# Patient Record
Sex: FEMALE | Race: WHITE | NOT HISPANIC OR LATINO | Employment: OTHER | URBAN - METROPOLITAN AREA
[De-identification: names, ages, dates, MRNs, and addresses within clinical notes are randomized per-mention and may not be internally consistent; named-entity substitution may affect disease eponyms.]

---

## 2017-01-20 ENCOUNTER — INFUSION (OUTPATIENT)
Dept: ONCOLOGY | Facility: HOSPITAL | Age: 67
End: 2017-01-20

## 2017-01-20 VITALS
WEIGHT: 144.1 LBS | DIASTOLIC BLOOD PRESSURE: 78 MMHG | BODY MASS INDEX: 22.57 KG/M2 | SYSTOLIC BLOOD PRESSURE: 143 MMHG | HEART RATE: 66 BPM | TEMPERATURE: 98.3 F | RESPIRATION RATE: 18 BRPM

## 2017-01-20 DIAGNOSIS — G35 MULTIPLE SCLEROSIS (HCC): Primary | ICD-10-CM

## 2017-01-20 PROCEDURE — 96365 THER/PROPH/DIAG IV INF INIT: CPT

## 2017-01-20 PROCEDURE — 96413 CHEMO IV INFUSION 1 HR: CPT

## 2017-01-20 PROCEDURE — 25010000002 NATALIZUMAB PER 1 MG: Performed by: PSYCHIATRY & NEUROLOGY

## 2017-01-20 PROCEDURE — 63710000001 DIPHENHYDRAMINE PER 50 MG: Performed by: PSYCHIATRY & NEUROLOGY

## 2017-01-20 RX ORDER — DIPHENHYDRAMINE HCL 25 MG
25 CAPSULE ORAL ONCE
Status: COMPLETED | OUTPATIENT
Start: 2017-01-20 | End: 2017-01-20

## 2017-01-20 RX ORDER — DIPHENHYDRAMINE HCL 25 MG
25 CAPSULE ORAL ONCE
Status: CANCELLED | OUTPATIENT
Start: 2017-02-01

## 2017-01-20 RX ORDER — ACETAMINOPHEN 325 MG/1
650 TABLET ORAL ONCE
Status: CANCELLED | OUTPATIENT
Start: 2017-02-01

## 2017-01-20 RX ORDER — ACETAMINOPHEN 325 MG/1
650 TABLET ORAL ONCE
Status: COMPLETED | OUTPATIENT
Start: 2017-01-20 | End: 2017-01-20

## 2017-01-20 RX ORDER — SODIUM CHLORIDE 9 MG/ML
250 INJECTION, SOLUTION INTRAVENOUS ONCE
Status: CANCELLED | OUTPATIENT
Start: 2017-02-01

## 2017-01-20 RX ADMIN — ACETAMINOPHEN 650 MG: 325 TABLET ORAL at 10:03

## 2017-01-20 RX ADMIN — NATALIZUMAB 300 MG: 300 INJECTION INTRAVENOUS at 10:05

## 2017-01-20 RX ADMIN — DIPHENHYDRAMINE HYDROCHLORIDE 25 MG: 25 CAPSULE ORAL at 10:04

## 2017-02-20 ENCOUNTER — INFUSION (OUTPATIENT)
Dept: ONCOLOGY | Facility: HOSPITAL | Age: 67
End: 2017-02-20

## 2017-02-20 ENCOUNTER — APPOINTMENT (OUTPATIENT)
Dept: LAB | Facility: HOSPITAL | Age: 67
End: 2017-02-20

## 2017-02-20 ENCOUNTER — OFFICE VISIT (OUTPATIENT)
Dept: NEUROLOGY | Facility: CLINIC | Age: 67
End: 2017-02-20

## 2017-02-20 VITALS
SYSTOLIC BLOOD PRESSURE: 175 MMHG | HEART RATE: 66 BPM | TEMPERATURE: 97.5 F | RESPIRATION RATE: 18 BRPM | DIASTOLIC BLOOD PRESSURE: 94 MMHG

## 2017-02-20 VITALS
OXYGEN SATURATION: 92 % | HEART RATE: 58 BPM | SYSTOLIC BLOOD PRESSURE: 154 MMHG | DIASTOLIC BLOOD PRESSURE: 84 MMHG | WEIGHT: 148.6 LBS | HEIGHT: 67 IN | BODY MASS INDEX: 23.32 KG/M2

## 2017-02-20 DIAGNOSIS — G35 MULTIPLE SCLEROSIS (HCC): Primary | ICD-10-CM

## 2017-02-20 LAB
ALBUMIN SERPL-MCNC: 3.8 G/DL (ref 3.2–4.8)
ALBUMIN/GLOB SERPL: 1.2 G/DL (ref 1.5–2.5)
ALP SERPL-CCNC: 50 U/L (ref 25–100)
ALT SERPL W P-5'-P-CCNC: 18 U/L (ref 7–40)
ANION GAP SERPL CALCULATED.3IONS-SCNC: 3 MMOL/L (ref 3–11)
AST SERPL-CCNC: 19 U/L (ref 0–33)
BASOPHILS # BLD AUTO: 0.03 10*3/MM3 (ref 0–0.2)
BASOPHILS NFR BLD AUTO: 0.2 % (ref 0–1)
BILIRUB SERPL-MCNC: 0.3 MG/DL (ref 0.3–1.2)
BUN BLD-MCNC: 18 MG/DL (ref 9–23)
BUN/CREAT SERPL: 20 (ref 7–25)
CALCIUM SPEC-SCNC: 9.4 MG/DL (ref 8.7–10.4)
CHLORIDE SERPL-SCNC: 107 MMOL/L (ref 99–109)
CO2 SERPL-SCNC: 30 MMOL/L (ref 20–31)
CREAT BLD-MCNC: 0.9 MG/DL (ref 0.6–1.3)
DEPRECATED RDW RBC AUTO: 52.1 FL (ref 37–54)
EOSINOPHIL # BLD AUTO: 0.15 10*3/MM3 (ref 0.1–0.3)
EOSINOPHIL NFR BLD AUTO: 0.9 % (ref 0–3)
ERYTHROCYTE [DISTWIDTH] IN BLOOD BY AUTOMATED COUNT: 15.1 % (ref 11.3–14.5)
GFR SERPL CREATININE-BSD FRML MDRD: 63 ML/MIN/1.73
GLOBULIN UR ELPH-MCNC: 3.1 GM/DL
GLUCOSE BLD-MCNC: 107 MG/DL (ref 70–100)
HCT VFR BLD AUTO: 35.4 % (ref 34.5–44)
HGB BLD-MCNC: 11.5 G/DL (ref 11.5–15.5)
IMM GRANULOCYTES # BLD: 0.16 10*3/MM3 (ref 0–0.03)
IMM GRANULOCYTES NFR BLD: 0.9 % (ref 0–0.6)
LYMPHOCYTES # BLD AUTO: 8.38 10*3/MM3 (ref 0.6–4.8)
LYMPHOCYTES NFR BLD AUTO: 48.3 % (ref 24–44)
MCH RBC QN AUTO: 30.7 PG (ref 27–31)
MCHC RBC AUTO-ENTMCNC: 32.5 G/DL (ref 32–36)
MCV RBC AUTO: 94.7 FL (ref 80–99)
MONOCYTES # BLD AUTO: 1.05 10*3/MM3 (ref 0–1)
MONOCYTES NFR BLD AUTO: 6 % (ref 0–12)
NEUTROPHILS # BLD AUTO: 7.59 10*3/MM3 (ref 1.5–8.3)
NEUTROPHILS NFR BLD AUTO: 43.7 % (ref 41–71)
NRBC BLD MANUAL-RTO: 0.5 /100 WBC (ref 0–0)
PLATELET # BLD AUTO: 391 10*3/MM3 (ref 150–450)
PMV BLD AUTO: 9.9 FL (ref 6–12)
POTASSIUM BLD-SCNC: 3.9 MMOL/L (ref 3.5–5.5)
PROT SERPL-MCNC: 6.9 G/DL (ref 5.7–8.2)
RBC # BLD AUTO: 3.74 10*6/MM3 (ref 3.89–5.14)
SODIUM BLD-SCNC: 140 MMOL/L (ref 132–146)
WBC NRBC COR # BLD: 17.36 10*3/MM3 (ref 3.5–10.8)

## 2017-02-20 PROCEDURE — 63710000001 DIPHENHYDRAMINE PER 50 MG: Performed by: PSYCHIATRY & NEUROLOGY

## 2017-02-20 PROCEDURE — 80053 COMPREHEN METABOLIC PANEL: CPT | Performed by: PSYCHIATRY & NEUROLOGY

## 2017-02-20 PROCEDURE — 96365 THER/PROPH/DIAG IV INF INIT: CPT

## 2017-02-20 PROCEDURE — 96413 CHEMO IV INFUSION 1 HR: CPT

## 2017-02-20 PROCEDURE — 99212 OFFICE O/P EST SF 10 MIN: CPT | Performed by: PSYCHIATRY & NEUROLOGY

## 2017-02-20 PROCEDURE — 36415 COLL VENOUS BLD VENIPUNCTURE: CPT | Performed by: PSYCHIATRY & NEUROLOGY

## 2017-02-20 PROCEDURE — 25010000002 NATALIZUMAB PER 1 MG: Performed by: PSYCHIATRY & NEUROLOGY

## 2017-02-20 PROCEDURE — 85025 COMPLETE CBC W/AUTO DIFF WBC: CPT | Performed by: PSYCHIATRY & NEUROLOGY

## 2017-02-20 RX ORDER — ACETAMINOPHEN 325 MG/1
650 TABLET ORAL ONCE
Status: COMPLETED | OUTPATIENT
Start: 2017-02-20 | End: 2017-02-20

## 2017-02-20 RX ORDER — ACETAMINOPHEN 325 MG/1
650 TABLET ORAL ONCE
Status: CANCELLED | OUTPATIENT
Start: 2017-03-01

## 2017-02-20 RX ORDER — DEXTROMETHORPHAN HYDROBROMIDE AND PROMETHAZINE HYDROCHLORIDE 15; 6.25 MG/5ML; MG/5ML
SYRUP ORAL
COMMUNITY
Start: 2017-01-23 | End: 2017-07-05

## 2017-02-20 RX ORDER — SODIUM CHLORIDE 9 MG/ML
250 INJECTION, SOLUTION INTRAVENOUS ONCE
Status: CANCELLED | OUTPATIENT
Start: 2017-03-01

## 2017-02-20 RX ORDER — DIPHENHYDRAMINE HCL 25 MG
25 CAPSULE ORAL ONCE
Status: CANCELLED | OUTPATIENT
Start: 2017-03-01

## 2017-02-20 RX ORDER — DIPHENHYDRAMINE HCL 25 MG
25 CAPSULE ORAL ONCE
Status: COMPLETED | OUTPATIENT
Start: 2017-02-20 | End: 2017-02-20

## 2017-02-20 RX ORDER — PREDNISONE 10 MG/1
TABLET ORAL
COMMUNITY
Start: 2017-02-10 | End: 2017-02-20

## 2017-02-20 RX ADMIN — DIPHENHYDRAMINE HYDROCHLORIDE 25 MG: 25 CAPSULE ORAL at 09:49

## 2017-02-20 RX ADMIN — ACETAMINOPHEN 650 MG: 325 TABLET ORAL at 09:49

## 2017-02-20 RX ADMIN — NATALIZUMAB 300 MG: 300 INJECTION INTRAVENOUS at 09:50

## 2017-02-20 NOTE — PROGRESS NOTES
Subjective:     Patient ID: Leatha Wall is a 66 y.o. female.    History of Present Illness     65 yo woman returns in follow for RRMS and migraines.  Last visit on 12/8/16 started Tysabri and ordered JCV.    JCV 2.21    Tb negative  AST 46  ALT 44  CBC - NCS       First infusion of Tysabri on 1/20/17.  Noticed increased stamina and improved strength in left leg.  Balance has improved.  Right eye vision stabilized.  Tongue numbness markedly decreased.  Fatigue is mild.        Migraines have resolved.       PMH:    Fatigue and heat are bothersome.  Echo -   EF 60%  C U/S -  0-49% on right, no stenosis on left    MRI cervical, my review, unremarkable  Labs below:    NMO negative  Hypercoaguable panel WNL  Vit D 17.2    Fatigue continues to wax and wane.  Last few days has had unsteady gait. Weakness on left arm/leg.  Numbness in left arm and right foot.  Left side of tongue numb for the last 7 to 14 weeks.   Continue to smoke Tob 1 ppd.     Patient called and noted overwhelming fatigue.  After steroids fatigue improved but then recurred on 8/5/16.  Frequent HA's every other day frontal and occipital location of squeezing sensation of moderate severity.  Prednisone taper rx on 7/22/16 improved energy.      6/22/16 awoke at 3:30 am and noted left sided weakness in arm and leg.  Gait was unsteady and worsened over the next 7 days and presented to [facility] ED.  Fatigue was overwhelming.  MRI Brain in ED, my review, with multiple T2 white matter lesions and one large black hole in left PVWM.  Given steroids in ED with improvement in sx by 50%.    5/2016 had episode of left arm clumsiness for a week.      Reports increased fatigue since the first of the year.      Vision is fuzzy and feel swollen.      Bladder frequency.     Tob 1 ppd.    The following portions of the patient's history were reviewed and updated as appropriate: allergies, current medications, past family history, past medical history, past social history, past  "surgical history and problem list.    Review of Systems   Constitutional: Positive for fatigue. Negative for activity change and unexpected weight change.   HENT: Negative for tinnitus and trouble swallowing.    Eyes: Negative for photophobia and visual disturbance.   Respiratory: Negative for apnea and choking.    Cardiovascular: Negative for leg swelling.   Endocrine: Negative for cold intolerance and heat intolerance.   Genitourinary: Positive for frequency and urgency. Negative for difficulty urinating and menstrual problem.   Musculoskeletal: Positive for gait problem. Negative for back pain.   Skin: Negative for color change.   Allergic/Immunologic: Negative for immunocompromised state.   Neurological: Positive for dizziness and tremors. Negative for seizures, syncope, facial asymmetry, speech difficulty and light-headedness.   Hematological: Negative for adenopathy. Does not bruise/bleed easily.   Psychiatric/Behavioral: Positive for decreased concentration. Negative for behavioral problems, confusion, hallucinations and sleep disturbance.        Objective:  Vitals:    02/20/17 1143   BP: 154/84   Pulse: 58   SpO2: 92%   Weight: 148 lb 9.6 oz (67.4 kg)   Height: 67\" (170.2 cm)       Neurologic Exam     Mental Status   Registration: recalls 3 of 3 objects. Recall at 5 minutes: recalls 3 of 3 objects. Follows 3 step commands.   Attention: normal. Concentration: normal.   Level of consciousness: alert  Knowledge: good and consistent with education.   Able to name object. Able to read. Able to repeat. Able to write. Normal comprehension.     Cranial Nerves     CN II   Visual fields full to confrontation.   Visual acuity: normal  Right visual field deficit: none  Left visual field deficit: none     CN III, IV, VI   Right pupil: Shape: regular. Reactivity: brisk. Consensual response: intact.   Left pupil: Shape: regular. Reactivity: brisk. Consensual response: intact.   Nystagmus: none   Diplopia: " none  Ophthalmoparesis: none  Upgaze: normal  Downgaze: normal  Conjugate gaze: present  Vestibulo-ocular reflex: present    CN V   Right facial sensation deficit: forehead  Left facial sensation deficit: forehead  Right corneal reflex: normal  Left corneal reflex: normal    CN VII   Right facial weakness: none  Left facial weakness: none    CN VIII   Hearing: intact    CN IX, X   Palate: symmetric  Right gag reflex: normal  Left gag reflex: normal    CN XI   Right sternocleidomastoid strength: normal  Left sternocleidomastoid strength: normal    CN XII   Tongue: not atrophic  Fasciculations: absent  Tongue deviation: none    Motor Exam   Muscle bulk: normal  Overall muscle tone: normal  Right arm tone: normal  Left arm tone: normal  Right leg tone: normal  Left leg tone: normal    Strength   Right neck flexion: 5/5  Left neck flexion: 5/5  Right neck extension: 5/5  Left neck extension: 5/5  Right deltoid: 5/5  Left deltoid: 5/5  Right biceps: 5/5  Left biceps: 5/5  Right triceps: 5/5  Left triceps: 5/5  Right wrist flexion: 5/5  Left wrist flexion: 5/5  Right wrist extension: 5/5  Left wrist extension: 5/5  Right interossei: 5/5  Left interossei: 5/5  Right abdominals: 5/5  Left abdominals: 5/5  Right iliopsoas: 5/5  Left iliopsoas: 5/5  Right quadriceps: 5/5  Left quadriceps: 5/5  Right hamstrin/5  Left hamstrin/5  Right glutei: 5/5  Left glutei: 5/5  Right anterior tibial: 5/5  Left anterior tibial: 5/5  Right posterior tibial: 5/5  Left posterior tibial: 5/5  Right peroneal: 5/5  Left peroneal: 5/5  Right gastroc: 5/5  Left gastroc: 5/5    Sensory Exam   Right arm light touch: normal  Right leg light touch: normal  Right arm vibration: normal  Right leg vibration: normal  Right arm proprioception: normal  Right leg proprioception: normal  Right arm pinprick: normal  Right leg pinprick: normal  Sensory deficit distribution on left: C6    Gait, Coordination, and Reflexes     Gait  Gait: circumduction  (left leg)    Tremor   Resting tremor: absent  Intention tremor: absent  Action tremor: absent    Reflexes   Right ankle clonus: absent  Left ankle clonus: absent      Physical Exam   Constitutional: She appears well-developed and well-nourished.   Nursing note and vitals reviewed.    Office Visit on 10/06/2016   Component Date Value Ref Range Status   • Glucose 10/06/2016 98  70 - 100 mg/dL Final   • BUN 10/06/2016 10  9 - 23 mg/dL Final   • Creatinine 10/06/2016 0.80  0.60 - 1.30 mg/dL Final   • Sodium 10/06/2016 136  132 - 146 mmol/L Final   • Potassium 10/06/2016 5.3  3.5 - 5.5 mmol/L Final   • Chloride 10/06/2016 104  99 - 109 mmol/L Final   • CO2 10/06/2016 30.0  20.0 - 31.0 mmol/L Final   • Calcium 10/06/2016 9.9  8.7 - 10.4 mg/dL Final   • Total Protein 10/06/2016 8.4* 5.7 - 8.2 g/dL Final   • Albumin 10/06/2016 4.20  3.20 - 4.80 g/dL Final   • ALT (SGPT) 10/06/2016 44* 7 - 40 U/L Final   • AST (SGOT) 10/06/2016 46* 0 - 33 U/L Final   • Alkaline Phosphatase 10/06/2016 69  25 - 100 U/L Final   • Total Bilirubin 10/06/2016 0.5  0.3 - 1.2 mg/dL Final   • eGFR Non African Amer 10/06/2016 72  >60 mL/min/1.73 Final   • Globulin 10/06/2016 4.2  gm/dL Final   • A/G Ratio 10/06/2016 1.0  g/dL Final   • BUN/Creatinine Ratio 10/06/2016 12.5  7.0 - 25.0 Final   • Anion Gap 10/06/2016 2.0* 3.0 - 11.0 mmol/L Final   • QuantiFERON-TB Gold In Tube 10/06/2016 Negative  Negative Final    The specimen received for QuantiFERON testing was incubated by the  ordering institution.  Specific procedures outlined in our Directory  of Services and in the package insert for the QuantiFERON Gold  (In Tube) test must be followed to enable for proper stimulation of  cells for the production of interferon gamma.   • QuantiFERON Criteria 10/06/2016 Comment   Final    To be considered positive a specimen should have a TB Ag minus Nil  value greater than or equal to 0.35 IU/mL and in addition the TB Ag  minus Nil value must be greater than or  equal to 25% of the Nil  value. There may be insufficient information in these values to  differentiate between some negative and some indeterminate test  values.   • QuantiFERON TB Ag Value 10/06/2016 0.04  IU/mL Final   • QuantiFERON Nil Value 10/06/2016 0.05  IU/mL Final   • QuantiFERON Mitogen Value 10/06/2016 >10.00  IU/mL Final   • QFT TB Ag minus Nil Value 10/06/2016 <0.00  IU/mL Final   • Interpretation 10/06/2016 Comment   Final    The QuantiFERON TB Gold (in Tube) assay is intended for use as an aid  in the diagnosis of TB infection. Negative results suggest that there  is no TB infection. In patients with high suspicion of exposure, a  negative test should be repeated. A positive test indicates infection  with Mycobacterium tuberculosis. Among individuals without  tuberculosis infection, a positive test may be due to exposure to  M. kansasii, M. szulgai or M. marinum. On the Internet, go to  cdc.gov/tb for further details.   • WBC 10/06/2016 10.83* 3.50 - 10.80 10*3/mm3 Final   • RBC 10/06/2016 4.57  3.89 - 5.14 10*6/mm3 Final   • Hemoglobin 10/06/2016 14.0  11.5 - 15.5 g/dL Final   • Hematocrit 10/06/2016 42.1  34.5 - 44.0 % Final   • MCV 10/06/2016 92.1  80.0 - 99.0 fL Final   • MCH 10/06/2016 30.6  27.0 - 31.0 pg Final   • MCHC 10/06/2016 33.3  32.0 - 36.0 g/dL Final   • RDW 10/06/2016 13.4  11.3 - 14.5 % Final   • RDW-SD 10/06/2016 44.9  37.0 - 54.0 fl Final   • MPV 10/06/2016 10.0  6.0 - 12.0 fL Final   • Platelets 10/06/2016 368  150 - 450 10*3/mm3 Final   • Neutrophil % 10/06/2016 65.7  41.0 - 71.0 % Final   • Lymphocyte % 10/06/2016 26.7  24.0 - 44.0 % Final   • Monocyte % 10/06/2016 6.3  0.0 - 12.0 % Final   • Eosinophil % 10/06/2016 0.8  0.0 - 3.0 % Final   • Basophil % 10/06/2016 0.3  0.0 - 1.0 % Final   • Immature Grans % 10/06/2016 0.2  0.0 - 0.6 % Final   • Neutrophils, Absolute 10/06/2016 7.12  1.50 - 8.30 10*3/mm3 Final   • Lymphocytes, Absolute 10/06/2016 2.89  0.60 - 4.80 10*3/mm3 Final    • Monocytes, Absolute 10/06/2016 0.68  0.00 - 1.00 10*3/mm3 Final   • Eosinophils, Absolute 10/06/2016 0.09* 0.10 - 0.30 10*3/mm3 Final   • Basophils, Absolute 10/06/2016 0.03  0.00 - 0.20 10*3/mm3 Final   • Immature Grans, Absolute 10/06/2016 0.02  0.00 - 0.03 10*3/mm3 Final     Assessment/Plan:       Problems Addressed this Visit        Nervous and Auditory    Multiple sclerosis - Primary     Marked improvement after one infusion of Tsyabri    CBC, CMP           Relevant Orders    CBC & Differential    Comprehensive Metabolic Panel

## 2017-02-24 ENCOUNTER — TELEPHONE (OUTPATIENT)
Dept: NEUROLOGY | Facility: CLINIC | Age: 67
End: 2017-02-24

## 2017-02-24 NOTE — TELEPHONE ENCOUNTER
----- Message from Alesha Nguyen sent at 2/24/2017 11:46 AM EST -----  Regarding: sweating/stomach upset  Contact: [phone]  Patient states she is experiencing cold sweats and upset stomach. She states she is having bloody watery bowel movements for two days.

## 2017-03-20 ENCOUNTER — INFUSION (OUTPATIENT)
Dept: ONCOLOGY | Facility: HOSPITAL | Age: 67
End: 2017-03-20

## 2017-03-20 VITALS
SYSTOLIC BLOOD PRESSURE: 151 MMHG | BODY MASS INDEX: 22.66 KG/M2 | WEIGHT: 144.7 LBS | DIASTOLIC BLOOD PRESSURE: 77 MMHG | HEART RATE: 61 BPM | RESPIRATION RATE: 16 BRPM

## 2017-03-20 DIAGNOSIS — G35 MULTIPLE SCLEROSIS (HCC): Primary | ICD-10-CM

## 2017-03-20 PROCEDURE — 25010000002 NATALIZUMAB PER 1 MG: Performed by: PSYCHIATRY & NEUROLOGY

## 2017-03-20 PROCEDURE — 96413 CHEMO IV INFUSION 1 HR: CPT

## 2017-03-20 PROCEDURE — 96365 THER/PROPH/DIAG IV INF INIT: CPT

## 2017-03-20 PROCEDURE — 63710000001 DIPHENHYDRAMINE PER 50 MG: Performed by: PSYCHIATRY & NEUROLOGY

## 2017-03-20 RX ORDER — ACETAMINOPHEN 325 MG/1
650 TABLET ORAL ONCE
Status: CANCELLED | OUTPATIENT
Start: 2017-04-01

## 2017-03-20 RX ORDER — SODIUM CHLORIDE 9 MG/ML
250 INJECTION, SOLUTION INTRAVENOUS ONCE
Status: CANCELLED | OUTPATIENT
Start: 2017-04-01

## 2017-03-20 RX ORDER — DIPHENHYDRAMINE HCL 25 MG
25 CAPSULE ORAL ONCE
Status: COMPLETED | OUTPATIENT
Start: 2017-03-20 | End: 2017-03-20

## 2017-03-20 RX ORDER — DIPHENHYDRAMINE HCL 25 MG
25 CAPSULE ORAL ONCE
Status: CANCELLED | OUTPATIENT
Start: 2017-04-01

## 2017-03-20 RX ORDER — ACETAMINOPHEN 325 MG/1
650 TABLET ORAL ONCE
Status: COMPLETED | OUTPATIENT
Start: 2017-03-20 | End: 2017-03-20

## 2017-03-20 RX ADMIN — DIPHENHYDRAMINE HYDROCHLORIDE 25 MG: 25 CAPSULE ORAL at 09:58

## 2017-03-20 RX ADMIN — NATALIZUMAB 300 MG: 300 INJECTION INTRAVENOUS at 10:03

## 2017-03-20 RX ADMIN — ACETAMINOPHEN 650 MG: 325 TABLET, FILM COATED ORAL at 09:58

## 2017-04-17 ENCOUNTER — INFUSION (OUTPATIENT)
Dept: ONCOLOGY | Facility: HOSPITAL | Age: 67
End: 2017-04-17

## 2017-04-17 VITALS
SYSTOLIC BLOOD PRESSURE: 165 MMHG | RESPIRATION RATE: 20 BRPM | DIASTOLIC BLOOD PRESSURE: 82 MMHG | HEART RATE: 65 BPM | TEMPERATURE: 97.5 F

## 2017-04-17 DIAGNOSIS — G35 MULTIPLE SCLEROSIS (HCC): Primary | ICD-10-CM

## 2017-04-17 PROCEDURE — 96413 CHEMO IV INFUSION 1 HR: CPT

## 2017-04-17 PROCEDURE — 96365 THER/PROPH/DIAG IV INF INIT: CPT

## 2017-04-17 PROCEDURE — 25010000002 NATALIZUMAB PER 1 MG: Performed by: PSYCHIATRY & NEUROLOGY

## 2017-04-17 PROCEDURE — 63710000001 DIPHENHYDRAMINE PER 50 MG: Performed by: PSYCHIATRY & NEUROLOGY

## 2017-04-17 RX ORDER — ACETAMINOPHEN 325 MG/1
650 TABLET ORAL ONCE
Status: COMPLETED | OUTPATIENT
Start: 2017-04-17 | End: 2017-04-17

## 2017-04-17 RX ORDER — DIPHENHYDRAMINE HCL 25 MG
25 CAPSULE ORAL ONCE
Status: CANCELLED | OUTPATIENT
Start: 2017-05-01

## 2017-04-17 RX ORDER — DIPHENHYDRAMINE HCL 25 MG
25 CAPSULE ORAL ONCE
Status: COMPLETED | OUTPATIENT
Start: 2017-04-17 | End: 2017-04-17

## 2017-04-17 RX ORDER — SODIUM CHLORIDE 9 MG/ML
250 INJECTION, SOLUTION INTRAVENOUS ONCE
Status: CANCELLED | OUTPATIENT
Start: 2017-05-01

## 2017-04-17 RX ORDER — SODIUM CHLORIDE 9 MG/ML
250 INJECTION, SOLUTION INTRAVENOUS ONCE
Status: DISCONTINUED | OUTPATIENT
Start: 2017-04-17 | End: 2017-04-17 | Stop reason: HOSPADM

## 2017-04-17 RX ORDER — ACETAMINOPHEN 325 MG/1
650 TABLET ORAL ONCE
Status: CANCELLED | OUTPATIENT
Start: 2017-05-01

## 2017-04-17 RX ADMIN — NATALIZUMAB 300 MG: 300 INJECTION INTRAVENOUS at 10:03

## 2017-04-17 RX ADMIN — ACETAMINOPHEN 650 MG: 325 TABLET ORAL at 09:32

## 2017-04-17 RX ADMIN — DIPHENHYDRAMINE HYDROCHLORIDE 25 MG: 25 CAPSULE ORAL at 09:31

## 2017-05-17 ENCOUNTER — INFUSION (OUTPATIENT)
Dept: ONCOLOGY | Facility: HOSPITAL | Age: 67
End: 2017-05-17

## 2017-05-17 VITALS
DIASTOLIC BLOOD PRESSURE: 76 MMHG | TEMPERATURE: 97.6 F | RESPIRATION RATE: 18 BRPM | HEART RATE: 65 BPM | SYSTOLIC BLOOD PRESSURE: 152 MMHG

## 2017-05-17 DIAGNOSIS — G35 MULTIPLE SCLEROSIS (HCC): Primary | ICD-10-CM

## 2017-05-17 RX ORDER — SODIUM CHLORIDE 9 MG/ML
250 INJECTION, SOLUTION INTRAVENOUS ONCE
Status: CANCELLED | OUTPATIENT
Start: 2017-05-17

## 2017-05-17 RX ORDER — ACETAMINOPHEN 325 MG/1
650 TABLET ORAL ONCE
Status: CANCELLED | OUTPATIENT
Start: 2017-05-17

## 2017-05-17 RX ORDER — ACETAMINOPHEN 325 MG/1
650 TABLET ORAL ONCE
Status: COMPLETED | OUTPATIENT
Start: 2017-05-17 | End: 2017-05-17

## 2017-05-17 RX ORDER — DIPHENHYDRAMINE HCL 25 MG
25 CAPSULE ORAL ONCE
Status: CANCELLED | OUTPATIENT
Start: 2017-05-17

## 2017-05-17 RX ORDER — DIPHENHYDRAMINE HCL 25 MG
25 CAPSULE ORAL ONCE
Status: COMPLETED | OUTPATIENT
Start: 2017-05-17 | End: 2017-05-17

## 2017-05-17 RX ADMIN — NATALIZUMAB 300 MG: 300 INJECTION INTRAVENOUS at 09:43

## 2017-05-17 RX ADMIN — DIPHENHYDRAMINE HYDROCHLORIDE 25 MG: 25 CAPSULE ORAL at 09:38

## 2017-05-17 RX ADMIN — ACETAMINOPHEN 650 MG: 325 TABLET ORAL at 09:39

## 2017-06-09 ENCOUNTER — OFFICE VISIT (OUTPATIENT)
Dept: NEUROLOGY | Facility: CLINIC | Age: 67
End: 2017-06-09

## 2017-06-09 VITALS
DIASTOLIC BLOOD PRESSURE: 76 MMHG | WEIGHT: 143 LBS | HEART RATE: 62 BPM | BODY MASS INDEX: 22.4 KG/M2 | OXYGEN SATURATION: 98 % | SYSTOLIC BLOOD PRESSURE: 122 MMHG

## 2017-06-09 DIAGNOSIS — G35 MULTIPLE SCLEROSIS (HCC): Primary | ICD-10-CM

## 2017-06-09 DIAGNOSIS — G43.C0 PERIODIC HEADACHE SYNDROME, NOT INTRACTABLE: ICD-10-CM

## 2017-06-09 PROCEDURE — 99214 OFFICE O/P EST MOD 30 MIN: CPT | Performed by: PSYCHIATRY & NEUROLOGY

## 2017-06-09 NOTE — ASSESSMENT & PLAN NOTE
MSFC acceptable on Tysabri    MRI Brain   CBC, CMP, JCV ab  Refer to PT in [facility]

## 2017-06-14 ENCOUNTER — INFUSION (OUTPATIENT)
Dept: ONCOLOGY | Facility: HOSPITAL | Age: 67
End: 2017-06-14

## 2017-06-14 VITALS
RESPIRATION RATE: 18 BRPM | HEART RATE: 63 BPM | DIASTOLIC BLOOD PRESSURE: 83 MMHG | TEMPERATURE: 97.6 F | SYSTOLIC BLOOD PRESSURE: 151 MMHG

## 2017-06-14 DIAGNOSIS — G35 MULTIPLE SCLEROSIS (HCC): Primary | ICD-10-CM

## 2017-06-14 PROCEDURE — 96365 THER/PROPH/DIAG IV INF INIT: CPT

## 2017-06-14 PROCEDURE — 63710000001 DIPHENHYDRAMINE PER 50 MG: Performed by: PSYCHIATRY & NEUROLOGY

## 2017-06-14 PROCEDURE — 96413 CHEMO IV INFUSION 1 HR: CPT

## 2017-06-14 PROCEDURE — 25010000002 NATALIZUMAB PER 1 MG: Performed by: PSYCHIATRY & NEUROLOGY

## 2017-06-14 RX ORDER — DIPHENHYDRAMINE HCL 25 MG
25 CAPSULE ORAL ONCE
Status: CANCELLED | OUTPATIENT
Start: 2017-06-14

## 2017-06-14 RX ORDER — ACETAMINOPHEN 325 MG/1
650 TABLET ORAL ONCE
Status: COMPLETED | OUTPATIENT
Start: 2017-06-14 | End: 2017-06-14

## 2017-06-14 RX ORDER — SODIUM CHLORIDE 9 MG/ML
250 INJECTION, SOLUTION INTRAVENOUS ONCE
Status: CANCELLED | OUTPATIENT
Start: 2017-06-14

## 2017-06-14 RX ORDER — ACETAMINOPHEN 325 MG/1
650 TABLET ORAL ONCE
Status: CANCELLED | OUTPATIENT
Start: 2017-06-14

## 2017-06-14 RX ORDER — DIPHENHYDRAMINE HCL 25 MG
25 CAPSULE ORAL ONCE
Status: COMPLETED | OUTPATIENT
Start: 2017-06-14 | End: 2017-06-14

## 2017-06-14 RX ADMIN — NATALIZUMAB 300 MG: 300 INJECTION INTRAVENOUS at 10:00

## 2017-06-14 RX ADMIN — ACETAMINOPHEN 650 MG: 325 TABLET ORAL at 09:32

## 2017-06-14 RX ADMIN — DIPHENHYDRAMINE HYDROCHLORIDE 25 MG: 25 CAPSULE ORAL at 09:32

## 2017-06-21 ENCOUNTER — HOSPITAL ENCOUNTER (OUTPATIENT)
Dept: MRI IMAGING | Facility: HOSPITAL | Age: 67
Discharge: HOME | End: 2017-06-21
Attending: PSYCHIATRY & NEUROLOGY | Admitting: PSYCHIATRY & NEUROLOGY

## 2017-06-21 PROCEDURE — A9577 INJ MULTIHANCE: HCPCS | Performed by: PSYCHIATRY & NEUROLOGY

## 2017-06-21 PROCEDURE — 0 GADOBENATE DIMEGLUMINE 529 MG/ML SOLUTION: Performed by: PSYCHIATRY & NEUROLOGY

## 2017-06-21 PROCEDURE — 70553 MRI BRAIN STEM W/O & W/DYE: CPT

## 2017-06-21 RX ADMIN — GADOBENATE DIMEGLUMINE 13 ML: 529 INJECTION, SOLUTION INTRAVENOUS at 11:56

## 2017-07-05 ENCOUNTER — OFFICE VISIT (OUTPATIENT)
Dept: NEUROLOGY | Facility: CLINIC | Age: 67
End: 2017-07-05

## 2017-07-05 VITALS
OXYGEN SATURATION: 99 % | WEIGHT: 141 LBS | HEIGHT: 67 IN | SYSTOLIC BLOOD PRESSURE: 126 MMHG | HEART RATE: 66 BPM | BODY MASS INDEX: 22.13 KG/M2 | DIASTOLIC BLOOD PRESSURE: 74 MMHG

## 2017-07-05 DIAGNOSIS — G43.C0 PERIODIC HEADACHE SYNDROME, NOT INTRACTABLE: ICD-10-CM

## 2017-07-05 DIAGNOSIS — G35 MULTIPLE SCLEROSIS (HCC): Primary | ICD-10-CM

## 2017-07-05 PROCEDURE — 99214 OFFICE O/P EST MOD 30 MIN: CPT | Performed by: PSYCHIATRY & NEUROLOGY

## 2017-07-05 NOTE — PROGRESS NOTES
Subjective:     Patient ID: Leatha Wall is a 67 y.o. female.    History of Present Illness     67 yo woman returns in follow for RRMS and migraines.  Last visit on 6/9/17 continue Tysabri and ordered MRI Brain and  labs.      12/13/16:  JCV 2.21  6/27/17    JCV 1.87   6/27/17:  Cr 1.19, WBC 13.8  - NCS   MRI Brain my review of films moderate T2 lesion load with decrease number and size of lesions, no enhancing lesions as compared to 6/29/16    RRMS    Fvie total Tysabri infusions.  Left arm continues with feeling of heaviness.  Starting PT this Friday in [facility].  No heat intolerance.     Problem history:    MSFC remarkable for 26/110 o/w within normal results.  Oral numbness resolved.  Bladder urgency and frequency slight increase.    Mild N/T in bottom of feet     Migraines         Migraine frequency is 1 to 2 times a week  IBU aborts in 45 minutes.  Located in occiput and behind eyes.  Quality of tightness and throbbing.  Mild severity.       PMH:    Fatigue and heat are bothersome.  Echo -   EF 60%  C U/S -  0-49% on right, no stenosis on left    MRI cervical, my review, unremarkable  Labs below:    NMO negative  Hypercoaguable panel WNL  Vit D 17.2    Fatigue continues to wax and wane.  Last few days has had unsteady gait. Weakness on left arm/leg.  Numbness in left arm and right foot.  Left side of tongue numb for the last 7 to 14 weeks.   Continue to smoke Tob 1 ppd.     Patient called and noted overwhelming fatigue.  After steroids fatigue improved but then recurred on 8/5/16.  Frequent HA's every other day frontal and occipital location of squeezing sensation of moderate severity.  Prednisone taper rx on 7/22/16 improved energy.      6/22/16 awoke at 3:30 am and noted left sided weakness in arm and leg.  Gait was unsteady and worsened over the next 7 days and presented to [facility] ED.  Fatigue was overwhelming.  MRI Brain in ED, my review, with multiple T2 white matter lesions and one large black hole in  "left PVWM.  Given steroids in ED with improvement in sx by 50%.    5/2016 had episode of left arm clumsiness for a week.      Reports increased fatigue since the first of the year.      Vision is fuzzy and feel swollen.      Bladder frequency.     Tob 1 ppd.    The following portions of the patient's history were reviewed and updated as appropriate: allergies, current medications, past family history, past medical history, past social history, past surgical history and problem list.    Review of Systems   Constitutional: Positive for fatigue. Negative for activity change and unexpected weight change.   HENT: Negative for tinnitus and trouble swallowing.    Eyes: Negative for photophobia and visual disturbance.   Respiratory: Negative for apnea and choking.    Cardiovascular: Negative for leg swelling.   Endocrine: Negative for cold intolerance and heat intolerance.   Genitourinary: Positive for frequency and urgency. Negative for difficulty urinating and menstrual problem.   Musculoskeletal: Positive for gait problem. Negative for back pain.   Skin: Negative for color change.   Allergic/Immunologic: Negative for immunocompromised state.   Neurological: Positive for dizziness and tremors. Negative for seizures, syncope, facial asymmetry, speech difficulty and light-headedness.   Hematological: Negative for adenopathy. Does not bruise/bleed easily.   Psychiatric/Behavioral: Positive for decreased concentration. Negative for behavioral problems, confusion, hallucinations and sleep disturbance.        Objective:  Vitals:    07/05/17 1127   BP: 126/74   Pulse: 66   SpO2: 99%   Weight: 141 lb (64 kg)   Height: 67\" (170.2 cm)       Neurologic Exam     Mental Status   Registration: recalls 3 of 3 objects. Recall at 5 minutes: recalls 3 of 3 objects. Follows 3 step commands.   Attention: normal. Concentration: normal.   Level of consciousness: alert  Knowledge: good and consistent with education.   Able to name object. Able " to read. Able to repeat. Able to write. Normal comprehension.     Cranial Nerves     CN II   Visual fields full to confrontation.   Visual acuity: normal  Right visual field deficit: none  Left visual field deficit: none     CN III, IV, VI   Right pupil: Shape: regular. Reactivity: brisk. Consensual response: intact.   Left pupil: Shape: regular. Reactivity: brisk. Consensual response: intact.   Nystagmus: none   Diplopia: none  Ophthalmoparesis: none  Upgaze: normal  Downgaze: normal  Conjugate gaze: present  Vestibulo-ocular reflex: present    CN V   Right facial sensation deficit: forehead  Left facial sensation deficit: forehead  Right corneal reflex: normal  Left corneal reflex: normal    CN VII   Right facial weakness: none  Left facial weakness: none    CN VIII   Hearing: intact    CN IX, X   Palate: symmetric  Right gag reflex: normal  Left gag reflex: normal    CN XI   Right sternocleidomastoid strength: normal  Left sternocleidomastoid strength: normal    CN XII   Tongue: not atrophic  Fasciculations: absent  Tongue deviation: none    Motor Exam   Muscle bulk: normal  Overall muscle tone: normal  Right arm tone: normal  Left arm tone: normal  Right leg tone: normal  Left leg tone: normal    Strength   Right neck flexion: 5/5  Left neck flexion: 5/5  Right neck extension: 5/5  Left neck extension: 5/5  Right deltoid: 5/5  Left deltoid: 5/5  Right biceps: 5/5  Left biceps: 5/5  Right triceps: 5/5  Left triceps: 5/5  Right wrist flexion: 5/5  Left wrist flexion: 5/5  Right wrist extension: 5/5  Left wrist extension: 5/5  Right interossei: 5/5  Left interossei: 5/5  Right abdominals: 5/5  Left abdominals: 5/5  Right iliopsoas: 5/5  Left iliopsoas: 5/5  Right quadriceps: 5/5  Left quadriceps: 5/5  Right hamstrin/5  Left hamstrin/5  Right glutei: 5/5  Left glutei: 5/5  Right anterior tibial: 5/5  Left anterior tibial: 5/5  Right posterior tibial: 5/5  Left posterior tibial: 5/5  Right peroneal: 5/5  Left  peroneal: 5/5  Right gastroc: 5/5  Left gastroc: 5/5    Sensory Exam   Right arm light touch: normal  Right leg light touch: normal  Right arm vibration: normal  Right leg vibration: normal  Right arm proprioception: normal  Right leg proprioception: normal  Right arm pinprick: normal  Right leg pinprick: normal  Sensory deficit distribution on left: C6    Gait, Coordination, and Reflexes     Gait  Gait: circumduction (left leg)    Tremor   Resting tremor: absent  Intention tremor: absent  Action tremor: absent    Reflexes   Right ankle clonus: absent  Left ankle clonus: absent      Physical Exam   Constitutional: She appears well-developed and well-nourished.   Nursing note and vitals reviewed.    Office Visit on 02/20/2017   Component Date Value Ref Range Status   • Glucose 02/20/2017 107* 70 - 100 mg/dL Final   • BUN 02/20/2017 18  9 - 23 mg/dL Final   • Creatinine 02/20/2017 0.90  0.60 - 1.30 mg/dL Final   • Sodium 02/20/2017 140  132 - 146 mmol/L Final   • Potassium 02/20/2017 3.9  3.5 - 5.5 mmol/L Final   • Chloride 02/20/2017 107  99 - 109 mmol/L Final   • CO2 02/20/2017 30.0  20.0 - 31.0 mmol/L Final   • Calcium 02/20/2017 9.4  8.7 - 10.4 mg/dL Final   • Total Protein 02/20/2017 6.9  5.7 - 8.2 g/dL Final   • Albumin 02/20/2017 3.80  3.20 - 4.80 g/dL Final   • ALT (SGPT) 02/20/2017 18  7 - 40 U/L Final   • AST (SGOT) 02/20/2017 19  0 - 33 U/L Final   • Alkaline Phosphatase 02/20/2017 50  25 - 100 U/L Final   • Total Bilirubin 02/20/2017 0.3  0.3 - 1.2 mg/dL Final   • eGFR Non  Amer 02/20/2017 63  >60 mL/min/1.73 Final   • Globulin 02/20/2017 3.1  gm/dL Final   • A/G Ratio 02/20/2017 1.2* 1.5 - 2.5 g/dL Final   • BUN/Creatinine Ratio 02/20/2017 20.0  7.0 - 25.0 Final   • Anion Gap 02/20/2017 3.0  3.0 - 11.0 mmol/L Final   • WBC 02/20/2017 17.36* 3.50 - 10.80 10*3/mm3 Final   • RBC 02/20/2017 3.74* 3.89 - 5.14 10*6/mm3 Final   • Hemoglobin 02/20/2017 11.5  11.5 - 15.5 g/dL Final   • Hematocrit 02/20/2017  35.4  34.5 - 44.0 % Final   • MCV 02/20/2017 94.7  80.0 - 99.0 fL Final   • MCH 02/20/2017 30.7  27.0 - 31.0 pg Final   • MCHC 02/20/2017 32.5  32.0 - 36.0 g/dL Final   • RDW 02/20/2017 15.1* 11.3 - 14.5 % Final   • RDW-SD 02/20/2017 52.1  37.0 - 54.0 fl Final   • MPV 02/20/2017 9.9  6.0 - 12.0 fL Final   • Platelets 02/20/2017 391  150 - 450 10*3/mm3 Final   • Neutrophil % 02/20/2017 43.7  41.0 - 71.0 % Final   • Lymphocyte % 02/20/2017 48.3* 24.0 - 44.0 % Final   • Monocyte % 02/20/2017 6.0  0.0 - 12.0 % Final   • Eosinophil % 02/20/2017 0.9  0.0 - 3.0 % Final   • Basophil % 02/20/2017 0.2  0.0 - 1.0 % Final   • Immature Grans % 02/20/2017 0.9* 0.0 - 0.6 % Final   • Neutrophils, Absolute 02/20/2017 7.59  1.50 - 8.30 10*3/mm3 Final   • Lymphocytes, Absolute 02/20/2017 8.38* 0.60 - 4.80 10*3/mm3 Final   • Monocytes, Absolute 02/20/2017 1.05* 0.00 - 1.00 10*3/mm3 Final   • Eosinophils, Absolute 02/20/2017 0.15  0.10 - 0.30 10*3/mm3 Final   • Basophils, Absolute 02/20/2017 0.03  0.00 - 0.20 10*3/mm3 Final   • Immature Grans, Absolute 02/20/2017 0.16* 0.00 - 0.03 10*3/mm3 Final   • nRBC 02/20/2017 0.5* 0.0 - 0.0 /100 WBC Final     Assessment/Plan:       Problems Addressed this Visit        Cardiovascular and Mediastinum    Periodic headache syndrome, not intractable     Headaches are improving with treatment.  Continue current treatment regimen.                Nervous and Auditory    Multiple sclerosis - Primary     MRI Brain improved on Tysabri    JCV 1.87    5 total infusions    RTC 6 months

## 2017-07-12 ENCOUNTER — INFUSION (OUTPATIENT)
Dept: ONCOLOGY | Facility: HOSPITAL | Age: 67
End: 2017-07-12

## 2017-07-12 VITALS
SYSTOLIC BLOOD PRESSURE: 158 MMHG | DIASTOLIC BLOOD PRESSURE: 80 MMHG | HEART RATE: 63 BPM | RESPIRATION RATE: 20 BRPM | TEMPERATURE: 98.3 F

## 2017-07-12 DIAGNOSIS — G35 MULTIPLE SCLEROSIS (HCC): Primary | ICD-10-CM

## 2017-07-12 PROCEDURE — 96413 CHEMO IV INFUSION 1 HR: CPT

## 2017-07-12 PROCEDURE — 25010000002 NATALIZUMAB PER 1 MG: Performed by: PSYCHIATRY & NEUROLOGY

## 2017-07-12 PROCEDURE — 63710000001 DIPHENHYDRAMINE PER 50 MG: Performed by: PSYCHIATRY & NEUROLOGY

## 2017-07-12 RX ORDER — DIPHENHYDRAMINE HCL 25 MG
25 CAPSULE ORAL ONCE
Status: COMPLETED | OUTPATIENT
Start: 2017-07-12 | End: 2017-07-12

## 2017-07-12 RX ORDER — ACETAMINOPHEN 325 MG/1
650 TABLET ORAL ONCE
Status: COMPLETED | OUTPATIENT
Start: 2017-07-12 | End: 2017-07-12

## 2017-07-12 RX ORDER — DIPHENHYDRAMINE HCL 25 MG
25 CAPSULE ORAL ONCE
Status: CANCELLED | OUTPATIENT
Start: 2017-07-12

## 2017-07-12 RX ORDER — ACETAMINOPHEN 325 MG/1
650 TABLET ORAL ONCE
Status: CANCELLED | OUTPATIENT
Start: 2017-07-12

## 2017-07-12 RX ORDER — SODIUM CHLORIDE 9 MG/ML
250 INJECTION, SOLUTION INTRAVENOUS ONCE
Status: CANCELLED | OUTPATIENT
Start: 2017-07-12

## 2017-07-12 RX ADMIN — NATALIZUMAB 300 MG: 300 INJECTION INTRAVENOUS at 09:35

## 2017-07-12 RX ADMIN — ACETAMINOPHEN 650 MG: 325 TABLET ORAL at 09:33

## 2017-07-12 RX ADMIN — DIPHENHYDRAMINE HYDROCHLORIDE 25 MG: 25 CAPSULE ORAL at 09:33

## 2017-08-09 ENCOUNTER — INFUSION (OUTPATIENT)
Dept: ONCOLOGY | Facility: HOSPITAL | Age: 67
End: 2017-08-09

## 2017-08-09 VITALS
TEMPERATURE: 97.7 F | HEART RATE: 64 BPM | SYSTOLIC BLOOD PRESSURE: 144 MMHG | DIASTOLIC BLOOD PRESSURE: 70 MMHG | RESPIRATION RATE: 18 BRPM

## 2017-08-09 DIAGNOSIS — G35 MULTIPLE SCLEROSIS (HCC): Primary | ICD-10-CM

## 2017-08-09 PROCEDURE — 63710000001 DIPHENHYDRAMINE PER 50 MG: Performed by: PSYCHIATRY & NEUROLOGY

## 2017-08-09 PROCEDURE — 96365 THER/PROPH/DIAG IV INF INIT: CPT

## 2017-08-09 PROCEDURE — 25010000002 NATALIZUMAB PER 1 MG: Performed by: PSYCHIATRY & NEUROLOGY

## 2017-08-09 PROCEDURE — 96413 CHEMO IV INFUSION 1 HR: CPT

## 2017-08-09 RX ORDER — DIPHENHYDRAMINE HCL 25 MG
25 CAPSULE ORAL ONCE
Status: COMPLETED | OUTPATIENT
Start: 2017-08-09 | End: 2017-08-09

## 2017-08-09 RX ORDER — ACETAMINOPHEN 325 MG/1
650 TABLET ORAL ONCE
Status: CANCELLED | OUTPATIENT
Start: 2017-08-09

## 2017-08-09 RX ORDER — SODIUM CHLORIDE 9 MG/ML
250 INJECTION, SOLUTION INTRAVENOUS ONCE
Status: CANCELLED | OUTPATIENT
Start: 2017-08-09

## 2017-08-09 RX ORDER — DIPHENHYDRAMINE HCL 25 MG
25 CAPSULE ORAL ONCE
Status: CANCELLED | OUTPATIENT
Start: 2017-08-09

## 2017-08-09 RX ORDER — ACETAMINOPHEN 325 MG/1
650 TABLET ORAL ONCE
Status: COMPLETED | OUTPATIENT
Start: 2017-08-09 | End: 2017-08-09

## 2017-08-09 RX ADMIN — DIPHENHYDRAMINE HYDROCHLORIDE 25 MG: 25 CAPSULE ORAL at 09:42

## 2017-08-09 RX ADMIN — ACETAMINOPHEN 650 MG: 325 TABLET ORAL at 09:42

## 2017-08-09 RX ADMIN — NATALIZUMAB 300 MG: 300 INJECTION INTRAVENOUS at 09:42

## 2017-09-06 ENCOUNTER — INFUSION (OUTPATIENT)
Dept: ONCOLOGY | Facility: HOSPITAL | Age: 67
End: 2017-09-06

## 2017-09-06 VITALS
DIASTOLIC BLOOD PRESSURE: 67 MMHG | SYSTOLIC BLOOD PRESSURE: 140 MMHG | RESPIRATION RATE: 20 BRPM | HEART RATE: 70 BPM | TEMPERATURE: 97.5 F

## 2017-09-06 DIAGNOSIS — G35 MULTIPLE SCLEROSIS (HCC): Primary | ICD-10-CM

## 2017-09-06 PROCEDURE — 96413 CHEMO IV INFUSION 1 HR: CPT

## 2017-09-06 PROCEDURE — 96365 THER/PROPH/DIAG IV INF INIT: CPT

## 2017-09-06 PROCEDURE — 63710000001 DIPHENHYDRAMINE PER 50 MG: Performed by: PSYCHIATRY & NEUROLOGY

## 2017-09-06 PROCEDURE — 25010000002 NATALIZUMAB PER 1 MG: Performed by: PSYCHIATRY & NEUROLOGY

## 2017-09-06 RX ORDER — ACETAMINOPHEN 325 MG/1
650 TABLET ORAL ONCE
Status: CANCELLED | OUTPATIENT
Start: 2017-09-06

## 2017-09-06 RX ORDER — DIPHENHYDRAMINE HCL 25 MG
25 CAPSULE ORAL ONCE
Status: COMPLETED | OUTPATIENT
Start: 2017-09-06 | End: 2017-09-06

## 2017-09-06 RX ORDER — DIPHENHYDRAMINE HCL 25 MG
25 CAPSULE ORAL ONCE
Status: CANCELLED | OUTPATIENT
Start: 2017-09-06

## 2017-09-06 RX ORDER — ACETAMINOPHEN 325 MG/1
650 TABLET ORAL ONCE
Status: COMPLETED | OUTPATIENT
Start: 2017-09-06 | End: 2017-09-06

## 2017-09-06 RX ORDER — SODIUM CHLORIDE 9 MG/ML
250 INJECTION, SOLUTION INTRAVENOUS ONCE
Status: CANCELLED | OUTPATIENT
Start: 2017-09-06

## 2017-09-06 RX ADMIN — NATALIZUMAB 300 MG: 300 INJECTION INTRAVENOUS at 09:34

## 2017-09-06 RX ADMIN — ACETAMINOPHEN 650 MG: 325 TABLET ORAL at 09:32

## 2017-09-06 RX ADMIN — DIPHENHYDRAMINE HYDROCHLORIDE 25 MG: 25 CAPSULE ORAL at 09:32

## 2017-10-04 ENCOUNTER — INFUSION (OUTPATIENT)
Dept: ONCOLOGY | Facility: HOSPITAL | Age: 67
End: 2017-10-04

## 2017-10-04 VITALS
SYSTOLIC BLOOD PRESSURE: 142 MMHG | DIASTOLIC BLOOD PRESSURE: 79 MMHG | BODY MASS INDEX: 21.46 KG/M2 | HEART RATE: 71 BPM | WEIGHT: 137 LBS | RESPIRATION RATE: 18 BRPM | TEMPERATURE: 97.6 F

## 2017-10-04 DIAGNOSIS — G35 MULTIPLE SCLEROSIS (HCC): Primary | ICD-10-CM

## 2017-10-04 PROCEDURE — 25010000002 NATALIZUMAB PER 1 MG: Performed by: PSYCHIATRY & NEUROLOGY

## 2017-10-04 PROCEDURE — 63710000001 DIPHENHYDRAMINE PER 50 MG: Performed by: PSYCHIATRY & NEUROLOGY

## 2017-10-04 PROCEDURE — 96365 THER/PROPH/DIAG IV INF INIT: CPT

## 2017-10-04 RX ORDER — ACETAMINOPHEN 325 MG/1
650 TABLET ORAL ONCE
Status: CANCELLED | OUTPATIENT
Start: 2017-10-04

## 2017-10-04 RX ORDER — ACETAMINOPHEN 325 MG/1
650 TABLET ORAL ONCE
Status: COMPLETED | OUTPATIENT
Start: 2017-10-04 | End: 2017-10-04

## 2017-10-04 RX ORDER — DIPHENHYDRAMINE HCL 25 MG
25 CAPSULE ORAL ONCE
Status: COMPLETED | OUTPATIENT
Start: 2017-10-04 | End: 2017-10-04

## 2017-10-04 RX ORDER — DIPHENHYDRAMINE HCL 25 MG
25 CAPSULE ORAL ONCE
Status: CANCELLED | OUTPATIENT
Start: 2017-10-04

## 2017-10-04 RX ORDER — SODIUM CHLORIDE 9 MG/ML
250 INJECTION, SOLUTION INTRAVENOUS ONCE
Status: CANCELLED | OUTPATIENT
Start: 2017-10-04

## 2017-10-04 RX ADMIN — ACETAMINOPHEN 650 MG: 325 TABLET ORAL at 09:40

## 2017-10-04 RX ADMIN — DIPHENHYDRAMINE HYDROCHLORIDE 25 MG: 25 CAPSULE ORAL at 09:40

## 2017-10-04 RX ADMIN — NATALIZUMAB 300 MG: 300 INJECTION INTRAVENOUS at 09:41

## 2017-11-01 ENCOUNTER — OFFICE VISIT (OUTPATIENT)
Dept: NEUROLOGY | Facility: CLINIC | Age: 67
End: 2017-11-01

## 2017-11-01 ENCOUNTER — INFUSION (OUTPATIENT)
Dept: ONCOLOGY | Facility: HOSPITAL | Age: 67
End: 2017-11-01

## 2017-11-01 VITALS
HEART RATE: 68 BPM | HEIGHT: 67 IN | RESPIRATION RATE: 16 BRPM | BODY MASS INDEX: 21.97 KG/M2 | WEIGHT: 140 LBS | SYSTOLIC BLOOD PRESSURE: 140 MMHG | DIASTOLIC BLOOD PRESSURE: 72 MMHG

## 2017-11-01 VITALS — TEMPERATURE: 97.8 F

## 2017-11-01 DIAGNOSIS — G35 MULTIPLE SCLEROSIS (HCC): Primary | ICD-10-CM

## 2017-11-01 LAB
ALBUMIN SERPL-MCNC: 4.1 G/DL (ref 3.2–4.8)
ALBUMIN/GLOB SERPL: 1.1 G/DL (ref 1.5–2.5)
ALP SERPL-CCNC: 74 U/L (ref 25–100)
ALT SERPL W P-5'-P-CCNC: 25 U/L (ref 7–40)
ANION GAP SERPL CALCULATED.3IONS-SCNC: 2 MMOL/L (ref 3–11)
AST SERPL-CCNC: 30 U/L (ref 0–33)
BASOPHILS # BLD AUTO: 0.04 10*3/MM3 (ref 0–0.2)
BASOPHILS NFR BLD AUTO: 0.3 % (ref 0–1)
BILIRUB SERPL-MCNC: 0.4 MG/DL (ref 0.3–1.2)
BUN BLD-MCNC: 11 MG/DL (ref 9–23)
BUN/CREAT SERPL: 12.2 (ref 7–25)
CALCIUM SPEC-SCNC: 9.2 MG/DL (ref 8.7–10.4)
CHLORIDE SERPL-SCNC: 106 MMOL/L (ref 99–109)
CO2 SERPL-SCNC: 29 MMOL/L (ref 20–31)
CREAT BLD-MCNC: 0.9 MG/DL (ref 0.6–1.3)
DEPRECATED RDW RBC AUTO: 49.4 FL (ref 37–54)
EOSINOPHIL # BLD AUTO: 0.17 10*3/MM3 (ref 0–0.3)
EOSINOPHIL NFR BLD AUTO: 1.4 % (ref 0–3)
ERYTHROCYTE [DISTWIDTH] IN BLOOD BY AUTOMATED COUNT: 14.7 % (ref 11.3–14.5)
GFR SERPL CREATININE-BSD FRML MDRD: 63 ML/MIN/1.73
GLOBULIN UR ELPH-MCNC: 3.9 GM/DL
GLUCOSE BLD-MCNC: 87 MG/DL (ref 70–100)
HCT VFR BLD AUTO: 37.9 % (ref 34.5–44)
HGB BLD-MCNC: 12.9 G/DL (ref 11.5–15.5)
IMM GRANULOCYTES # BLD: 0.05 10*3/MM3 (ref 0–0.03)
IMM GRANULOCYTES NFR BLD: 0.4 % (ref 0–0.6)
LYMPHOCYTES # BLD AUTO: 4.44 10*3/MM3 (ref 0.6–4.8)
LYMPHOCYTES NFR BLD AUTO: 37 % (ref 24–44)
MCH RBC QN AUTO: 30.9 PG (ref 27–31)
MCHC RBC AUTO-ENTMCNC: 34 G/DL (ref 32–36)
MCV RBC AUTO: 90.9 FL (ref 80–99)
MONOCYTES # BLD AUTO: 0.95 10*3/MM3 (ref 0–1)
MONOCYTES NFR BLD AUTO: 7.9 % (ref 0–12)
NEUTROPHILS # BLD AUTO: 6.34 10*3/MM3 (ref 1.5–8.3)
NEUTROPHILS NFR BLD AUTO: 53 % (ref 41–71)
PLATELET # BLD AUTO: 294 10*3/MM3 (ref 150–450)
PMV BLD AUTO: 9.9 FL (ref 6–12)
POTASSIUM BLD-SCNC: 4.9 MMOL/L (ref 3.5–5.5)
PROT SERPL-MCNC: 8 G/DL (ref 5.7–8.2)
RBC # BLD AUTO: 4.17 10*6/MM3 (ref 3.89–5.14)
SODIUM BLD-SCNC: 137 MMOL/L (ref 132–146)
WBC NRBC COR # BLD: 11.99 10*3/MM3 (ref 3.5–10.8)

## 2017-11-01 PROCEDURE — 96374 THER/PROPH/DIAG INJ IV PUSH: CPT

## 2017-11-01 PROCEDURE — 99213 OFFICE O/P EST LOW 20 MIN: CPT | Performed by: PSYCHIATRY & NEUROLOGY

## 2017-11-01 PROCEDURE — 63710000001 DIPHENHYDRAMINE PER 50 MG: Performed by: PSYCHIATRY & NEUROLOGY

## 2017-11-01 PROCEDURE — 96365 THER/PROPH/DIAG IV INF INIT: CPT

## 2017-11-01 PROCEDURE — 80053 COMPREHEN METABOLIC PANEL: CPT

## 2017-11-01 PROCEDURE — 25010000002 NATALIZUMAB PER 1 MG: Performed by: PSYCHIATRY & NEUROLOGY

## 2017-11-01 PROCEDURE — 85025 COMPLETE CBC W/AUTO DIFF WBC: CPT

## 2017-11-01 RX ORDER — ACETAMINOPHEN 325 MG/1
650 TABLET ORAL ONCE
Status: CANCELLED | OUTPATIENT
Start: 2017-11-01

## 2017-11-01 RX ORDER — DIPHENHYDRAMINE HCL 25 MG
25 CAPSULE ORAL ONCE
Status: CANCELLED | OUTPATIENT
Start: 2017-11-01

## 2017-11-01 RX ORDER — SODIUM CHLORIDE 9 MG/ML
250 INJECTION, SOLUTION INTRAVENOUS ONCE
Status: CANCELLED | OUTPATIENT
Start: 2017-11-01

## 2017-11-01 RX ORDER — ACETAMINOPHEN 325 MG/1
650 TABLET ORAL ONCE
Status: COMPLETED | OUTPATIENT
Start: 2017-11-01 | End: 2017-11-01

## 2017-11-01 RX ORDER — DIPHENHYDRAMINE HCL 25 MG
25 CAPSULE ORAL ONCE
Status: COMPLETED | OUTPATIENT
Start: 2017-11-01 | End: 2017-11-01

## 2017-11-01 RX ADMIN — ACETAMINOPHEN 650 MG: 325 TABLET ORAL at 11:05

## 2017-11-01 RX ADMIN — NATALIZUMAB 300 MG: 300 INJECTION INTRAVENOUS at 11:06

## 2017-11-01 RX ADMIN — DIPHENHYDRAMINE HYDROCHLORIDE 25 MG: 25 CAPSULE ORAL at 11:05

## 2017-11-01 NOTE — PROGRESS NOTES
Subjective:   Chief Complaint   Patient presents with   • Multiple Sclerosis       Patient ID: Leatha Wall is a 67 y.o. female.    History of Present Illness     67 y.o. woman returns in follow for RRMS and migraines.  Last visit on 7/5/17 continue Tysabri.    12/13/16:  JCV 2.21  6/27/17    JCV 1.87   6/27/17:  Cr 1.19, WBC 13.8  - NCS   MRI Brain 6/21/17 moderate T2 lesion load with decrease number and size of lesions, no enhancing lesions as compared to 6/29/16    RRMS    Continues on Tysabri without side effects 10 infusions.  No new or worsening sx.  Left side feels heavy with colder weather.  Fatigue is moderate to severe.  Exercising on own.  Walking for 40 minutes every other day.  Decreasing Tobacco.     Problem history:    MSFC remarkable for 26/110 o/w within normal results.  Oral numbness resolved.  Bladder urgency and frequency slight increase.    Mild N/T in bottom of feet     Migraines         Migraine frequency is 1 to 2 times a week  IBU aborts in 45 minutes.  Located in occiput and behind eyes.  Quality of tightness and throbbing.  Mild severity.       PMH:    Fatigue and heat are bothersome.  Echo -   EF 60%  C U/S -  0-49% on right, no stenosis on left    MRI cervical, my review, unremarkable  Labs below:    NMO negative  Hypercoaguable panel WNL  Vit D 17.2    Fatigue continues to wax and wane.  Last few days has had unsteady gait. Weakness on left arm/leg.  Numbness in left arm and right foot.  Left side of tongue numb for the last 7 to 14 weeks.   Continue to smoke Tob 1 ppd.     Patient called and noted overwhelming fatigue.  After steroids fatigue improved but then recurred on 8/5/16.  Frequent HA's every other day frontal and occipital location of squeezing sensation of moderate severity.  Prednisone taper rx on 7/22/16 improved energy.      6/22/16 awoke at 3:30 am and noted left sided weakness in arm and leg.  Gait was unsteady and worsened over the next 7 days and presented to [facility] ED.  " Fatigue was overwhelming.  MRI Brain in ED, my review, with multiple T2 white matter lesions and one large black hole in left PVWM.  Given steroids in ED with improvement in sx by 50%.    5/2016 had episode of left arm clumsiness for a week.      Reports increased fatigue since the first of the year.      Vision is fuzzy and feel swollen.      Bladder frequency.     Tob 1 ppd.    The following portions of the patient's history were reviewed and updated as appropriate: allergies, current medications, past family history, past medical history, past social history, past surgical history and problem list.    Review of Systems   Constitutional: Positive for fatigue. Negative for activity change and unexpected weight change.   HENT: Negative for tinnitus and trouble swallowing.    Eyes: Negative for photophobia and visual disturbance.   Respiratory: Negative for apnea and choking.    Cardiovascular: Negative for leg swelling.   Endocrine: Negative for cold intolerance and heat intolerance.   Genitourinary: Positive for frequency and urgency. Negative for difficulty urinating and menstrual problem.   Musculoskeletal: Positive for gait problem. Negative for back pain.   Skin: Negative for color change.   Allergic/Immunologic: Negative for immunocompromised state.   Neurological: Positive for dizziness and tremors. Negative for seizures, syncope, facial asymmetry, speech difficulty and light-headedness.   Hematological: Negative for adenopathy. Does not bruise/bleed easily.   Psychiatric/Behavioral: Positive for decreased concentration. Negative for behavioral problems, confusion, hallucinations and sleep disturbance.        Objective:  Vitals:    11/01/17 1020   BP: 140/72   Pulse: 68   Resp: 16   Weight: 140 lb (63.5 kg)   Height: 67\" (170.2 cm)       Neurologic Exam     Mental Status   Registration: recalls 3 of 3 objects. Recall at 5 minutes: recalls 3 of 3 objects. Follows 3 step commands.   Attention: normal. " Concentration: normal.   Level of consciousness: alert  Knowledge: good and consistent with education.   Able to name object. Able to read. Able to repeat. Able to write. Normal comprehension.     Cranial Nerves     CN II   Visual fields full to confrontation.   Visual acuity: normal  Right visual field deficit: none  Left visual field deficit: none     CN III, IV, VI   Right pupil: Shape: regular. Reactivity: brisk. Consensual response: intact.   Left pupil: Shape: regular. Reactivity: brisk. Consensual response: intact.   Nystagmus: none   Diplopia: none  Ophthalmoparesis: none  Upgaze: normal  Downgaze: normal  Conjugate gaze: present  Vestibulo-ocular reflex: present    CN V   Right facial sensation deficit: forehead  Left facial sensation deficit: forehead  Right corneal reflex: normal  Left corneal reflex: normal    CN VII   Right facial weakness: none  Left facial weakness: none    CN VIII   Hearing: intact    CN IX, X   Palate: symmetric  Right gag reflex: normal  Left gag reflex: normal    CN XI   Right sternocleidomastoid strength: normal  Left sternocleidomastoid strength: normal    CN XII   Tongue: not atrophic  Fasciculations: absent  Tongue deviation: none    Motor Exam   Muscle bulk: normal  Overall muscle tone: normal  Right arm tone: normal  Left arm tone: normal  Right leg tone: normal  Left leg tone: normal    Strength   Right neck flexion: 5/5  Left neck flexion: 5/5  Right neck extension: 5/5  Left neck extension: 5/5  Right deltoid: 5/5  Left deltoid: 5/5  Right biceps: 5/5  Left biceps: 5/5  Right triceps: 5/5  Left triceps: 5/5  Right wrist flexion: 5/5  Left wrist flexion: 5/5  Right wrist extension: 5/5  Left wrist extension: 5/5  Right interossei: 5/5  Left interossei: 5/5  Right abdominals: 5/5  Left abdominals: 5/5  Right iliopsoas: 5/5  Left iliopsoas: 5/5  Right quadriceps: 5/5  Left quadriceps: 5/5  Right hamstrin/5  Left hamstrin/5  Right glutei: 5/5  Left glutei: 5/5  Right  anterior tibial: 5/5  Left anterior tibial: 5/5  Right posterior tibial: 5/5  Left posterior tibial: 5/5  Right peroneal: 5/5  Left peroneal: 5/5  Right gastroc: 5/5  Left gastroc: 5/5    Sensory Exam   Right arm light touch: normal  Right leg light touch: normal  Right arm vibration: normal  Right leg vibration: normal  Right arm proprioception: normal  Right leg proprioception: normal  Right arm pinprick: normal  Right leg pinprick: normal  Sensory deficit distribution on left: C6    Gait, Coordination, and Reflexes     Gait  Gait: normal ( )    Tremor   Resting tremor: absent  Intention tremor: absent  Action tremor: absent    Reflexes   Reflexes 2+ except as noted.   Right ankle clonus: absent  Left ankle clonus: absent      Physical Exam   Constitutional: She appears well-developed and well-nourished.   Neurological: Gait normal.   Nursing note and vitals reviewed.    Office Visit on 02/20/2017   Component Date Value Ref Range Status   • Glucose 02/20/2017 107* 70 - 100 mg/dL Final   • BUN 02/20/2017 18  9 - 23 mg/dL Final   • Creatinine 02/20/2017 0.90  0.60 - 1.30 mg/dL Final   • Sodium 02/20/2017 140  132 - 146 mmol/L Final   • Potassium 02/20/2017 3.9  3.5 - 5.5 mmol/L Final   • Chloride 02/20/2017 107  99 - 109 mmol/L Final   • CO2 02/20/2017 30.0  20.0 - 31.0 mmol/L Final   • Calcium 02/20/2017 9.4  8.7 - 10.4 mg/dL Final   • Total Protein 02/20/2017 6.9  5.7 - 8.2 g/dL Final   • Albumin 02/20/2017 3.80  3.20 - 4.80 g/dL Final   • ALT (SGPT) 02/20/2017 18  7 - 40 U/L Final   • AST (SGOT) 02/20/2017 19  0 - 33 U/L Final   • Alkaline Phosphatase 02/20/2017 50  25 - 100 U/L Final   • Total Bilirubin 02/20/2017 0.3  0.3 - 1.2 mg/dL Final   • eGFR Non  Amer 02/20/2017 63  >60 mL/min/1.73 Final   • Globulin 02/20/2017 3.1  gm/dL Final   • A/G Ratio 02/20/2017 1.2* 1.5 - 2.5 g/dL Final   • BUN/Creatinine Ratio 02/20/2017 20.0  7.0 - 25.0 Final   • Anion Gap 02/20/2017 3.0  3.0 - 11.0 mmol/L Final   • WBC  02/20/2017 17.36* 3.50 - 10.80 10*3/mm3 Final   • RBC 02/20/2017 3.74* 3.89 - 5.14 10*6/mm3 Final   • Hemoglobin 02/20/2017 11.5  11.5 - 15.5 g/dL Final   • Hematocrit 02/20/2017 35.4  34.5 - 44.0 % Final   • MCV 02/20/2017 94.7  80.0 - 99.0 fL Final   • MCH 02/20/2017 30.7  27.0 - 31.0 pg Final   • MCHC 02/20/2017 32.5  32.0 - 36.0 g/dL Final   • RDW 02/20/2017 15.1* 11.3 - 14.5 % Final   • RDW-SD 02/20/2017 52.1  37.0 - 54.0 fl Final   • MPV 02/20/2017 9.9  6.0 - 12.0 fL Final   • Platelets 02/20/2017 391  150 - 450 10*3/mm3 Final   • Neutrophil % 02/20/2017 43.7  41.0 - 71.0 % Final   • Lymphocyte % 02/20/2017 48.3* 24.0 - 44.0 % Final   • Monocyte % 02/20/2017 6.0  0.0 - 12.0 % Final   • Eosinophil % 02/20/2017 0.9  0.0 - 3.0 % Final   • Basophil % 02/20/2017 0.2  0.0 - 1.0 % Final   • Immature Grans % 02/20/2017 0.9* 0.0 - 0.6 % Final   • Neutrophils, Absolute 02/20/2017 7.59  1.50 - 8.30 10*3/mm3 Final   • Lymphocytes, Absolute 02/20/2017 8.38* 0.60 - 4.80 10*3/mm3 Final   • Monocytes, Absolute 02/20/2017 1.05* 0.00 - 1.00 10*3/mm3 Final   • Eosinophils, Absolute 02/20/2017 0.15  0.10 - 0.30 10*3/mm3 Final   • Basophils, Absolute 02/20/2017 0.03  0.00 - 0.20 10*3/mm3 Final   • Immature Grans, Absolute 02/20/2017 0.16* 0.00 - 0.03 10*3/mm3 Final   • nRBC 02/20/2017 0.5* 0.0 - 0.0 /100 WBC Final     Assessment/Plan:       Problems Addressed this Visit        Nervous and Auditory    Multiple sclerosis - Primary     Stable without new or worsening on Tysabri     6/27/17    JCV 1.87     Draw labs     Continue Tysabri          Relevant Orders    CBC & Differential    Comprehensive Metabolic Panel    Stratify JCV, Antibody CHRISTIAN With / Reflex To Inhibition Assay

## 2017-11-06 ENCOUNTER — RESULTS ENCOUNTER (OUTPATIENT)
Dept: NEUROLOGY | Facility: CLINIC | Age: 67
End: 2017-11-06

## 2017-11-06 DIAGNOSIS — G35 MULTIPLE SCLEROSIS (HCC): ICD-10-CM

## 2017-11-29 ENCOUNTER — INFUSION (OUTPATIENT)
Dept: ONCOLOGY | Facility: HOSPITAL | Age: 67
End: 2017-11-29

## 2017-11-29 VITALS
RESPIRATION RATE: 18 BRPM | SYSTOLIC BLOOD PRESSURE: 139 MMHG | TEMPERATURE: 97.6 F | DIASTOLIC BLOOD PRESSURE: 73 MMHG | HEART RATE: 66 BPM

## 2017-11-29 DIAGNOSIS — G35 MULTIPLE SCLEROSIS (HCC): Primary | ICD-10-CM

## 2017-11-29 PROCEDURE — 63710000001 DIPHENHYDRAMINE PER 50 MG: Performed by: PSYCHIATRY & NEUROLOGY

## 2017-11-29 PROCEDURE — 25010000002 NATALIZUMAB PER 1 MG: Performed by: PSYCHIATRY & NEUROLOGY

## 2017-11-29 PROCEDURE — 96365 THER/PROPH/DIAG IV INF INIT: CPT

## 2017-11-29 RX ORDER — DIPHENHYDRAMINE HCL 25 MG
25 CAPSULE ORAL ONCE
Status: COMPLETED | OUTPATIENT
Start: 2017-11-29 | End: 2017-11-29

## 2017-11-29 RX ORDER — DIPHENHYDRAMINE HCL 25 MG
25 CAPSULE ORAL ONCE
Status: CANCELLED | OUTPATIENT
Start: 2017-11-29

## 2017-11-29 RX ORDER — SODIUM CHLORIDE 9 MG/ML
250 INJECTION, SOLUTION INTRAVENOUS ONCE
Status: CANCELLED | OUTPATIENT
Start: 2017-11-29

## 2017-11-29 RX ORDER — ACETAMINOPHEN 325 MG/1
650 TABLET ORAL ONCE
Status: CANCELLED | OUTPATIENT
Start: 2017-11-29

## 2017-11-29 RX ORDER — SODIUM CHLORIDE 9 MG/ML
250 INJECTION, SOLUTION INTRAVENOUS ONCE
Status: DISCONTINUED | OUTPATIENT
Start: 2017-11-29 | End: 2017-11-29 | Stop reason: HOSPADM

## 2017-11-29 RX ORDER — ACETAMINOPHEN 325 MG/1
650 TABLET ORAL ONCE
Status: COMPLETED | OUTPATIENT
Start: 2017-11-29 | End: 2017-11-29

## 2017-11-29 RX ADMIN — DIPHENHYDRAMINE HYDROCHLORIDE 25 MG: 25 CAPSULE ORAL at 10:06

## 2017-11-29 RX ADMIN — ACETAMINOPHEN 650 MG: 325 TABLET ORAL at 10:06

## 2017-11-29 RX ADMIN — NATALIZUMAB 300 MG: 300 INJECTION INTRAVENOUS at 10:07

## 2017-12-27 ENCOUNTER — INFUSION (OUTPATIENT)
Dept: ONCOLOGY | Facility: HOSPITAL | Age: 67
End: 2017-12-27

## 2017-12-27 VITALS
TEMPERATURE: 97.6 F | DIASTOLIC BLOOD PRESSURE: 90 MMHG | HEART RATE: 68 BPM | SYSTOLIC BLOOD PRESSURE: 155 MMHG | RESPIRATION RATE: 16 BRPM

## 2017-12-27 DIAGNOSIS — G35 MULTIPLE SCLEROSIS (HCC): Primary | ICD-10-CM

## 2017-12-27 PROCEDURE — 96365 THER/PROPH/DIAG IV INF INIT: CPT

## 2017-12-27 PROCEDURE — 63710000001 DIPHENHYDRAMINE PER 50 MG: Performed by: PSYCHIATRY & NEUROLOGY

## 2017-12-27 PROCEDURE — 96413 CHEMO IV INFUSION 1 HR: CPT

## 2017-12-27 PROCEDURE — 25010000002 NATALIZUMAB PER 1 MG: Performed by: PSYCHIATRY & NEUROLOGY

## 2017-12-27 RX ORDER — SODIUM CHLORIDE 9 MG/ML
250 INJECTION, SOLUTION INTRAVENOUS ONCE
Status: CANCELLED | OUTPATIENT
Start: 2017-12-27

## 2017-12-27 RX ORDER — DIPHENHYDRAMINE HCL 25 MG
25 CAPSULE ORAL ONCE
Status: CANCELLED | OUTPATIENT
Start: 2017-12-27

## 2017-12-27 RX ORDER — DIPHENHYDRAMINE HCL 25 MG
25 CAPSULE ORAL ONCE
Status: COMPLETED | OUTPATIENT
Start: 2017-12-27 | End: 2017-12-27

## 2017-12-27 RX ORDER — ACETAMINOPHEN 325 MG/1
650 TABLET ORAL ONCE
Status: CANCELLED | OUTPATIENT
Start: 2017-12-27

## 2017-12-27 RX ORDER — ACETAMINOPHEN 325 MG/1
650 TABLET ORAL ONCE
Status: COMPLETED | OUTPATIENT
Start: 2017-12-27 | End: 2017-12-27

## 2017-12-27 RX ADMIN — DIPHENHYDRAMINE HYDROCHLORIDE 25 MG: 25 CAPSULE ORAL at 10:00

## 2017-12-27 RX ADMIN — NATALIZUMAB 300 MG: 300 INJECTION INTRAVENOUS at 10:01

## 2017-12-27 RX ADMIN — ACETAMINOPHEN 650 MG: 325 TABLET ORAL at 10:00

## 2018-01-24 ENCOUNTER — INFUSION (OUTPATIENT)
Dept: ONCOLOGY | Facility: HOSPITAL | Age: 68
End: 2018-01-24

## 2018-01-24 VITALS
SYSTOLIC BLOOD PRESSURE: 162 MMHG | TEMPERATURE: 97.3 F | RESPIRATION RATE: 18 BRPM | HEART RATE: 66 BPM | DIASTOLIC BLOOD PRESSURE: 82 MMHG

## 2018-01-24 DIAGNOSIS — G35 MULTIPLE SCLEROSIS (HCC): Primary | ICD-10-CM

## 2018-01-24 PROCEDURE — 25010000002 NATALIZUMAB PER 1 MG: Performed by: PSYCHIATRY & NEUROLOGY

## 2018-01-24 PROCEDURE — 96365 THER/PROPH/DIAG IV INF INIT: CPT

## 2018-01-24 PROCEDURE — 63710000001 DIPHENHYDRAMINE PER 50 MG: Performed by: PSYCHIATRY & NEUROLOGY

## 2018-01-24 RX ORDER — ACETAMINOPHEN 325 MG/1
650 TABLET ORAL ONCE
Status: CANCELLED | OUTPATIENT
Start: 2018-01-24

## 2018-01-24 RX ORDER — ACETAMINOPHEN 325 MG/1
650 TABLET ORAL ONCE
Status: COMPLETED | OUTPATIENT
Start: 2018-01-24 | End: 2018-01-24

## 2018-01-24 RX ORDER — SODIUM CHLORIDE 9 MG/ML
250 INJECTION, SOLUTION INTRAVENOUS ONCE
Status: CANCELLED | OUTPATIENT
Start: 2018-01-24

## 2018-01-24 RX ORDER — DIPHENHYDRAMINE HCL 25 MG
25 CAPSULE ORAL ONCE
Status: COMPLETED | OUTPATIENT
Start: 2018-01-24 | End: 2018-01-24

## 2018-01-24 RX ORDER — DIPHENHYDRAMINE HCL 25 MG
25 CAPSULE ORAL ONCE
Status: CANCELLED | OUTPATIENT
Start: 2018-01-24

## 2018-01-24 RX ADMIN — ACETAMINOPHEN 650 MG: 325 TABLET ORAL at 09:33

## 2018-01-24 RX ADMIN — NATALIZUMAB 300 MG: 300 INJECTION INTRAVENOUS at 09:35

## 2018-01-24 RX ADMIN — DIPHENHYDRAMINE HYDROCHLORIDE 25 MG: 25 CAPSULE ORAL at 09:33

## 2018-02-21 ENCOUNTER — INFUSION (OUTPATIENT)
Dept: ONCOLOGY | Facility: HOSPITAL | Age: 68
End: 2018-02-21

## 2018-02-21 VITALS
DIASTOLIC BLOOD PRESSURE: 72 MMHG | RESPIRATION RATE: 16 BRPM | SYSTOLIC BLOOD PRESSURE: 146 MMHG | TEMPERATURE: 97.5 F | HEART RATE: 62 BPM

## 2018-02-21 DIAGNOSIS — G35 MULTIPLE SCLEROSIS (HCC): Primary | ICD-10-CM

## 2018-02-21 PROCEDURE — 25010000002 NATALIZUMAB PER 1 MG: Performed by: PSYCHIATRY & NEUROLOGY

## 2018-02-21 PROCEDURE — 63710000001 DIPHENHYDRAMINE PER 50 MG: Performed by: PSYCHIATRY & NEUROLOGY

## 2018-02-21 PROCEDURE — 96365 THER/PROPH/DIAG IV INF INIT: CPT

## 2018-02-21 RX ORDER — ACETAMINOPHEN 325 MG/1
650 TABLET ORAL ONCE
Status: CANCELLED | OUTPATIENT
Start: 2018-02-21

## 2018-02-21 RX ORDER — DIPHENHYDRAMINE HCL 25 MG
25 CAPSULE ORAL ONCE
Status: CANCELLED | OUTPATIENT
Start: 2018-02-21

## 2018-02-21 RX ORDER — ACETAMINOPHEN 325 MG/1
650 TABLET ORAL ONCE
Status: COMPLETED | OUTPATIENT
Start: 2018-02-21 | End: 2018-02-21

## 2018-02-21 RX ORDER — SODIUM CHLORIDE 9 MG/ML
250 INJECTION, SOLUTION INTRAVENOUS ONCE
Status: CANCELLED | OUTPATIENT
Start: 2018-02-21

## 2018-02-21 RX ORDER — DIPHENHYDRAMINE HCL 25 MG
25 CAPSULE ORAL ONCE
Status: COMPLETED | OUTPATIENT
Start: 2018-02-21 | End: 2018-02-21

## 2018-02-21 RX ADMIN — ACETAMINOPHEN 650 MG: 325 TABLET ORAL at 09:38

## 2018-02-21 RX ADMIN — DIPHENHYDRAMINE HYDROCHLORIDE 25 MG: 25 CAPSULE ORAL at 09:38

## 2018-02-21 RX ADMIN — NATALIZUMAB 300 MG: 300 INJECTION INTRAVENOUS at 09:39

## 2018-03-21 ENCOUNTER — APPOINTMENT (OUTPATIENT)
Dept: ONCOLOGY | Facility: HOSPITAL | Age: 68
End: 2018-03-21

## 2018-03-22 ENCOUNTER — INFUSION (OUTPATIENT)
Dept: ONCOLOGY | Facility: HOSPITAL | Age: 68
End: 2018-03-22

## 2018-03-22 ENCOUNTER — OFFICE VISIT (OUTPATIENT)
Dept: NEUROLOGY | Facility: CLINIC | Age: 68
End: 2018-03-22

## 2018-03-22 VITALS
WEIGHT: 147 LBS | RESPIRATION RATE: 18 BRPM | BODY MASS INDEX: 23.07 KG/M2 | SYSTOLIC BLOOD PRESSURE: 124 MMHG | HEART RATE: 62 BPM | HEIGHT: 67 IN | OXYGEN SATURATION: 99 % | DIASTOLIC BLOOD PRESSURE: 68 MMHG

## 2018-03-22 DIAGNOSIS — G35 MULTIPLE SCLEROSIS (HCC): Primary | ICD-10-CM

## 2018-03-22 DIAGNOSIS — D89.89 OTHER SPECIFIED DISORDERS INVOLVING THE IMMUNE MECHANISM, NOT ELSEWHERE CLASSIFIED (HCC): ICD-10-CM

## 2018-03-22 DIAGNOSIS — G60.8 OTHER HEREDITARY AND IDIOPATHIC NEUROPATHIES: ICD-10-CM

## 2018-03-22 DIAGNOSIS — G43.C0 PERIODIC HEADACHE SYNDROME, NOT INTRACTABLE: ICD-10-CM

## 2018-03-22 DIAGNOSIS — G93.31 POSTVIRAL FATIGUE SYNDROME: ICD-10-CM

## 2018-03-22 PROCEDURE — 96365 THER/PROPH/DIAG IV INF INIT: CPT

## 2018-03-22 PROCEDURE — 25010000002 NATALIZUMAB PER 1 MG: Performed by: PSYCHIATRY & NEUROLOGY

## 2018-03-22 PROCEDURE — 99214 OFFICE O/P EST MOD 30 MIN: CPT | Performed by: PSYCHIATRY & NEUROLOGY

## 2018-03-22 PROCEDURE — 63710000001 DIPHENHYDRAMINE PER 50 MG: Performed by: PSYCHIATRY & NEUROLOGY

## 2018-03-22 RX ORDER — DIPHENHYDRAMINE HCL 25 MG
25 CAPSULE ORAL ONCE
Status: CANCELLED | OUTPATIENT
Start: 2018-03-22

## 2018-03-22 RX ORDER — ACETAMINOPHEN 325 MG/1
650 TABLET ORAL ONCE
Status: COMPLETED | OUTPATIENT
Start: 2018-03-22 | End: 2018-03-22

## 2018-03-22 RX ORDER — ACETAMINOPHEN 325 MG/1
650 TABLET ORAL ONCE
Status: CANCELLED | OUTPATIENT
Start: 2018-03-22

## 2018-03-22 RX ORDER — DIPHENHYDRAMINE HCL 25 MG
25 CAPSULE ORAL ONCE
Status: COMPLETED | OUTPATIENT
Start: 2018-03-22 | End: 2018-03-22

## 2018-03-22 RX ORDER — SODIUM CHLORIDE 9 MG/ML
250 INJECTION, SOLUTION INTRAVENOUS ONCE
Status: CANCELLED | OUTPATIENT
Start: 2018-03-22

## 2018-03-22 RX ADMIN — ACETAMINOPHEN 650 MG: 325 TABLET ORAL at 12:59

## 2018-03-22 RX ADMIN — NATALIZUMAB 300 MG: 300 INJECTION INTRAVENOUS at 13:00

## 2018-03-22 RX ADMIN — DIPHENHYDRAMINE HYDROCHLORIDE 25 MG: 25 CAPSULE ORAL at 12:59

## 2018-03-22 NOTE — PROGRESS NOTES
Subjective:   Chief Complaint   Patient presents with   • Multiple Sclerosis       Patient ID: Leatha Wall is a 67 y.o. female.    History of Present Illness     67 y.o. woman returns in follow for RRMS and migraines.  Last visit on 11/1/17 continue Tysabri.    11/1/17:  CBC, CMP - NCS    12/13/16:  JCV 2.21  6/27/17    JCV 1.87   6/27/17:  Cr 1.19, WBC 13.8  - NCS   MRI Brain 6/21/17 moderate T2 lesion load with decrease number and size of lesions, no enhancing lesions as compared to 6/29/16    RRMS    Continues on Tysabri without side effects 15 infusions.  Fatigue is most common symptom.  Fatigue is moderate to severe.  Exercising on own.  Walking for 40 minutes every other day.   Tobacco 3/4 ppd.     Problem history:    MSFC remarkable for 26/110 o/w within normal results.  Oral numbness resolved.  Bladder urgency and frequency slight increase.    Mild N/T in bottom of feet     Migraines         Migraine frequency is less than once a month.    Located in occiput and behind eyes.  Quality of tightness and throbbing.  Mild severity.       PMH:    Fatigue and heat are bothersome.  Echo -   EF 60%  C U/S -  0-49% on right, no stenosis on left    MRI cervical, my review, unremarkable  Labs below:    NMO negative  Hypercoaguable panel WNL  Vit D 17.2    Fatigue continues to wax and wane.  Last few days has had unsteady gait. Weakness on left arm/leg.  Numbness in left arm and right foot.  Left side of tongue numb for the last 7 to 14 weeks.   Continue to smoke Tob 1 ppd.     Patient called and noted overwhelming fatigue.  After steroids fatigue improved but then recurred on 8/5/16.  Frequent HA's every other day frontal and occipital location of squeezing sensation of moderate severity.  Prednisone taper rx on 7/22/16 improved energy.      6/22/16 awoke at 3:30 am and noted left sided weakness in arm and leg.  Gait was unsteady and worsened over the next 7 days and presented to [facility] ED.  Fatigue was overwhelming.   "MRI Brain in ED, my review, with multiple T2 white matter lesions and one large black hole in left PVWM.  Given steroids in ED with improvement in sx by 50%.    5/2016 had episode of left arm clumsiness for a week.      Reports increased fatigue since the first of the year.      Vision is fuzzy and feel swollen.      Bladder frequency.     Tob 1 ppd.    The following portions of the patient's history were reviewed and updated as appropriate: allergies, current medications, past family history, past medical history, past social history, past surgical history and problem list.    Review of Systems   Constitutional: Negative for activity change and unexpected weight change.   HENT: Negative for tinnitus and trouble swallowing.    Eyes: Negative for photophobia and visual disturbance.   Respiratory: Negative for apnea and choking.    Cardiovascular: Negative for leg swelling.   Endocrine: Negative for cold intolerance and heat intolerance.   Genitourinary: Positive for frequency and urgency. Negative for difficulty urinating and menstrual problem.   Musculoskeletal: Positive for gait problem. Negative for back pain.   Skin: Negative for color change.   Allergic/Immunologic: Negative for immunocompromised state.   Neurological: Positive for dizziness and tremors. Negative for seizures, syncope, facial asymmetry, speech difficulty and light-headedness.   Hematological: Negative for adenopathy. Does not bruise/bleed easily.   Psychiatric/Behavioral: Positive for decreased concentration. Negative for behavioral problems, confusion, hallucinations and sleep disturbance.        Objective:  Vitals:    03/22/18 1124   BP: 124/68   BP Location: Left arm   Patient Position: Sitting   Cuff Size: Adult   Pulse: 62   Resp: 18   SpO2: 99%   Weight: 66.7 kg (147 lb)   Height: 170.2 cm (67\")       Neurologic Exam     Mental Status   Registration: recalls 3 of 3 objects. Recall at 5 minutes: recalls 3 of 3 objects. Follows 3 step " commands.   Attention: normal. Concentration: normal.   Level of consciousness: alert  Knowledge: good and consistent with education.   Able to name object. Able to read. Able to repeat. Able to write. Normal comprehension.     Cranial Nerves     CN II   Visual fields full to confrontation.   Visual acuity: normal  Right visual field deficit: none  Left visual field deficit: none     CN III, IV, VI   Right pupil: Shape: regular. Reactivity: brisk. Consensual response: intact.   Left pupil: Shape: regular. Reactivity: brisk. Consensual response: intact.   Nystagmus: none   Diplopia: none  Ophthalmoparesis: none  Upgaze: normal  Downgaze: normal  Conjugate gaze: present  Vestibulo-ocular reflex: present    CN V   Right facial sensation deficit: forehead  Left facial sensation deficit: forehead  Right corneal reflex: normal  Left corneal reflex: normal    CN VII   Right facial weakness: none  Left facial weakness: none    CN VIII   Hearing: intact    CN IX, X   Palate: symmetric  Right gag reflex: normal  Left gag reflex: normal    CN XI   Right sternocleidomastoid strength: normal  Left sternocleidomastoid strength: normal    CN XII   Tongue: not atrophic  Fasciculations: absent  Tongue deviation: none    Motor Exam   Muscle bulk: normal  Overall muscle tone: normal  Right arm tone: normal  Left arm tone: normal  Right leg tone: normal  Left leg tone: normal    Strength   Right neck flexion: 5/5  Left neck flexion: 5/5  Right neck extension: 5/5  Left neck extension: 5/5  Right deltoid: 5/5  Left deltoid: 5/5  Right biceps: 5/5  Left biceps: 5/5  Right triceps: 5/5  Left triceps: 5/5  Right wrist flexion: 5/5  Left wrist flexion: 5/5  Right wrist extension: 5/5  Left wrist extension: 5/5  Right interossei: 5/5  Left interossei: 5/5  Right abdominals: 5/5  Left abdominals: 5/5  Right iliopsoas: 5/5  Left iliopsoas: 5/5  Right quadriceps: 5/5  Left quadriceps: 5/5  Right hamstrin/5  Left hamstrin/5  Right  glutei: 5/5  Left glutei: 5/5  Right anterior tibial: 5/5  Left anterior tibial: 5/5  Right posterior tibial: 5/5  Left posterior tibial: 5/5  Right peroneal: 5/5  Left peroneal: 5/5  Right gastroc: 5/5  Left gastroc: 5/5    Sensory Exam   Right arm light touch: normal  Right leg light touch: normal  Right arm vibration: normal  Right leg vibration: normal  Right arm proprioception: normal  Right leg proprioception: normal  Right arm pinprick: normal  Right leg pinprick: normal  Sensory deficit distribution on left: C6    Gait, Coordination, and Reflexes     Gait  Gait: normal ( )    Tremor   Resting tremor: absent  Intention tremor: absent  Action tremor: absent    Reflexes   Reflexes 2+ except as noted.   Right ankle clonus: absent  Left ankle clonus: absent      Physical Exam   Constitutional: She appears well-developed and well-nourished.   Neurological: Gait normal.   Nursing note and vitals reviewed.    Infusion on 11/01/2017   Component Date Value Ref Range Status   • Glucose 11/01/2017 87  70 - 100 mg/dL Final   • BUN 11/01/2017 11  9 - 23 mg/dL Final   • Creatinine 11/01/2017 0.90  0.60 - 1.30 mg/dL Final   • Sodium 11/01/2017 137  132 - 146 mmol/L Final   • Potassium 11/01/2017 4.9  3.5 - 5.5 mmol/L Final   • Chloride 11/01/2017 106  99 - 109 mmol/L Final   • CO2 11/01/2017 29.0  20.0 - 31.0 mmol/L Final   • Calcium 11/01/2017 9.2  8.7 - 10.4 mg/dL Final   • Total Protein 11/01/2017 8.0  5.7 - 8.2 g/dL Final   • Albumin 11/01/2017 4.10  3.20 - 4.80 g/dL Final   • ALT (SGPT) 11/01/2017 25  7 - 40 U/L Final   • AST (SGOT) 11/01/2017 30  0 - 33 U/L Final   • Alkaline Phosphatase 11/01/2017 74  25 - 100 U/L Final   • Total Bilirubin 11/01/2017 0.4  0.3 - 1.2 mg/dL Final   • eGFR Non African Amer 11/01/2017 63  >60 mL/min/1.73 Final   • Globulin 11/01/2017 3.9  gm/dL Final   • A/G Ratio 11/01/2017 1.1* 1.5 - 2.5 g/dL Final   • BUN/Creatinine Ratio 11/01/2017 12.2  7.0 - 25.0 Final   • Anion Gap 11/01/2017 2.0*  3.0 - 11.0 mmol/L Final   • WBC 11/01/2017 11.99* 3.50 - 10.80 10*3/mm3 Final   • RBC 11/01/2017 4.17  3.89 - 5.14 10*6/mm3 Final   • Hemoglobin 11/01/2017 12.9  11.5 - 15.5 g/dL Final   • Hematocrit 11/01/2017 37.9  34.5 - 44.0 % Final   • MCV 11/01/2017 90.9  80.0 - 99.0 fL Final   • MCH 11/01/2017 30.9  27.0 - 31.0 pg Final   • MCHC 11/01/2017 34.0  32.0 - 36.0 g/dL Final   • RDW 11/01/2017 14.7* 11.3 - 14.5 % Final   • RDW-SD 11/01/2017 49.4  37.0 - 54.0 fl Final   • MPV 11/01/2017 9.9  6.0 - 12.0 fL Final   • Platelets 11/01/2017 294  150 - 450 10*3/mm3 Final   • Neutrophil % 11/01/2017 53.0  41.0 - 71.0 % Final   • Lymphocyte % 11/01/2017 37.0  24.0 - 44.0 % Final   • Monocyte % 11/01/2017 7.9  0.0 - 12.0 % Final   • Eosinophil % 11/01/2017 1.4  0.0 - 3.0 % Final   • Basophil % 11/01/2017 0.3  0.0 - 1.0 % Final   • Immature Grans % 11/01/2017 0.4  0.0 - 0.6 % Final   • Neutrophils, Absolute 11/01/2017 6.34  1.50 - 8.30 10*3/mm3 Final   • Lymphocytes, Absolute 11/01/2017 4.44  0.60 - 4.80 10*3/mm3 Final   • Monocytes, Absolute 11/01/2017 0.95  0.00 - 1.00 10*3/mm3 Final   • Eosinophils, Absolute 11/01/2017 0.17  0.00 - 0.30 10*3/mm3 Final   • Basophils, Absolute 11/01/2017 0.04  0.00 - 0.20 10*3/mm3 Final   • Immature Grans, Absolute 11/01/2017 0.05* 0.00 - 0.03 10*3/mm3 Final     Assessment/Plan:       Problems Addressed this Visit        Cardiovascular and Mediastinum    Periodic headache syndrome, not intractable     Headaches are improving with treatment.  Continue current treatment regimen.                Nervous and Auditory    Multiple sclerosis - Primary     15 infusions of Tysabri and JCV positive    Discusses Lemtrada    Draw pre labs and MRI Brain          Relevant Orders    MRI Brain With & Without Contrast    Hepatitis Panel, Acute    CBC & Differential    Comprehensive Metabolic Panel    TSH    Urinalysis With Microscopic - Urine, Clean Catch    Varicella Zoster Antibody, IgG    QuantiFERON TB  Client Incubated    HIV 1 / 2 (HIV-1 / 2) Antibody Differentiation      Other Visit Diagnoses     Postviral fatigue syndrome         Relevant Orders    Hepatitis Panel, Acute    Other specified disorders involving the immune mechanism, not elsewhere classified         Relevant Orders    TSH    Other hereditary and idiopathic neuropathies         Relevant Orders    HIV 1 / 2 (HIV-1 / 2) Antibody Differentiation

## 2018-04-04 ENCOUNTER — HOSPITAL ENCOUNTER (OUTPATIENT)
Dept: MRI IMAGING | Facility: HOSPITAL | Age: 68
Discharge: HOME | End: 2018-04-04
Attending: PSYCHIATRY & NEUROLOGY | Admitting: PSYCHIATRY & NEUROLOGY

## 2018-04-04 DIAGNOSIS — G35 MULTIPLE SCLEROSIS (HCC): ICD-10-CM

## 2018-04-04 LAB — CREAT BLDA-MCNC: 1.1 MG/DL (ref 0.6–1.3)

## 2018-04-04 PROCEDURE — 0 GADOBENATE DIMEGLUMINE 529 MG/ML SOLUTION: Performed by: PSYCHIATRY & NEUROLOGY

## 2018-04-04 PROCEDURE — 82565 ASSAY OF CREATININE: CPT

## 2018-04-04 PROCEDURE — A9577 INJ MULTIHANCE: HCPCS | Performed by: PSYCHIATRY & NEUROLOGY

## 2018-04-04 PROCEDURE — 70553 MRI BRAIN STEM W/O & W/DYE: CPT

## 2018-04-04 RX ADMIN — GADOBENATE DIMEGLUMINE 13 ML: 529 INJECTION, SOLUTION INTRAVENOUS at 12:15

## 2018-04-18 ENCOUNTER — OFFICE VISIT (OUTPATIENT)
Dept: NEUROLOGY | Facility: CLINIC | Age: 68
End: 2018-04-18

## 2018-04-18 ENCOUNTER — INFUSION (OUTPATIENT)
Dept: ONCOLOGY | Facility: HOSPITAL | Age: 68
End: 2018-04-18

## 2018-04-18 VITALS
WEIGHT: 147 LBS | OXYGEN SATURATION: 97 % | RESPIRATION RATE: 18 BRPM | SYSTOLIC BLOOD PRESSURE: 140 MMHG | BODY MASS INDEX: 23.07 KG/M2 | HEIGHT: 67 IN | DIASTOLIC BLOOD PRESSURE: 84 MMHG | HEART RATE: 56 BPM

## 2018-04-18 VITALS
SYSTOLIC BLOOD PRESSURE: 151 MMHG | TEMPERATURE: 97.5 F | DIASTOLIC BLOOD PRESSURE: 76 MMHG | HEART RATE: 62 BPM | RESPIRATION RATE: 18 BRPM

## 2018-04-18 DIAGNOSIS — G60.8 OTHER HEREDITARY AND IDIOPATHIC NEUROPATHIES: ICD-10-CM

## 2018-04-18 DIAGNOSIS — G43.C0 PERIODIC HEADACHE SYNDROME, NOT INTRACTABLE: ICD-10-CM

## 2018-04-18 DIAGNOSIS — G35 MULTIPLE SCLEROSIS (HCC): Primary | ICD-10-CM

## 2018-04-18 DIAGNOSIS — D89.89 OTHER SPECIFIED DISORDERS INVOLVING THE IMMUNE MECHANISM, NOT ELSEWHERE CLASSIFIED (HCC): ICD-10-CM

## 2018-04-18 DIAGNOSIS — G93.31 POSTVIRAL FATIGUE SYNDROME: ICD-10-CM

## 2018-04-18 PROCEDURE — 25010000002 NATALIZUMAB PER 1 MG: Performed by: PSYCHIATRY & NEUROLOGY

## 2018-04-18 PROCEDURE — 99213 OFFICE O/P EST LOW 20 MIN: CPT | Performed by: PSYCHIATRY & NEUROLOGY

## 2018-04-18 PROCEDURE — 63710000001 DIPHENHYDRAMINE PER 50 MG: Performed by: PSYCHIATRY & NEUROLOGY

## 2018-04-18 PROCEDURE — 96365 THER/PROPH/DIAG IV INF INIT: CPT

## 2018-04-18 RX ORDER — DIPHENHYDRAMINE HCL 25 MG
25 CAPSULE ORAL ONCE
Status: COMPLETED | OUTPATIENT
Start: 2018-04-18 | End: 2018-04-18

## 2018-04-18 RX ORDER — SODIUM CHLORIDE 9 MG/ML
250 INJECTION, SOLUTION INTRAVENOUS ONCE
Status: CANCELLED | OUTPATIENT
Start: 2018-04-18

## 2018-04-18 RX ORDER — DIPHENHYDRAMINE HCL 25 MG
25 CAPSULE ORAL ONCE
Status: CANCELLED | OUTPATIENT
Start: 2018-04-18

## 2018-04-18 RX ORDER — ACETAMINOPHEN 325 MG/1
650 TABLET ORAL ONCE
Status: COMPLETED | OUTPATIENT
Start: 2018-04-18 | End: 2018-04-18

## 2018-04-18 RX ORDER — ACETAMINOPHEN 325 MG/1
650 TABLET ORAL ONCE
Status: CANCELLED | OUTPATIENT
Start: 2018-04-18

## 2018-04-18 RX ADMIN — DIPHENHYDRAMINE HYDROCHLORIDE 25 MG: 25 CAPSULE ORAL at 09:19

## 2018-04-18 RX ADMIN — ACETAMINOPHEN 650 MG: 325 TABLET ORAL at 09:19

## 2018-04-18 RX ADMIN — NATALIZUMAB 300 MG: 300 INJECTION INTRAVENOUS at 09:20

## 2018-04-18 NOTE — ASSESSMENT & PLAN NOTE
Pt is willing to proceed with Lemtrada    Will do one more Tysabri infusion then do Lemtrada 4 weeks after next infusion

## 2018-04-18 NOTE — PROGRESS NOTES
Subjective:   Chief Complaint   Patient presents with   • Multiple Sclerosis       Patient ID: Leatha Wall is a 67 y.o. female.    History of Present Illness     67 y.o. woman returns in follow for RRMS and migraines.  Last visit on 3/22/18 continue Tysabri, ordered MRI Brain and pre labs for Lemtrada.  .    Pre labs not drawn.     MRI Brain, my review of films, 4/4/18 as compared to 6/21/17 moderate to severe T2 lesion load, without new/enlarging or enhancing lesions    11/1/17:  CBC, CMP - NCS    12/13/16:  JCV 2.21  6/27/17    JCV 1.87   6/27/17:  Cr 1.19, WBC 13.8  - NCS        RRMS    No new or worsening sx.  Intermittent numbness left arm and leg.   Continues on Tysabri without side effects 16 infusions.  Fatigue is moderate to severe.    Walking for 40 minutes every other day.   Tobacco 3/4 ppd.     Problem history:    MSFC remarkable for 26/110 o/w within normal results.  Oral numbness resolved.  Bladder urgency and frequency slight increase.    Mild N/T in bottom of feet     Migraines         Migraine frequency is  once a every couple months.    Located in occiput and behind eyes.  Quality of tightness and throbbing.  Mild severity.       PMH:    Fatigue and heat are bothersome.  Echo -   EF 60%  C U/S -  0-49% on right, no stenosis on left    MRI cervical, my review, unremarkable  Labs below:    NMO negative  Hypercoaguable panel WNL  Vit D 17.2    Fatigue continues to wax and wane.  Last few days has had unsteady gait. Weakness on left arm/leg.  Numbness in left arm and right foot.  Left side of tongue numb for the last 7 to 14 weeks.   Continue to smoke Tob 1 ppd.     Patient called and noted overwhelming fatigue.  After steroids fatigue improved but then recurred on 8/5/16.  Frequent HA's every other day frontal and occipital location of squeezing sensation of moderate severity.  Prednisone taper rx on 7/22/16 improved energy.      6/22/16 awoke at 3:30 am and noted left sided weakness in arm and  "leg.  Gait was unsteady and worsened over the next 7 days and presented to [facility] ED.  Fatigue was overwhelming.  MRI Brain in ED, my review, with multiple T2 white matter lesions and one large black hole in left PVWM.  Given steroids in ED with improvement in sx by 50%.    5/2016 had episode of left arm clumsiness for a week.      Reports increased fatigue since the first of the year.      Vision is fuzzy and feel swollen.      Bladder frequency.     Tob 1 ppd.    The following portions of the patient's history were reviewed and updated as appropriate: allergies, current medications, past family history, past medical history, past social history, past surgical history and problem list.    Review of Systems   Constitutional: Positive for fatigue. Negative for activity change and unexpected weight change.   HENT: Negative for tinnitus and trouble swallowing.    Eyes: Negative for photophobia and visual disturbance.   Respiratory: Negative for apnea and choking.    Cardiovascular: Negative for leg swelling.   Endocrine: Negative for cold intolerance and heat intolerance.   Genitourinary: Positive for frequency and urgency. Negative for difficulty urinating and menstrual problem.   Musculoskeletal: Positive for gait problem. Negative for back pain.   Skin: Negative for color change.   Allergic/Immunologic: Negative for immunocompromised state.   Neurological: Positive for dizziness and tremors. Negative for seizures, syncope, facial asymmetry, speech difficulty and light-headedness.   Hematological: Negative for adenopathy. Does not bruise/bleed easily.   Psychiatric/Behavioral: Positive for decreased concentration. Negative for behavioral problems, confusion, hallucinations and sleep disturbance.        Objective:  Vitals:    04/18/18 1034   BP: 140/84   BP Location: Left arm   Patient Position: Sitting   Cuff Size: Adult   Pulse: 56   Resp: 18   SpO2: 97%   Weight: 66.7 kg (147 lb)   Height: 170.2 cm (67\") "       Neurologic Exam     Mental Status   Registration: recalls 3 of 3 objects. Recall at 5 minutes: recalls 3 of 3 objects. Follows 3 step commands.   Attention: normal. Concentration: normal.   Level of consciousness: alert  Knowledge: good and consistent with education.   Able to name object. Able to read. Able to repeat. Able to write. Normal comprehension.     Cranial Nerves     CN II   Visual fields full to confrontation.   Visual acuity: normal  Right visual field deficit: none  Left visual field deficit: none     CN III, IV, VI   Right pupil: Shape: regular. Reactivity: brisk. Consensual response: intact.   Left pupil: Shape: regular. Reactivity: brisk. Consensual response: intact.   Nystagmus: none   Diplopia: none  Ophthalmoparesis: none  Upgaze: normal  Downgaze: normal  Conjugate gaze: present  Vestibulo-ocular reflex: present    CN V   Right facial sensation deficit: forehead  Left facial sensation deficit: forehead  Right corneal reflex: normal  Left corneal reflex: normal    CN VII   Right facial weakness: none  Left facial weakness: none    CN VIII   Hearing: intact    CN IX, X   Palate: symmetric  Right gag reflex: normal  Left gag reflex: normal    CN XI   Right sternocleidomastoid strength: normal  Left sternocleidomastoid strength: normal    CN XII   Tongue: not atrophic  Fasciculations: absent  Tongue deviation: none    Motor Exam   Muscle bulk: normal  Overall muscle tone: normal  Right arm tone: normal  Left arm tone: normal  Right leg tone: normal  Left leg tone: normal    Strength   Right neck flexion: 5/5  Left neck flexion: 5/5  Right neck extension: 5/5  Left neck extension: 5/5  Right deltoid: 5/5  Left deltoid: 5/5  Right biceps: 5/5  Left biceps: 5/5  Right triceps: 5/5  Left triceps: 5/5  Right wrist flexion: 5/5  Left wrist flexion: 5/5  Right wrist extension: 5/5  Left wrist extension: 5/5  Right interossei: 5/5  Left interossei: 5/5  Right abdominals: 5/5  Left abdominals:  5/5  Right iliopsoas: 5/5  Left iliopsoas: 5/5  Right quadriceps: 5/5  Left quadriceps: 5/5  Right hamstrin/5  Left hamstrin/5  Right glutei: 5/5  Left glutei: 5/5  Right anterior tibial: 5/5  Left anterior tibial: 5/5  Right posterior tibial: 5/5  Left posterior tibial: 5/5  Right peroneal: 5/5  Left peroneal: 5/5  Right gastroc: 5/5  Left gastroc: 5/5    Sensory Exam   Right arm light touch: normal  Right leg light touch: normal  Right arm vibration: normal  Right leg vibration: normal  Right arm proprioception: normal  Right leg proprioception: normal  Right arm pinprick: normal  Right leg pinprick: normal  Sensory deficit distribution on left: C6    Gait, Coordination, and Reflexes     Gait  Gait: normal ( )    Tremor   Resting tremor: absent  Intention tremor: absent  Action tremor: absent    Reflexes   Reflexes 2+ except as noted.   Right ankle clonus: absent  Left ankle clonus: absent      Physical Exam   Constitutional: She appears well-developed and well-nourished.   Neurological: Gait normal.   Nursing note and vitals reviewed.    Hospital Outpatient Visit on 2018   Component Date Value Ref Range Status   • Creatinine 2018 1.10  0.60 - 1.30 mg/dL Final     Assessment/Plan:       Problems Addressed this Visit        Cardiovascular and Mediastinum    Periodic headache syndrome, not intractable     Headaches are improving with treatment.  Continue current treatment regimen.                Nervous and Auditory    Multiple sclerosis - Primary     Pt is willing to proceed with Lemtrada    Will do one more Tysabri infusion then do Lemtrada 4 weeks after next infusion           Other Visit Diagnoses    None.

## 2018-05-16 ENCOUNTER — INFUSION (OUTPATIENT)
Dept: ONCOLOGY | Facility: HOSPITAL | Age: 68
End: 2018-05-16

## 2018-05-16 VITALS
RESPIRATION RATE: 18 BRPM | TEMPERATURE: 98 F | HEIGHT: 67 IN | HEART RATE: 63 BPM | SYSTOLIC BLOOD PRESSURE: 143 MMHG | DIASTOLIC BLOOD PRESSURE: 72 MMHG

## 2018-05-16 DIAGNOSIS — G35 MULTIPLE SCLEROSIS (HCC): Primary | ICD-10-CM

## 2018-05-16 PROCEDURE — 25010000002 NATALIZUMAB PER 1 MG: Performed by: PSYCHIATRY & NEUROLOGY

## 2018-05-16 PROCEDURE — 63710000001 DIPHENHYDRAMINE PER 50 MG: Performed by: PSYCHIATRY & NEUROLOGY

## 2018-05-16 PROCEDURE — 96365 THER/PROPH/DIAG IV INF INIT: CPT

## 2018-05-16 RX ORDER — ACETAMINOPHEN 325 MG/1
650 TABLET ORAL ONCE
Status: COMPLETED | OUTPATIENT
Start: 2018-05-16 | End: 2018-05-16

## 2018-05-16 RX ORDER — ACETAMINOPHEN 325 MG/1
650 TABLET ORAL ONCE
Status: CANCELLED | OUTPATIENT
Start: 2018-05-16

## 2018-05-16 RX ORDER — SODIUM CHLORIDE 9 MG/ML
250 INJECTION, SOLUTION INTRAVENOUS ONCE
Status: CANCELLED | OUTPATIENT
Start: 2018-05-16

## 2018-05-16 RX ORDER — DIPHENHYDRAMINE HCL 25 MG
25 CAPSULE ORAL ONCE
Status: COMPLETED | OUTPATIENT
Start: 2018-05-16 | End: 2018-05-16

## 2018-05-16 RX ORDER — DIPHENHYDRAMINE HCL 25 MG
25 CAPSULE ORAL ONCE
Status: CANCELLED | OUTPATIENT
Start: 2018-05-16

## 2018-05-16 RX ADMIN — ACETAMINOPHEN 650 MG: 325 TABLET ORAL at 10:00

## 2018-05-16 RX ADMIN — NATALIZUMAB 300 MG: 300 INJECTION INTRAVENOUS at 10:06

## 2018-05-16 RX ADMIN — DIPHENHYDRAMINE HYDROCHLORIDE 25 MG: 25 CAPSULE ORAL at 10:01

## 2018-05-17 RX ORDER — DIPHENHYDRAMINE HYDROCHLORIDE 50 MG/ML
25 INJECTION INTRAMUSCULAR; INTRAVENOUS ONCE
Status: CANCELLED | OUTPATIENT
Start: 2018-05-17

## 2018-05-17 RX ORDER — ONDANSETRON 2 MG/ML
8 INJECTION INTRAMUSCULAR; INTRAVENOUS EVERY 8 HOURS PRN
Status: CANCELLED | OUTPATIENT
Start: 2018-05-17

## 2018-05-17 RX ORDER — FAMOTIDINE 10 MG/ML
20 INJECTION, SOLUTION INTRAVENOUS EVERY 6 HOURS PRN
Status: CANCELLED | OUTPATIENT
Start: 2018-05-17

## 2018-05-17 RX ORDER — ACETAMINOPHEN 325 MG/1
650 TABLET ORAL ONCE
Status: CANCELLED | OUTPATIENT
Start: 2018-05-17

## 2018-05-17 RX ORDER — SODIUM CHLORIDE 9 MG/ML
250 INJECTION, SOLUTION INTRAVENOUS ONCE
Status: CANCELLED | OUTPATIENT
Start: 2018-05-17

## 2018-05-29 ENCOUNTER — TELEPHONE (OUTPATIENT)
Dept: NEUROLOGY | Facility: CLINIC | Age: 68
End: 2018-05-29

## 2018-05-29 NOTE — TELEPHONE ENCOUNTER
CALLED PATIENT, SPOKE WITH THE PATIENT, PATIENT IS AWARE THAT THE PROVIDER WANTED TO GO OVER LAB RESULTS. PATIENT VERBALIZED UNDERSTANDING AND MADE AN APPOINTMENT.

## 2018-05-29 NOTE — TELEPHONE ENCOUNTER
----- Message from Umang Perez MD sent at 5/29/2018  8:15 AM EDT -----  Please have patient schedule follow up to discuss labs

## 2018-06-07 ENCOUNTER — OFFICE VISIT (OUTPATIENT)
Dept: NEUROLOGY | Facility: CLINIC | Age: 68
End: 2018-06-07

## 2018-06-07 VITALS
SYSTOLIC BLOOD PRESSURE: 128 MMHG | HEIGHT: 67 IN | DIASTOLIC BLOOD PRESSURE: 68 MMHG | RESPIRATION RATE: 18 BRPM | BODY MASS INDEX: 22.76 KG/M2 | OXYGEN SATURATION: 99 % | WEIGHT: 145 LBS | HEART RATE: 60 BPM

## 2018-06-07 DIAGNOSIS — R76.8 HEPATITIS C ANTIBODY TEST POSITIVE: Primary | ICD-10-CM

## 2018-06-07 DIAGNOSIS — G43.C0 PERIODIC HEADACHE SYNDROME, NOT INTRACTABLE: ICD-10-CM

## 2018-06-07 DIAGNOSIS — G35 MULTIPLE SCLEROSIS (HCC): ICD-10-CM

## 2018-06-07 PROCEDURE — 99214 OFFICE O/P EST MOD 30 MIN: CPT | Performed by: PSYCHIATRY & NEUROLOGY

## 2018-06-07 RX ORDER — DIPHENHYDRAMINE HCL 25 MG
25 CAPSULE ORAL ONCE
Status: CANCELLED | OUTPATIENT
Start: 2018-06-07

## 2018-06-07 RX ORDER — SODIUM CHLORIDE 9 MG/ML
250 INJECTION, SOLUTION INTRAVENOUS ONCE
Status: CANCELLED | OUTPATIENT
Start: 2018-06-07

## 2018-06-07 RX ORDER — ACETAMINOPHEN 325 MG/1
650 TABLET ORAL ONCE
Status: CANCELLED | OUTPATIENT
Start: 2018-06-07

## 2018-06-07 NOTE — PROGRESS NOTES
Subjective:   Chief Complaint   Patient presents with   • Multiple Sclerosis       Patient ID: Leatha Wall is a 68 y.o. female.    History of Present Illness     68 y.o. woman returns in follow for RRMS, Hep C and migraines.  Last visit on 4/18/18 continue Tysabri, and planned to transition to Lemtrada, ordered pre labs.     TB - neg   Cr 1.12; WBC 12.6; VZV 2318; HIV - neg; Hep C reactive 29.00, HCV quantitative PCR 6.45     MRI Brain, 4/4/18 as compared to 6/21/17 moderate to severe T2 lesion load, without new/enlarging or enhancing lesions    11/1/17:  CBC, CMP - NCS    5/23/18     JCV 1.44  12/13/16:  JCV 2.21  6/27/17    JCV 1.87   6/27/17:  Cr 1.19, WBC 13.8  - NCS        RRMS    No new or worsening sx.  Intermittent numbness left arm and leg.   Continues on Tysabri without side effects 17 infusions.  Fatigue is moderate to severe.    Walking for 40 minutes every other day.   Tobacco 3/4 ppd.     Problem history:    MSFC remarkable for 26/110 o/w within normal results.  Oral numbness resolved.  Bladder urgency and frequency slight increase.    Mild N/T in bottom of feet     Migraines         Migraine frequency is rare.    Located in occiput and behind eyes.  Quality of tightness and throbbing.  Mild severity.       PMH:    Fatigue and heat are bothersome.  Echo -   EF 60%  C U/S -  0-49% on right, no stenosis on left    MRI cervical, my review, unremarkable  Labs below:    NMO negative  Hypercoaguable panel WNL  Vit D 17.2    Fatigue continues to wax and wane.  Last few days has had unsteady gait. Weakness on left arm/leg.  Numbness in left arm and right foot.  Left side of tongue numb for the last 7 to 14 weeks.   Continue to smoke Tob 1 ppd.     Patient called and noted overwhelming fatigue.  After steroids fatigue improved but then recurred on 8/5/16.  Frequent HA's every other day frontal and occipital location of squeezing sensation of moderate severity.  Prednisone taper rx on 7/22/16 improved  energy.      6/22/16 awoke at 3:30 am and noted left sided weakness in arm and leg.  Gait was unsteady and worsened over the next 7 days and presented to [facility] ED.  Fatigue was overwhelming.  MRI Brain in ED, my review, with multiple T2 white matter lesions and one large black hole in left PVWM.  Given steroids in ED with improvement in sx by 50%.    5/2016 had episode of left arm clumsiness for a week.      Reports increased fatigue since the first of the year.      Vision is fuzzy and feel swollen.      Bladder frequency.     Tob 1 ppd.    The following portions of the patient's history were reviewed and updated as appropriate: allergies, current medications, past family history, past medical history, past social history, past surgical history and problem list.    Review of Systems   Constitutional: Positive for fatigue. Negative for activity change and unexpected weight change.   HENT: Negative for tinnitus and trouble swallowing.    Eyes: Negative for photophobia and visual disturbance.   Respiratory: Negative for apnea and choking.    Cardiovascular: Negative for leg swelling.   Endocrine: Negative for cold intolerance and heat intolerance.   Genitourinary: Positive for frequency and urgency. Negative for difficulty urinating and menstrual problem.   Musculoskeletal: Positive for gait problem. Negative for back pain.   Skin: Negative for color change.   Allergic/Immunologic: Negative for immunocompromised state.   Neurological: Positive for dizziness and tremors. Negative for seizures, syncope, facial asymmetry, speech difficulty and light-headedness.   Hematological: Negative for adenopathy. Does not bruise/bleed easily.   Psychiatric/Behavioral: Positive for decreased concentration. Negative for behavioral problems, confusion, hallucinations and sleep disturbance.        Objective:  Vitals:    06/07/18 1057   BP: 128/68   BP Location: Left arm   Patient Position: Sitting   Cuff Size: Adult   Pulse: 60   Resp:  "18   SpO2: 99%   Weight: 65.8 kg (145 lb)   Height: 170.2 cm (67\")       Neurologic Exam     Mental Status   Registration: recalls 3 of 3 objects. Recall at 5 minutes: recalls 3 of 3 objects. Follows 3 step commands.   Attention: normal. Concentration: normal.   Level of consciousness: alert  Knowledge: good and consistent with education.   Able to name object. Able to read. Able to repeat. Able to write. Normal comprehension.     Cranial Nerves     CN II   Visual fields full to confrontation.   Visual acuity: normal  Right visual field deficit: none  Left visual field deficit: none     CN III, IV, VI   Right pupil: Shape: regular. Reactivity: brisk. Consensual response: intact.   Left pupil: Shape: regular. Reactivity: brisk. Consensual response: intact.   Nystagmus: none   Diplopia: none  Ophthalmoparesis: none  Upgaze: normal  Downgaze: normal  Conjugate gaze: present  Vestibulo-ocular reflex: present    CN V   Right facial sensation deficit: forehead  Left facial sensation deficit: forehead  Right corneal reflex: normal  Left corneal reflex: normal    CN VII   Right facial weakness: none  Left facial weakness: none    CN VIII   Hearing: intact    CN IX, X   Palate: symmetric  Right gag reflex: normal  Left gag reflex: normal    CN XI   Right sternocleidomastoid strength: normal  Left sternocleidomastoid strength: normal    CN XII   Tongue: not atrophic  Fasciculations: absent  Tongue deviation: none    Motor Exam   Muscle bulk: normal  Overall muscle tone: normal  Right arm tone: normal  Left arm tone: normal  Right leg tone: normal  Left leg tone: normal    Strength   Right neck flexion: 5/5  Left neck flexion: 5/5  Right neck extension: 5/5  Left neck extension: 5/5  Right deltoid: 5/5  Left deltoid: 5/5  Right biceps: 5/5  Left biceps: 5/5  Right triceps: 5/5  Left triceps: 5/5  Right wrist flexion: 5/5  Left wrist flexion: 5/5  Right wrist extension: 5/5  Left wrist extension: 5/5  Right interossei: " 5/5  Left interossei: 5/5  Right abdominals: 5/5  Left abdominals: 5/5  Right iliopsoas: 5/5  Left iliopsoas: 5/5  Right quadriceps: 5/5  Left quadriceps: 5/5  Right hamstrin/5  Left hamstrin/5  Right glutei: 5/5  Left glutei: 5/5  Right anterior tibial: 5/5  Left anterior tibial: 5/5  Right posterior tibial: 5/5  Left posterior tibial: 5/5  Right peroneal: 5/5  Left peroneal: 5/5  Right gastroc: 5/5  Left gastroc: 5/5    Sensory Exam   Right arm light touch: normal  Right leg light touch: normal  Right arm vibration: normal  Right leg vibration: normal  Right arm proprioception: normal  Right leg proprioception: normal  Right arm pinprick: normal  Right leg pinprick: normal  Sensory deficit distribution on left: C6    Gait, Coordination, and Reflexes     Gait  Gait: normal ( )    Tremor   Resting tremor: absent  Intention tremor: absent  Action tremor: absent    Reflexes   Reflexes 2+ except as noted.   Right ankle clonus: absent  Left ankle clonus: absent      Physical Exam   Constitutional: She appears well-developed and well-nourished.   Neurological: Gait normal.   Nursing note and vitals reviewed.    Hospital Outpatient Visit on 2018   Component Date Value Ref Range Status   • Creatinine 2018 1.10  0.60 - 1.30 mg/dL Final    Serial Number: [id number]:  [id number]     Assessment/Plan:       Problems Addressed this Visit        Cardiovascular and Mediastinum    Periodic headache syndrome, not intractable     Headaches are improving with treatment.  Continue current treatment regimen.                Nervous and Auditory    Multiple sclerosis     Hold on Lemtrada due to Hep C ab positive    Continue Tysabri 300 mg IV every 6 weeks.     Next Tysabri 18             Other    Hepatitis C antibody test positive - Primary    Relevant Orders    Ambulatory Referral to Gastroenterology

## 2018-06-07 NOTE — ASSESSMENT & PLAN NOTE
Hold on Lemtrada due to Hep C ab positive    Continue Tysabri 300 mg IV every 6 weeks.     Next Tysabri 6/27/18

## 2018-06-25 ENCOUNTER — APPOINTMENT (OUTPATIENT)
Dept: ONCOLOGY | Facility: HOSPITAL | Age: 68
End: 2018-06-25

## 2018-06-27 ENCOUNTER — TELEPHONE (OUTPATIENT)
Dept: NEUROLOGY | Facility: CLINIC | Age: 68
End: 2018-06-27

## 2018-06-27 NOTE — TELEPHONE ENCOUNTER
----- Message from Alesharosy Nguyen sent at 6/27/2018  1:58 PM EDT -----  Regarding: MEDICATION  Contact: [phone]  Patient states she was unclear if she was to remain on tysabri after hep c dx. She states her Gastro appt is not until Aug 19/18 [facility].

## 2018-06-27 NOTE — TELEPHONE ENCOUNTER
CALLED PATIENT, SPOKE WITH THE PATIENT, PATIENT IS AWARE THAT THE PROVIDER WANTS HER TO STAY ON THE TYSABRI AT THIS TIME. TRANSFERRED HER TO INFUSION TO SCHEDULE HER INFUSION.

## 2018-07-05 ENCOUNTER — APPOINTMENT (OUTPATIENT)
Dept: ONCOLOGY | Facility: HOSPITAL | Age: 68
End: 2018-07-05

## 2018-07-23 ENCOUNTER — INFUSION (OUTPATIENT)
Dept: ONCOLOGY | Facility: HOSPITAL | Age: 68
End: 2018-07-23

## 2018-07-23 VITALS
RESPIRATION RATE: 16 BRPM | TEMPERATURE: 96.6 F | DIASTOLIC BLOOD PRESSURE: 75 MMHG | SYSTOLIC BLOOD PRESSURE: 172 MMHG | HEART RATE: 62 BPM

## 2018-07-23 DIAGNOSIS — G35 MULTIPLE SCLEROSIS (HCC): Primary | ICD-10-CM

## 2018-07-23 PROCEDURE — 63710000001 DIPHENHYDRAMINE PER 50 MG: Performed by: PSYCHIATRY & NEUROLOGY

## 2018-07-23 PROCEDURE — 25010000002 NATALIZUMAB PER 1 MG: Performed by: PSYCHIATRY & NEUROLOGY

## 2018-07-23 PROCEDURE — 96365 THER/PROPH/DIAG IV INF INIT: CPT

## 2018-07-23 RX ORDER — ACETAMINOPHEN 325 MG/1
650 TABLET ORAL ONCE
Status: COMPLETED | OUTPATIENT
Start: 2018-07-23 | End: 2018-07-23

## 2018-07-23 RX ORDER — DIPHENHYDRAMINE HCL 25 MG
25 CAPSULE ORAL ONCE
Status: CANCELLED | OUTPATIENT
Start: 2018-07-23

## 2018-07-23 RX ORDER — SODIUM CHLORIDE 9 MG/ML
250 INJECTION, SOLUTION INTRAVENOUS ONCE
Status: CANCELLED | OUTPATIENT
Start: 2018-07-23

## 2018-07-23 RX ORDER — SODIUM CHLORIDE 9 MG/ML
250 INJECTION, SOLUTION INTRAVENOUS ONCE
Status: DISCONTINUED | OUTPATIENT
Start: 2018-07-23 | End: 2018-07-23 | Stop reason: HOSPADM

## 2018-07-23 RX ORDER — DIPHENHYDRAMINE HCL 25 MG
25 CAPSULE ORAL ONCE
Status: COMPLETED | OUTPATIENT
Start: 2018-07-23 | End: 2018-07-23

## 2018-07-23 RX ORDER — ACETAMINOPHEN 325 MG/1
650 TABLET ORAL ONCE
Status: CANCELLED | OUTPATIENT
Start: 2018-07-23

## 2018-07-23 RX ADMIN — NATALIZUMAB 300 MG: 300 INJECTION INTRAVENOUS at 09:49

## 2018-07-23 RX ADMIN — DIPHENHYDRAMINE HYDROCHLORIDE 25 MG: 25 CAPSULE ORAL at 09:48

## 2018-07-23 RX ADMIN — ACETAMINOPHEN 650 MG: 325 TABLET ORAL at 09:48

## 2018-09-05 ENCOUNTER — INFUSION (OUTPATIENT)
Dept: ONCOLOGY | Facility: HOSPITAL | Age: 68
End: 2018-09-05

## 2018-09-05 VITALS
WEIGHT: 145 LBS | SYSTOLIC BLOOD PRESSURE: 140 MMHG | TEMPERATURE: 97.8 F | BODY MASS INDEX: 22.76 KG/M2 | RESPIRATION RATE: 16 BRPM | DIASTOLIC BLOOD PRESSURE: 76 MMHG | HEART RATE: 64 BPM | HEIGHT: 67 IN

## 2018-09-05 DIAGNOSIS — G35 MULTIPLE SCLEROSIS (HCC): Primary | ICD-10-CM

## 2018-09-05 PROCEDURE — 63710000001 DIPHENHYDRAMINE PER 50 MG: Performed by: PSYCHIATRY & NEUROLOGY

## 2018-09-05 PROCEDURE — 25010000002 NATALIZUMAB PER 1 MG: Performed by: PSYCHIATRY & NEUROLOGY

## 2018-09-05 PROCEDURE — 96413 CHEMO IV INFUSION 1 HR: CPT

## 2018-09-05 PROCEDURE — 96365 THER/PROPH/DIAG IV INF INIT: CPT

## 2018-09-05 RX ORDER — ACETAMINOPHEN 325 MG/1
650 TABLET ORAL ONCE
Status: CANCELLED | OUTPATIENT
Start: 2018-09-05

## 2018-09-05 RX ORDER — DIPHENHYDRAMINE HCL 25 MG
25 CAPSULE ORAL ONCE
Status: COMPLETED | OUTPATIENT
Start: 2018-09-05 | End: 2018-09-05

## 2018-09-05 RX ORDER — ACETAMINOPHEN 325 MG/1
650 TABLET ORAL ONCE
Status: COMPLETED | OUTPATIENT
Start: 2018-09-05 | End: 2018-09-05

## 2018-09-05 RX ORDER — DIPHENHYDRAMINE HCL 25 MG
25 CAPSULE ORAL ONCE
Status: CANCELLED | OUTPATIENT
Start: 2018-09-05

## 2018-09-05 RX ORDER — SODIUM CHLORIDE 9 MG/ML
250 INJECTION, SOLUTION INTRAVENOUS ONCE
Status: CANCELLED | OUTPATIENT
Start: 2018-09-05

## 2018-09-05 RX ADMIN — ACETAMINOPHEN 650 MG: 325 TABLET ORAL at 09:32

## 2018-09-05 RX ADMIN — DIPHENHYDRAMINE HYDROCHLORIDE 25 MG: 25 CAPSULE ORAL at 09:32

## 2018-09-05 RX ADMIN — NATALIZUMAB 300 MG: 300 INJECTION INTRAVENOUS at 09:34

## 2018-10-08 ENCOUNTER — OFFICE VISIT (OUTPATIENT)
Dept: NEUROLOGY | Facility: CLINIC | Age: 68
End: 2018-10-08

## 2018-10-08 VITALS
HEIGHT: 64 IN | RESPIRATION RATE: 16 BRPM | WEIGHT: 145 LBS | DIASTOLIC BLOOD PRESSURE: 72 MMHG | HEART RATE: 58 BPM | SYSTOLIC BLOOD PRESSURE: 138 MMHG | BODY MASS INDEX: 24.75 KG/M2 | OXYGEN SATURATION: 99 %

## 2018-10-08 DIAGNOSIS — G43.C0 PERIODIC HEADACHE SYNDROME, NOT INTRACTABLE: ICD-10-CM

## 2018-10-08 DIAGNOSIS — R76.8 HEPATITIS C ANTIBODY TEST POSITIVE: ICD-10-CM

## 2018-10-08 DIAGNOSIS — G35 MULTIPLE SCLEROSIS (HCC): Primary | ICD-10-CM

## 2018-10-08 PROCEDURE — 99214 OFFICE O/P EST MOD 30 MIN: CPT | Performed by: PSYCHIATRY & NEUROLOGY

## 2018-10-08 NOTE — PROGRESS NOTES
Subjective:   Chief Complaint   Patient presents with   • Multiple Sclerosis       Patient ID: Leatha Wall is a 68 y.o. female.    History of Present Illness     68 y.o. woman returns in follow for RRMS, Hep C and migraines.  Last visit on 6/7/18 continue Tysabri every 6 weeks, referred to GI    TB - neg   Cr 1.12; WBC 12.6; VZV 2318; HIV - neg;     Hep C reactive 29.00, HCV quantitative PCR 6.45     MRI Brain, 4/4/18 as compared to 6/21/17 moderate to severe T2 lesion load, without new/enlarging or enhancing lesions    11/1/17:  CBC, CMP - NCS    5/23/18     JCV 1.44  12/13/16:  JCV 2.21  6/27/17    JCV 1.87   6/27/17:  Cr 1.19, WBC 13.8  - NCS        RRMS    Starting treatment for Hep C tomorrow.      Increasing Tysabri to every 6 weeks has felt well.   Last two months has felt noticeably better.     Mild numbness left arm and leg.     Fatigue is mild.    Walking for 40 minutes every other day.   Tobacco < 1 ppd.     Problem history:    MSFC remarkable for 26/110 o/w within normal results.  Oral numbness resolved.  Bladder urgency and frequency slight increase.    Mild N/T in bottom of feet     Migraines         Migraine frequency controlled. .    Located in occiput and behind eyes.  Quality of tightness and throbbing.  Mild severity.       PMH:    Fatigue and heat are bothersome.  Echo -   EF 60%  C U/S -  0-49% on right, no stenosis on left    MRI cervical, my review, unremarkable  Labs below:    NMO negative  Hypercoaguable panel WNL  Vit D 17.2    Fatigue continues to wax and wane.  Last few days has had unsteady gait. Weakness on left arm/leg.  Numbness in left arm and right foot.  Left side of tongue numb for the last 7 to 14 weeks.   Continue to smoke Tob 1 ppd.     Patient called and noted overwhelming fatigue.  After steroids fatigue improved but then recurred on 8/5/16.  Frequent HA's every other day frontal and occipital location of squeezing sensation of moderate severity.  Prednisone taper rx on  7/22/16 improved energy.      6/22/16 awoke at 3:30 am and noted left sided weakness in arm and leg.  Gait was unsteady and worsened over the next 7 days and presented to [facility] ED.  Fatigue was overwhelming.  MRI Brain in ED, my review, with multiple T2 white matter lesions and one large black hole in left PVWM.  Given steroids in ED with improvement in sx by 50%.    5/2016 had episode of left arm clumsiness for a week.      Reports increased fatigue since the first of the year.      Vision is fuzzy and feel swollen.      Bladder frequency.     Tob 1 ppd.    The following portions of the patient's history were reviewed and updated as appropriate: allergies, current medications, past family history, past medical history, past social history, past surgical history and problem list.    Review of Systems   Constitutional: Positive for fatigue. Negative for activity change and unexpected weight change.   HENT: Negative for tinnitus and trouble swallowing.    Eyes: Negative for photophobia and visual disturbance.   Respiratory: Negative for apnea and choking.    Cardiovascular: Negative for leg swelling.   Endocrine: Negative for cold intolerance and heat intolerance.   Genitourinary: Positive for frequency and urgency. Negative for difficulty urinating and menstrual problem.   Musculoskeletal: Positive for gait problem. Negative for back pain.   Skin: Negative for color change.   Allergic/Immunologic: Negative for immunocompromised state.   Neurological: Positive for dizziness and tremors. Negative for seizures, syncope, facial asymmetry, speech difficulty and light-headedness.   Hematological: Negative for adenopathy. Does not bruise/bleed easily.   Psychiatric/Behavioral: Positive for decreased concentration. Negative for behavioral problems, confusion, hallucinations and sleep disturbance.        Objective:  Vitals:    10/08/18 0946   BP: 138/72   BP Location: Left arm   Patient Position: Sitting   Cuff Size: Adult  "  Pulse: 58   Resp: 16   SpO2: 99%   Weight: 65.8 kg (145 lb)   Height: 162.6 cm (64\")       Neurologic Exam     Mental Status   Registration: recalls 3 of 3 objects. Recall at 5 minutes: recalls 3 of 3 objects. Follows 3 step commands.   Attention: normal. Concentration: normal.   Level of consciousness: alert  Knowledge: good and consistent with education.   Able to name object. Able to read. Able to repeat. Able to write. Normal comprehension.     Cranial Nerves     CN II   Visual fields full to confrontation.   Visual acuity: normal  Right visual field deficit: none  Left visual field deficit: none     CN III, IV, VI   Right pupil: Shape: regular. Reactivity: brisk. Consensual response: intact.   Left pupil: Shape: regular. Reactivity: brisk. Consensual response: intact.   Nystagmus: none   Diplopia: none  Ophthalmoparesis: none  Upgaze: normal  Downgaze: normal  Conjugate gaze: present  Vestibulo-ocular reflex: present    CN V   Right facial sensation deficit: forehead  Left facial sensation deficit: forehead  Right corneal reflex: normal  Left corneal reflex: normal    CN VII   Right facial weakness: none  Left facial weakness: none    CN VIII   Hearing: intact    CN IX, X   Palate: symmetric  Right gag reflex: normal  Left gag reflex: normal    CN XI   Right sternocleidomastoid strength: normal  Left sternocleidomastoid strength: normal    CN XII   Tongue: not atrophic  Fasciculations: absent  Tongue deviation: none    Motor Exam   Muscle bulk: normal  Overall muscle tone: normal  Right arm tone: normal  Left arm tone: normal  Right leg tone: normal  Left leg tone: normal    Strength   Right neck flexion: 5/5  Left neck flexion: 5/5  Right neck extension: 5/5  Left neck extension: 5/5  Right deltoid: 5/5  Left deltoid: 5/5  Right biceps: 5/5  Left biceps: 5/5  Right triceps: 5/5  Left triceps: 5/5  Right wrist flexion: 5/5  Left wrist flexion: 5/5  Right wrist extension: 5/5  Left wrist extension: 5/5  Right " interossei: 5/5  Left interossei: 5/5  Right abdominals: 5/5  Left abdominals: 5/5  Right iliopsoas: 5/5  Left iliopsoas: 5/5  Right quadriceps: 5/5  Left quadriceps: 5/5  Right hamstrin/5  Left hamstrin/5  Right glutei: 5/5  Left glutei: 5/5  Right anterior tibial: 5/5  Left anterior tibial: 5/5  Right posterior tibial: 5/5  Left posterior tibial: 5/5  Right peroneal: 5/5  Left peroneal: 5/5  Right gastroc: 5/5  Left gastroc: 5/5    Sensory Exam   Right arm light touch: normal  Right leg light touch: normal  Right arm vibration: normal  Right leg vibration: normal  Right arm proprioception: normal  Right leg proprioception: normal  Right arm pinprick: normal  Right leg pinprick: normal  Sensory deficit distribution on left: C6    Gait, Coordination, and Reflexes     Gait  Gait: normal ( )    Tremor   Resting tremor: absent  Intention tremor: absent  Action tremor: absent    Reflexes   Reflexes 2+ except as noted.   Right ankle clonus: absent  Left ankle clonus: absent      Physical Exam   Constitutional: She appears well-developed and well-nourished.   Neurological: Gait normal.   Nursing note and vitals reviewed.    Hospital Outpatient Visit on 2018   Component Date Value Ref Range Status   • Creatinine 2018 1.10  0.60 - 1.30 mg/dL Final    Serial Number: [id number]:  [id number]     Assessment/Plan:       Problems Addressed this Visit        Cardiovascular and Mediastinum    Periodic headache syndrome, not intractable     Headaches are improving with treatment.  Continue current treatment regimen.                Nervous and Auditory    Multiple sclerosis (CMS/HCC) - Primary     Minimal sx on Tysabri every 6 weeks,    JCV ab    Starting treatment for Hep C                Other    Hepatitis C antibody test positive     Starting treatment for Hep C with Dr Ling

## 2018-10-17 ENCOUNTER — INFUSION (OUTPATIENT)
Dept: ONCOLOGY | Facility: HOSPITAL | Age: 68
End: 2018-10-17

## 2018-10-17 VITALS
SYSTOLIC BLOOD PRESSURE: 145 MMHG | HEART RATE: 64 BPM | RESPIRATION RATE: 16 BRPM | TEMPERATURE: 97.2 F | DIASTOLIC BLOOD PRESSURE: 64 MMHG

## 2018-10-17 DIAGNOSIS — G35 MULTIPLE SCLEROSIS (HCC): Primary | ICD-10-CM

## 2018-10-17 PROCEDURE — 63710000001 DIPHENHYDRAMINE PER 50 MG: Performed by: PSYCHIATRY & NEUROLOGY

## 2018-10-17 PROCEDURE — 25010000002 NATALIZUMAB PER 1 MG: Performed by: PSYCHIATRY & NEUROLOGY

## 2018-10-17 PROCEDURE — 96365 THER/PROPH/DIAG IV INF INIT: CPT

## 2018-10-17 RX ORDER — ACETAMINOPHEN 325 MG/1
650 TABLET ORAL ONCE
Status: COMPLETED | OUTPATIENT
Start: 2018-10-17 | End: 2018-10-17

## 2018-10-17 RX ORDER — ACETAMINOPHEN 325 MG/1
650 TABLET ORAL ONCE
Status: CANCELLED | OUTPATIENT
Start: 2018-10-17

## 2018-10-17 RX ORDER — DIPHENHYDRAMINE HCL 25 MG
25 CAPSULE ORAL ONCE
Status: CANCELLED | OUTPATIENT
Start: 2018-10-17

## 2018-10-17 RX ORDER — SODIUM CHLORIDE 9 MG/ML
250 INJECTION, SOLUTION INTRAVENOUS ONCE
Status: CANCELLED | OUTPATIENT
Start: 2018-10-17

## 2018-10-17 RX ORDER — DIPHENHYDRAMINE HCL 25 MG
25 CAPSULE ORAL ONCE
Status: COMPLETED | OUTPATIENT
Start: 2018-10-17 | End: 2018-10-17

## 2018-10-17 RX ADMIN — DIPHENHYDRAMINE HYDROCHLORIDE 25 MG: 25 CAPSULE ORAL at 09:41

## 2018-10-17 RX ADMIN — ACETAMINOPHEN 650 MG: 325 TABLET ORAL at 09:41

## 2018-10-17 RX ADMIN — NATALIZUMAB 300 MG: 300 INJECTION INTRAVENOUS at 09:42

## 2018-11-28 ENCOUNTER — INFUSION (OUTPATIENT)
Dept: ONCOLOGY | Facility: HOSPITAL | Age: 68
End: 2018-11-28

## 2018-11-28 VITALS
SYSTOLIC BLOOD PRESSURE: 136 MMHG | TEMPERATURE: 97.7 F | HEART RATE: 64 BPM | DIASTOLIC BLOOD PRESSURE: 77 MMHG | RESPIRATION RATE: 16 BRPM

## 2018-11-28 DIAGNOSIS — G35 MULTIPLE SCLEROSIS (HCC): Primary | ICD-10-CM

## 2018-11-28 PROCEDURE — 96365 THER/PROPH/DIAG IV INF INIT: CPT

## 2018-11-28 PROCEDURE — 25010000002 NATALIZUMAB PER 1 MG: Performed by: PSYCHIATRY & NEUROLOGY

## 2018-11-28 PROCEDURE — 63710000001 DIPHENHYDRAMINE PER 50 MG: Performed by: PSYCHIATRY & NEUROLOGY

## 2018-11-28 RX ORDER — DIPHENHYDRAMINE HCL 25 MG
25 CAPSULE ORAL ONCE
Status: COMPLETED | OUTPATIENT
Start: 2018-11-28 | End: 2018-11-28

## 2018-11-28 RX ORDER — VELPATASVIR AND SOFOSBUVIR 100; 400 MG/1; MG/1
TABLET, FILM COATED ORAL
COMMUNITY
Start: 2018-11-07 | End: 2019-02-20

## 2018-11-28 RX ORDER — DIPHENHYDRAMINE HCL 25 MG
25 CAPSULE ORAL ONCE
Status: CANCELLED | OUTPATIENT
Start: 2018-11-28

## 2018-11-28 RX ORDER — SODIUM CHLORIDE 9 MG/ML
250 INJECTION, SOLUTION INTRAVENOUS ONCE
Status: CANCELLED | OUTPATIENT
Start: 2018-11-28

## 2018-11-28 RX ORDER — ACETAMINOPHEN 325 MG/1
650 TABLET ORAL ONCE
Status: COMPLETED | OUTPATIENT
Start: 2018-11-28 | End: 2018-11-28

## 2018-11-28 RX ORDER — ACETAMINOPHEN 325 MG/1
650 TABLET ORAL ONCE
Status: CANCELLED | OUTPATIENT
Start: 2018-11-28

## 2018-11-28 RX ADMIN — DIPHENHYDRAMINE HYDROCHLORIDE 25 MG: 25 CAPSULE ORAL at 10:11

## 2018-11-28 RX ADMIN — NATALIZUMAB 300 MG: 300 INJECTION INTRAVENOUS at 10:16

## 2018-11-28 RX ADMIN — ACETAMINOPHEN 650 MG: 325 TABLET ORAL at 10:11

## 2019-01-09 ENCOUNTER — INFUSION (OUTPATIENT)
Dept: ONCOLOGY | Facility: HOSPITAL | Age: 69
End: 2019-01-09

## 2019-01-09 VITALS
RESPIRATION RATE: 16 BRPM | HEART RATE: 68 BPM | BODY MASS INDEX: 24.88 KG/M2 | DIASTOLIC BLOOD PRESSURE: 82 MMHG | TEMPERATURE: 96.5 F | SYSTOLIC BLOOD PRESSURE: 147 MMHG | HEIGHT: 64 IN

## 2019-01-09 DIAGNOSIS — G35 MULTIPLE SCLEROSIS (HCC): Primary | ICD-10-CM

## 2019-01-09 PROCEDURE — 25010000002 NATALIZUMAB PER 1 MG: Performed by: PSYCHIATRY & NEUROLOGY

## 2019-01-09 PROCEDURE — 63710000001 DIPHENHYDRAMINE PER 50 MG: Performed by: PSYCHIATRY & NEUROLOGY

## 2019-01-09 PROCEDURE — 96365 THER/PROPH/DIAG IV INF INIT: CPT

## 2019-01-09 RX ORDER — ACETAMINOPHEN 325 MG/1
650 TABLET ORAL ONCE
Status: CANCELLED | OUTPATIENT
Start: 2019-01-09

## 2019-01-09 RX ORDER — DIPHENHYDRAMINE HCL 25 MG
25 CAPSULE ORAL ONCE
Status: CANCELLED | OUTPATIENT
Start: 2019-01-09

## 2019-01-09 RX ORDER — ACETAMINOPHEN 325 MG/1
650 TABLET ORAL ONCE
Status: COMPLETED | OUTPATIENT
Start: 2019-01-09 | End: 2019-01-09

## 2019-01-09 RX ORDER — SODIUM CHLORIDE 9 MG/ML
250 INJECTION, SOLUTION INTRAVENOUS ONCE
Status: CANCELLED | OUTPATIENT
Start: 2019-01-09

## 2019-01-09 RX ORDER — DIPHENHYDRAMINE HCL 25 MG
25 CAPSULE ORAL ONCE
Status: COMPLETED | OUTPATIENT
Start: 2019-01-09 | End: 2019-01-09

## 2019-01-09 RX ADMIN — DIPHENHYDRAMINE HYDROCHLORIDE 25 MG: 25 CAPSULE ORAL at 10:01

## 2019-01-09 RX ADMIN — NATALIZUMAB 300 MG: 300 INJECTION INTRAVENOUS at 10:04

## 2019-01-09 RX ADMIN — ACETAMINOPHEN 650 MG: 325 TABLET ORAL at 10:01

## 2019-02-20 ENCOUNTER — INFUSION (OUTPATIENT)
Dept: ONCOLOGY | Facility: HOSPITAL | Age: 69
End: 2019-02-20

## 2019-02-20 ENCOUNTER — OFFICE VISIT (OUTPATIENT)
Dept: NEUROLOGY | Facility: CLINIC | Age: 69
End: 2019-02-20

## 2019-02-20 VITALS
TEMPERATURE: 97.5 F | DIASTOLIC BLOOD PRESSURE: 82 MMHG | SYSTOLIC BLOOD PRESSURE: 147 MMHG | RESPIRATION RATE: 20 BRPM | HEART RATE: 69 BPM

## 2019-02-20 VITALS
WEIGHT: 149 LBS | RESPIRATION RATE: 16 BRPM | HEIGHT: 64 IN | OXYGEN SATURATION: 98 % | BODY MASS INDEX: 25.44 KG/M2 | SYSTOLIC BLOOD PRESSURE: 152 MMHG | HEART RATE: 58 BPM | DIASTOLIC BLOOD PRESSURE: 80 MMHG

## 2019-02-20 DIAGNOSIS — G35 MULTIPLE SCLEROSIS (HCC): Primary | ICD-10-CM

## 2019-02-20 DIAGNOSIS — G43.C0 PERIODIC HEADACHE SYNDROME, NOT INTRACTABLE: ICD-10-CM

## 2019-02-20 PROCEDURE — 99214 OFFICE O/P EST MOD 30 MIN: CPT | Performed by: PSYCHIATRY & NEUROLOGY

## 2019-02-20 PROCEDURE — 63710000001 DIPHENHYDRAMINE PER 50 MG: Performed by: PSYCHIATRY & NEUROLOGY

## 2019-02-20 PROCEDURE — 96365 THER/PROPH/DIAG IV INF INIT: CPT

## 2019-02-20 PROCEDURE — 25010000002 NATALIZUMAB PER 1 MG: Performed by: PSYCHIATRY & NEUROLOGY

## 2019-02-20 RX ORDER — SODIUM CHLORIDE 9 MG/ML
250 INJECTION, SOLUTION INTRAVENOUS ONCE
Status: CANCELLED | OUTPATIENT
Start: 2019-02-20

## 2019-02-20 RX ORDER — DIPHENHYDRAMINE HCL 25 MG
25 CAPSULE ORAL ONCE
Status: COMPLETED | OUTPATIENT
Start: 2019-02-20 | End: 2019-02-20

## 2019-02-20 RX ORDER — ACETAMINOPHEN 325 MG/1
650 TABLET ORAL ONCE
Status: COMPLETED | OUTPATIENT
Start: 2019-02-20 | End: 2019-02-20

## 2019-02-20 RX ORDER — ACETAMINOPHEN 325 MG/1
650 TABLET ORAL ONCE
Status: CANCELLED | OUTPATIENT
Start: 2019-02-20

## 2019-02-20 RX ORDER — DIPHENHYDRAMINE HCL 25 MG
25 CAPSULE ORAL ONCE
Status: CANCELLED | OUTPATIENT
Start: 2019-02-20

## 2019-02-20 RX ADMIN — NATALIZUMAB 300 MG: 300 INJECTION INTRAVENOUS at 09:57

## 2019-02-20 RX ADMIN — ACETAMINOPHEN 650 MG: 325 TABLET ORAL at 09:55

## 2019-02-20 RX ADMIN — DIPHENHYDRAMINE HYDROCHLORIDE 25 MG: 25 CAPSULE ORAL at 09:55

## 2019-02-20 NOTE — PROGRESS NOTES
Subjective:   Chief Complaint   Patient presents with   • Multiple Sclerosis       Patient ID: eLatha Wall is a 68 y.o. female.    History of Present Illness     68 y.o. woman returns in follow for RRMS, Hep C and migraines.  Last visit on 10/8/18 continue Tysabri every 6 weeks, referred to GI for Hep C.    TB - neg   Cr 1.12; WBC 12.6; VZV 2318; HIV - neg;     Hep C reactive 29.00, HCV quantitative PCR 6.45     MRI Brain, 4/4/18 as compared to 6/21/17 moderate to severe T2 lesion load, without new/enlarging or enhancing lesions    11/1/17:   CBC, CMP - NCS  10/12/18  JCV 1.39  5/23/18    JCV 1.44  12/13/16: JCV 2.21  6/27/17    JCV 1.87   6/27/17:   Cr 1.19, WBC 13.8  - NCS      RRMS    Finished Hep C treatment.      Started Tysabri 2/20/17 23 infusions    No new or worsening sx on Tysabri every 6 weeks.     Mild heaviness left arm and leg.     Fatigue is minimal    Walking for 40 minutes every other day.   Tobacco < 1 ppd.     Problem history:    MSFC remarkable for 26/110 o/w within normal results.  Oral numbness resolved.  Bladder urgency and frequency slight increase.    Mild N/T in bottom of feet     Migraines         None since last visit.  Located in occiput and behind eyes.  Quality of tightness and throbbing.  Mild severity.       PMH:    Fatigue and heat are bothersome.  Echo -   EF 60%  C U/S -  0-49% on right, no stenosis on left    MRI cervical, my review, unremarkable  Labs below:    NMO negative  Hypercoaguable panel WNL  Vit D 17.2    Fatigue continues to wax and wane.  Last few days has had unsteady gait. Weakness on left arm/leg.  Numbness in left arm and right foot.  Left side of tongue numb for the last 7 to 14 weeks.   Continue to smoke Tob 1 ppd.     Patient called and noted overwhelming fatigue.  After steroids fatigue improved but then recurred on 8/5/16.  Frequent HA's every other day frontal and occipital location of squeezing sensation of moderate severity.  Prednisone taper rx on  7/22/16 improved energy.      6/22/16 awoke at 3:30 am and noted left sided weakness in arm and leg.  Gait was unsteady and worsened over the next 7 days and presented to [facility] ED.  Fatigue was overwhelming.  MRI Brain in ED, my review, with multiple T2 white matter lesions and one large black hole in left PVWM.  Given steroids in ED with improvement in sx by 50%.    5/2016 had episode of left arm clumsiness for a week.      Reports increased fatigue since the first of the year.      Vision is fuzzy and feel swollen.      Bladder frequency.     Tob 1 ppd.    The following portions of the patient's history were reviewed and updated as appropriate: allergies, current medications, past family history, past medical history, past social history, past surgical history and problem list.    Review of Systems   Constitutional: Positive for fatigue. Negative for activity change and unexpected weight change.   HENT: Negative for tinnitus and trouble swallowing.    Eyes: Negative for photophobia and visual disturbance.   Respiratory: Negative for apnea and choking.    Cardiovascular: Negative for leg swelling.   Endocrine: Negative for cold intolerance and heat intolerance.   Genitourinary: Positive for frequency and urgency. Negative for difficulty urinating and menstrual problem.   Musculoskeletal: Positive for gait problem. Negative for back pain.   Skin: Negative for color change.   Allergic/Immunologic: Negative for immunocompromised state.   Neurological: Positive for dizziness and tremors. Negative for seizures, syncope, facial asymmetry, speech difficulty and light-headedness.   Hematological: Negative for adenopathy. Does not bruise/bleed easily.   Psychiatric/Behavioral: Positive for decreased concentration. Negative for behavioral problems, confusion, hallucinations and sleep disturbance.        Objective:  Vitals:    02/20/19 1107   BP: 152/80   BP Location: Left arm   Patient Position: Sitting   Cuff Size: Adult  "  Pulse: 58   Resp: 16   SpO2: 98%   Weight: 67.6 kg (149 lb)   Height: 162.6 cm (64\")       Neurologic Exam     Mental Status   Registration: recalls 3 of 3 objects. Recall at 5 minutes: recalls 3 of 3 objects. Follows 3 step commands.   Attention: normal. Concentration: normal.   Level of consciousness: alert  Knowledge: good and consistent with education.   Able to name object. Able to read. Able to repeat. Able to write. Normal comprehension.     Cranial Nerves     CN II   Visual fields full to confrontation.   Visual acuity: normal  Right visual field deficit: none  Left visual field deficit: none     CN III, IV, VI   Right pupil: Shape: regular. Reactivity: brisk. Consensual response: intact.   Left pupil: Shape: regular. Reactivity: brisk. Consensual response: intact.   Nystagmus: none   Diplopia: none  Ophthalmoparesis: none  Upgaze: normal  Downgaze: normal  Conjugate gaze: present  Vestibulo-ocular reflex: present    CN V   Right facial sensation deficit: forehead  Left facial sensation deficit: forehead  Right corneal reflex: normal  Left corneal reflex: normal    CN VII   Right facial weakness: none  Left facial weakness: none    CN VIII   Hearing: intact    CN IX, X   Palate: symmetric  Right gag reflex: normal  Left gag reflex: normal    CN XI   Right sternocleidomastoid strength: normal  Left sternocleidomastoid strength: normal    CN XII   Tongue: not atrophic  Fasciculations: absent  Tongue deviation: none    Motor Exam   Muscle bulk: normal  Overall muscle tone: normal  Right arm tone: normal  Left arm tone: normal  Right leg tone: normal  Left leg tone: normal    Strength   Right neck flexion: 5/5  Left neck flexion: 5/5  Right neck extension: 5/5  Left neck extension: 5/5  Right deltoid: 5/5  Left deltoid: 5/5  Right biceps: 5/5  Left biceps: 5/5  Right triceps: 5/5  Left triceps: 5/5  Right wrist flexion: 5/5  Left wrist flexion: 5/5  Right wrist extension: 5/5  Left wrist extension: 5/5  Right " interossei: 5/5  Left interossei: 5/5  Right abdominals: 5/5  Left abdominals: 5/5  Right iliopsoas: 5/5  Left iliopsoas: 5/5  Right quadriceps: 5/5  Left quadriceps: 5/5  Right hamstrin/5  Left hamstrin/5  Right glutei: 5/5  Left glutei: 5/5  Right anterior tibial: 5/5  Left anterior tibial: 5/5  Right posterior tibial: 5/5  Left posterior tibial: 5/5  Right peroneal: 5/5  Left peroneal: 5/5  Right gastroc: 5/5  Left gastroc: 5/5    Sensory Exam   Right arm light touch: normal  Right leg light touch: normal  Right arm vibration: normal  Right leg vibration: normal  Right arm proprioception: normal  Right leg proprioception: normal  Right arm pinprick: normal  Right leg pinprick: normal  Sensory deficit distribution on left: C6    Gait, Coordination, and Reflexes     Gait  Gait: normal ( )    Tremor   Resting tremor: absent  Intention tremor: absent  Action tremor: absent    Reflexes   Reflexes 2+ except as noted.   Right ankle clonus: absent  Left ankle clonus: absent      Physical Exam   Constitutional: She appears well-developed and well-nourished.   Neurological: Gait normal.   Nursing note and vitals reviewed.    Hospital Outpatient Visit on 2018   Component Date Value Ref Range Status   • Creatinine 2018 1.10  0.60 - 1.30 mg/dL Final    Serial Number: [id number]:  [id number]     Assessment/Plan:       Problems Addressed this Visit        Cardiovascular and Mediastinum    Periodic headache syndrome, not intractable     Headaches are improving with treatment.  Continue current treatment regimen.                Nervous and Auditory    Multiple sclerosis (CMS/HCC) - Primary     Clinically stable on Tysabri every 6 weeks.     CBC,CMP, JCV            Relevant Orders    CBC & Differential    Comprehensive Metabolic Panel    Stratify JCV, Antibody CHRISTIAN With / Reflex To Inhibition Assay    MRI Brain With & Without Contrast

## 2019-02-25 ENCOUNTER — RESULTS ENCOUNTER (OUTPATIENT)
Dept: NEUROLOGY | Facility: CLINIC | Age: 69
End: 2019-02-25

## 2019-02-25 DIAGNOSIS — G35 MULTIPLE SCLEROSIS (HCC): ICD-10-CM

## 2019-03-29 ENCOUNTER — TELEPHONE (OUTPATIENT)
Dept: NEUROLOGY | Facility: CLINIC | Age: 69
End: 2019-03-29

## 2019-03-29 NOTE — TELEPHONE ENCOUNTER
----- Message from Alesha Nguyen sent at 3/29/2019 12:03 PM EDT -----  Regarding: Bronchitis  Contact: [phone]  Patient states she is taking prednisone and levaquin for bronchitis. Patient on medications for 7 days She is scheduled for an infusion 04/03/19. Please advise

## 2019-03-29 NOTE — TELEPHONE ENCOUNTER
Called the patient, spoke with the patient, advised her of the provider instructions. Patient verbalized understanding.

## 2019-04-08 ENCOUNTER — OFFICE VISIT (OUTPATIENT)
Dept: NEUROLOGY | Facility: CLINIC | Age: 69
End: 2019-04-08

## 2019-04-08 ENCOUNTER — HOSPITAL ENCOUNTER (OUTPATIENT)
Dept: MRI IMAGING | Facility: HOSPITAL | Age: 69
Discharge: HOME | End: 2019-04-08
Admitting: PSYCHIATRY & NEUROLOGY

## 2019-04-08 VITALS
HEIGHT: 64 IN | WEIGHT: 148 LBS | RESPIRATION RATE: 18 BRPM | BODY MASS INDEX: 25.27 KG/M2 | DIASTOLIC BLOOD PRESSURE: 74 MMHG | HEART RATE: 68 BPM | OXYGEN SATURATION: 98 % | SYSTOLIC BLOOD PRESSURE: 118 MMHG

## 2019-04-08 DIAGNOSIS — G35 MULTIPLE SCLEROSIS (HCC): Primary | ICD-10-CM

## 2019-04-08 DIAGNOSIS — G43.C0 PERIODIC HEADACHE SYNDROME, NOT INTRACTABLE: ICD-10-CM

## 2019-04-08 DIAGNOSIS — G35 MULTIPLE SCLEROSIS (HCC): ICD-10-CM

## 2019-04-08 LAB — CREAT BLDA-MCNC: 1.1 MG/DL (ref 0.6–1.3)

## 2019-04-08 PROCEDURE — 70553 MRI BRAIN STEM W/O & W/DYE: CPT

## 2019-04-08 PROCEDURE — 99214 OFFICE O/P EST MOD 30 MIN: CPT | Performed by: PSYCHIATRY & NEUROLOGY

## 2019-04-08 PROCEDURE — 82565 ASSAY OF CREATININE: CPT

## 2019-04-08 PROCEDURE — 0 GADOBENATE DIMEGLUMINE 529 MG/ML SOLUTION: Performed by: PSYCHIATRY & NEUROLOGY

## 2019-04-08 PROCEDURE — A9577 INJ MULTIHANCE: HCPCS | Performed by: PSYCHIATRY & NEUROLOGY

## 2019-04-08 RX ADMIN — GADOBENATE DIMEGLUMINE 14 ML: 529 INJECTION, SOLUTION INTRAVENOUS at 10:37

## 2019-04-10 ENCOUNTER — INFUSION (OUTPATIENT)
Dept: ONCOLOGY | Facility: HOSPITAL | Age: 69
End: 2019-04-10

## 2019-04-10 VITALS
TEMPERATURE: 97.6 F | HEART RATE: 60 BPM | RESPIRATION RATE: 18 BRPM | DIASTOLIC BLOOD PRESSURE: 51 MMHG | SYSTOLIC BLOOD PRESSURE: 116 MMHG

## 2019-04-10 DIAGNOSIS — G35 MULTIPLE SCLEROSIS (HCC): Primary | ICD-10-CM

## 2019-04-10 PROCEDURE — 96365 THER/PROPH/DIAG IV INF INIT: CPT

## 2019-04-10 PROCEDURE — 63710000001 DIPHENHYDRAMINE PER 50 MG: Performed by: PSYCHIATRY & NEUROLOGY

## 2019-04-10 PROCEDURE — 25010000002 NATALIZUMAB PER 1 MG: Performed by: PSYCHIATRY & NEUROLOGY

## 2019-04-10 RX ORDER — SODIUM CHLORIDE 9 MG/ML
250 INJECTION, SOLUTION INTRAVENOUS ONCE
Status: CANCELLED | OUTPATIENT
Start: 2019-04-10

## 2019-04-10 RX ORDER — DIPHENHYDRAMINE HCL 25 MG
25 CAPSULE ORAL ONCE
Status: COMPLETED | OUTPATIENT
Start: 2019-04-10 | End: 2019-04-10

## 2019-04-10 RX ORDER — ACETAMINOPHEN 325 MG/1
650 TABLET ORAL ONCE
Status: COMPLETED | OUTPATIENT
Start: 2019-04-10 | End: 2019-04-10

## 2019-04-10 RX ORDER — ACETAMINOPHEN 325 MG/1
650 TABLET ORAL ONCE
Status: CANCELLED | OUTPATIENT
Start: 2019-04-10

## 2019-04-10 RX ORDER — DIPHENHYDRAMINE HCL 25 MG
25 CAPSULE ORAL ONCE
Status: CANCELLED | OUTPATIENT
Start: 2019-04-10

## 2019-04-10 RX ADMIN — NATALIZUMAB 300 MG: 300 INJECTION INTRAVENOUS at 09:59

## 2019-04-10 RX ADMIN — DIPHENHYDRAMINE HYDROCHLORIDE 25 MG: 25 CAPSULE ORAL at 09:56

## 2019-04-10 RX ADMIN — ACETAMINOPHEN 650 MG: 325 TABLET ORAL at 09:56

## 2019-05-22 ENCOUNTER — INFUSION (OUTPATIENT)
Dept: ONCOLOGY | Facility: HOSPITAL | Age: 69
End: 2019-05-22

## 2019-05-22 VITALS
SYSTOLIC BLOOD PRESSURE: 148 MMHG | DIASTOLIC BLOOD PRESSURE: 76 MMHG | TEMPERATURE: 96.9 F | RESPIRATION RATE: 18 BRPM | HEART RATE: 63 BPM

## 2019-05-22 DIAGNOSIS — G35 MULTIPLE SCLEROSIS (HCC): Primary | ICD-10-CM

## 2019-05-22 PROCEDURE — 63710000001 DIPHENHYDRAMINE PER 50 MG: Performed by: PSYCHIATRY & NEUROLOGY

## 2019-05-22 PROCEDURE — 96365 THER/PROPH/DIAG IV INF INIT: CPT

## 2019-05-22 PROCEDURE — 25010000002 NATALIZUMAB PER 1 MG: Performed by: PSYCHIATRY & NEUROLOGY

## 2019-05-22 RX ORDER — BENZONATATE 100 MG/1
100 CAPSULE ORAL 3 TIMES DAILY PRN
COMMUNITY
End: 2019-11-06

## 2019-05-22 RX ORDER — DIPHENHYDRAMINE HCL 25 MG
25 CAPSULE ORAL ONCE
Status: CANCELLED | OUTPATIENT
Start: 2019-05-22

## 2019-05-22 RX ORDER — SODIUM CHLORIDE 9 MG/ML
250 INJECTION, SOLUTION INTRAVENOUS ONCE
Status: CANCELLED | OUTPATIENT
Start: 2019-05-22

## 2019-05-22 RX ORDER — ACETAMINOPHEN 325 MG/1
650 TABLET ORAL ONCE
Status: CANCELLED | OUTPATIENT
Start: 2019-05-22

## 2019-05-22 RX ORDER — ACETAMINOPHEN 325 MG/1
650 TABLET ORAL ONCE
Status: COMPLETED | OUTPATIENT
Start: 2019-05-22 | End: 2019-05-22

## 2019-05-22 RX ORDER — DIPHENHYDRAMINE HCL 25 MG
25 CAPSULE ORAL ONCE
Status: COMPLETED | OUTPATIENT
Start: 2019-05-22 | End: 2019-05-22

## 2019-05-22 RX ADMIN — ACETAMINOPHEN 650 MG: 325 TABLET ORAL at 09:37

## 2019-05-22 RX ADMIN — NATALIZUMAB 300 MG: 300 INJECTION INTRAVENOUS at 09:39

## 2019-05-22 RX ADMIN — DIPHENHYDRAMINE HYDROCHLORIDE 25 MG: 25 CAPSULE ORAL at 09:37

## 2019-07-03 ENCOUNTER — INFUSION (OUTPATIENT)
Dept: ONCOLOGY | Facility: HOSPITAL | Age: 69
End: 2019-07-03

## 2019-07-03 VITALS
HEART RATE: 62 BPM | SYSTOLIC BLOOD PRESSURE: 154 MMHG | WEIGHT: 144 LBS | RESPIRATION RATE: 20 BRPM | TEMPERATURE: 97.2 F | BODY MASS INDEX: 24.72 KG/M2 | DIASTOLIC BLOOD PRESSURE: 74 MMHG

## 2019-07-03 DIAGNOSIS — G35 MULTIPLE SCLEROSIS (HCC): Primary | ICD-10-CM

## 2019-07-03 PROCEDURE — 25010000002 NATALIZUMAB PER 1 MG: Performed by: PSYCHIATRY & NEUROLOGY

## 2019-07-03 PROCEDURE — 63710000001 DIPHENHYDRAMINE PER 50 MG: Performed by: PSYCHIATRY & NEUROLOGY

## 2019-07-03 PROCEDURE — 96365 THER/PROPH/DIAG IV INF INIT: CPT

## 2019-07-03 RX ORDER — DIPHENHYDRAMINE HCL 25 MG
25 CAPSULE ORAL ONCE
Status: CANCELLED | OUTPATIENT
Start: 2019-07-03

## 2019-07-03 RX ORDER — DIPHENHYDRAMINE HCL 25 MG
25 CAPSULE ORAL ONCE
Status: COMPLETED | OUTPATIENT
Start: 2019-07-03 | End: 2019-07-03

## 2019-07-03 RX ORDER — SODIUM CHLORIDE 9 MG/ML
250 INJECTION, SOLUTION INTRAVENOUS ONCE
Status: CANCELLED | OUTPATIENT
Start: 2019-07-03

## 2019-07-03 RX ORDER — ACETAMINOPHEN 325 MG/1
650 TABLET ORAL ONCE
Status: CANCELLED | OUTPATIENT
Start: 2019-07-03

## 2019-07-03 RX ORDER — ACETAMINOPHEN 325 MG/1
650 TABLET ORAL ONCE
Status: COMPLETED | OUTPATIENT
Start: 2019-07-03 | End: 2019-07-03

## 2019-07-03 RX ADMIN — ACETAMINOPHEN 650 MG: 325 TABLET ORAL at 09:35

## 2019-07-03 RX ADMIN — DIPHENHYDRAMINE HYDROCHLORIDE 25 MG: 25 CAPSULE ORAL at 09:35

## 2019-07-03 RX ADMIN — NATALIZUMAB 300 MG: 300 INJECTION INTRAVENOUS at 09:36

## 2019-08-14 ENCOUNTER — INFUSION (OUTPATIENT)
Dept: ONCOLOGY | Facility: HOSPITAL | Age: 69
End: 2019-08-14

## 2019-08-14 VITALS
TEMPERATURE: 98 F | RESPIRATION RATE: 18 BRPM | SYSTOLIC BLOOD PRESSURE: 131 MMHG | HEART RATE: 61 BPM | DIASTOLIC BLOOD PRESSURE: 67 MMHG

## 2019-08-14 DIAGNOSIS — G35 MULTIPLE SCLEROSIS (HCC): Primary | ICD-10-CM

## 2019-08-14 PROCEDURE — 25010000002 NATALIZUMAB PER 1 MG: Performed by: PSYCHIATRY & NEUROLOGY

## 2019-08-14 PROCEDURE — 96365 THER/PROPH/DIAG IV INF INIT: CPT

## 2019-08-14 PROCEDURE — 63710000001 DIPHENHYDRAMINE PER 50 MG: Performed by: PSYCHIATRY & NEUROLOGY

## 2019-08-14 RX ORDER — SODIUM CHLORIDE 9 MG/ML
250 INJECTION, SOLUTION INTRAVENOUS ONCE
Status: CANCELLED | OUTPATIENT
Start: 2019-08-14

## 2019-08-14 RX ORDER — ACETAMINOPHEN 325 MG/1
650 TABLET ORAL ONCE
Status: COMPLETED | OUTPATIENT
Start: 2019-08-14 | End: 2019-08-14

## 2019-08-14 RX ORDER — DIPHENHYDRAMINE HCL 25 MG
25 CAPSULE ORAL ONCE
Status: COMPLETED | OUTPATIENT
Start: 2019-08-14 | End: 2019-08-14

## 2019-08-14 RX ORDER — DIPHENHYDRAMINE HCL 25 MG
25 CAPSULE ORAL ONCE
Status: CANCELLED | OUTPATIENT
Start: 2019-08-14

## 2019-08-14 RX ORDER — ACETAMINOPHEN 325 MG/1
650 TABLET ORAL ONCE
Status: CANCELLED | OUTPATIENT
Start: 2019-08-14

## 2019-08-14 RX ADMIN — DIPHENHYDRAMINE HYDROCHLORIDE 25 MG: 25 CAPSULE ORAL at 10:10

## 2019-08-14 RX ADMIN — ACETAMINOPHEN 650 MG: 325 TABLET ORAL at 10:10

## 2019-08-14 RX ADMIN — NATALIZUMAB 300 MG: 300 INJECTION INTRAVENOUS at 10:14

## 2019-09-25 ENCOUNTER — INFUSION (OUTPATIENT)
Dept: ONCOLOGY | Facility: HOSPITAL | Age: 69
End: 2019-09-25

## 2019-09-25 VITALS
HEART RATE: 61 BPM | RESPIRATION RATE: 22 BRPM | DIASTOLIC BLOOD PRESSURE: 73 MMHG | TEMPERATURE: 97.1 F | SYSTOLIC BLOOD PRESSURE: 155 MMHG

## 2019-09-25 DIAGNOSIS — G35 MULTIPLE SCLEROSIS (HCC): Primary | ICD-10-CM

## 2019-09-25 PROCEDURE — 96365 THER/PROPH/DIAG IV INF INIT: CPT

## 2019-09-25 PROCEDURE — 63710000001 DIPHENHYDRAMINE PER 50 MG: Performed by: PSYCHIATRY & NEUROLOGY

## 2019-09-25 PROCEDURE — 25010000002 NATALIZUMAB PER 1 MG: Performed by: PSYCHIATRY & NEUROLOGY

## 2019-09-25 RX ORDER — ACETAMINOPHEN 325 MG/1
650 TABLET ORAL ONCE
Status: COMPLETED | OUTPATIENT
Start: 2019-09-25 | End: 2019-09-25

## 2019-09-25 RX ORDER — SODIUM CHLORIDE 9 MG/ML
250 INJECTION, SOLUTION INTRAVENOUS ONCE
Status: CANCELLED | OUTPATIENT
Start: 2019-09-25

## 2019-09-25 RX ORDER — DIPHENHYDRAMINE HCL 25 MG
25 CAPSULE ORAL ONCE
Status: CANCELLED | OUTPATIENT
Start: 2019-09-25

## 2019-09-25 RX ORDER — DIPHENHYDRAMINE HCL 25 MG
25 CAPSULE ORAL ONCE
Status: COMPLETED | OUTPATIENT
Start: 2019-09-25 | End: 2019-09-25

## 2019-09-25 RX ORDER — ACETAMINOPHEN 325 MG/1
650 TABLET ORAL ONCE
Status: CANCELLED | OUTPATIENT
Start: 2019-09-25

## 2019-09-25 RX ADMIN — DIPHENHYDRAMINE HYDROCHLORIDE 25 MG: 25 CAPSULE ORAL at 09:58

## 2019-09-25 RX ADMIN — ACETAMINOPHEN 650 MG: 325 TABLET ORAL at 09:58

## 2019-09-25 RX ADMIN — NATALIZUMAB 300 MG: 300 INJECTION INTRAVENOUS at 09:59

## 2019-11-06 ENCOUNTER — HOSPITAL ENCOUNTER (OUTPATIENT)
Facility: HOSPITAL | Age: 69
Setting detail: OBSERVATION
Discharge: HOME | End: 2019-11-08
Attending: EMERGENCY MEDICINE | Admitting: EMERGENCY MEDICINE

## 2019-11-06 ENCOUNTER — APPOINTMENT (OUTPATIENT)
Dept: CT IMAGING | Facility: HOSPITAL | Age: 69
End: 2019-11-06

## 2019-11-06 ENCOUNTER — INFUSION (OUTPATIENT)
Dept: ONCOLOGY | Facility: HOSPITAL | Age: 69
End: 2019-11-06

## 2019-11-06 ENCOUNTER — OFFICE VISIT (OUTPATIENT)
Dept: NEUROLOGY | Facility: CLINIC | Age: 69
End: 2019-11-06

## 2019-11-06 VITALS
SYSTOLIC BLOOD PRESSURE: 143 MMHG | TEMPERATURE: 97.5 F | HEART RATE: 61 BPM | DIASTOLIC BLOOD PRESSURE: 68 MMHG | RESPIRATION RATE: 20 BRPM

## 2019-11-06 VITALS
OXYGEN SATURATION: 96 % | RESPIRATION RATE: 16 BRPM | HEART RATE: 61 BPM | DIASTOLIC BLOOD PRESSURE: 70 MMHG | BODY MASS INDEX: 24.96 KG/M2 | SYSTOLIC BLOOD PRESSURE: 132 MMHG | WEIGHT: 146.2 LBS | HEIGHT: 64 IN

## 2019-11-06 DIAGNOSIS — G43.C0 PERIODIC HEADACHE SYNDROME, NOT INTRACTABLE: ICD-10-CM

## 2019-11-06 DIAGNOSIS — G35 MULTIPLE SCLEROSIS (HCC): Primary | ICD-10-CM

## 2019-11-06 DIAGNOSIS — T50.905A ADVERSE EFFECT OF DRUG, INITIAL ENCOUNTER: ICD-10-CM

## 2019-11-06 DIAGNOSIS — R19.7 NAUSEA VOMITING AND DIARRHEA: ICD-10-CM

## 2019-11-06 DIAGNOSIS — R76.8 HEPATITIS C ANTIBODY TEST POSITIVE: ICD-10-CM

## 2019-11-06 DIAGNOSIS — R11.2 NAUSEA VOMITING AND DIARRHEA: ICD-10-CM

## 2019-11-06 DIAGNOSIS — E87.20 LACTIC ACIDOSIS: Primary | ICD-10-CM

## 2019-11-06 PROBLEM — D72.829 LEUKOCYTOSIS: Status: ACTIVE | Noted: 2019-11-06

## 2019-11-06 PROBLEM — K52.9 COLITIS: Status: ACTIVE | Noted: 2019-11-06

## 2019-11-06 PROBLEM — N28.1 RENAL CYST: Status: ACTIVE | Noted: 2019-11-06

## 2019-11-06 PROBLEM — Z72.0 TOBACCO ABUSE: Status: ACTIVE | Noted: 2019-11-06

## 2019-11-06 LAB
ALBUMIN SERPL-MCNC: 4 G/DL (ref 3.5–5.2)
ALBUMIN/GLOB SERPL: 0.8 G/DL
ALP SERPL-CCNC: 83 U/L (ref 39–117)
ALT SERPL W P-5'-P-CCNC: 6 U/L (ref 1–33)
ANION GAP SERPL CALCULATED.3IONS-SCNC: 14 MMOL/L (ref 5–15)
AST SERPL-CCNC: 15 U/L (ref 1–32)
BACTERIA UR QL AUTO: ABNORMAL /HPF
BASOPHILS # BLD AUTO: 0.06 10*3/MM3 (ref 0–0.2)
BASOPHILS NFR BLD AUTO: 0.3 % (ref 0–1.5)
BILIRUB SERPL-MCNC: 0.3 MG/DL (ref 0.2–1.2)
BILIRUB UR QL STRIP: NEGATIVE
BUN BLD-MCNC: 15 MG/DL (ref 8–23)
BUN/CREAT SERPL: 13.3 (ref 7–25)
C DIFF TOX GENS STL QL NAA+PROBE: NOT DETECTED
CALCIUM SPEC-SCNC: 8.7 MG/DL (ref 8.6–10.5)
CHLORIDE SERPL-SCNC: 103 MMOL/L (ref 98–107)
CLARITY UR: CLEAR
CO2 SERPL-SCNC: 21 MMOL/L (ref 22–29)
COLOR UR: YELLOW
CREAT BLD-MCNC: 1.13 MG/DL (ref 0.57–1)
D-LACTATE SERPL-SCNC: 2.4 MMOL/L (ref 0.5–2)
DEPRECATED RDW RBC AUTO: 49.1 FL (ref 37–54)
EOSINOPHIL # BLD AUTO: 0.12 10*3/MM3 (ref 0–0.4)
EOSINOPHIL NFR BLD AUTO: 0.6 % (ref 0.3–6.2)
ERYTHROCYTE [DISTWIDTH] IN BLOOD BY AUTOMATED COUNT: 14.3 % (ref 12.3–15.4)
GFR SERPL CREATININE-BSD FRML MDRD: 48 ML/MIN/1.73
GLOBULIN UR ELPH-MCNC: 5 GM/DL
GLUCOSE BLD-MCNC: 137 MG/DL (ref 65–99)
GLUCOSE UR STRIP-MCNC: NEGATIVE MG/DL
HCT VFR BLD AUTO: 44.6 % (ref 34–46.6)
HGB BLD-MCNC: 14.5 G/DL (ref 12–15.9)
HGB UR QL STRIP.AUTO: NEGATIVE
HOLD SPECIMEN: NORMAL
HYALINE CASTS UR QL AUTO: ABNORMAL /LPF
IMM GRANULOCYTES # BLD AUTO: 0.1 10*3/MM3 (ref 0–0.05)
IMM GRANULOCYTES NFR BLD AUTO: 0.5 % (ref 0–0.5)
KETONES UR QL STRIP: NEGATIVE
LEUKOCYTE ESTERASE UR QL STRIP.AUTO: ABNORMAL
LIPASE SERPL-CCNC: 20 U/L (ref 13–60)
LYMPHOCYTES # BLD AUTO: 4.09 10*3/MM3 (ref 0.7–3.1)
LYMPHOCYTES NFR BLD AUTO: 20.9 % (ref 19.6–45.3)
MCH RBC QN AUTO: 30.5 PG (ref 26.6–33)
MCHC RBC AUTO-ENTMCNC: 32.5 G/DL (ref 31.5–35.7)
MCV RBC AUTO: 93.9 FL (ref 79–97)
MONOCYTES # BLD AUTO: 1.14 10*3/MM3 (ref 0.1–0.9)
MONOCYTES NFR BLD AUTO: 5.8 % (ref 5–12)
NEUTROPHILS # BLD AUTO: 14.09 10*3/MM3 (ref 1.7–7)
NEUTROPHILS NFR BLD AUTO: 71.9 % (ref 42.7–76)
NITRITE UR QL STRIP: NEGATIVE
NRBC BLD AUTO-RTO: 0.2 /100 WBC (ref 0–0.2)
PH UR STRIP.AUTO: <=5 [PH] (ref 5–8)
PLATELET # BLD AUTO: 376 10*3/MM3 (ref 140–450)
PMV BLD AUTO: 9.2 FL (ref 6–12)
POTASSIUM BLD-SCNC: 3.9 MMOL/L (ref 3.5–5.2)
PROT SERPL-MCNC: 9 G/DL (ref 6–8.5)
PROT UR QL STRIP: NEGATIVE
RBC # BLD AUTO: 4.75 10*6/MM3 (ref 3.77–5.28)
RBC # UR: ABNORMAL /HPF
REF LAB TEST METHOD: ABNORMAL
SODIUM BLD-SCNC: 138 MMOL/L (ref 136–145)
SP GR UR STRIP: 1.01 (ref 1–1.03)
SQUAMOUS #/AREA URNS HPF: ABNORMAL /HPF
UROBILINOGEN UR QL STRIP: ABNORMAL
WBC NRBC COR # BLD: 19.6 10*3/MM3 (ref 3.4–10.8)
WBC UR QL AUTO: ABNORMAL /HPF
WHOLE BLOOD HOLD SPECIMEN: NORMAL
WHOLE BLOOD HOLD SPECIMEN: NORMAL

## 2019-11-06 PROCEDURE — 83690 ASSAY OF LIPASE: CPT | Performed by: EMERGENCY MEDICINE

## 2019-11-06 PROCEDURE — 83605 ASSAY OF LACTIC ACID: CPT | Performed by: EMERGENCY MEDICINE

## 2019-11-06 PROCEDURE — 87045 FECES CULTURE AEROBIC BACT: CPT | Performed by: EMERGENCY MEDICINE

## 2019-11-06 PROCEDURE — 96372 THER/PROPH/DIAG INJ SC/IM: CPT

## 2019-11-06 PROCEDURE — G0378 HOSPITAL OBSERVATION PER HR: HCPCS

## 2019-11-06 PROCEDURE — 81001 URINALYSIS AUTO W/SCOPE: CPT | Performed by: EMERGENCY MEDICINE

## 2019-11-06 PROCEDURE — 99213 OFFICE O/P EST LOW 20 MIN: CPT | Performed by: PSYCHIATRY & NEUROLOGY

## 2019-11-06 PROCEDURE — 99284 EMERGENCY DEPT VISIT MOD MDM: CPT

## 2019-11-06 PROCEDURE — 96361 HYDRATE IV INFUSION ADD-ON: CPT

## 2019-11-06 PROCEDURE — 25010000002 HEPARIN (PORCINE) PER 1000 UNITS: Performed by: INTERNAL MEDICINE

## 2019-11-06 PROCEDURE — 87427 SHIGA-LIKE TOXIN AG IA: CPT | Performed by: EMERGENCY MEDICINE

## 2019-11-06 PROCEDURE — 80053 COMPREHEN METABOLIC PANEL: CPT | Performed by: EMERGENCY MEDICINE

## 2019-11-06 PROCEDURE — 63710000001 DIPHENHYDRAMINE PER 50 MG: Performed by: PSYCHIATRY & NEUROLOGY

## 2019-11-06 PROCEDURE — 25010000002 NATALIZUMAB PER 1 MG: Performed by: PSYCHIATRY & NEUROLOGY

## 2019-11-06 PROCEDURE — 87493 C DIFF AMPLIFIED PROBE: CPT | Performed by: EMERGENCY MEDICINE

## 2019-11-06 PROCEDURE — 74176 CT ABD & PELVIS W/O CONTRAST: CPT

## 2019-11-06 PROCEDURE — 85025 COMPLETE CBC W/AUTO DIFF WBC: CPT | Performed by: EMERGENCY MEDICINE

## 2019-11-06 PROCEDURE — 96365 THER/PROPH/DIAG IV INF INIT: CPT

## 2019-11-06 PROCEDURE — 99220 PR INITIAL OBSERVATION CARE/DAY 70 MINUTES: CPT | Performed by: INTERNAL MEDICINE

## 2019-11-06 PROCEDURE — 87046 STOOL CULTR AEROBIC BACT EA: CPT | Performed by: EMERGENCY MEDICINE

## 2019-11-06 RX ORDER — OXYBUTYNIN CHLORIDE 5 MG/1
5 TABLET ORAL 2 TIMES DAILY
COMMUNITY
End: 2022-04-18

## 2019-11-06 RX ORDER — SODIUM CHLORIDE 0.9 % (FLUSH) 0.9 %
10 SYRINGE (ML) INJECTION AS NEEDED
Status: DISCONTINUED | OUTPATIENT
Start: 2019-11-06 | End: 2019-11-06 | Stop reason: SDUPTHER

## 2019-11-06 RX ORDER — ONDANSETRON 2 MG/ML
4 INJECTION INTRAMUSCULAR; INTRAVENOUS EVERY 6 HOURS PRN
Status: DISCONTINUED | OUTPATIENT
Start: 2019-11-06 | End: 2019-11-08 | Stop reason: HOSPADM

## 2019-11-06 RX ORDER — SODIUM CHLORIDE 0.9 % (FLUSH) 0.9 %
10 SYRINGE (ML) INJECTION AS NEEDED
Status: DISCONTINUED | OUTPATIENT
Start: 2019-11-06 | End: 2019-11-08 | Stop reason: HOSPADM

## 2019-11-06 RX ORDER — ACETAMINOPHEN 325 MG/1
650 TABLET ORAL ONCE
Status: COMPLETED | OUTPATIENT
Start: 2019-11-06 | End: 2019-11-06

## 2019-11-06 RX ORDER — LISINOPRIL 10 MG/1
10 TABLET ORAL
Status: DISCONTINUED | OUTPATIENT
Start: 2019-11-07 | End: 2019-11-06

## 2019-11-06 RX ORDER — SODIUM CHLORIDE 9 MG/ML
75 INJECTION, SOLUTION INTRAVENOUS CONTINUOUS
Status: DISCONTINUED | OUTPATIENT
Start: 2019-11-06 | End: 2019-11-08 | Stop reason: HOSPADM

## 2019-11-06 RX ORDER — LISINOPRIL 10 MG/1
10 TABLET ORAL NIGHTLY
Status: DISCONTINUED | OUTPATIENT
Start: 2019-11-06 | End: 2019-11-08 | Stop reason: HOSPADM

## 2019-11-06 RX ORDER — OXYBUTYNIN CHLORIDE 5 MG/1
5 TABLET ORAL 2 TIMES DAILY
Status: DISCONTINUED | OUTPATIENT
Start: 2019-11-06 | End: 2019-11-08 | Stop reason: HOSPADM

## 2019-11-06 RX ORDER — ZOLPIDEM TARTRATE 5 MG/1
10 TABLET ORAL NIGHTLY PRN
Status: DISCONTINUED | OUTPATIENT
Start: 2019-11-06 | End: 2019-11-08 | Stop reason: HOSPADM

## 2019-11-06 RX ORDER — GABAPENTIN 300 MG/1
300 CAPSULE ORAL 2 TIMES DAILY
Status: DISCONTINUED | OUTPATIENT
Start: 2019-11-06 | End: 2019-11-08 | Stop reason: HOSPADM

## 2019-11-06 RX ORDER — SODIUM CHLORIDE 9 MG/ML
250 INJECTION, SOLUTION INTRAVENOUS ONCE
Status: CANCELLED | OUTPATIENT
Start: 2019-11-06

## 2019-11-06 RX ORDER — AMLODIPINE BESYLATE 10 MG/1
5 TABLET ORAL DAILY
Status: DISCONTINUED | OUTPATIENT
Start: 2019-11-07 | End: 2019-11-06

## 2019-11-06 RX ORDER — GABAPENTIN 300 MG/1
300 CAPSULE ORAL 2 TIMES DAILY
Qty: 60 CAPSULE | Refills: 5 | Status: SHIPPED | OUTPATIENT
Start: 2019-11-06 | End: 2020-04-28 | Stop reason: SDUPTHER

## 2019-11-06 RX ORDER — HEPARIN SODIUM 5000 [USP'U]/ML
5000 INJECTION, SOLUTION INTRAVENOUS; SUBCUTANEOUS EVERY 12 HOURS SCHEDULED
Status: DISCONTINUED | OUTPATIENT
Start: 2019-11-06 | End: 2019-11-08 | Stop reason: HOSPADM

## 2019-11-06 RX ORDER — ONDANSETRON 2 MG/ML
4 INJECTION INTRAMUSCULAR; INTRAVENOUS ONCE
Status: DISCONTINUED | OUTPATIENT
Start: 2019-11-06 | End: 2019-11-08 | Stop reason: HOSPADM

## 2019-11-06 RX ORDER — NICOTINE 21 MG/24HR
1 PATCH, TRANSDERMAL 24 HOURS TRANSDERMAL
Status: DISCONTINUED | OUTPATIENT
Start: 2019-11-07 | End: 2019-11-08 | Stop reason: HOSPADM

## 2019-11-06 RX ORDER — HYDROCODONE BITARTRATE AND ACETAMINOPHEN 10; 325 MG/1; MG/1
1 TABLET ORAL 2 TIMES DAILY PRN
Status: DISCONTINUED | OUTPATIENT
Start: 2019-11-06 | End: 2019-11-08 | Stop reason: HOSPADM

## 2019-11-06 RX ORDER — DIPHENHYDRAMINE HCL 25 MG
25 CAPSULE ORAL ONCE
Status: COMPLETED | OUTPATIENT
Start: 2019-11-06 | End: 2019-11-06

## 2019-11-06 RX ORDER — ACETAMINOPHEN 325 MG/1
650 TABLET ORAL ONCE
Status: CANCELLED | OUTPATIENT
Start: 2019-11-06

## 2019-11-06 RX ORDER — DIPHENHYDRAMINE HCL 25 MG
25 CAPSULE ORAL ONCE
Status: CANCELLED | OUTPATIENT
Start: 2019-11-06

## 2019-11-06 RX ORDER — SODIUM CHLORIDE 0.9 % (FLUSH) 0.9 %
10 SYRINGE (ML) INJECTION EVERY 12 HOURS SCHEDULED
Status: DISCONTINUED | OUTPATIENT
Start: 2019-11-06 | End: 2019-11-08 | Stop reason: HOSPADM

## 2019-11-06 RX ORDER — OXYBUTYNIN CHLORIDE 5 MG/1
10 TABLET, EXTENDED RELEASE ORAL DAILY
Status: DISCONTINUED | OUTPATIENT
Start: 2019-11-07 | End: 2019-11-06

## 2019-11-06 RX ORDER — AMLODIPINE BESYLATE 10 MG/1
5 TABLET ORAL NIGHTLY
Status: DISCONTINUED | OUTPATIENT
Start: 2019-11-06 | End: 2019-11-08 | Stop reason: HOSPADM

## 2019-11-06 RX ORDER — ALBUTEROL SULFATE 90 UG/1
AEROSOL, METERED RESPIRATORY (INHALATION)
COMMUNITY
Start: 2019-10-10

## 2019-11-06 RX ADMIN — SODIUM CHLORIDE 1000 ML: 9 INJECTION, SOLUTION INTRAVENOUS at 20:31

## 2019-11-06 RX ADMIN — DIPHENHYDRAMINE HYDROCHLORIDE 25 MG: 25 CAPSULE ORAL at 09:55

## 2019-11-06 RX ADMIN — ZOLPIDEM TARTRATE 10 MG: 5 TABLET ORAL at 23:22

## 2019-11-06 RX ADMIN — NATALIZUMAB 300 MG: 300 INJECTION INTRAVENOUS at 10:00

## 2019-11-06 RX ADMIN — HEPARIN SODIUM 5000 UNITS: 5000 INJECTION, SOLUTION INTRAVENOUS; SUBCUTANEOUS at 23:03

## 2019-11-06 RX ADMIN — OXYBUTYNIN CHLORIDE 5 MG: 5 TABLET ORAL at 23:03

## 2019-11-06 RX ADMIN — LISINOPRIL 10 MG: 10 TABLET ORAL at 23:03

## 2019-11-06 RX ADMIN — SODIUM CHLORIDE, PRESERVATIVE FREE 10 ML: 5 INJECTION INTRAVENOUS at 22:42

## 2019-11-06 RX ADMIN — SODIUM CHLORIDE 125 ML/HR: 9 INJECTION, SOLUTION INTRAVENOUS at 23:03

## 2019-11-06 RX ADMIN — AMLODIPINE BESYLATE 5 MG: 10 TABLET ORAL at 23:03

## 2019-11-06 RX ADMIN — SODIUM CHLORIDE 1000 ML: 9 INJECTION, SOLUTION INTRAVENOUS at 16:47

## 2019-11-06 RX ADMIN — ACETAMINOPHEN 650 MG: 325 TABLET ORAL at 09:55

## 2019-11-06 RX ADMIN — GABAPENTIN 300 MG: 300 CAPSULE ORAL at 23:03

## 2019-11-06 NOTE — ED PROVIDER NOTES
"Subjective   Leatha Wall is a 69 y.o. female who presents to the ED via EMS with complaints of worsening nausea and vomiting for the past 2 hours s/p an MS infusion with Dr. Perez, neurology. She also complains of associated diaphoresis, hot flashes, lightheadedness, diarrhea, and abdominal cramping secondary to the vomiting and diarrhea. She reports that her symptoms started after she got home from her infusion today. She advises that she has been getting MS infusions every 6 weeks for the past 2 years and she has never had any issues before. EMS reports that they administered Zofran and Benadryl en route and patient stated she felt slightly better. She has a history of MS, HTN, COPD, and arthritis. Her past surgical history includes a cholecystectomy, hysterectomy, and small intestine surgery. Her neurologist is Dr. Perez. Her PCP is Dr. Edmonds. There are no other acute complaints at this time.        History provided by:  Patient  Nausea   The primary symptoms include abdominal pain (cramping), nausea, vomiting and diarrhea. The illness began today. The onset was sudden. The problem has been gradually worsening.       Review of Systems   Constitutional: Positive for diaphoresis.        + hot flashes   Gastrointestinal: Positive for abdominal pain (cramping), diarrhea, nausea and vomiting.   Neurological: Positive for light-headedness.   All other systems reviewed and are negative.      Past Medical History:   Diagnosis Date   • Arthritis    • Back disorder     \"PROTRUDING DISC\"   • COPD (chronic obstructive pulmonary disease) (CMS/HCC)    • Hypertension    • MS (multiple sclerosis) (CMS/[facility])        Allergies   Allergen Reactions   • Penicillins    • Sulfa Antibiotics        Past Surgical History:   Procedure Laterality Date   • CHOLECYSTECTOMY     • HYSTERECTOMY     • SMALL INTESTINE SURGERY     • TONSILLECTOMY         Family History   Problem Relation Age of Onset   • Cancer Father    • Diabetes Brother  "   • Hypertension Brother        Social History     Socioeconomic History   • Marital status:      Spouse name: Not on file   • Number of children: Not on file   • Years of education: Not on file   • Highest education level: Not on file   Tobacco Use   • Smoking status: Current Every Day Smoker     Packs/day: 1.00     Years: 40.00     Pack years: 40.00   • Smokeless tobacco: Never Used   Substance and Sexual Activity   • Alcohol use: No   • Drug use: No   • Sexual activity: Defer         Objective   Physical Exam   Constitutional: She is oriented to person, place, and time. She appears well-developed and well-nourished.   Patient appears uncomfortable with her eyes closed.   HENT:   Head: Normocephalic and atraumatic.   Nose: Nose normal.   Eyes: Conjunctivae are normal. No scleral icterus.   Neck: Normal range of motion. Neck supple.   Cardiovascular: Normal rate, regular rhythm and normal heart sounds.   No murmur heard.  Pulmonary/Chest: Effort normal and breath sounds normal. No respiratory distress.   Abdominal: Soft. She exhibits no distension. There is generalized tenderness.   Soft. Mild diffused abdominal tenderness. No focal pain.    Musculoskeletal: Normal range of motion.   Neurological: She is alert and oriented to person, place, and time.   Mild generalized weakness.   Skin: Skin is warm and dry.   Psychiatric: She has a normal mood and affect. Her behavior is normal.   Nursing note and vitals reviewed.      Procedures         ED Course  ED Course as of Nov 06 2249 Wed Nov 06, 2019 1931 Paged Dr. Perez, neurology -CP  [NP]   1934 Discussed the case with Dr. Perez, neurology, who states this could be an allergic reaction, however, it is not something he has seen often. Given the leokocytosis and lactic acid he agrees it would be safer to admit the patient overnight for observation and trend her labs. -CP  [CP]   1954 At bedside re-evaluating the patient and updating her on her findings,  as well as, Dr. Perez's recommendation for admission. -CP  [NP]      ED Course User Index  [CP] Marshall Zelaya DO  [NP] Regina Tinoco     Recent Results (from the past 24 hour(s))   Comprehensive Metabolic Panel    Collection Time: 11/06/19  4:42 PM   Result Value Ref Range    Glucose 137 (H) 65 - 99 mg/dL    BUN 15 8 - 23 mg/dL    Creatinine 1.13 (H) 0.57 - 1.00 mg/dL    Sodium 138 136 - 145 mmol/L    Potassium 3.9 3.5 - 5.2 mmol/L    Chloride 103 98 - 107 mmol/L    CO2 21.0 (L) 22.0 - 29.0 mmol/L    Calcium 8.7 8.6 - 10.5 mg/dL    Total Protein 9.0 (H) 6.0 - 8.5 g/dL    Albumin 4.00 3.50 - 5.20 g/dL    ALT (SGPT) 6 1 - 33 U/L    AST (SGOT) 15 1 - 32 U/L    Alkaline Phosphatase 83 39 - 117 U/L    Total Bilirubin 0.3 0.2 - 1.2 mg/dL    eGFR Non African Amer 48 (L) >60 mL/min/1.73    Globulin 5.0 gm/dL    A/G Ratio 0.8 g/dL    BUN/Creatinine Ratio 13.3 7.0 - 25.0    Anion Gap 14.0 5.0 - 15.0 mmol/L   Lipase    Collection Time: 11/06/19  4:42 PM   Result Value Ref Range    Lipase 20 13 - 60 U/L   Light Blue Top    Collection Time: 11/06/19  4:42 PM   Result Value Ref Range    Extra Tube hold for add-on    Green Top (Gel)    Collection Time: 11/06/19  4:42 PM   Result Value Ref Range    Extra Tube Hold for add-ons.    Lavender Top    Collection Time: 11/06/19  4:42 PM   Result Value Ref Range    Extra Tube hold for add-on    Gold Top - SST    Collection Time: 11/06/19  4:42 PM   Result Value Ref Range    Extra Tube Hold for add-ons.    CBC Auto Differential    Collection Time: 11/06/19  4:42 PM   Result Value Ref Range    WBC 19.60 (H) 3.40 - 10.80 10*3/mm3    RBC 4.75 3.77 - 5.28 10*6/mm3    Hemoglobin 14.5 12.0 - 15.9 g/dL    Hematocrit 44.6 34.0 - 46.6 %    MCV 93.9 79.0 - 97.0 fL    MCH 30.5 26.6 - 33.0 pg    MCHC 32.5 31.5 - 35.7 g/dL    RDW 14.3 12.3 - 15.4 %    RDW-SD 49.1 37.0 - 54.0 fl    MPV 9.2 6.0 - 12.0 fL    Platelets 376 140 - 450 10*3/mm3    Neutrophil % 71.9 42.7 - 76.0 %    Lymphocyte % 20.9 19.6 -  45.3 %    Monocyte % 5.8 5.0 - 12.0 %    Eosinophil % 0.6 0.3 - 6.2 %    Basophil % 0.3 0.0 - 1.5 %    Immature Grans % 0.5 0.0 - 0.5 %    Neutrophils, Absolute 14.09 (H) 1.70 - 7.00 10*3/mm3    Lymphocytes, Absolute 4.09 (H) 0.70 - 3.10 10*3/mm3    Monocytes, Absolute 1.14 (H) 0.10 - 0.90 10*3/mm3    Eosinophils, Absolute 0.12 0.00 - 0.40 10*3/mm3    Basophils, Absolute 0.06 0.00 - 0.20 10*3/mm3    Immature Grans, Absolute 0.10 (H) 0.00 - 0.05 10*3/mm3    nRBC 0.2 0.0 - 0.2 /100 WBC   Clostridium Difficile Toxin, PCR - Stool, Per Rectum    Collection Time: 11/06/19  4:42 PM   Result Value Ref Range    C. Difficile Toxins by PCR Not Detected Not Detected   Lactic Acid, Plasma    Collection Time: 11/06/19  4:43 PM   Result Value Ref Range    Lactate 2.4 (C) 0.5 - 2.0 mmol/L   Lactic Acid, Reflex Timer (This will reflex a repeat order 3-3:15 hours after ordered.)    Collection Time: 11/06/19  4:43 PM   Result Value Ref Range    Extra Tube Hold for add-ons.    Urinalysis With Microscopic If Indicated (No Culture) - Urine, Clean Catch    Collection Time: 11/06/19  9:14 PM   Result Value Ref Range    Color, UA Yellow Yellow, Straw    Appearance, UA Clear Clear    pH, UA <=5.0 5.0 - 8.0    Specific Gravity, UA 1.009 1.001 - 1.030    Glucose, UA Negative Negative    Ketones, UA Negative Negative    Bilirubin, UA Negative Negative    Blood, UA Negative Negative    Protein, UA Negative Negative    Leuk Esterase, UA Moderate (2+) (A) Negative    Nitrite, UA Negative Negative    Urobilinogen, UA 0.2 E.U./dL 0.2 - 1.0 E.U./dL   Urinalysis, Microscopic Only - Urine, Clean Catch    Collection Time: 11/06/19  9:14 PM   Result Value Ref Range    RBC, UA 0-2 None Seen, 0-2 /HPF    WBC, UA 6-12 (A) None Seen, 0-2 /HPF    Bacteria, UA None Seen None Seen, Trace /HPF    Squamous Epithelial Cells, UA 0-2 None Seen, 0-2 /HPF    Hyaline Casts, UA 0-6 0 - 6 /LPF    Methodology Automated Microscopy      Note: In addition to lab results from  this visit, the labs listed above may include labs taken at another facility or during a different encounter within the last 24 hours. Please correlate lab times with ED admission and discharge times for further clarification of the services performed during this visit.    CT Abdomen Pelvis Without Contrast   Final Result   Colonic wall thickening predominantly involving the descending colon with inflammatory changes within the pericolonic soft tissues. Findings most compatible with focal colitis either infectious or inflammatory in nature. No perforation or abscess. No   obstruction.      Small amount of free fluid within the pelvis.      1.7 cm exophytic hyperdense right renal lesion probably represents a hemorrhagic or proteinaceous cyst. Correlation with outside studies recommended if available. If no prior studies follow-up imaging may be warranted.               Signer Name: EM Edmonds MD    Signed: 11/6/2019 9:30 PM    Workstation Name: [id number]     [facility]        Vitals:    11/06/19 1831 11/06/19 1900 11/06/19 1930 11/06/19 2134   BP:  121/75 146/84 143/73   BP Location:    Right arm   Patient Position:    Lying   Pulse: 70 71 68 72   Resp:       Temp:       TempSrc:       SpO2: 97% 97% 97% 97%   Weight:       Height:         Medications   ondansetron (ZOFRAN) injection 4 mg (0 mg Intravenous Hold 11/6/19 1656)   amLODIPine (NORVASC) tablet 5 mg (not administered)   lisinopril (PRINIVIL,ZESTRIL) tablet 10 mg (not administered)   gabapentin (NEURONTIN) capsule 300 mg (not administered)   HYDROcodone-acetaminophen (NORCO)  MG per tablet 1 tablet (not administered)   oxybutynin XL (DITROPAN-XL) 24 hr tablet 10 mg (not administered)   sodium chloride 0.9 % flush 10 mL (10 mL Intravenous Given 11/6/19 2242)   sodium chloride 0.9 % flush 10 mL (not administered)   heparin (porcine) 5000 UNIT/ML injection 5,000 Units (not administered)   sodium chloride 0.9 % infusion  (not administered)   ondansetron (ZOFRAN) injection 4 mg (not administered)   nicotine (NICODERM CQ) 14 MG/24HR patch 1 patch (not administered)   sodium chloride 0.9 % bolus 1,000 mL (0 mL Intravenous Stopped 11/6/19 1851)   sodium chloride 0.9 % bolus 1,000 mL (1,000 mL Intravenous Currently Infusing 11/6/19 2136)     ECG/EMG Results (last 24 hours)     ** No results found for the last 24 hours. **        No orders to display                       MDM    Final diagnoses:   Lactic acidosis   Nausea vomiting and diarrhea   Adverse effect of drug, initial encounter       Documentation assistance provided by analisa Tinoco.  Information recorded by the scribe was done at my direction and has been verified and validated by me.                 Regina Tinoco  11/06/19 1937       Marshall Zelaya DO  11/06/19 7034

## 2019-11-06 NOTE — PROGRESS NOTES
Subjective:   Chief Complaint   Patient presents with   • Multiple Sclerosis       Patient ID: Leatha Wall is a 69 y.o. female.    History of Present Illness     69 y.o. woman returns in follow for RRMS, Hep C and migraines.  Last visit on 4/8/19 continue Tysabri every 6 weeks, ordered MRI Brain, labs.     TB - neg   Cr 1.12; WBC 12.6; VZV 2318; HIV - neg;     Hep C treated    MRI Brain, 4/8/19 as compared to 4/4/18 moderate to severe T2 lesion load, without new/enlarging or enhancing lesions    2/22/19:   CBC, CMP - NCS  2/22/19 JCV 1.38  10/12/18  JCV 1.39  5/23/18    JCV 1.44  12/13/16: JCV 2.21  6/27/17    JCV 1.87   6/27/17:   Cr 1.19, WBC 13.8  - NCS      RRMS      Started Tysabri 2/20/17 switched to CHENG.    No new or worsening sx on Tysabri every 6 weeks.     Mild heaviness left arm and leg.  Left left bothers her at night feels like it is crawling.     Fatigue is minimal    Walking for 40 minutes every other day.   Tobacco < 1 ppd.    Working part time.     Problem history:    MSFC remarkable for 26/110 o/w within normal results.  Oral numbness resolved.  Bladder urgency and frequency slight increase.    Mild N/T in bottom of feet     Migraines         Frequency was daily for 3 weeks.  Located in occiput and behind eyes.  Quality of tightness and throbbing.  Moderate severity.       PMH:    Fatigue and heat are bothersome.  Echo -   EF 60%  C U/S -  0-49% on right, no stenosis on left    MRI cervical, my review, unremarkable  Labs below:    NMO negative  Hypercoaguable panel WNL  Vit D 17.2    Fatigue continues to wax and wane.  Last few days has had unsteady gait. Weakness on left arm/leg.  Numbness in left arm and right foot.  Left side of tongue numb for the last 7 to 14 weeks.   Continue to smoke Tob 1 ppd.     Patient called and noted overwhelming fatigue.  After steroids fatigue improved but then recurred on 8/5/16.  Frequent HA's every other day frontal and occipital location of squeezing  sensation of moderate severity.  Prednisone taper rx on 7/22/16 improved energy.      6/22/16 awoke at 3:30 am and noted left sided weakness in arm and leg.  Gait was unsteady and worsened over the next 7 days and presented to [facility] ED.  Fatigue was overwhelming.  MRI Brain in ED, my review, with multiple T2 white matter lesions and one large black hole in left PVWM.  Given steroids in ED with improvement in sx by 50%.    5/2016 had episode of left arm clumsiness for a week.      Reports increased fatigue since the first of the year.      Vision is fuzzy and feel swollen.      Bladder frequency.     Tob 1 ppd.    The following portions of the patient's history were reviewed and updated as appropriate: allergies, current medications, past family history, past medical history, past social history, past surgical history and problem list.    Review of Systems   Constitutional: Negative for activity change and unexpected weight change.   HENT: Negative for tinnitus and trouble swallowing.    Eyes: Negative for photophobia and visual disturbance.   Respiratory: Negative for apnea and choking.    Cardiovascular: Negative for leg swelling.   Endocrine: Negative for cold intolerance and heat intolerance.   Genitourinary: Positive for frequency and urgency. Negative for difficulty urinating and menstrual problem.   Musculoskeletal: Positive for gait problem. Negative for back pain.   Skin: Negative for color change.   Allergic/Immunologic: Negative for immunocompromised state.   Neurological: Positive for dizziness and tremors. Negative for seizures, syncope, facial asymmetry, speech difficulty and light-headedness.   Hematological: Negative for adenopathy. Does not bruise/bleed easily.   Psychiatric/Behavioral: Positive for decreased concentration. Negative for behavioral problems, confusion, hallucinations and sleep disturbance.        Objective:  Vitals:    11/06/19 1113   BP: 132/70   Pulse: 61   Resp: 16   SpO2: 96%  "  Weight: 66.3 kg (146 lb 3.2 oz)   Height: 162.6 cm (64\")       Neurologic Exam     Mental Status   Registration: recalls 3 of 3 objects. Recall at 5 minutes: recalls 3 of 3 objects. Follows 3 step commands.   Attention: normal. Concentration: normal.   Level of consciousness: alert  Knowledge: good and consistent with education.   Able to name object. Able to read. Able to repeat. Able to write. Normal comprehension.     Cranial Nerves     CN II   Visual fields full to confrontation.   Visual acuity: normal  Right visual field deficit: none  Left visual field deficit: none     CN III, IV, VI   Right pupil: Shape: regular. Reactivity: brisk. Consensual response: intact.   Left pupil: Shape: regular. Reactivity: brisk. Consensual response: intact.   Nystagmus: none   Diplopia: none  Ophthalmoparesis: none  Upgaze: normal  Downgaze: normal  Conjugate gaze: present  Vestibulo-ocular reflex: present    CN V   Right facial sensation deficit: forehead  Left facial sensation deficit: forehead  Right corneal reflex: normal  Left corneal reflex: normal    CN VII   Right facial weakness: none  Left facial weakness: none    CN VIII   Hearing: intact    CN IX, X   Palate: symmetric  Right gag reflex: normal  Left gag reflex: normal    CN XI   Right sternocleidomastoid strength: normal  Left sternocleidomastoid strength: normal    CN XII   Tongue: not atrophic  Fasciculations: absent  Tongue deviation: none    Motor Exam   Muscle bulk: normal  Overall muscle tone: normal  Right arm tone: normal  Left arm tone: normal  Right leg tone: normal  Left leg tone: normal    Strength   Right neck flexion: 5/5  Left neck flexion: 5/5  Right neck extension: 5/5  Left neck extension: 5/5  Right deltoid: 5/5  Left deltoid: 5/5  Right biceps: 5/5  Left biceps: 5/5  Right triceps: 5/5  Left triceps: 5/5  Right wrist flexion: 5/5  Left wrist flexion: 5/5  Right wrist extension: 5/5  Left wrist extension: 5/5  Right interossei: 5/5  Left " interossei: 5/5  Right abdominals: 5/5  Left abdominals: 5/5  Right iliopsoas: 5/5  Left iliopsoas: 5/5  Right quadriceps: 5/5  Left quadriceps: 5/5  Right hamstrin/5  Left hamstrin/5  Right glutei: 5/5  Left glutei: 5/5  Right anterior tibial: 5/5  Left anterior tibial: 5/5  Right posterior tibial: 5/5  Left posterior tibial: 5/5  Right peroneal: 5/5  Left peroneal: 5/5  Right gastroc: 5/5  Left gastroc: 5/5    Sensory Exam   Right arm light touch: normal  Right leg light touch: normal  Right arm vibration: normal  Right leg vibration: normal  Right arm proprioception: normal  Right leg proprioception: normal  Right arm pinprick: normal  Right leg pinprick: normal  Sensory deficit distribution on left: C6    Gait, Coordination, and Reflexes     Gait  Gait: normal ( )    Tremor   Resting tremor: absent  Intention tremor: absent  Action tremor: absent    Reflexes   Reflexes 2+ except as noted.   Right ankle clonus: absent  Left ankle clonus: absent      Physical Exam   Constitutional: She appears well-developed and well-nourished.   Neurological: Gait normal.   Nursing note and vitals reviewed.    Hospital Outpatient Visit on 2019   Component Date Value Ref Range Status   • Creatinine 2019 1.10  0.60 - 1.30 mg/dL Final    Serial Number: [id number]:  [id number]     Assessment/Plan:       Problems Addressed this Visit        Cardiovascular and Mediastinum    Periodic headache syndrome, not intractable     Headaches are worsening.  Medication changes per orders.     Start  mg BID              Relevant Medications    gabapentin (NEURONTIN) 300 MG capsule       Nervous and Auditory    Multiple sclerosis (CMS/HCC) - Primary     Sx stable on Tysabri    CBC,CMP, JCV            Relevant Orders    CBC & Differential    Comprehensive Metabolic Panel    Stratify JCV, Antibody CHRISTIAN With / Reflex To Inhibition Assay       Other    Hepatitis C antibody test positive

## 2019-11-07 LAB
ALBUMIN SERPL-MCNC: 3 G/DL (ref 3.5–5.2)
ALBUMIN/GLOB SERPL: 0.7 G/DL
ALP SERPL-CCNC: 64 U/L (ref 39–117)
ALT SERPL W P-5'-P-CCNC: <5 U/L (ref 1–33)
ANION GAP SERPL CALCULATED.3IONS-SCNC: 9 MMOL/L (ref 5–15)
AST SERPL-CCNC: 14 U/L (ref 1–32)
BASOPHILS # BLD AUTO: 0.03 10*3/MM3 (ref 0–0.2)
BASOPHILS NFR BLD AUTO: 0.2 % (ref 0–1.5)
BILIRUB SERPL-MCNC: 0.2 MG/DL (ref 0.2–1.2)
BUN BLD-MCNC: 12 MG/DL (ref 8–23)
BUN/CREAT SERPL: 11.2 (ref 7–25)
CALCIUM SPEC-SCNC: 8.4 MG/DL (ref 8.6–10.5)
CHLORIDE SERPL-SCNC: 109 MMOL/L (ref 98–107)
CO2 SERPL-SCNC: 20 MMOL/L (ref 22–29)
CREAT BLD-MCNC: 1.07 MG/DL (ref 0.57–1)
D-LACTATE SERPL-SCNC: 1 MMOL/L (ref 0.5–2)
D-LACTATE SERPL-SCNC: 1 MMOL/L (ref 0.5–2)
DEPRECATED RDW RBC AUTO: 48.6 FL (ref 37–54)
EOSINOPHIL # BLD AUTO: 0.12 10*3/MM3 (ref 0–0.4)
EOSINOPHIL NFR BLD AUTO: 0.8 % (ref 0.3–6.2)
ERYTHROCYTE [DISTWIDTH] IN BLOOD BY AUTOMATED COUNT: 14.3 % (ref 12.3–15.4)
GFR SERPL CREATININE-BSD FRML MDRD: 51 ML/MIN/1.73
GLOBULIN UR ELPH-MCNC: 4.1 GM/DL
GLUCOSE BLD-MCNC: 93 MG/DL (ref 65–99)
HCT VFR BLD AUTO: 35.5 % (ref 34–46.6)
HGB BLD-MCNC: 11.4 G/DL (ref 12–15.9)
IMM GRANULOCYTES # BLD AUTO: 0.06 10*3/MM3 (ref 0–0.05)
IMM GRANULOCYTES NFR BLD AUTO: 0.4 % (ref 0–0.5)
LYMPHOCYTES # BLD AUTO: 4.5 10*3/MM3 (ref 0.7–3.1)
LYMPHOCYTES NFR BLD AUTO: 30 % (ref 19.6–45.3)
MAGNESIUM SERPL-MCNC: 2 MG/DL (ref 1.6–2.4)
MCH RBC QN AUTO: 29.8 PG (ref 26.6–33)
MCHC RBC AUTO-ENTMCNC: 32.1 G/DL (ref 31.5–35.7)
MCV RBC AUTO: 92.9 FL (ref 79–97)
MONOCYTES # BLD AUTO: 1.02 10*3/MM3 (ref 0.1–0.9)
MONOCYTES NFR BLD AUTO: 6.8 % (ref 5–12)
NEUTROPHILS # BLD AUTO: 9.26 10*3/MM3 (ref 1.7–7)
NEUTROPHILS NFR BLD AUTO: 61.8 % (ref 42.7–76)
NRBC BLD AUTO-RTO: 0 /100 WBC (ref 0–0.2)
PLATELET # BLD AUTO: 289 10*3/MM3 (ref 140–450)
PMV BLD AUTO: 9.3 FL (ref 6–12)
POTASSIUM BLD-SCNC: 3.9 MMOL/L (ref 3.5–5.2)
PROT SERPL-MCNC: 7.1 G/DL (ref 6–8.5)
RBC # BLD AUTO: 3.82 10*6/MM3 (ref 3.77–5.28)
SODIUM BLD-SCNC: 138 MMOL/L (ref 136–145)
WBC NRBC COR # BLD: 14.99 10*3/MM3 (ref 3.4–10.8)

## 2019-11-07 PROCEDURE — G0378 HOSPITAL OBSERVATION PER HR: HCPCS

## 2019-11-07 PROCEDURE — 80053 COMPREHEN METABOLIC PANEL: CPT | Performed by: INTERNAL MEDICINE

## 2019-11-07 PROCEDURE — 25010000002 ERTAPENEM PER 500 MG: Performed by: INTERNAL MEDICINE

## 2019-11-07 PROCEDURE — 85025 COMPLETE CBC W/AUTO DIFF WBC: CPT | Performed by: INTERNAL MEDICINE

## 2019-11-07 PROCEDURE — 96365 THER/PROPH/DIAG IV INF INIT: CPT

## 2019-11-07 PROCEDURE — 96366 THER/PROPH/DIAG IV INF ADDON: CPT

## 2019-11-07 PROCEDURE — 99221 1ST HOSP IP/OBS SF/LOW 40: CPT | Performed by: PSYCHIATRY & NEUROLOGY

## 2019-11-07 PROCEDURE — 93010 ELECTROCARDIOGRAM REPORT: CPT | Performed by: INTERNAL MEDICINE

## 2019-11-07 PROCEDURE — 93005 ELECTROCARDIOGRAM TRACING: CPT | Performed by: INTERNAL MEDICINE

## 2019-11-07 PROCEDURE — 25010000002 LEVOFLOXACIN PER 250 MG: Performed by: INTERNAL MEDICINE

## 2019-11-07 PROCEDURE — 83735 ASSAY OF MAGNESIUM: CPT | Performed by: INTERNAL MEDICINE

## 2019-11-07 PROCEDURE — 99226 PR SBSQ OBSERVATION CARE/DAY 35 MINUTES: CPT | Performed by: INTERNAL MEDICINE

## 2019-11-07 PROCEDURE — 96361 HYDRATE IV INFUSION ADD-ON: CPT

## 2019-11-07 PROCEDURE — 96372 THER/PROPH/DIAG INJ SC/IM: CPT

## 2019-11-07 PROCEDURE — 83605 ASSAY OF LACTIC ACID: CPT | Performed by: INTERNAL MEDICINE

## 2019-11-07 PROCEDURE — 25010000002 HEPARIN (PORCINE) PER 1000 UNITS: Performed by: INTERNAL MEDICINE

## 2019-11-07 PROCEDURE — 96367 TX/PROPH/DG ADDL SEQ IV INF: CPT

## 2019-11-07 RX ORDER — MAGNESIUM SULFATE 1 G/100ML
1 INJECTION INTRAVENOUS AS NEEDED
Status: DISCONTINUED | OUTPATIENT
Start: 2019-11-07 | End: 2019-11-08 | Stop reason: HOSPADM

## 2019-11-07 RX ORDER — MAGNESIUM SULFATE HEPTAHYDRATE 40 MG/ML
2 INJECTION, SOLUTION INTRAVENOUS AS NEEDED
Status: DISCONTINUED | OUTPATIENT
Start: 2019-11-07 | End: 2019-11-08 | Stop reason: HOSPADM

## 2019-11-07 RX ORDER — POTASSIUM CHLORIDE 7.45 MG/ML
10 INJECTION INTRAVENOUS
Status: DISCONTINUED | OUTPATIENT
Start: 2019-11-07 | End: 2019-11-08 | Stop reason: HOSPADM

## 2019-11-07 RX ORDER — POTASSIUM CHLORIDE 750 MG/1
40 CAPSULE, EXTENDED RELEASE ORAL AS NEEDED
Status: DISCONTINUED | OUTPATIENT
Start: 2019-11-07 | End: 2019-11-08 | Stop reason: HOSPADM

## 2019-11-07 RX ORDER — LEVOFLOXACIN 5 MG/ML
750 INJECTION, SOLUTION INTRAVENOUS EVERY 24 HOURS
Status: DISCONTINUED | OUTPATIENT
Start: 2019-11-07 | End: 2019-11-08 | Stop reason: HOSPADM

## 2019-11-07 RX ORDER — POTASSIUM CHLORIDE 1.5 G/1.77G
40 POWDER, FOR SOLUTION ORAL AS NEEDED
Status: DISCONTINUED | OUTPATIENT
Start: 2019-11-07 | End: 2019-11-08 | Stop reason: HOSPADM

## 2019-11-07 RX ORDER — METRONIDAZOLE 500 MG/1
500 TABLET ORAL EVERY 8 HOURS
Status: DISCONTINUED | OUTPATIENT
Start: 2019-11-07 | End: 2019-11-08 | Stop reason: HOSPADM

## 2019-11-07 RX ORDER — MAGNESIUM SULFATE HEPTAHYDRATE 40 MG/ML
4 INJECTION, SOLUTION INTRAVENOUS AS NEEDED
Status: DISCONTINUED | OUTPATIENT
Start: 2019-11-07 | End: 2019-11-08 | Stop reason: HOSPADM

## 2019-11-07 RX ADMIN — HEPARIN SODIUM 5000 UNITS: 5000 INJECTION, SOLUTION INTRAVENOUS; SUBCUTANEOUS at 20:08

## 2019-11-07 RX ADMIN — LISINOPRIL 10 MG: 10 TABLET ORAL at 20:07

## 2019-11-07 RX ADMIN — OXYBUTYNIN CHLORIDE 5 MG: 5 TABLET ORAL at 20:07

## 2019-11-07 RX ADMIN — NICOTINE 1 PATCH: 14 PATCH, EXTENDED RELEASE TRANSDERMAL at 09:50

## 2019-11-07 RX ADMIN — SODIUM CHLORIDE 125 ML/HR: 9 INJECTION, SOLUTION INTRAVENOUS at 07:29

## 2019-11-07 RX ADMIN — METRONIDAZOLE 500 MG: 500 TABLET ORAL at 17:46

## 2019-11-07 RX ADMIN — OXYBUTYNIN CHLORIDE 5 MG: 5 TABLET ORAL at 09:50

## 2019-11-07 RX ADMIN — ERTAPENEM SODIUM 1 G: 1 INJECTION, POWDER, LYOPHILIZED, FOR SOLUTION INTRAMUSCULAR; INTRAVENOUS at 11:24

## 2019-11-07 RX ADMIN — GABAPENTIN 300 MG: 300 CAPSULE ORAL at 09:50

## 2019-11-07 RX ADMIN — LEVOFLOXACIN 750 MG: 5 INJECTION, SOLUTION INTRAVENOUS at 17:46

## 2019-11-07 RX ADMIN — SODIUM CHLORIDE 75 ML/HR: 9 INJECTION, SOLUTION INTRAVENOUS at 17:47

## 2019-11-07 RX ADMIN — ZOLPIDEM TARTRATE 10 MG: 5 TABLET ORAL at 22:21

## 2019-11-07 RX ADMIN — AMLODIPINE BESYLATE 5 MG: 10 TABLET ORAL at 20:07

## 2019-11-07 RX ADMIN — HEPARIN SODIUM 5000 UNITS: 5000 INJECTION, SOLUTION INTRAVENOUS; SUBCUTANEOUS at 09:50

## 2019-11-07 RX ADMIN — HYDROCODONE BITARTRATE AND ACETAMINOPHEN 1 TABLET: 10; 325 TABLET ORAL at 22:21

## 2019-11-07 RX ADMIN — GABAPENTIN 300 MG: 300 CAPSULE ORAL at 20:07

## 2019-11-07 NOTE — PLAN OF CARE
Problem: Patient Care Overview  Goal: Plan of Care Review  Outcome: Ongoing (interventions implemented as appropriate)   11/07/19 0454   Coping/Psychosocial   Plan of Care Reviewed With patient   Plan of Care Review   Progress no change   OTHER   Outcome Summary Patient arrived on the unit with signs and symptoms related to colitis. VSS, and patient has been alert and oriented times four. No complaints of pain since arriving on the unit. Nursing staff will continue to monitor and assess the patient.     Goal: Individualization and Mutuality  Outcome: Ongoing (interventions implemented as appropriate)   11/07/19 0454   Individualization   Patient Specific Interventions Monitor and assess for pain q2hrs and prn       Problem: Pain, Chronic (Adult)  Goal: Identify Related Risk Factors and Signs and Symptoms  Outcome: Ongoing (interventions implemented as appropriate)   11/07/19 0454   Pain, Chronic (Adult)   Related Risk Factors (Chronic Pain) disease process   Signs and Symptoms (Chronic Pain) verbalization of pain/discomfort for a prolonged time period     Goal: Acceptable Pain/Comfort Level and Functional Ability  Outcome: Ongoing (interventions implemented as appropriate)   11/07/19 0454   Pain, Chronic (Adult)   Acceptable Pain/Comfort Level and Functional Ability making progress toward outcome       Problem: Bowel Disease, Inflammatory (Adult)  Goal: Signs and Symptoms of Listed Potential Problems Will be Absent, Minimized or Managed (Bowel Disease, Inflammatory)  Outcome: Ongoing (interventions implemented as appropriate)   11/07/19 0454   Goal/Outcome Evaluation   Problems Assessed (Inflammatory Bowel Disease) all   Problems Present (Inflammatory Bowel) diarrhea;infection       Problem: Fall Risk (Adult)  Goal: Identify Related Risk Factors and Signs and Symptoms  Outcome: Ongoing (interventions implemented as appropriate)   11/07/19 0454   Fall Risk (Adult)   Related Risk Factors (Fall Risk) environment  unfamiliar     Goal: Absence of Fall  Outcome: Ongoing (interventions implemented as appropriate)   11/07/19 0454   Fall Risk (Adult)   Absence of Fall making progress toward outcome

## 2019-11-07 NOTE — CONSULTS
Neurology    Referring provider:   No referring provider defined for this encounter.    Reason for Consultation: MS evaluation    Chief complaint: Diarrhea    History of present illness: 69-year-old woman seen for Dr. Franklin for evaluation of MS.    Patient is followed by Dr. Perez for the last 2 years with the recent diagnosis of MS late in life.    She has been on Tysabri therapy for 2 years.    She has had no troubles in the MS is been well controlled.    Her primary complaint is fatigue.    She does have a mild left-sided weakness which is been worse in the past.    She is had episodes of what sound like optic neuritis of the right eye and some tingling and numbness in her right arm.    She said no worsening of her symptoms associated with the current illness which started abruptly yesterday.    She does have hepatitis C but has been treated with the new curative drug.        Review of Systems: Ongoing fatigue.    No nausea or vomiting.    All other systems reviewed and are negative.        Home meds:   Medications Prior to Admission   Medication Sig Dispense Refill Last Dose   • albuterol sulfate  (90 Base) MCG/ACT inhaler    Past Week at Unknown time   • amLODIPine (NORVASC) 5 MG tablet Take 5 mg by mouth Every Night.   11/6/2019 at Unknown time   • benazepril (LOTENSIN) 40 MG tablet Take 1 tablet by mouth Every Night.   11/5/2019 at Unknown time   • gabapentin (NEURONTIN) 300 MG capsule Take 1 capsule by mouth 2 (Two) Times a Day. 60 capsule 5 11/6/2019 at Unknown time   • HYDROcodone-acetaminophen (NORCO)  MG per tablet Take 1 tablet by mouth 2 (two) times a day.   11/6/2019 at Unknown time   • oxybutynin (DITROPAN) 5 MG tablet Take 5 mg by mouth 2 (Two) Times a Day.   11/6/2019 at Unknown time   • zolpidem (AMBIEN) 10 MG tablet Take 10 mg by mouth at night as needed for sleep.   11/5/2019 at Unknown time       History  Past Medical History:   Diagnosis Date   • Arthritis    • Back disorder      "\"PROTRUDING DISC\"   • COPD (chronic obstructive pulmonary disease) (CMS/HCC)    • Hypertension    • MS (multiple sclerosis) (CMS/HCC)    ,   Past Surgical History:   Procedure Laterality Date   • CHOLECYSTECTOMY     • HYSTERECTOMY     • SMALL INTESTINE SURGERY     • TONSILLECTOMY     ,   Family History   Problem Relation Age of Onset   • Cancer Father    • Diabetes Brother    • Hypertension Brother    ,   Social History     Tobacco Use   • Smoking status: Current Every Day Smoker     Packs/day: 1.00     Years: 40.00     Pack years: 40.00   • Smokeless tobacco: Never Used   Substance Use Topics   • Alcohol use: No   • Drug use: No    and Allergies:  Penicillins and Sulfa antibiotics,    Vital Signs   Blood pressure 136/64, pulse 64, temperature 98.6 °F (37 °C), temperature source Oral, resp. rate 16, height 162.6 cm (64\"), weight 67.7 kg (149 lb 4.8 oz), SpO2 90 %.  Body mass index is 25.63 kg/m².    Physical Exam:   General: Pleasant white female in no distress              Head: No signs of trauma              Neck: No bruit              Resp: Normal breath sound              Cor: Regular rhythm              Extremities: No edema              Skin: Warm and dry              Neuro: Mentally alert and oriented with normal memory attention and concentration.    Speech is articulate with no word finding problems.    Cranial nerves show benign fundi equal pupils and full eye movements.  Facial movement and sensation are normal.    Palate elevates normally tongue protrudes normally.    Coordination is normal and fine finger movements on the right.  It is mildly dysmetric on the left with slow fine finger movements on the left by comparison to the right.    Motor testing shows no weakness in the left arm or leg.    Reflexes are 2+ left 1+ right with a cross adductor to the left.    Sensory testing is normal.        Results Review: CT of the abdomen shows colonic wall thickening predominantly involving the descending colon " with inflammatory changes.  Most compatible with a focal colitis.        Labs:  Lab Results (last 72 hours)     Procedure Component Value Units Date/Time    Magnesium [[id number]]  (Normal) Collected:  11/07/19 0551    Specimen:  Blood Updated:  11/07/19 1141     Magnesium 2.0 mg/dL     CBC Auto Differential [[id number]]  (Abnormal) Collected:  11/07/19 0551    Specimen:  Blood Updated:  11/07/19 0802     WBC 14.99 10*3/mm3      RBC 3.82 10*6/mm3      Hemoglobin 11.4 g/dL      Hematocrit 35.5 %      MCV 92.9 fL      MCH 29.8 pg      MCHC 32.1 g/dL      RDW 14.3 %      RDW-SD 48.6 fl      MPV 9.3 fL      Platelets 289 10*3/mm3      Neutrophil % 61.8 %      Lymphocyte % 30.0 %      Monocyte % 6.8 %      Eosinophil % 0.8 %      Basophil % 0.2 %      Immature Grans % 0.4 %      Neutrophils, Absolute 9.26 10*3/mm3      Lymphocytes, Absolute 4.50 10*3/mm3      Monocytes, Absolute 1.02 10*3/mm3      Eosinophils, Absolute 0.12 10*3/mm3      Basophils, Absolute 0.03 10*3/mm3      Immature Grans, Absolute 0.06 10*3/mm3      nRBC 0.0 /100 WBC     Comprehensive Metabolic Panel [[id number]]  (Abnormal) Collected:  11/07/19 0551    Specimen:  Blood Updated:  11/07/19 0737     Glucose 93 mg/dL      BUN 12 mg/dL      Creatinine 1.07 mg/dL      Sodium 138 mmol/L      Potassium 3.9 mmol/L      Chloride 109 mmol/L      CO2 20.0 mmol/L      Calcium 8.4 mg/dL      Total Protein 7.1 g/dL      Albumin 3.00 g/dL      ALT (SGPT) <5 U/L      AST (SGOT) 14 U/L      Alkaline Phosphatase 64 U/L      Total Bilirubin 0.2 mg/dL      eGFR Non African Amer 51 mL/min/1.73      Globulin 4.1 gm/dL      A/G Ratio 0.7 g/dL      BUN/Creatinine Ratio 11.2     Anion Gap 9.0 mmol/L     Narrative:       GFR Normal >60  Chronic Kidney Disease <60  Kidney Failure <15    Lactic Acid, Plasma [[id number]]  (Normal) Collected:  11/07/19 0551    Specimen:  Blood Updated:  11/07/19 0653     Lactate 1.0 mmol/L      Comment: Falsely depressed results may occur on samples  drawn from patients receiving N-Acetylcysteine (NAC) or Metamizole.       Lactic Acid, Reflex [[id number]]  (Normal) Collected:  11/06/19 2305    Specimen:  Blood Updated:  11/07/19 0033     Lactate 1.0 mmol/L      Comment: Falsely depressed results may occur on samples drawn from patients receiving N-Acetylcysteine (NAC) or Metamizole.       Urinalysis With Microscopic If Indicated (No Culture) - Urine, Clean Catch [[id number]]  (Abnormal) Collected:  11/06/19 2114    Specimen:  Urine, Clean Catch Updated:  11/06/19 2124     Color, UA Yellow     Appearance, UA Clear     pH, UA <=5.0     Specific Gravity, UA 1.009     Glucose, UA Negative     Ketones, UA Negative     Bilirubin, UA Negative     Blood, UA Negative     Protein, UA Negative     Leuk Esterase, UA Moderate (2+)     Nitrite, UA Negative     Urobilinogen, UA 0.2 E.U./dL    Urinalysis, Microscopic Only - Urine, Clean Catch [[id number]]  (Abnormal) Collected:  11/06/19 2114    Specimen:  Urine, Clean Catch Updated:  11/06/19 2124     RBC, UA 0-2 /HPF      WBC, UA 6-12 /HPF      Bacteria, UA None Seen /HPF      Squamous Epithelial Cells, UA 0-2 /HPF      Hyaline Casts, UA 0-6 /LPF      Methodology Automated Microscopy    Lactic Acid, Reflex Timer (This will reflex a repeat order 3-3:15 hours after ordered.) [[id number]] Collected:  11/06/19 1643    Specimen:  Blood Updated:  11/06/19 2107     Extra Tube Hold for add-ons.     Comment: Auto resulted.       [place] Draw [[id number]] Collected:  11/06/19 1642    Specimen:  Blood Updated:  11/06/19 2011    Narrative:       The following orders were created for panel order [place] Draw.  Procedure                               Abnormality         Status                     ---------                               -----------         ------                     Light Blue Top[[id number]]                                   Final result               Green Top (Gel)[[id number]]                                  Final result                Lavender Top[[id number]]                                     Final result               Gold Top - SST[[id number]]                                   Final result               Green Top (No Gel)[[id number]]                                                            Please view results for these tests on the individual orders.    Clostridium Difficile Toxin - Stool, Per Rectum [[id number]] Collected:  11/06/19 1642    Specimen:  Stool from Per Rectum Updated:  11/06/19 1828    Narrative:       The following orders were created for panel order Clostridium Difficile Toxin - Stool, Per Rectum.  Procedure                               Abnormality         Status                     ---------                               -----------         ------                     Clostridium Difficile To...[[id number]]  Normal              Final result                 Please view results for these tests on the individual orders.    Clostridium Difficile Toxin, PCR - Stool, Per Rectum [[id number]]  (Normal) Collected:  11/06/19 1642    Specimen:  Stool from Per Rectum Updated:  11/06/19 1828     C. Difficile Toxins by PCR Not Detected    Narrative:       Performance characteristics of test not established for patients <2 years of age.  Negative for Toxigenic C. Difficile    Green Top (Gel) [[id number]] Collected:  11/06/19 1642    Specimen:  Blood Updated:  11/06/19 1745     Extra Tube Hold for add-ons.     Comment: Auto resulted.       Lavender Top [[id number]] Collected:  11/06/19 1642    Specimen:  Blood Updated:  11/06/19 1745     Extra Tube hold for add-on     Comment: Auto resulted       Gold Top - SST [[id number]] Collected:  11/06/19 1642    Specimen:  Blood Updated:  11/06/19 1745     Extra Tube Hold for add-ons.     Comment: Auto resulted.       Light Blue Top [[id number]] Collected:  11/06/19 1642    Specimen:  Blood Updated:  11/06/19 1745     Extra Tube hold for add-on     Comment: Auto resulted       Lactic Acid, Plasma  [[id number]]  (Abnormal) Collected:  11/06/19 1643    Specimen:  Blood Updated:  11/06/19 1738     Lactate 2.4 mmol/L      Comment: Falsely depressed results may occur on samples drawn from patients receiving N-Acetylcysteine (NAC) or Metamizole.       Stool Culture (Reference Lab) - Stool, Per Rectum [[id number]] Collected:  11/06/19 1642    Specimen:  Stool from Per Rectum Updated:  11/06/19 1736    Comprehensive Metabolic Panel [[id number]]  (Abnormal) Collected:  11/06/19 1642    Specimen:  Blood Updated:  11/06/19 1731     Glucose 137 mg/dL      BUN 15 mg/dL      Creatinine 1.13 mg/dL      Sodium 138 mmol/L      Potassium 3.9 mmol/L      Chloride 103 mmol/L      CO2 21.0 mmol/L      Calcium 8.7 mg/dL      Total Protein 9.0 g/dL      Albumin 4.00 g/dL      ALT (SGPT) 6 U/L      AST (SGOT) 15 U/L      Alkaline Phosphatase 83 U/L      Total Bilirubin 0.3 mg/dL      eGFR Non African Amer 48 mL/min/1.73      Globulin 5.0 gm/dL      A/G Ratio 0.8 g/dL      BUN/Creatinine Ratio 13.3     Anion Gap 14.0 mmol/L     Narrative:       GFR Normal >60  Chronic Kidney Disease <60  Kidney Failure <15    Lipase [[id number]]  (Normal) Collected:  11/06/19 1642    Specimen:  Blood Updated:  11/06/19 1731     Lipase 20 U/L     CBC & Differential [[id number]] Collected:  11/06/19 1642    Specimen:  Blood Updated:  11/06/19 1704    Narrative:       The following orders were created for panel order CBC & Differential.  Procedure                               Abnormality         Status                     ---------                               -----------         ------                     CBC Auto Differential[[id number]]        Abnormal            Final result                 Please view results for these tests on the individual orders.    CBC Auto Differential [[id number]]  (Abnormal) Collected:  11/06/19 1642    Specimen:  Blood Updated:  11/06/19 1704     WBC 19.60 10*3/mm3      RBC 4.75 10*6/mm3      Hemoglobin 14.5 g/dL       Hematocrit 44.6 %      MCV 93.9 fL      MCH 30.5 pg      MCHC 32.5 g/dL      RDW 14.3 %      RDW-SD 49.1 fl      MPV 9.2 fL      Platelets 376 10*3/mm3      Neutrophil % 71.9 %      Lymphocyte % 20.9 %      Monocyte % 5.8 %      Eosinophil % 0.6 %      Basophil % 0.3 %      Immature Grans % 0.5 %      Neutrophils, Absolute 14.09 10*3/mm3      Lymphocytes, Absolute 4.09 10*3/mm3      Monocytes, Absolute 1.14 10*3/mm3      Eosinophils, Absolute 0.12 10*3/mm3      Basophils, Absolute 0.06 10*3/mm3      Immature Grans, Absolute 0.10 10*3/mm3      nRBC 0.2 /100 WBC           Rads:  Imaging Results (Last 72 Hours)     Procedure Component Value Units Date/Time    CT Abdomen Pelvis Without Contrast [[id number]] Collected:  11/06/19 2130     Updated:  11/06/19 2132    Narrative:       CT Abdomen Pelvis WO    INDICATION:   Generalized abdominal pain with nausea vomiting and diarrhea. Leukocytosis after infusion today.    TECHNIQUE:   CT of the abdomen and pelvis without IV contrast. Coronal and sagittal reconstructions were obtained.  Radiation dose reduction techniques included automated exposure control or exposure modulation based on body size. Count of known CT and cardiac nuc  med studies performed in previous 12 months: 0.     COMPARISON:   None available.    FINDINGS:  Abdomen: Emphysematous changes within the lung bases. Liver and spleen unremarkable. Gallbladder surgically absent. Pancreas and adrenal glands appear normal. 1.7 cm hyperdense exophytic right renal lesion most likely represents a hemorrhagic or  proteinaceous cyst but further evaluation and follow-up may be warranted. Correlation with any outside studies may be of benefit. Mild aneurysmal dilatation of the infrarenal aorta measuring up to 3 cm. Colonic wall thickening predominantly involving the  descending colon and to a lesser extent sigmoid colon may represent focal colitis either infectious or inflammatory nature. No perforation or abscess. No  obstruction.    Pelvis: Bladder moderately distended. Small amount of free fluid within the pelvis. Grade 1 spondylolisthesis L4 on L5 with lower lumbar spine facet arthropathy.      Impression:       Colonic wall thickening predominantly involving the descending colon with inflammatory changes within the pericolonic soft tissues. Findings most compatible with focal colitis either infectious or inflammatory in nature. No perforation or abscess. No  obstruction.    Small amount of free fluid within the pelvis.    1.7 cm exophytic hyperdense right renal lesion probably represents a hemorrhagic or proteinaceous cyst. Correlation with outside studies recommended if available. If no prior studies follow-up imaging may be warranted.          Signer Name: EM Edmonds MD   Signed: 11/6/2019 9:30 PM   Workstation Name: [facility]    [facility]            Assessment: Acute colitis, questionable cause    Relapsing remitting MS stable       Plan:    Continue therapy and evaluation.    The patient seems to have responded to antibiotics.        Comment:   Seem highly improbable that focal colitis would be side effect of Tysabri.  That said this is an immune suppressant drug and careful search for infectious causes as appropriate.    I discussed the patients findings and my recommendations with patient and family      Francisco Javier Meraz MD  11/07/19  3:56 PM

## 2019-11-07 NOTE — PROGRESS NOTES
[facility] Medicine Services  PROGRESS NOTE    Patient Name: Leatha Wall  : 1950  MRN: [id number]    Date of Admission: 2019  Primary Care Physician: Clare Edmonds APRN    Subjective   Subjective     CC:  N/v/d, colitis    HPI:  Feeling much better. No fever. No chills, abd pain improved. no dyspnea. tolrated clears    Review of Systems  No headache, no chest pain      Objective   Objective     Vital Signs:   Temp:  [97.5 °F (36.4 °C)-98.5 °F (36.9 °C)] 98.5 °F (36.9 °C)  Heart Rate:  [65-80] 68  Resp:  [16-18] 18  BP: (121-181)/(61-97) 125/63        Physical Exam:  Constitutional:Alert, oriented x 3, nontoxic appearing  Psych:Normal/appropriate affect  HEENT:Ncat, oroph clear  Neck: neck supple, full range of motion  Neuro: Face symmetric, speech clear, equal , moves all extremities  Cardiac: Rrr; No pretibial pitting edema  Resp: Ctab, normal effort  GI: abd soft, LLQ tenderness, no rebound, no guarding, +bs  Skin: No extremity rash  Musculoskeletal/extremities: no cyanosis extremities; no significant ankle edema            Results Reviewed:    Results from last 7 days   Lab Units 19  0551 19  1642   WBC 10*3/mm3 14.99* 19.60*   HEMOGLOBIN g/dL 11.4* 14.5   HEMATOCRIT % 35.5 44.6   PLATELETS 10*3/mm3 289 376     Results from last 7 days   Lab Units 19  0551 19  1642   SODIUM mmol/L 138 138   POTASSIUM mmol/L 3.9 3.9   CHLORIDE mmol/L 109* 103   CO2 mmol/L 20.0* 21.0*   BUN mg/dL 12 15   CREATININE mg/dL 1.07* 1.13*   GLUCOSE mg/dL 93 137*   CALCIUM mg/dL 8.4* 8.7   ALT (SGPT) U/L <5 6   AST (SGOT) U/L 14 15     Estimated Creatinine Clearance: 48.2 mL/min (A) (by C-G formula based on SCr of 1.07 mg/dL (H)).    Microbiology Results Abnormal     Procedure Component Value - Date/Time    Clostridium Difficile Toxin - Stool, Per Rectum [[id number]] Collected:  19 1642    Lab Status:  Final result Specimen:  Stool from Per Rectum Updated:   11/06/19 1828    Narrative:       The following orders were created for panel order Clostridium Difficile Toxin - Stool, Per Rectum.  Procedure                               Abnormality         Status                     ---------                               -----------         ------                     Clostridium Difficile To...[[id number]]  Normal              Final result                 Please view results for these tests on the individual orders.    Clostridium Difficile Toxin, PCR - Stool, Per Rectum [[id number]]  (Normal) Collected:  11/06/19 1642    Lab Status:  Final result Specimen:  Stool from Per Rectum Updated:  11/06/19 1828     C. Difficile Toxins by PCR Not Detected    Narrative:       Performance characteristics of test not established for patients <2 years of age.  Negative for Toxigenic C. Difficile          Imaging Results (Last 24 Hours)     Procedure Component Value Units Date/Time    CT Abdomen Pelvis Without Contrast [[id number]] Collected:  11/06/19 2130     Updated:  11/06/19 2132    Narrative:       CT Abdomen Pelvis WO    INDICATION:   Generalized abdominal pain with nausea vomiting and diarrhea. Leukocytosis after infusion today.    TECHNIQUE:   CT of the abdomen and pelvis without IV contrast. Coronal and sagittal reconstructions were obtained.  Radiation dose reduction techniques included automated exposure control or exposure modulation based on body size. Count of known CT and cardiac nuc  med studies performed in previous 12 months: 0.     COMPARISON:   None available.    FINDINGS:  Abdomen: Emphysematous changes within the lung bases. Liver and spleen unremarkable. Gallbladder surgically absent. Pancreas and adrenal glands appear normal. 1.7 cm hyperdense exophytic right renal lesion most likely represents a hemorrhagic or  proteinaceous cyst but further evaluation and follow-up may be warranted. Correlation with any outside studies may be of benefit. Mild aneurysmal dilatation of  the infrarenal aorta measuring up to 3 cm. Colonic wall thickening predominantly involving the  descending colon and to a lesser extent sigmoid colon may represent focal colitis either infectious or inflammatory nature. No perforation or abscess. No obstruction.    Pelvis: Bladder moderately distended. Small amount of free fluid within the pelvis. Grade 1 spondylolisthesis L4 on L5 with lower lumbar spine facet arthropathy.      Impression:       Colonic wall thickening predominantly involving the descending colon with inflammatory changes within the pericolonic soft tissues. Findings most compatible with focal colitis either infectious or inflammatory in nature. No perforation or abscess. No  obstruction.    Small amount of free fluid within the pelvis.    1.7 cm exophytic hyperdense right renal lesion probably represents a hemorrhagic or proteinaceous cyst. Correlation with outside studies recommended if available. If no prior studies follow-up imaging may be warranted.          Signer Name: EM Edmonds MD   Signed: 11/6/2019 9:30 PM   Workstation Name: [email or web address]\""    [facility]          Results for orders placed during the hospital encounter of 09/07/16   Adult transthoracic echo complete    Narrative · Left ventricular function is normal. Estimated EF = 60%.  · Mild mitral valve regurgitation is present  · Mild tricuspid valve regurgitation is present.          I have reviewed the medications:  Scheduled Meds:  amLODIPine 5 mg Oral Nightly   ertapenem 1 g Intravenous Q24H   gabapentin 300 mg Oral BID   heparin (porcine) 5,000 Units Subcutaneous Q12H   lisinopril 10 mg Oral Nightly   nicotine 1 patch Transdermal Q24H   ondansetron 4 mg Intravenous Once   oxybutynin 5 mg Oral BID   sodium chloride 10 mL Intravenous Q12H     Continuous Infusions:  sodium chloride 75 mL/hr Last Rate: 125 mL/hr (11/07/19 1000)     PRN Meds:.HYDROcodone-acetaminophen  •  magnesium sulfate **OR**  magnesium sulfate in D5W 1g/100mL (PREMIX) **OR** magnesium sulfate  •  ondansetron  •  potassium chloride **OR** potassium chloride **OR** potassium chloride  •  sodium chloride  •  zolpidem      Assessment/Plan   Assessment / Plan     Active Hospital Problems    Diagnosis  POA   • Lactic acidosis [E87.2]  Yes   • Nausea vomiting and diarrhea [R11.2, R19.7]  Yes   • Leukocytosis [D72.829]  Yes   • Colitis [K52.9]  Yes   • Renal cyst [N28.1]  Not Applicable   • Tobacco abuse [Z72.0]  Yes   • Multiple sclerosis (CMS/HCC) [G35]  Yes      Resolved Hospital Problems   No resolved problems to display.        Brief Hospital Course to date:  Leatha Wall is a 69 y.o. female     *N/V/D w/ Colitis (distal descending colon and sigmoid) on CT scan   -infectious gastroenteritis vs food borne illness vs other   -c.diff pcr negative  *Lactic acidosis (resolved)  *Leukocytosis  *Lactic acidosis  *incidental right renal cystic lesion (outpatient follow up)  *incidental 3cm infrarenal AAA (outpatient f/u)  *Multiple sclerosis    -gets q6w infusion tysabri  *htn  *pcn allergy (unclear severity) & sulfa allergy    Plan:  -no n/v/d for >12 hours. No fever. Wbc & lactic improved w/ iv fluids  -initiated invanz; get ekg and if qtc ok likely change to levaquin & flagyl x 1 week (patient understands risk of heart dysrrhythmia & tendinopathy and wishes to proceed with therapy)  -discussed w/ Dr. Perez, not likely infusion reaction from tysabri  -continue iv fluids, advance to gi soft diet    Am labs: cbc,bmp,mag; follow stool studies    *if afebrile, labs continue to improve, left abdominal tenderness improved and tolerating diet without further n/v/d then possible d/c home tomorrow on abx x 1 week w/ close pcp follow up    Addendum:  -ekg qtc ok. Initiate levaquin & flagyl tonight  -ekg in a.m.  -monitor/replace electrolytes prn  -see how tolerates diet, repeat labs in a.m., etc    DVT Prophylaxis:  ambulate    Disposition: I expect  the patient to be discharged 1 day    CODE STATUS:   Code Status and Medical Interventions:   Ordered at: 11/06/19 2207     Code Status:    CPR     Medical Interventions (Level of Support Prior to Arrest):    Full         Electronically signed by Marcel Franklin MD, 11/07/19, 11:25 AM.

## 2019-11-07 NOTE — PLAN OF CARE
Problem: Patient Care Overview  Goal: Plan of Care Review  Outcome: Ongoing (interventions implemented as appropriate)   11/07/19 1658   Coping/Psychosocial   Plan of Care Reviewed With patient   Plan of Care Review   Progress improving   OTHER   Outcome Summary patient denied pain today, tolerated advancing her diet to a regular GI soft diet, fall precautions in place, will continue to monitor        Problem: Pain, Chronic (Adult)  Goal: Acceptable Pain/Comfort Level and Functional Ability  Outcome: Ongoing (interventions implemented as appropriate)   11/07/19 1658   Pain, Chronic (Adult)   Acceptable Pain/Comfort Level and Functional Ability making progress toward outcome       Problem: Bowel Disease, Inflammatory (Adult)  Goal: Signs and Symptoms of Listed Potential Problems Will be Absent, Minimized or Managed (Bowel Disease, Inflammatory)  Outcome: Ongoing (interventions implemented as appropriate)   11/07/19 1658   Goal/Outcome Evaluation   Problems Assessed (Inflammatory Bowel Disease) all   Problems Present (Inflammatory Bowel) situational response       Problem: Fall Risk (Adult)  Goal: Absence of Fall  Outcome: Ongoing (interventions implemented as appropriate)   11/07/19 1658   Fall Risk (Adult)   Absence of Fall making progress toward outcome

## 2019-11-07 NOTE — PROGRESS NOTES
Discharge Planning Assessment  [facility]     Patient Name: Leatha Wall  MRN: [id number]  Today's Date: 11/7/2019    Admit Date: 11/6/2019    Discharge Needs Assessment     Row Name 11/07/19 1109       Living Environment    Lives With  alone    Current Living Arrangements  home/apartment/condo    Primary Care Provided by  self    Provides Primary Care For  no one    Family Caregiver if Needed  grandchild(brandi), adult    Family Caregiver Names  Granddaughter, Niharika Wall    Quality of Family Relationships  supportive;helpful    Able to Return to Prior Arrangements  yes    Living Arrangement Comments  Lives in mobile home with 4 steps at entrance       Resource/Environmental Concerns    Resource/Environmental Concerns  none    Transportation Concerns  car, none       Transition Planning    Patient/Family Anticipates Transition to  home    Patient/Family Anticipated Services at Transition  none    Transportation Anticipated  family or friend will provide       Discharge Needs Assessment    Readmission Within the Last 30 Days  no previous admission in last 30 days    Concerns to be Addressed  no discharge needs identified    Equipment Currently Used at Home  none    Anticipated Changes Related to Illness  none    Equipment Needed After Discharge  none        Discharge Plan     Row Name 11/07/19 1111       Plan    Plan  Plan is home    Patient/Family in Agreement with Plan  yes    Plan Comments  Met with patient in the room for initial discharge planning.  Lives alone in [facility],  in mobile home with 4 steps at entrance.  States she uses the steps without difficulty.  Independent.  PCP is Clare Edmonds.  No DME or HH involved.  State she calls her niece or neighbor if she needs anything.  Denies needs at present.  Following for D/C needs.     Final Discharge Disposition Code  01 - home or self-care        Destination      No service coordination in this encounter.      Durable Medical Equipment      No  service coordination in this encounter.      Dialysis/Infusion      No service coordination in this encounter.      Home Medical Care      No service coordination in this encounter.      Therapy      No service coordination in this encounter.      Community Resources      No service coordination in this encounter.          Demographic Summary     Row Name 11/07/19 1109       General Information    Admission Type  observation    Arrived From  emergency department    Referral Source  admission list    Reason for Consult  discharge planning    Preferred Language  English        Functional Status     Row Name 11/07/19 1109       Functional Status    Usual Activity Tolerance  moderate    Current Activity Tolerance  moderate       Functional Status, IADL    Medications  independent    Meal Preparation  independent    Housekeeping  independent    Laundry  independent    Shopping  independent        Psychosocial    No documentation.       Abuse/Neglect    No documentation.       Legal    No documentation.       Substance Abuse    No documentation.       Patient Forms    No documentation.           Ciara Montana RN

## 2019-11-07 NOTE — H&P
"    [facility] Medicine Services  HISTORY AND PHYSICAL    Patient Name: Leatha Wall  : 1950  MRN: [id number]  Primary Care Physician: Clare Edmonds APRN  Date of admission: 2019      Subjective   Subjective     Chief Complaint:  N/v/d    HPI:  Leatha Wall is a 69 y.o. female with hx of MS for which she receives infusions of Tysabri w/ Dr. Perez neurology who presents w/ c/o n/v/d, abd cramping associated w/ hot flashes and feeling light headed.  Pt states sx began after returning home from infusions but has never had problems with this infusion before which she has received every 6 wks for 2 yrs.  Pt denies any fever.  No recent antibiotics - only diflucan for yeast infections.  Pt states her abd pain was fairly mild.  Notes she was feeling at her baseline prior to today.  No significant changes in her MS.      C diff PCR in ER was negative x 2.  Dr. Perez paged by ER and felt this could be allergic reaction but not a common one and would like admit for observation given leukocytosis and lactic acidosis.   Pt received NS and zofran in ER.      Review of Systems    All other systems reviewed and are negative.     Personal History     Past Medical History:   Diagnosis Date   • Arthritis    • Back disorder     \"PROTRUDING DISC\"   • COPD (chronic obstructive pulmonary disease) (CMS/HCC)    • Hypertension    • MS (multiple sclerosis) (CMS/HCC)        Past Surgical History:   Procedure Laterality Date   • CHOLECYSTECTOMY     • HYSTERECTOMY     • SMALL INTESTINE SURGERY     • TONSILLECTOMY         Family History: family history includes Cancer in her father; Diabetes in her brother; Hypertension in her brother. Otherwise pertinent FHx was reviewed and unremarkable.     Social History:  reports that she has been smoking.  She has a 40.00 pack-year smoking history. She has never used smokeless tobacco. She reports that she does not drink alcohol or use drugs.  Social History "     Social History Narrative   • Not on file       Medications:    Available home medication information reviewed.  Medications Prior to Admission   Medication Sig Dispense Refill Last Dose   • albuterol sulfate  (90 Base) MCG/ACT inhaler    Taking   • amLODIPine (NORVASC) 5 MG tablet Take 5 mg by mouth daily.   Taking   • benazepril (LOTENSIN) 40 MG tablet Take 1 tablet by mouth daily.   Taking   • gabapentin (NEURONTIN) 300 MG capsule Take 1 capsule by mouth 2 (Two) Times a Day. 60 capsule 5    • HYDROcodone-acetaminophen (NORCO)  MG per tablet Take 1 tablet by mouth 2 (two) times a day.   Taking   • oxybutynin XL (DITROPAN-XL) 5 MG 24 hr tablet Take 2 tablets by mouth daily.   Taking   • zolpidem (AMBIEN) 10 MG tablet Take 10 mg by mouth at night as needed for sleep.   Taking       Allergies   Allergen Reactions   • Penicillins    • Sulfa Antibiotics        Objective   Objective     Vital Signs:   Temp:  [97.5 °F (36.4 °C)] 97.5 °F (36.4 °C)  Heart Rate:  [61-80] 72  Resp:  [16-20] 16  BP: (121-181)/(67-97) 143/73        Physical Exam    gen; alert, oriented x 3, nad  Heent; pasty mm, perrla, eomi  Cv; rrr, no mrg  L; cta w/ occ end exp wheezes, no crackles  Abd; soft, +bs, ntnd, no r/g  Ext; no cce, 2+ pulses  Skin; cdi, warm  Neuro; grossly intact  Psych; mood and affect appropriate      Results Reviewed:  I have personally reviewed current lab and radiology data.    Results from last 7 days   Lab Units 11/06/19  1642   WBC 10*3/mm3 19.60*   HEMOGLOBIN g/dL 14.5   HEMATOCRIT % 44.6   PLATELETS 10*3/mm3 376     Results from last 7 days   Lab Units 11/06/19  1643 11/06/19  1642   SODIUM mmol/L  --  138   POTASSIUM mmol/L  --  3.9   CHLORIDE mmol/L  --  103   CO2 mmol/L  --  21.0*   BUN mg/dL  --  15   CREATININE mg/dL  --  1.13*   GLUCOSE mg/dL  --  137*   CALCIUM mg/dL  --  8.7   ALT (SGPT) U/L  --  6   AST (SGOT) U/L  --  15   LACTATE mmol/L 2.4*  --      Estimated Creatinine Clearance: 50.5 mL/min  (A) (by C-G formula based on SCr of 1.13 mg/dL (H)).  Brief Urine Lab Results  (Last result in the past 365 days)      Color   Clarity   Blood   Leuk Est   Nitrite   Protein   CREAT   Urine HCG        11/06/19 2114 Yellow Clear Negative Moderate (2+) Negative Negative             Imaging Results (Last 24 Hours)     Procedure Component Value Units Date/Time    CT Abdomen Pelvis Without Contrast [[id number]] Collected:  11/06/19 2130     Updated:  11/06/19 2132    Narrative:       CT Abdomen Pelvis WO    INDICATION:   Generalized abdominal pain with nausea vomiting and diarrhea. Leukocytosis after infusion today.    TECHNIQUE:   CT of the abdomen and pelvis without IV contrast. Coronal and sagittal reconstructions were obtained.  Radiation dose reduction techniques included automated exposure control or exposure modulation based on body size. Count of known CT and cardiac nuc  med studies performed in previous 12 months: 0.     COMPARISON:   None available.    FINDINGS:  Abdomen: Emphysematous changes within the lung bases. Liver and spleen unremarkable. Gallbladder surgically absent. Pancreas and adrenal glands appear normal. 1.7 cm hyperdense exophytic right renal lesion most likely represents a hemorrhagic or  proteinaceous cyst but further evaluation and follow-up may be warranted. Correlation with any outside studies may be of benefit. Mild aneurysmal dilatation of the infrarenal aorta measuring up to 3 cm. Colonic wall thickening predominantly involving the  descending colon and to a lesser extent sigmoid colon may represent focal colitis either infectious or inflammatory nature. No perforation or abscess. No obstruction.    Pelvis: Bladder moderately distended. Small amount of free fluid within the pelvis. Grade 1 spondylolisthesis L4 on L5 with lower lumbar spine facet arthropathy.      Impression:       Colonic wall thickening predominantly involving the descending colon with inflammatory changes within the  pericolonic soft tissues. Findings most compatible with focal colitis either infectious or inflammatory in nature. No perforation or abscess. No  obstruction.    Small amount of free fluid within the pelvis.    1.7 cm exophytic hyperdense right renal lesion probably represents a hemorrhagic or proteinaceous cyst. Correlation with outside studies recommended if available. If no prior studies follow-up imaging may be warranted.          Signer Name: EM Edmonds MD   Signed: 11/6/2019 9:30 PM   Workstation Name: [facility]    [facility]        Results for orders placed during the hospital encounter of 09/07/16   Adult transthoracic echo complete    Narrative · Left ventricular function is normal. Estimated EF = 60%.  · Mild mitral valve regurgitation is present  · Mild tricuspid valve regurgitation is present.          Assessment/Plan   Assessment / Plan     Active Hospital Problems    Diagnosis POA   • Lactic acidosis [E87.2] Yes   • Nausea vomiting and diarrhea [R11.2, R19.7] Yes   • Leukocytosis [D72.829] Yes   • Colitis [K52.9] Yes   • Renal cyst [N28.1] Not Applicable   • Multiple sclerosis (CMS/HCC) [G35] Yes         70 y/o female w/ hx of MS here w/ n/v/d, lactic acidosis and leukocytosis w/ colitis on abd/pelv CT:  1. N/V/D w/?colitis on CT;  -non-surgical abdomen  -c diff pcr negative x 2  -plan to hydrate overnight and repeat labs in a.m.  -felt to be possible but an uncommon reaction to Tysabri  2. Lactic acidosis;  -related to above  -hydrate and repeat in a.m.  3. MS on q6 wks Tysabri;  -Dr. Perez notified by ER; will see in a.m.  4. ?right renal cyst;  -will need outpt f/u  5. Tobacco abuse;  -cessation  -nicotine patch    DVT prophylaxis:  heparin    CODE STATUS:    Code Status and Medical Interventions:   Ordered at: 11/06/19 2201     Code Status:    CPR     Medical Interventions (Level of Support Prior to Arrest):    Full       Admission Status:  I believe this  patient meets  OBSERVATION status, however if further evaluation or treatment plans warrant, status may change.  Based upon current information, I predict patient's care encounter to be less than or equal to 2 midnights.    Electronically signed by Sameera Menezes MD, 11/06/19, 9:48 PM.

## 2019-11-08 VITALS
HEIGHT: 64 IN | OXYGEN SATURATION: 96 % | RESPIRATION RATE: 18 BRPM | DIASTOLIC BLOOD PRESSURE: 72 MMHG | HEART RATE: 68 BPM | BODY MASS INDEX: 25.49 KG/M2 | WEIGHT: 149.3 LBS | SYSTOLIC BLOOD PRESSURE: 152 MMHG | TEMPERATURE: 98.2 F

## 2019-11-08 PROCEDURE — 96361 HYDRATE IV INFUSION ADD-ON: CPT

## 2019-11-08 PROCEDURE — 93010 ELECTROCARDIOGRAM REPORT: CPT | Performed by: INTERNAL MEDICINE

## 2019-11-08 PROCEDURE — G0378 HOSPITAL OBSERVATION PER HR: HCPCS

## 2019-11-08 PROCEDURE — 25010000002 HEPARIN (PORCINE) PER 1000 UNITS: Performed by: INTERNAL MEDICINE

## 2019-11-08 PROCEDURE — 99217 PR OBSERVATION CARE DISCHARGE MANAGEMENT: CPT | Performed by: INTERNAL MEDICINE

## 2019-11-08 PROCEDURE — 93005 ELECTROCARDIOGRAM TRACING: CPT | Performed by: INTERNAL MEDICINE

## 2019-11-08 PROCEDURE — 96372 THER/PROPH/DIAG INJ SC/IM: CPT

## 2019-11-08 RX ORDER — LEVOFLOXACIN 500 MG/1
500 TABLET, FILM COATED ORAL NIGHTLY
Qty: 6 TABLET | Refills: 0 | Status: SHIPPED | OUTPATIENT
Start: 2019-11-08 | End: 2019-11-14

## 2019-11-08 RX ORDER — METRONIDAZOLE 500 MG/1
500 TABLET ORAL EVERY 8 HOURS
Qty: 19 TABLET | Refills: 0 | Status: SHIPPED | OUTPATIENT
Start: 2019-11-08 | End: 2019-11-15

## 2019-11-08 RX ADMIN — SODIUM CHLORIDE 75 ML/HR: 9 INJECTION, SOLUTION INTRAVENOUS at 06:54

## 2019-11-08 RX ADMIN — METRONIDAZOLE 500 MG: 500 TABLET ORAL at 10:48

## 2019-11-08 RX ADMIN — GABAPENTIN 300 MG: 300 CAPSULE ORAL at 08:27

## 2019-11-08 RX ADMIN — HEPARIN SODIUM 5000 UNITS: 5000 INJECTION, SOLUTION INTRAVENOUS; SUBCUTANEOUS at 08:27

## 2019-11-08 RX ADMIN — OXYBUTYNIN CHLORIDE 5 MG: 5 TABLET ORAL at 08:27

## 2019-11-08 RX ADMIN — METRONIDAZOLE 500 MG: 500 TABLET ORAL at 02:02

## 2019-11-08 RX ADMIN — NICOTINE 1 PATCH: 14 PATCH, EXTENDED RELEASE TRANSDERMAL at 08:27

## 2019-11-08 NOTE — PLAN OF CARE
Problem: Patient Care Overview  Goal: Plan of Care Review  Outcome: Ongoing (interventions implemented as appropriate)   11/08/19 0514   Coping/Psychosocial   Plan of Care Reviewed With patient   Plan of Care Review   Progress improving   OTHER   Outcome Summary pt rested well during the night, complaints of back pain relieved with PRN pain medication, VSS on RA, SR, pt tolerating GI soft diet, fall precautions in place but pt is steady on feet, pt is adamant that she will discharged home today, will continue to monitor        Problem: Pain, Chronic (Adult)  Goal: Identify Related Risk Factors and Signs and Symptoms  Outcome: Ongoing (interventions implemented as appropriate)   11/07/19 0454   Pain, Chronic (Adult)   Related Risk Factors (Chronic Pain) disease process   Signs and Symptoms (Chronic Pain) verbalization of pain/discomfort for a prolonged time period       Problem: Bowel Disease, Inflammatory (Adult)  Goal: Signs and Symptoms of Listed Potential Problems Will be Absent, Minimized or Managed (Bowel Disease, Inflammatory)  Outcome: Ongoing (interventions implemented as appropriate)   11/08/19 0514   Goal/Outcome Evaluation   Problems Assessed (Inflammatory Bowel Disease) diarrhea;pain   Problems Present (Inflammatory Bowel) situational response       Problem: Fall Risk (Adult)  Goal: Identify Related Risk Factors and Signs and Symptoms  Outcome: Ongoing (interventions implemented as appropriate)   11/08/19 0514   Fall Risk (Adult)   Related Risk Factors (Fall Risk) environment unfamiliar   Signs and Symptoms (Fall Risk) presence of risk factors

## 2019-11-08 NOTE — PLAN OF CARE
Problem: Patient Care Overview  Goal: Plan of Care Review  Outcome: Ongoing (interventions implemented as appropriate)   11/08/19 1019   Coping/Psychosocial   Plan of Care Reviewed With patient   Plan of Care Review   Progress improving   OTHER   Outcome Summary patient being discharged home this am       Problem: Bowel Disease, Inflammatory (Adult)  Goal: Signs and Symptoms of Listed Potential Problems Will be Absent, Minimized or Managed (Bowel Disease, Inflammatory)  Outcome: Ongoing (interventions implemented as appropriate)   11/08/19 1019   Goal/Outcome Evaluation   Problems Assessed (Inflammatory Bowel Disease) all   Problems Present (Inflammatory Bowel) situational response

## 2019-11-08 NOTE — DISCHARGE SUMMARY
[facility]  DISCHARGE SUMMARY    Patient Name: Leatha Wall  : 1950  MRN: [id number]    Date of Admission: 2019  Date of Discharge:  2019  Primary Care Physician: Clare Edmonds APRN    Consults     Date and Time Order Name Status Description    2019 0030 Inpatient Neurology Consult General Completed           Hospital Course     Presenting Problem:   Lactic acidosis [E87.2]  Lactic acidosis [E87.2]    Active Hospital Problems    Diagnosis  POA   • Lactic acidosis [E87.2]  Yes   • Nausea vomiting and diarrhea [R11.2, R19.7]  Yes   • Leukocytosis [D72.829]  Yes   • Colitis [K52.9]  Yes   • Renal cyst [N28.1]  Not Applicable   • Tobacco abuse [Z72.0]  Yes   • Multiple sclerosis (CMS/HCC) [G35]  Yes      Resolved Hospital Problems   No resolved problems to display.      ----------------Final Diagnoses--------------  *N/V/D w/ Colitis (distal descending colon and sigmoid) on CT scan              -infectious gastroenteritis vs food borne illness vs other              -c.diff pcr negative  *Lactic acidosis (resolved)  *Leukocytosis  *Lactic acidosis  *incidental right renal cystic lesion (outpatient follow up)  *incidental 3cm infrarenal AAA/ectasia (outpatient f/u)  *Multiple sclerosis               -gets q6w infusion tysabri  *htn  *pcn allergy (unclear severity) & sulfa allergy    Hospital Course:  Leatha Wall is a 69 y.o. female w/ multiple sclerosis receiving Tysabri infusions by neurology every 6 weeks. Patient developed rather sudden onset nausea, vomiting and non-bloody diarrhea a few hours after going home following one of the infusion sessions. Presented to ED where wbc was 19,600, lactic was 2.4, and ct a/p revealed colon wall thickening predominantly involving the descending colon with inflammatory changes within the pericolonic soft tissues. Incidental 1.7cm exophytic hyperdense right renal lesion & 3cm infrarenal mild aortic  dilation was also noted. c-diff pcr negative. Patient was hydrated aggressively. Wbc decreased to 14,000 on day #2. Remained afebrile. Now having normal bowel movements now and tolerating diet. abd pain nearly totally resolved. Neurology does not feel tysarbi infusions are a likely cause for the colitis but certainly could predispose to infectious etiologies. As such (after discussing the risks of quinalone therapy which patient wanted to continue with) discharge on levaquin/flagyl therapy to complete 7 days empiric therapy. Recommend follow up pcp within 5-6 days, consideration of outpatient gi referral/c-scope, follow up of stool culture results.      Discharge Follow Up Recommendations for labs/diagnostics:   pcp follow up 1)colitis, consider outpatient GI referral & c-scope. Follow up stool culture 2)future follow up imaging of infrarenal 3cm aaa & right renal cyst     Day of Discharge     HPI:   No pain. Normal bm. Tolerating po. Wants to go home    Review of Systems  No headache    Otherwise ROS is negative except as mentioned in the HPI.    Vital Signs:   Temp:  [97.7 °F (36.5 °C)-98.6 °F (37 °C)] 98.2 °F (36.8 °C)  Heart Rate:  [64-69] 68  Resp:  [16-18] 18  BP: (136-152)/(64-77) 152/72     Physical Exam:  Constitutional:Alert, oriented x 3, nontoxic appearing  Psych:Normal/appropriate affect  HEENT:Ncat, oroph clear  Neck: neck supple, full range of motion  Neuro: Face symmetric, speech clear, equal , moves all extremities  Cardiac: Rrr; No pretibial pitting edema  Resp: Ctab, normal effort  GI: abd soft, nontender  Skin: No extremity rash  Musculoskeletal/extremities: no cyanosis extremities; no significant ankle edema        Pertinent  and/or Most Recent Results     Results from last 7 days   Lab Units 11/07/19  0551 11/06/19  1642   WBC 10*3/mm3 14.99* 19.60*   HEMOGLOBIN g/dL 11.4* 14.5   HEMATOCRIT % 35.5 44.6   PLATELETS 10*3/mm3 289 376   SODIUM mmol/L 138 138   POTASSIUM mmol/L 3.9 3.9    CHLORIDE mmol/L 109* 103   CO2 mmol/L 20.0* 21.0*   BUN mg/dL 12 15   CREATININE mg/dL 1.07* 1.13*   GLUCOSE mg/dL 93 137*   CALCIUM mg/dL 8.4* 8.7     Results from last 7 days   Lab Units 11/07/19  0551 11/06/19  1642   BILIRUBIN mg/dL 0.2 0.3   ALK PHOS U/L 64 83   ALT (SGPT) U/L <5 6   AST (SGOT) U/L 14 15           Invalid input(s): TG, LDLCALC, LDLREALC  Results from last 7 days   Lab Units 11/07/19  0551 11/06/19  2305 11/06/19  1643   LACTATE mmol/L 1.0 1.0 2.4*       Brief Urine Lab Results  (Last result in the past 365 days)      Color   Clarity   Blood   Leuk Est   Nitrite   Protein   CREAT   Urine HCG        11/06/19 2114 Yellow Clear Negative Moderate (2+) Negative Negative               Microbiology Results Abnormal     Procedure Component Value - Date/Time    Clostridium Difficile Toxin - Stool, Per Rectum [[id number]] Collected:  11/06/19 1642    Lab Status:  Final result Specimen:  Stool from Per Rectum Updated:  11/06/19 1828    Narrative:       The following orders were created for panel order Clostridium Difficile Toxin - Stool, Per Rectum.  Procedure                               Abnormality         Status                     ---------                               -----------         ------                     Clostridium Difficile To...[[id number]]  Normal              Final result                 Please view results for these tests on the individual orders.    Clostridium Difficile Toxin, PCR - Stool, Per Rectum [[id number]]  (Normal) Collected:  11/06/19 1642    Lab Status:  Final result Specimen:  Stool from Per Rectum Updated:  11/06/19 1828     C. Difficile Toxins by PCR Not Detected    Narrative:       Performance characteristics of test not established for patients <2 years of age.  Negative for Toxigenic C. Difficile          Imaging Results (All)     Procedure Component Value Units Date/Time    CT Abdomen Pelvis Without Contrast [[id number]] Collected:  11/06/19 2130     Updated:   11/06/19 2132    Narrative:       CT Abdomen Pelvis WO    INDICATION:   Generalized abdominal pain with nausea vomiting and diarrhea. Leukocytosis after infusion today.    TECHNIQUE:   CT of the abdomen and pelvis without IV contrast. Coronal and sagittal reconstructions were obtained.  Radiation dose reduction techniques included automated exposure control or exposure modulation based on body size. Count of known CT and cardiac nuc  med studies performed in previous 12 months: 0.     COMPARISON:   None available.    FINDINGS:  Abdomen: Emphysematous changes within the lung bases. Liver and spleen unremarkable. Gallbladder surgically absent. Pancreas and adrenal glands appear normal. 1.7 cm hyperdense exophytic right renal lesion most likely represents a hemorrhagic or  proteinaceous cyst but further evaluation and follow-up may be warranted. Correlation with any outside studies may be of benefit. Mild aneurysmal dilatation of the infrarenal aorta measuring up to 3 cm. Colonic wall thickening predominantly involving the  descending colon and to a lesser extent sigmoid colon may represent focal colitis either infectious or inflammatory nature. No perforation or abscess. No obstruction.    Pelvis: Bladder moderately distended. Small amount of free fluid within the pelvis. Grade 1 spondylolisthesis L4 on L5 with lower lumbar spine facet arthropathy.      Impression:       Colonic wall thickening predominantly involving the descending colon with inflammatory changes within the pericolonic soft tissues. Findings most compatible with focal colitis either infectious or inflammatory in nature. No perforation or abscess. No  obstruction.    Small amount of free fluid within the pelvis.    1.7 cm exophytic hyperdense right renal lesion probably represents a hemorrhagic or proteinaceous cyst. Correlation with outside studies recommended if available. If no prior studies follow-up imaging may be warranted.          Signer Name:  EM Edmonds MD   Signed: 11/6/2019 9:30 PM   Workstation Name: [facility]    [facility]          Results for orders placed during the hospital encounter of 09/07/16   DUPLEX CAROTID BILATERAL CAR    Narrative · Normal proximal left internal carotid artery.  · Proximal right internal carotid artery stenosis of 0-49%.          Results for orders placed during the hospital encounter of 09/07/16   DUPLEX CAROTID BILATERAL CAR    Narrative · Normal proximal left internal carotid artery.  · Proximal right internal carotid artery stenosis of 0-49%.          Results for orders placed during the hospital encounter of 09/07/16   Adult transthoracic echo complete    Narrative · Left ventricular function is normal. Estimated EF = 60%.  · Mild mitral valve regurgitation is present  · Mild tricuspid valve regurgitation is present.           Order Current Status    Stool Culture (Reference Lab) - Stool, Per Rectum In process        Discharge Details        Discharge Medications      New Medications      Instructions Start Date   levoFLOXacin 500 MG tablet  Commonly known as:  LEVAQUIN   500 mg, Oral, Nightly      metroNIDAZOLE 500 MG tablet  Commonly known as:  FLAGYL   500 mg, Oral, Every 8 Hours      PHARMACY MEDS TO BED CONSULT   Does not apply, Daily         Continue These Medications      Instructions Start Date   albuterol sulfate  (90 Base) MCG/ACT inhaler  Commonly known as:  PROVENTIL HFA;VENTOLIN HFA;PROAIR HFA   No dose, route, or frequency recorded.      AMBIEN 10 MG tablet  Generic drug:  zolpidem   10 mg, Oral, Nightly PRN      amLODIPine 5 MG tablet  Commonly known as:  NORVASC   5 mg, Oral, Nightly      benazepril 40 MG tablet  Commonly known as:  LOTENSIN   1 tablet, Oral, Nightly      gabapentin 300 MG capsule  Commonly known as:  NEURONTIN   300 mg, Oral, 2 Times Daily      HYDROcodone-acetaminophen  MG per tablet  Commonly known as:  NORCO   1 tablet, Oral, 2 Times  Daily      oxybutynin 5 MG tablet  Commonly known as:  DITROPAN   5 mg, Oral, 2 Times Daily             Allergies   Allergen Reactions   • Penicillins    • Sulfa Antibiotics          Discharge Disposition:  Home or Self Care    Diet:  Hospital:  Diet Order   Procedures   • Diet Regular; GI Soft       Discharge Activity:         CODE STATUS:    Code Status and Medical Interventions:   Ordered at: 11/06/19 2206     Code Status:    CPR     Medical Interventions (Level of Support Prior to Arrest):    Full         Future Appointments   Date Time Provider Department Center   12/18/2019 10:00 AM  [facility]  [facility]   5/8/2020 10:15 AM Umang Perez MD [facility] None       Additional Instructions for the Follow-ups that You Need to Schedule     Discharge Follow-up with PCP   As directed       Currently Documented PCP:    Clare Edmonds APRN    PCP Phone Number:    [phone]     Follow Up Details:  5-7 days follow up hospitalization               Time Spent on Discharge:  35 min    Electronically signed by Marcel Franklin MD, 11/08/19, 9:11 AM.

## 2019-11-11 ENCOUNTER — RESULTS ENCOUNTER (OUTPATIENT)
Dept: NEUROLOGY | Facility: CLINIC | Age: 69
End: 2019-11-11

## 2019-11-11 DIAGNOSIS — G35 MULTIPLE SCLEROSIS (HCC): ICD-10-CM

## 2019-11-11 LAB
CAMPYLOBACTER STL CULT: NORMAL
E COLI SXT STL QL IA: NEGATIVE
Lab: NORMAL
Lab: NORMAL
SALM + SHIG STL CULT: NORMAL

## 2019-12-18 ENCOUNTER — APPOINTMENT (OUTPATIENT)
Dept: ONCOLOGY | Facility: HOSPITAL | Age: 69
End: 2019-12-18

## 2020-01-07 ENCOUNTER — INFUSION (OUTPATIENT)
Dept: ONCOLOGY | Facility: HOSPITAL | Age: 70
End: 2020-01-07

## 2020-01-07 VITALS
HEART RATE: 69 BPM | TEMPERATURE: 97.5 F | DIASTOLIC BLOOD PRESSURE: 91 MMHG | SYSTOLIC BLOOD PRESSURE: 164 MMHG | RESPIRATION RATE: 18 BRPM

## 2020-01-07 DIAGNOSIS — G35 MULTIPLE SCLEROSIS (HCC): Primary | ICD-10-CM

## 2020-01-07 PROCEDURE — 25010000002 NATALIZUMAB PER 1 MG: Performed by: PSYCHIATRY & NEUROLOGY

## 2020-01-07 PROCEDURE — 63710000001 DIPHENHYDRAMINE PER 50 MG: Performed by: PSYCHIATRY & NEUROLOGY

## 2020-01-07 PROCEDURE — 96365 THER/PROPH/DIAG IV INF INIT: CPT

## 2020-01-07 RX ORDER — SODIUM CHLORIDE 9 MG/ML
250 INJECTION, SOLUTION INTRAVENOUS ONCE
Status: CANCELLED | OUTPATIENT
Start: 2020-01-29

## 2020-01-07 RX ORDER — DIPHENHYDRAMINE HCL 25 MG
25 CAPSULE ORAL ONCE
Status: COMPLETED | OUTPATIENT
Start: 2020-01-07 | End: 2020-01-07

## 2020-01-07 RX ORDER — ACETAMINOPHEN 325 MG/1
650 TABLET ORAL ONCE
Status: CANCELLED | OUTPATIENT
Start: 2020-01-29

## 2020-01-07 RX ORDER — ACETAMINOPHEN 325 MG/1
650 TABLET ORAL ONCE
Status: COMPLETED | OUTPATIENT
Start: 2020-01-07 | End: 2020-01-07

## 2020-01-07 RX ORDER — DIPHENHYDRAMINE HCL 25 MG
25 CAPSULE ORAL ONCE
Status: CANCELLED | OUTPATIENT
Start: 2020-01-29

## 2020-01-07 RX ADMIN — NATALIZUMAB 300 MG: 300 INJECTION INTRAVENOUS at 10:16

## 2020-01-07 RX ADMIN — DIPHENHYDRAMINE HYDROCHLORIDE 25 MG: 25 CAPSULE ORAL at 10:14

## 2020-01-07 RX ADMIN — ACETAMINOPHEN 650 MG: 325 TABLET ORAL at 10:14

## 2020-01-08 ENCOUNTER — OFFICE VISIT (OUTPATIENT)
Dept: CARDIAC SURGERY | Facility: CLINIC | Age: 70
End: 2020-01-08

## 2020-01-08 VITALS
DIASTOLIC BLOOD PRESSURE: 88 MMHG | WEIGHT: 150 LBS | OXYGEN SATURATION: 98 % | SYSTOLIC BLOOD PRESSURE: 166 MMHG | HEIGHT: 67 IN | HEART RATE: 77 BPM | BODY MASS INDEX: 23.54 KG/M2

## 2020-01-08 DIAGNOSIS — I71.40 ABDOMINAL AORTIC ANEURYSM (AAA) WITHOUT RUPTURE (HCC): Primary | ICD-10-CM

## 2020-01-08 PROCEDURE — 99406 BEHAV CHNG SMOKING 3-10 MIN: CPT | Performed by: THORACIC SURGERY (CARDIOTHORACIC VASCULAR SURGERY)

## 2020-01-08 PROCEDURE — 99203 OFFICE O/P NEW LOW 30 MIN: CPT | Performed by: THORACIC SURGERY (CARDIOTHORACIC VASCULAR SURGERY)

## 2020-01-08 RX ORDER — OXYBUTYNIN CHLORIDE 5 MG/1
TABLET, EXTENDED RELEASE ORAL
COMMUNITY
Start: 2020-01-03 | End: 2021-01-04

## 2020-01-08 NOTE — PROGRESS NOTES
"01/08/2020  Patient Information  Leatha Wall                                                                                          [address]  [facility] [address]   1950  'PCP/Referring Physician'  Clare Edmonds, APROCTAVIO  [phone]  Aparna Guy, APRN  [phone]  Chief Complaint   Patient presents with   • Consult     N/P per Aparna EWING for 3 cm AAA,       History of Present Illness:   The patient is a 69-year-old white female who was recently been found to have an aortic abdominal aneurysm.  She developed abdominal pain, nausea and vomiting and diarrhea.  She underwent a CT scan which revealed a 3 cm aortic abdominal aneurysm.  There is no evidence of leak, dissection or saccular component to this.  She denies a family history of aneurysmal disease.  She denies a family history of Marfan's or Hardik-Danlos syndrome.  She is a smoker and has smoked most of her adult life.      Patient Active Problem List   Diagnosis   • Multiple sclerosis (CMS/HCC)   • Periodic headache syndrome, not intractable   • Hepatitis C antibody test positive   • Lactic acidosis   • Nausea vomiting and diarrhea   • Leukocytosis   • Colitis   • Renal cyst   • Tobacco abuse   • Abdominal aortic aneurysm (AAA) without rupture (CMS/HCC)     Past Medical History:   Diagnosis Date   • Aneurysm of aorta (CMS/HCC)    • Anxiety and depression    • Arthritis    • Back disorder     \"PROTRUDING DISC\"   • COPD (chronic obstructive pulmonary disease) (CMS/HCC)    • Hepatitis C    • Hypertension    • MS (multiple sclerosis) (CMS/HCC)      Past Surgical History:   Procedure Laterality Date   • CHOLECYSTECTOMY     • HYSTERECTOMY     • SMALL INTESTINE SURGERY      As a child   • TOE SURGERY Left     Great   • TONSILLECTOMY      and Adnoids   • TUBAL ABDOMINAL LIGATION         Current Outpatient Medications:   •  albuterol sulfate  (90 Base) MCG/ACT inhaler, , Disp: , Rfl:   •  amLODIPine (NORVASC) 5 MG tablet, Take 5 mg " by mouth Every Night., Disp: , Rfl:   •  benazepril (LOTENSIN) 40 MG tablet, Take 1 tablet by mouth Every Night., Disp: , Rfl:   •  gabapentin (NEURONTIN) 300 MG capsule, Take 1 capsule by mouth 2 (Two) Times a Day., Disp: 60 capsule, Rfl: 5  •  HYDROcodone-acetaminophen (NORCO)  MG per tablet, Take 1 tablet by mouth 2 (two) times a day., Disp: , Rfl:   •  natalizumab (TYSABRI) 300 MG/15ML injection, Infuse  into a venous catheter., Disp: , Rfl:   •  oxybutynin (DITROPAN) 5 MG tablet, Take 5 mg by mouth 2 (Two) Times a Day., Disp: , Rfl:   •  PHARMACY MEDS TO BED CONSULT, Daily., Disp: , Rfl:   •  zolpidem (AMBIEN) 10 MG tablet, Take 10 mg by mouth at night as needed for sleep., Disp: , Rfl:   •  oxybutynin XL (DITROPAN-XL) 5 MG 24 hr tablet, , Disp: , Rfl:   Allergies   Allergen Reactions   • Penicillins    • Sulfa Antibiotics      Social History     Socioeconomic History   • Marital status:      Spouse name: Not on file   • Number of children: 3   • Years of education: Not on file   • Highest education level: Not on file   Occupational History   • Occupation: [employer]     Comment: Retired   Tobacco Use   • Smoking status: Current Every Day Smoker     Packs/day: 1.00     Years: 40.00     Pack years: 40.00   • Smokeless tobacco: Never Used   Substance and Sexual Activity   • Alcohol use: No   • Drug use: No   • Sexual activity: Defer   Social History Narrative    Lives in [place].      Family History   Problem Relation Age of Onset   • Lung cancer Father    • Diabetes Father    • Diabetes Brother    • Hypertension Brother    • Coronary artery disease Mother    • Heart attack Mother      Review of Systems   Constitution: Positive for malaise/fatigue. Negative for chills, fever, night sweats and weight loss.   HENT: Negative for congestion, hearing loss, nosebleeds and odynophagia.    Cardiovascular: Positive for dyspnea on exertion. Negative for chest pain, claudication, leg swelling, orthopnea,  "palpitations and syncope.   Respiratory: Positive for cough, shortness of breath and wheezing. Negative for hemoptysis.    Endocrine: Negative for cold intolerance, heat intolerance, polydipsia, polyphagia and polyuria.   Hematologic/Lymphatic: Does not bruise/bleed easily.   Skin: Negative for itching, poor wound healing and rash.   Musculoskeletal: Positive for joint pain and muscle cramps. Negative for arthritis, back pain, joint swelling and myalgias.   Gastrointestinal: Negative for abdominal pain, constipation, diarrhea, hematemesis, melena, nausea and vomiting.   Genitourinary: Negative for dysuria, frequency, hematuria, nocturia and urgency.   Neurological: Positive for loss of balance. Negative for dizziness, light-headedness and numbness.   Psychiatric/Behavioral: Positive for depression. Negative for suicidal ideas. The patient is nervous/anxious.    Allergic/Immunologic: Negative for environmental allergies and HIV exposure.     Vitals:    01/08/20 0801   BP: 166/88   Pulse: 77   SpO2: 98%   Weight: 68 kg (150 lb)   Height: 170.2 cm (67\")      Physical Exam   Constitutional: She is oriented to person, place, and time. She appears well-developed and well-nourished. No distress.   HENT:   Head: Normocephalic.   Eyes: Pupils are equal, round, and reactive to light. EOM are normal.   Neck: Normal range of motion. Carotid bruit is not present. No thyromegaly present.   Cardiovascular: Normal rate and regular rhythm. Exam reveals no gallop and no friction rub.   No murmur heard.  Pulmonary/Chest: She has no wheezes. She has no rales.   Abdominal: Soft. Bowel sounds are normal. She exhibits no distension and no mass. There is no hepatomegaly. There is no tenderness.   Musculoskeletal: Normal range of motion. She exhibits no deformity.   Neurological: She is alert and oriented to person, place, and time. She has normal strength. No cranial nerve deficit or sensory deficit.   Skin: No bruising and no petechiae " noted. No cyanosis. Nails show no clubbing.   Psychiatric: She has a normal mood and affect.     Labs/Imaging:  I obtained and reviewed medical records from Ms. Guy including the CT abdomen and pelvis demonstrating a 3 cm aortic abdominal aneurysm.    Assessment/Plan:   The patient is a 69-year-old white female who presents with a 3 cm aortic abdominal aneurysm.  I have obtained and reviewed the films.  She has no evidence of leak.  She has no evidence of dissection.  She has history of smoking.   She appears understand that. I extensively discussed consequences of smoking, namely cardiovascular, metabolic, lung cancer, mouth cancer, pulmonary disorder including COPD and the reduced quality of life.  At the end of the conversation, the patient voices understanding of the risks involved in smoking, and also understands the benefits of quitting.  During this visit, I spent 3-5 minutes counseling the patient regarding smoking cessation.  I have emphasized the importance of good blood pressure control, no smoking, and diet.  I have encouraged her to follow-up with her primary caregiver Aparna Guy.  I will see her back in 1 year with a abdominal ultrasound.    Patient Active Problem List   Diagnosis   • Multiple sclerosis (CMS/HCC)   • Periodic headache syndrome, not intractable   • Hepatitis C antibody test positive   • Lactic acidosis   • Nausea vomiting and diarrhea   • Leukocytosis   • Colitis   • Renal cyst   • Tobacco abuse   • Abdominal aortic aneurysm (AAA) without rupture (CMS/HCC)     CC: Aparna GuyKAYLIN, , editing for Deepak Connolly M.D.    I, Deepak Connolly MD, have read and agree with the editing done by Xenia Soto, .

## 2020-01-09 PROBLEM — I71.40 ABDOMINAL AORTIC ANEURYSM (AAA) WITHOUT RUPTURE: Status: ACTIVE | Noted: 2020-01-09

## 2020-02-17 RX ORDER — DIPHENHYDRAMINE HCL 25 MG
25 CAPSULE ORAL ONCE
Status: CANCELLED | OUTPATIENT
Start: 2020-02-17

## 2020-02-17 RX ORDER — ACETAMINOPHEN 325 MG/1
650 TABLET ORAL ONCE
Status: CANCELLED | OUTPATIENT
Start: 2020-02-17

## 2020-02-17 RX ORDER — SODIUM CHLORIDE 9 MG/ML
250 INJECTION, SOLUTION INTRAVENOUS ONCE
Status: CANCELLED | OUTPATIENT
Start: 2020-02-17

## 2020-02-18 ENCOUNTER — INFUSION (OUTPATIENT)
Dept: ONCOLOGY | Facility: HOSPITAL | Age: 70
End: 2020-02-18

## 2020-02-18 VITALS
TEMPERATURE: 97.4 F | HEART RATE: 67 BPM | RESPIRATION RATE: 16 BRPM | DIASTOLIC BLOOD PRESSURE: 82 MMHG | SYSTOLIC BLOOD PRESSURE: 158 MMHG

## 2020-02-18 DIAGNOSIS — G35 MULTIPLE SCLEROSIS (HCC): Primary | ICD-10-CM

## 2020-02-18 PROCEDURE — 96365 THER/PROPH/DIAG IV INF INIT: CPT

## 2020-02-18 PROCEDURE — 25010000002 NATALIZUMAB PER 1 MG: Performed by: PSYCHIATRY & NEUROLOGY

## 2020-02-18 PROCEDURE — 63710000001 DIPHENHYDRAMINE PER 50 MG: Performed by: PSYCHIATRY & NEUROLOGY

## 2020-02-18 RX ORDER — ACETAMINOPHEN 325 MG/1
650 TABLET ORAL ONCE
Status: CANCELLED | OUTPATIENT
Start: 2020-03-30

## 2020-02-18 RX ORDER — DIPHENHYDRAMINE HCL 25 MG
25 CAPSULE ORAL ONCE
Status: COMPLETED | OUTPATIENT
Start: 2020-02-18 | End: 2020-02-18

## 2020-02-18 RX ORDER — SODIUM CHLORIDE 9 MG/ML
250 INJECTION, SOLUTION INTRAVENOUS ONCE
Status: CANCELLED | OUTPATIENT
Start: 2020-03-30

## 2020-02-18 RX ORDER — ACETAMINOPHEN 325 MG/1
650 TABLET ORAL ONCE
Status: COMPLETED | OUTPATIENT
Start: 2020-02-18 | End: 2020-02-18

## 2020-02-18 RX ORDER — DIPHENHYDRAMINE HCL 25 MG
25 CAPSULE ORAL ONCE
Status: CANCELLED | OUTPATIENT
Start: 2020-03-30

## 2020-02-18 RX ADMIN — DIPHENHYDRAMINE HYDROCHLORIDE 25 MG: 25 CAPSULE ORAL at 09:56

## 2020-02-18 RX ADMIN — ACETAMINOPHEN 650 MG: 325 TABLET ORAL at 09:56

## 2020-02-18 RX ADMIN — NATALIZUMAB 300 MG: 300 INJECTION INTRAVENOUS at 09:58

## 2020-03-17 ENCOUNTER — TELEPHONE (OUTPATIENT)
Dept: NEUROLOGY | Facility: CLINIC | Age: 70
End: 2020-03-17

## 2020-03-17 NOTE — TELEPHONE ENCOUNTER
Leatha han works part time and is wanting to know if she should report to work currently with the carona virus out currently .  Her best call back number is [phone]

## 2020-03-31 ENCOUNTER — APPOINTMENT (OUTPATIENT)
Dept: ONCOLOGY | Facility: HOSPITAL | Age: 70
End: 2020-03-31

## 2020-04-09 ENCOUNTER — INFUSION (OUTPATIENT)
Dept: ONCOLOGY | Facility: HOSPITAL | Age: 70
End: 2020-04-09

## 2020-04-09 VITALS
DIASTOLIC BLOOD PRESSURE: 86 MMHG | HEART RATE: 71 BPM | TEMPERATURE: 97.6 F | SYSTOLIC BLOOD PRESSURE: 183 MMHG | RESPIRATION RATE: 18 BRPM

## 2020-04-09 DIAGNOSIS — G35 MULTIPLE SCLEROSIS (HCC): Primary | ICD-10-CM

## 2020-04-09 PROCEDURE — 96365 THER/PROPH/DIAG IV INF INIT: CPT

## 2020-04-09 PROCEDURE — 63710000001 DIPHENHYDRAMINE PER 50 MG: Performed by: PSYCHIATRY & NEUROLOGY

## 2020-04-09 PROCEDURE — 25010000002 NATALIZUMAB PER 1 MG: Performed by: PSYCHIATRY & NEUROLOGY

## 2020-04-09 RX ORDER — SODIUM CHLORIDE 9 MG/ML
250 INJECTION, SOLUTION INTRAVENOUS ONCE
Status: CANCELLED | OUTPATIENT
Start: 2020-05-11

## 2020-04-09 RX ORDER — DIPHENHYDRAMINE HCL 25 MG
25 CAPSULE ORAL ONCE
Status: CANCELLED | OUTPATIENT
Start: 2020-05-11

## 2020-04-09 RX ORDER — ACETAMINOPHEN 325 MG/1
650 TABLET ORAL ONCE
Status: CANCELLED | OUTPATIENT
Start: 2020-05-11

## 2020-04-09 RX ORDER — DIPHENHYDRAMINE HCL 25 MG
25 CAPSULE ORAL ONCE
Status: COMPLETED | OUTPATIENT
Start: 2020-04-09 | End: 2020-04-09

## 2020-04-09 RX ORDER — ACETAMINOPHEN 325 MG/1
650 TABLET ORAL ONCE
Status: COMPLETED | OUTPATIENT
Start: 2020-04-09 | End: 2020-04-09

## 2020-04-09 RX ADMIN — NATALIZUMAB 300 MG: 300 INJECTION INTRAVENOUS at 09:55

## 2020-04-09 RX ADMIN — DIPHENHYDRAMINE HYDROCHLORIDE 25 MG: 25 CAPSULE ORAL at 09:53

## 2020-04-09 RX ADMIN — ACETAMINOPHEN 650 MG: 325 TABLET ORAL at 09:53

## 2020-04-28 DIAGNOSIS — G35 MULTIPLE SCLEROSIS (HCC): Primary | ICD-10-CM

## 2020-04-28 RX ORDER — GABAPENTIN 300 MG/1
300 CAPSULE ORAL 2 TIMES DAILY
Qty: 180 CAPSULE | Refills: 2 | Status: SHIPPED | OUTPATIENT
Start: 2020-04-28 | End: 2020-07-02 | Stop reason: SDUPTHER

## 2020-04-28 NOTE — TELEPHONE ENCOUNTER
Patient requesting 90-day supply on Gabapentin. Ishaan looks okay. Will scan in chart for your review.

## 2020-05-08 ENCOUNTER — OFFICE VISIT (OUTPATIENT)
Dept: NEUROLOGY | Facility: CLINIC | Age: 70
End: 2020-05-08

## 2020-05-08 VITALS
SYSTOLIC BLOOD PRESSURE: 141 MMHG | WEIGHT: 150 LBS | DIASTOLIC BLOOD PRESSURE: 70 MMHG | HEART RATE: 80 BPM | BODY MASS INDEX: 23.49 KG/M2 | OXYGEN SATURATION: 98 %

## 2020-05-08 DIAGNOSIS — G35 MULTIPLE SCLEROSIS (HCC): Primary | ICD-10-CM

## 2020-05-08 DIAGNOSIS — Z72.0 TOBACCO ABUSE: ICD-10-CM

## 2020-05-08 DIAGNOSIS — G43.C0 PERIODIC HEADACHE SYNDROME, NOT INTRACTABLE: ICD-10-CM

## 2020-05-08 DIAGNOSIS — R53.82 CHRONIC FATIGUE: ICD-10-CM

## 2020-05-08 PROBLEM — D72.829 LEUKOCYTOSIS: Status: RESOLVED | Noted: 2019-11-06 | Resolved: 2020-05-08

## 2020-05-08 PROCEDURE — 99214 OFFICE O/P EST MOD 30 MIN: CPT | Performed by: PSYCHIATRY & NEUROLOGY

## 2020-05-08 RX ORDER — ARMODAFINIL 250 MG/1
250 TABLET ORAL DAILY
Qty: 30 TABLET | Refills: 5 | Status: SHIPPED | OUTPATIENT
Start: 2020-05-08 | End: 2020-06-07

## 2020-05-08 RX ORDER — FLUTICASONE PROPIONATE 50 MCG
SPRAY, SUSPENSION (ML) NASAL
COMMUNITY
Start: 2020-02-21 | End: 2021-07-29

## 2020-05-08 RX ORDER — LORATADINE 10 MG/1
10 TABLET ORAL DAILY
COMMUNITY
Start: 2020-04-21 | End: 2021-01-04

## 2020-05-08 NOTE — PROGRESS NOTES
Subjective:   Chief Complaint   Patient presents with   • [facility]       Patient ID: Leatha Wall is a 69 y.o. female.    History of Present Illness     69 y.o. woman returns in follow for RRMS, Hep C and migraines.  Last visit on 11/6/19 continue Tysabri every 6 weeks, started GBP, ordered labs.       TB - neg   Cr 1.12; WBC 12.6; VZV 2318; HIV - neg;     Hep C treated    MRI Brain, 4/8/19 as compared to 4/4/18 moderate to severe T2 lesion load, without new/enlarging or enhancing lesions    2/22/19:   CBC, CMP - NCS  2/22/19 JCV 1.38  10/12/18  JCV 1.39  5/23/18    JCV 1.44  12/13/16: JCV 2.21  6/27/17    JCV 1.87   6/27/17:   Cr 1.19, WBC 13.8  - NCS      Admitted to [facility] 11/6/19 - 11/8/19 for N/V/V w colitis.  CT A/P revealed colon wall thickening predominantly involving the descending colon with inflammatory changes within the pericolonic soft tissues. Incidental 1.7cm exophytic hyperdense right renal lesion & 3cm infrarenal mild aortic dilation was also noted. c-diff pcr negative.    Discharged on Levaquin and flagyl.      RRMS      Started Tysabri 2/20/17 switched to CHENG.    Tolerating Tysabri without incident every 6 weeks.      Mild heaviness left arm and leg.  Left foot will cramp at night.  GBP markedly reducing sx.       Off from work for last 6 weeks.     Fatigue is marked.    Walking for 40 minutes every other day.   Tobacco < 1 ppd.      Problem history:    MSFC remarkable for 26/110 o/w within normal results.  Oral numbness resolved.  Bladder urgency and frequency slight increase.    Mild N/T in bottom of feet     Migraines         Frequency has resolved.  Cannot remember last HA.      Located in occiput and behind eyes.  Quality of tightness and throbbing.  Moderate severity.       PMH:    Fatigue and heat are bothersome.  Echo -   EF 60%  C U/S -  0-49% on right, no stenosis on left    MRI cervical, my review, unremarkable  Labs below:    NMO negative  Hypercoaguable panel  WNL  Vit D 17.2    Fatigue continues to wax and wane.  Last few days has had unsteady gait. Weakness on left arm/leg.  Numbness in left arm and right foot.  Left side of tongue numb for the last 7 to 14 weeks.   Continue to smoke Tob 1 ppd.     Patient called and noted overwhelming fatigue.  After steroids fatigue improved but then recurred on 8/5/16.  Frequent HA's every other day frontal and occipital location of squeezing sensation of moderate severity.  Prednisone taper rx on 7/22/16 improved energy.      6/22/16 awoke at 3:30 am and noted left sided weakness in arm and leg.  Gait was unsteady and worsened over the next 7 days and presented to [facility] ED.  Fatigue was overwhelming.  MRI Brain in ED, my review, with multiple T2 white matter lesions and one large black hole in left PVWM.  Given steroids in ED with improvement in sx by 50%.    5/2016 had episode of left arm clumsiness for a week.      Reports increased fatigue since the first of the year.      Vision is fuzzy and feel swollen.      Bladder frequency.     Tob 1 ppd.    The following portions of the patient's history were reviewed and updated as appropriate: allergies, current medications, past family history, past medical history, past social history, past surgical history and problem list.    Review of Systems   Constitutional: Negative for activity change and unexpected weight change.   HENT: Negative for tinnitus and trouble swallowing.    Eyes: Negative for photophobia and visual disturbance.   Respiratory: Negative for apnea and choking.    Cardiovascular: Negative for leg swelling.   Endocrine: Negative for cold intolerance and heat intolerance.   Genitourinary: Positive for frequency and urgency. Negative for difficulty urinating and menstrual problem.   Musculoskeletal: Positive for gait problem. Negative for back pain.   Skin: Negative for color change.   Allergic/Immunologic: Negative for immunocompromised state.   Neurological: Positive for  dizziness and tremors. Negative for seizures, syncope, facial asymmetry, speech difficulty and light-headedness.   Hematological: Negative for adenopathy. Does not bruise/bleed easily.   Psychiatric/Behavioral: Positive for decreased concentration. Negative for behavioral problems, confusion, hallucinations and sleep disturbance.        Objective:  Vitals:    05/08/20 0959   BP: 141/70   Pulse: 80   SpO2: 98%   Weight: 68 kg (150 lb)       Neurologic Exam     Mental Status   Registration: recalls 3 of 3 objects. Recall at 5 minutes: recalls 3 of 3 objects. Follows 3 step commands.   Attention: normal. Concentration: normal.   Level of consciousness: alert  Knowledge: good and consistent with education.   Able to name object. Able to read. Able to repeat. Able to write. Normal comprehension.     Cranial Nerves     CN II   Visual fields full to confrontation.   Visual acuity: normal  Right visual field deficit: none  Left visual field deficit: none     CN III, IV, VI   Right pupil: Shape: regular. Reactivity: brisk. Consensual response: intact.   Left pupil: Shape: regular. Reactivity: brisk. Consensual response: intact.   Nystagmus: none   Diplopia: none  Ophthalmoparesis: none  Upgaze: normal  Downgaze: normal  Conjugate gaze: present  Vestibulo-ocular reflex: present    CN V   Right facial sensation deficit: forehead  Left facial sensation deficit: forehead  Right corneal reflex: normal  Left corneal reflex: normal    CN VII   Right facial weakness: none  Left facial weakness: none    CN VIII   Hearing: intact    CN IX, X   Palate: symmetric  Right gag reflex: normal  Left gag reflex: normal    CN XI   Right sternocleidomastoid strength: normal  Left sternocleidomastoid strength: normal    CN XII   Tongue: not atrophic  Fasciculations: absent  Tongue deviation: none    Motor Exam   Muscle bulk: normal  Overall muscle tone: normal  Right arm tone: normal  Left arm tone: normal  Right leg tone: normal  Left leg tone:  normal    Strength   Right neck flexion: 5/5  Left neck flexion: 5/5  Right neck extension: 5/5  Left neck extension: 5/5  Right deltoid: 5/5  Left deltoid: 5/5  Right biceps: 5/5  Left biceps: 5/5  Right triceps: 5/5  Left triceps: 5/5  Right wrist flexion: 5/5  Left wrist flexion: 5/5  Right wrist extension: 5/5  Left wrist extension: 5/5  Right interossei: 5/5  Left interossei: 5/5  Right abdominals: 5/5  Left abdominals: 5/5  Right iliopsoas: 5/5  Left iliopsoas: 5/5  Right quadriceps: 5/5  Left quadriceps: 5/5  Right hamstrin/  Left hamstrin/  Right glutei: 5/  Left glutei: 5  Right anterior tibial: 5  Left anterior tibial: 5  Right posterior tibial: 5/  Left posterior tibial: 5/  Right peroneal: 5  Left peroneal: 5/  Right gastroc: 5/5  Left gastroc: 55    Sensory Exam   Right arm light touch: normal  Right leg light touch: normal  Right arm vibration: normal  Right leg vibration: normal  Right arm proprioception: normal  Right leg proprioception: normal  Right arm pinprick: normal  Right leg pinprick: normal  Sensory deficit distribution on left: C6    Gait, Coordination, and Reflexes     Gait  Gait: normal ( )    Tremor   Resting tremor: absent  Intention tremor: absent  Action tremor: absent    Reflexes   Reflexes 2+ except as noted.   Right ankle clonus: absent  Left ankle clonus: absent      Physical Exam   Constitutional: She appears well-developed and well-nourished.   Neurological: Gait normal.   Nursing note and vitals reviewed.    Admission on 2019, Discharged on 2019   Component Date Value Ref Range Status   • Glucose 2019 137* 65 - 99 mg/dL Final   • BUN 2019 15  8 - 23 mg/dL Final   • Creatinine 2019 1.13* 0.57 - 1.00 mg/dL Final   • Sodium 2019 138  136 - 145 mmol/L Final   • Potassium 2019 3.9  3.5 - 5.2 mmol/L Final   • Chloride 2019 103  98 - 107 mmol/L Final   • CO2 2019 21.0* 22.0 - 29.0 mmol/L Final   • Calcium  11/06/2019 8.7  8.6 - 10.5 mg/dL Final   • Total Protein 11/06/2019 9.0* 6.0 - 8.5 g/dL Final   • Albumin 11/06/2019 4.00  3.50 - 5.20 g/dL Final   • ALT (SGPT) 11/06/2019 6  1 - 33 U/L Final   • AST (SGOT) 11/06/2019 15  1 - 32 U/L Final   • Alkaline Phosphatase 11/06/2019 83  39 - 117 U/L Final   • Total Bilirubin 11/06/2019 0.3  0.2 - 1.2 mg/dL Final   • eGFR Non  Amer 11/06/2019 48* >60 mL/min/1.73 Final   • Globulin 11/06/2019 5.0  gm/dL Final   • A/G Ratio 11/06/2019 0.8  g/dL Final   • BUN/Creatinine Ratio 11/06/2019 13.3  7.0 - 25.0 Final   • Anion Gap 11/06/2019 14.0  5.0 - 15.0 mmol/L Final   • Lipase 11/06/2019 20  13 - 60 U/L Final   • Color, UA 11/06/2019 Yellow  Yellow, Straw Final   • Appearance, UA 11/06/2019 Clear  Clear Final   • pH, UA 11/06/2019 <=5.0  5.0 - 8.0 Final   • Specific Gravity, UA 11/06/2019 1.009  1.001 - 1.030 Final   • Glucose, UA 11/06/2019 Negative  Negative Final   • Ketones, UA 11/06/2019 Negative  Negative Final   • Bilirubin, UA 11/06/2019 Negative  Negative Final   • Blood, UA 11/06/2019 Negative  Negative Final   • Protein, UA 11/06/2019 Negative  Negative Final   • Leuk Esterase, UA 11/06/2019 Moderate (2+)* Negative Final   • Nitrite, UA 11/06/2019 Negative  Negative Final   • Urobilinogen, UA 11/06/2019 0.2 E.U./dL  0.2 - 1.0 E.U./dL Final   • Extra Tube 11/06/2019 hold for add-on   Final    Auto resulted   • Extra Tube 11/06/2019 Hold for add-ons.   Final    Auto resulted.   • Extra Tube 11/06/2019 hold for add-on   Final    Auto resulted   • Extra Tube 11/06/2019 Hold for add-ons.   Final    Auto resulted.   • WBC 11/06/2019 19.60* 3.40 - 10.80 10*3/mm3 Final   • RBC 11/06/2019 4.75  3.77 - 5.28 10*6/mm3 Final   • Hemoglobin 11/06/2019 14.5  12.0 - 15.9 g/dL Final   • Hematocrit 11/06/2019 44.6  34.0 - 46.6 % Final   • MCV 11/06/2019 93.9  79.0 - 97.0 fL Final   • MCH 11/06/2019 30.5  26.6 - 33.0 pg Final   • MCHC 11/06/2019 32.5  31.5 - 35.7 g/dL Final   • RDW  11/06/2019 14.3  12.3 - 15.4 % Final   • RDW-SD 11/06/2019 49.1  37.0 - 54.0 fl Final   • MPV 11/06/2019 9.2  6.0 - 12.0 fL Final   • Platelets 11/06/2019 376  140 - 450 10*3/mm3 Final   • Neutrophil % 11/06/2019 71.9  42.7 - 76.0 % Final   • Lymphocyte % 11/06/2019 20.9  19.6 - 45.3 % Final   • Monocyte % 11/06/2019 5.8  5.0 - 12.0 % Final   • Eosinophil % 11/06/2019 0.6  0.3 - 6.2 % Final   • Basophil % 11/06/2019 0.3  0.0 - 1.5 % Final   • Immature Grans % 11/06/2019 0.5  0.0 - 0.5 % Final   • Neutrophils, Absolute 11/06/2019 14.09* 1.70 - 7.00 10*3/mm3 Final   • Lymphocytes, Absolute 11/06/2019 4.09* 0.70 - 3.10 10*3/mm3 Final   • Monocytes, Absolute 11/06/2019 1.14* 0.10 - 0.90 10*3/mm3 Final   • Eosinophils, Absolute 11/06/2019 0.12  0.00 - 0.40 10*3/mm3 Final   • Basophils, Absolute 11/06/2019 0.06  0.00 - 0.20 10*3/mm3 Final   • Immature Grans, Absolute 11/06/2019 0.10* 0.00 - 0.05 10*3/mm3 Final   • nRBC 11/06/2019 0.2  0.0 - 0.2 /100 WBC Final   • Salmonella/Shigella Screen 11/06/2019 Final report   Final   • Result 1 11/06/2019 Comment   Final    No Salmonella or Shigella recovered.   • Campylobacter Culture 11/06/2019 Final report   Final   • Result 1 11/06/2019 Comment   Final    No Campylobacter species isolated.   • E coli, Shiga toxin Assay 11/06/2019 Negative  Negative Final   • C. Difficile Toxins by PCR 11/06/2019 Not Detected  Not Detected Final   • Lactate 11/06/2019 2.4* 0.5 - 2.0 mmol/L Final    Falsely depressed results may occur on samples drawn from patients receiving N-Acetylcysteine (NAC) or Metamizole.   • Extra Tube 11/06/2019 Hold for add-ons.   Final    Auto resulted.   • Lactate 11/06/2019 1.0  0.5 - 2.0 mmol/L Final    Falsely depressed results may occur on samples drawn from patients receiving N-Acetylcysteine (NAC) or Metamizole.   • RBC, UA 11/06/2019 0-2  None Seen, 0-2 /HPF Final   • WBC, UA 11/06/2019 6-12* None Seen, 0-2 /HPF Final   • Bacteria, UA 11/06/2019 None Seen  None  Seen, Trace /HPF Final   • Squamous Epithelial Cells, UA 11/06/2019 0-2  None Seen, 0-2 /HPF Final   • Hyaline Casts, UA 11/06/2019 0-6  0 - 6 /LPF Final   • Methodology 11/06/2019 Automated Microscopy   Final   • WBC 11/07/2019 14.99* 3.40 - 10.80 10*3/mm3 Final   • RBC 11/07/2019 3.82  3.77 - 5.28 10*6/mm3 Final   • Hemoglobin 11/07/2019 11.4* 12.0 - 15.9 g/dL Final   • Hematocrit 11/07/2019 35.5  34.0 - 46.6 % Final   • MCV 11/07/2019 92.9  79.0 - 97.0 fL Final   • MCH 11/07/2019 29.8  26.6 - 33.0 pg Final   • MCHC 11/07/2019 32.1  31.5 - 35.7 g/dL Final   • RDW 11/07/2019 14.3  12.3 - 15.4 % Final   • RDW-SD 11/07/2019 48.6  37.0 - 54.0 fl Final   • MPV 11/07/2019 9.3  6.0 - 12.0 fL Final   • Platelets 11/07/2019 289  140 - 450 10*3/mm3 Final   • Neutrophil % 11/07/2019 61.8  42.7 - 76.0 % Final   • Lymphocyte % 11/07/2019 30.0  19.6 - 45.3 % Final   • Monocyte % 11/07/2019 6.8  5.0 - 12.0 % Final   • Eosinophil % 11/07/2019 0.8  0.3 - 6.2 % Final   • Basophil % 11/07/2019 0.2  0.0 - 1.5 % Final   • Immature Grans % 11/07/2019 0.4  0.0 - 0.5 % Final   • Neutrophils, Absolute 11/07/2019 9.26* 1.70 - 7.00 10*3/mm3 Final   • Lymphocytes, Absolute 11/07/2019 4.50* 0.70 - 3.10 10*3/mm3 Final   • Monocytes, Absolute 11/07/2019 1.02* 0.10 - 0.90 10*3/mm3 Final   • Eosinophils, Absolute 11/07/2019 0.12  0.00 - 0.40 10*3/mm3 Final   • Basophils, Absolute 11/07/2019 0.03  0.00 - 0.20 10*3/mm3 Final   • Immature Grans, Absolute 11/07/2019 0.06* 0.00 - 0.05 10*3/mm3 Final   • nRBC 11/07/2019 0.0  0.0 - 0.2 /100 WBC Final   • Glucose 11/07/2019 93  65 - 99 mg/dL Final   • BUN 11/07/2019 12  8 - 23 mg/dL Final   • Creatinine 11/07/2019 1.07* 0.57 - 1.00 mg/dL Final   • Sodium 11/07/2019 138  136 - 145 mmol/L Final   • Potassium 11/07/2019 3.9  3.5 - 5.2 mmol/L Final   • Chloride 11/07/2019 109* 98 - 107 mmol/L Final   • CO2 11/07/2019 20.0* 22.0 - 29.0 mmol/L Final   • Calcium 11/07/2019 8.4* 8.6 - 10.5 mg/dL Final   • Total  Protein 11/07/2019 7.1  6.0 - 8.5 g/dL Final   • Albumin 11/07/2019 3.00* 3.50 - 5.20 g/dL Final   • ALT (SGPT) 11/07/2019 <5  1 - 33 U/L Final   • AST (SGOT) 11/07/2019 14  1 - 32 U/L Final   • Alkaline Phosphatase 11/07/2019 64  39 - 117 U/L Final   • Total Bilirubin 11/07/2019 0.2  0.2 - 1.2 mg/dL Final   • eGFR Non African Amer 11/07/2019 51* >60 mL/min/1.73 Final   • Globulin 11/07/2019 4.1  gm/dL Final   • A/G Ratio 11/07/2019 0.7  g/dL Final   • BUN/Creatinine Ratio 11/07/2019 11.2  7.0 - 25.0 Final   • Anion Gap 11/07/2019 9.0  5.0 - 15.0 mmol/L Final   • Lactate 11/07/2019 1.0  0.5 - 2.0 mmol/L Final    Falsely depressed results may occur on samples drawn from patients receiving N-Acetylcysteine (NAC) or Metamizole.   • Magnesium 11/07/2019 2.0  1.6 - 2.4 mg/dL Final     Assessment/Plan:       Problems Addressed this Visit        Cardiovascular and Mediastinum    Periodic headache syndrome, not intractable     Headaches are improving with treatment.  Continue current treatment regimen.                Nervous and Auditory    Multiple sclerosis (CMS/HCC) - Primary     MSFC stable since 2017    MRI Brain/cervical     Labs          Relevant Medications    Armodafinil (Nuvigil) 250 MG tablet    Other Relevant Orders    MRI Brain With & Without Contrast    MRI Cervical Spine With & Without Contrast    CBC & Differential    Comprehensive Metabolic Panel    Stratify JCV, Antibody CHRISTIAN With / Reflex To Inhibition Assay       Other    Tobacco abuse     Tob 1 ppd    Counseled to stop          Chronic fatigue     Start Nuvgil 250 mg daily

## 2020-05-13 ENCOUNTER — RESULTS ENCOUNTER (OUTPATIENT)
Dept: NEUROLOGY | Facility: CLINIC | Age: 70
End: 2020-05-13

## 2020-05-13 DIAGNOSIS — G35 MULTIPLE SCLEROSIS (HCC): ICD-10-CM

## 2020-05-18 ENCOUNTER — TELEPHONE (OUTPATIENT)
Dept: NEUROLOGY | Facility: CLINIC | Age: 70
End: 2020-05-18

## 2020-05-18 NOTE — TELEPHONE ENCOUNTER
is on Prednisone, she is scheduled for a infusion on 05/21/2020. Would being on this medication hinder her infusion. Please advise.

## 2020-05-21 ENCOUNTER — INFUSION (OUTPATIENT)
Dept: ONCOLOGY | Facility: HOSPITAL | Age: 70
End: 2020-05-21

## 2020-05-21 VITALS
TEMPERATURE: 97.4 F | HEART RATE: 71 BPM | DIASTOLIC BLOOD PRESSURE: 76 MMHG | RESPIRATION RATE: 18 BRPM | SYSTOLIC BLOOD PRESSURE: 164 MMHG

## 2020-05-21 DIAGNOSIS — G35 MULTIPLE SCLEROSIS (HCC): Primary | ICD-10-CM

## 2020-05-21 LAB
ALBUMIN SERPL-MCNC: 4.1 G/DL (ref 3.5–5.2)
ALBUMIN/GLOB SERPL: 1.2 G/DL
ALP SERPL-CCNC: 62 U/L (ref 39–117)
ALT SERPL W P-5'-P-CCNC: 6 U/L (ref 1–33)
ANION GAP SERPL CALCULATED.3IONS-SCNC: 12 MMOL/L (ref 5–15)
AST SERPL-CCNC: 14 U/L (ref 1–32)
BASOPHILS # BLD AUTO: 0.05 10*3/MM3 (ref 0–0.2)
BASOPHILS NFR BLD AUTO: 0.3 % (ref 0–1.5)
BILIRUB SERPL-MCNC: 0.3 MG/DL (ref 0.2–1.2)
BUN BLD-MCNC: 19 MG/DL (ref 8–23)
BUN/CREAT SERPL: 16 (ref 7–25)
CALCIUM SPEC-SCNC: 9.1 MG/DL (ref 8.6–10.5)
CHLORIDE SERPL-SCNC: 99 MMOL/L (ref 98–107)
CO2 SERPL-SCNC: 27 MMOL/L (ref 22–29)
CREAT BLD-MCNC: 1.19 MG/DL (ref 0.57–1)
DEPRECATED RDW RBC AUTO: 51.8 FL (ref 37–54)
EOSINOPHIL # BLD AUTO: 0.11 10*3/MM3 (ref 0–0.4)
EOSINOPHIL NFR BLD AUTO: 0.7 % (ref 0.3–6.2)
ERYTHROCYTE [DISTWIDTH] IN BLOOD BY AUTOMATED COUNT: 15.2 % (ref 12.3–15.4)
GFR SERPL CREATININE-BSD FRML MDRD: 45 ML/MIN/1.73
GLOBULIN UR ELPH-MCNC: 3.5 GM/DL
GLUCOSE BLD-MCNC: 117 MG/DL (ref 65–99)
HCT VFR BLD AUTO: 40.2 % (ref 34–46.6)
HGB BLD-MCNC: 13.1 G/DL (ref 12–15.9)
IMM GRANULOCYTES # BLD AUTO: 0.16 10*3/MM3 (ref 0–0.05)
IMM GRANULOCYTES NFR BLD AUTO: 0.9 % (ref 0–0.5)
LYMPHOCYTES # BLD AUTO: 3.71 10*3/MM3 (ref 0.7–3.1)
LYMPHOCYTES NFR BLD AUTO: 21.9 % (ref 19.6–45.3)
MCH RBC QN AUTO: 30.6 PG (ref 26.6–33)
MCHC RBC AUTO-ENTMCNC: 32.6 G/DL (ref 31.5–35.7)
MCV RBC AUTO: 93.9 FL (ref 79–97)
MONOCYTES # BLD AUTO: 1.04 10*3/MM3 (ref 0.1–0.9)
MONOCYTES NFR BLD AUTO: 6.2 % (ref 5–12)
NEUTROPHILS # BLD AUTO: 11.84 10*3/MM3 (ref 1.7–7)
NEUTROPHILS NFR BLD AUTO: 70 % (ref 42.7–76)
NRBC BLD AUTO-RTO: 0 /100 WBC (ref 0–0.2)
PLATELET # BLD AUTO: 310 10*3/MM3 (ref 140–450)
PMV BLD AUTO: 10.5 FL (ref 6–12)
POTASSIUM BLD-SCNC: 4.7 MMOL/L (ref 3.5–5.2)
PROT SERPL-MCNC: 7.6 G/DL (ref 6–8.5)
RBC # BLD AUTO: 4.28 10*6/MM3 (ref 3.77–5.28)
SODIUM BLD-SCNC: 138 MMOL/L (ref 136–145)
WBC NRBC COR # BLD: 16.91 10*3/MM3 (ref 3.4–10.8)

## 2020-05-21 PROCEDURE — 25010000002 NATALIZUMAB PER 1 MG: Performed by: PSYCHIATRY & NEUROLOGY

## 2020-05-21 PROCEDURE — 96365 THER/PROPH/DIAG IV INF INIT: CPT

## 2020-05-21 PROCEDURE — 80053 COMPREHEN METABOLIC PANEL: CPT | Performed by: PSYCHIATRY & NEUROLOGY

## 2020-05-21 PROCEDURE — 85025 COMPLETE CBC W/AUTO DIFF WBC: CPT | Performed by: PSYCHIATRY & NEUROLOGY

## 2020-05-21 PROCEDURE — 86711 JOHN CUNNINGHAM ANTIBODY: CPT | Performed by: PSYCHIATRY & NEUROLOGY

## 2020-05-21 PROCEDURE — 63710000001 DIPHENHYDRAMINE PER 50 MG: Performed by: PSYCHIATRY & NEUROLOGY

## 2020-05-21 RX ORDER — ACETAMINOPHEN 325 MG/1
650 TABLET ORAL ONCE
Status: CANCELLED | OUTPATIENT
Start: 2020-06-22

## 2020-05-21 RX ORDER — SODIUM CHLORIDE 9 MG/ML
250 INJECTION, SOLUTION INTRAVENOUS ONCE
Status: CANCELLED | OUTPATIENT
Start: 2020-06-22

## 2020-05-21 RX ORDER — DIPHENHYDRAMINE HCL 25 MG
25 CAPSULE ORAL ONCE
Status: COMPLETED | OUTPATIENT
Start: 2020-05-21 | End: 2020-05-21

## 2020-05-21 RX ORDER — ACETAMINOPHEN 325 MG/1
650 TABLET ORAL ONCE
Status: COMPLETED | OUTPATIENT
Start: 2020-05-21 | End: 2020-05-21

## 2020-05-21 RX ORDER — DIPHENHYDRAMINE HCL 25 MG
25 CAPSULE ORAL ONCE
Status: CANCELLED | OUTPATIENT
Start: 2020-06-22

## 2020-05-21 RX ADMIN — DIPHENHYDRAMINE HYDROCHLORIDE 25 MG: 25 CAPSULE ORAL at 10:15

## 2020-05-21 RX ADMIN — ACETAMINOPHEN 650 MG: 325 TABLET ORAL at 10:15

## 2020-05-21 RX ADMIN — NATALIZUMAB 300 MG: 300 INJECTION INTRAVENOUS at 10:15

## 2020-05-28 LAB — REF LAB TEST METHOD: NORMAL

## 2020-06-09 ENCOUNTER — APPOINTMENT (OUTPATIENT)
Dept: MRI IMAGING | Facility: HOSPITAL | Age: 70
End: 2020-06-09

## 2020-06-12 ENCOUNTER — TELEPHONE (OUTPATIENT)
Dept: NEUROLOGY | Facility: CLINIC | Age: 70
End: 2020-06-12

## 2020-06-12 NOTE — TELEPHONE ENCOUNTER
PT CALLED RE: CURRENT OUT OF WORK NOTE DUE TO CORONAVIRUS, WHICH EXPIRES 6-22-20; SHE WOULD LIKE TO HAVE THIS EXTENDED FOR 30 MORE DAYS AT THIS TIME.  PT WILL CALL BACK WITH INFORMATION ON WHERE TO SEND NOTE.    CALL BACK: [phone]; LEAVE VM IF NO ANSWER

## 2020-06-15 NOTE — TELEPHONE ENCOUNTER
Called 1x to find out how patient would like to receive off work extension. Voicemail is not set up. I was unable to LVM.   -TMT 06/15/2020

## 2020-06-25 ENCOUNTER — HOSPITAL ENCOUNTER (OUTPATIENT)
Dept: MRI IMAGING | Facility: HOSPITAL | Age: 70
Discharge: HOME | End: 2020-06-25
Admitting: PSYCHIATRY & NEUROLOGY

## 2020-06-25 ENCOUNTER — HOSPITAL ENCOUNTER (OUTPATIENT)
Dept: MRI IMAGING | Facility: HOSPITAL | Age: 70
Discharge: HOME | End: 2020-06-25

## 2020-06-25 DIAGNOSIS — G35 MULTIPLE SCLEROSIS (HCC): ICD-10-CM

## 2020-06-25 PROCEDURE — A9577 INJ MULTIHANCE: HCPCS | Performed by: PSYCHIATRY & NEUROLOGY

## 2020-06-25 PROCEDURE — 70553 MRI BRAIN STEM W/O & W/DYE: CPT

## 2020-06-25 PROCEDURE — 0 GADOBENATE DIMEGLUMINE 529 MG/ML SOLUTION: Performed by: PSYCHIATRY & NEUROLOGY

## 2020-06-25 PROCEDURE — 72156 MRI NECK SPINE W/O & W/DYE: CPT

## 2020-06-25 RX ADMIN — GADOBENATE DIMEGLUMINE 10 ML: 529 INJECTION, SOLUTION INTRAVENOUS at 11:07

## 2020-06-29 NOTE — TELEPHONE ENCOUNTER
Nury, patient boss, informed that I typed wrong dated, white out it and hand written the right date. She verbalized understanding.   -TMT 06/29/2020

## 2020-06-29 NOTE — TELEPHONE ENCOUNTER
PT'S WORK CALLED IN AND IS ASKING IF THE WORK RELEASE FOR 07/22/2020 IS ACURATE AS THE  NOTE IS TYPED ACCEPT THE RETURN DATE WHICH IS HAND WRITTEN  CELI BLAKELY AT [facility]   THE BEST PHONE NUMBER [phone]

## 2020-07-01 NOTE — PROGRESS NOTES
Subjective:   Chief Complaint   Patient presents with   • Multiple Sclerosis     Post MRI        Patient ID: Leatha Wlal is a 70 y.o. female.    History of Present Illness     70 y.o. woman returns in follow for RRMS, Hep C and migraines.  Last visit on 5/8/20 continue Tysabri every 6 weeks, started GBP, ordered MRI Brain/cervical and labs.        Hep C treated    MRI Brain, my review of films, 6/25/20 as compared to 4/8/19 moderate to severe T2 lesion load, without new/enlarging or enhancing lesions, no cord lesions     5/21/20:   JCV 2.06;  CBC, CMP - NCS  2/22/19:   JCV 1.38  10/12/18  JCV 1.39  5/23/18    JCV 1.44  12/13/16: JCV 2.21  6/27/17    JCV 1.87        RRMS      Started Tysabri 2/20/17 switched to CHENG.    Tolerating Tysabri without side effects.  .      Fatigue is moderate.  Improved with Nuvigil 125 mg prn.     Walking for 40 minutes every other day.   Tobacco < 1 ppd.    Problem history:    MSFC remarkable for 26/110 o/w within normal results.  Oral numbness resolved.  Bladder urgency and frequency slight increase.    Mild N/T in bottom of feet     Migraines         Frequency no noted HA.      Located in occiput and behind eyes.  Quality of tightness and throbbing.  Moderate severity.       RLS    Left left drawing improved with addition of GBP but now both feet are cramping at night.  Occurs during sleep.      PMH:    Fatigue and heat are bothersome.  Echo -   EF 60%  C U/S -  0-49% on right, no stenosis on left    MRI cervical, my review, unremarkable  Labs below:    NMO negative  Hypercoaguable panel WNL  Vit D 17.2    Fatigue continues to wax and wane.  Last few days has had unsteady gait. Weakness on left arm/leg.  Numbness in left arm and right foot.  Left side of tongue numb for the last 7 to 14 weeks.   Continue to smoke Tob 1 ppd.     Patient called and noted overwhelming fatigue.  After steroids fatigue improved but then recurred on 8/5/16.  Frequent HA's every other day frontal and  occipital location of squeezing sensation of moderate severity.  Prednisone taper rx on 7/22/16 improved energy.      6/22/16 awoke at 3:30 am and noted left sided weakness in arm and leg.  Gait was unsteady and worsened over the next 7 days and presented to [facility] ED.  Fatigue was overwhelming.  MRI Brain in ED, my review, with multiple T2 white matter lesions and one large black hole in left PVWM.  Given steroids in ED with improvement in sx by 50%.    5/2016 had episode of left arm clumsiness for a week.      Reports increased fatigue since the first of the year.      Vision is fuzzy and feel swollen.      Bladder frequency.     Tob 1 ppd.    The following portions of the patient's history were reviewed and updated as appropriate: allergies, current medications, past family history, past medical history, past social history, past surgical history and problem list.    Review of Systems   Constitutional: Negative for activity change and unexpected weight change.   HENT: Negative for tinnitus and trouble swallowing.    Eyes: Negative for photophobia and visual disturbance.   Respiratory: Negative for apnea and choking.    Cardiovascular: Negative for leg swelling.   Endocrine: Negative for cold intolerance and heat intolerance.   Genitourinary: Positive for frequency and urgency. Negative for difficulty urinating and menstrual problem.   Musculoskeletal: Positive for gait problem. Negative for back pain.   Skin: Negative for color change.   Allergic/Immunologic: Negative for immunocompromised state.   Neurological: Positive for dizziness and tremors. Negative for seizures, syncope, facial asymmetry, speech difficulty and light-headedness.   Hematological: Negative for adenopathy. Does not bruise/bleed easily.   Psychiatric/Behavioral: Positive for decreased concentration. Negative for behavioral problems, confusion, hallucinations and sleep disturbance.        Objective:  Vitals:    07/02/20 0826   BP: 126/58   Pulse:  "71   Temp: 98.2 °F (36.8 °C)   SpO2: 94%   Weight: 69.4 kg (153 lb)   Height: 170.2 cm (67\")       Neurologic Exam     Mental Status   Registration: recalls 3 of 3 objects. Recall at 5 minutes: recalls 3 of 3 objects. Follows 3 step commands.   Attention: normal. Concentration: normal.   Level of consciousness: alert  Knowledge: good and consistent with education.   Able to name object. Able to read. Able to repeat. Able to write. Normal comprehension.     Cranial Nerves     CN II   Visual fields full to confrontation.   Visual acuity: normal  Right visual field deficit: none  Left visual field deficit: none     CN III, IV, VI   Right pupil: Shape: regular. Reactivity: brisk. Consensual response: intact.   Left pupil: Shape: regular. Reactivity: brisk. Consensual response: intact.   Nystagmus: none   Diplopia: none  Ophthalmoparesis: none  Upgaze: normal  Downgaze: normal  Conjugate gaze: present  Vestibulo-ocular reflex: present    CN V   Right facial sensation deficit: forehead  Left facial sensation deficit: forehead  Right corneal reflex: normal  Left corneal reflex: normal    CN VII   Right facial weakness: none  Left facial weakness: none    CN VIII   Hearing: intact    CN IX, X   Palate: symmetric  Right gag reflex: normal  Left gag reflex: normal    CN XI   Right sternocleidomastoid strength: normal  Left sternocleidomastoid strength: normal    CN XII   Tongue: not atrophic  Fasciculations: absent  Tongue deviation: none    Motor Exam   Muscle bulk: normal  Overall muscle tone: normal  Right arm tone: normal  Left arm tone: normal  Right leg tone: normal  Left leg tone: normal    Strength   Right neck flexion: 5/5  Left neck flexion: 5/5  Right neck extension: 5/5  Left neck extension: 5/5  Right deltoid: 5/5  Left deltoid: 5/5  Right biceps: 5/5  Left biceps: 5/5  Right triceps: 5/5  Left triceps: 5/5  Right wrist flexion: 5/5  Left wrist flexion: 5/5  Right wrist extension: 5/5  Left wrist extension: " 5/5  Right interossei: 5/5  Left interossei: 5/5  Right abdominals: 5/5  Left abdominals: 5/5  Right iliopsoas: 5/5  Left iliopsoas: 5/5  Right quadriceps: 5/5  Left quadriceps: 5/5  Right hamstrin/5  Left hamstrin/5  Right glutei: 5/5  Left glutei: 5/5  Right anterior tibial: 5/5  Left anterior tibial: 5/5  Right posterior tibial: 5/5  Left posterior tibial: 5/5  Right peroneal: 5/5  Left peroneal: 5/5  Right gastroc: 5/5  Left gastroc: 5/5    Sensory Exam   Right arm light touch: normal  Right leg light touch: normal  Right arm vibration: normal  Right leg vibration: normal  Right arm proprioception: normal  Right leg proprioception: normal  Right arm pinprick: normal  Right leg pinprick: normal  Sensory deficit distribution on left: C6    Gait, Coordination, and Reflexes     Gait  Gait: normal ( )    Tremor   Resting tremor: absent  Intention tremor: absent  Action tremor: absent    Reflexes   Reflexes 2+ except as noted.   Right ankle clonus: absent  Left ankle clonus: absent      Physical Exam   Constitutional: She appears well-developed and well-nourished.   Neurological: Gait normal.   Nursing note and vitals reviewed.    Infusion on 2020   Component Date Value Ref Range Status   • Glucose 2020 117* 65 - 99 mg/dL Final   • BUN 2020 19  8 - 23 mg/dL Final   • Creatinine 2020 1.19* 0.57 - 1.00 mg/dL Final   • Sodium 2020 138  136 - 145 mmol/L Final   • Potassium 2020 4.7  3.5 - 5.2 mmol/L Final   • Chloride 2020 99  98 - 107 mmol/L Final   • CO2 2020 27.0  22.0 - 29.0 mmol/L Final   • Calcium 2020 9.1  8.6 - 10.5 mg/dL Final   • Total Protein 2020 7.6  6.0 - 8.5 g/dL Final   • Albumin 2020 4.10  3.50 - 5.20 g/dL Final   • ALT (SGPT) 2020 6  1 - 33 U/L Final   • AST (SGOT) 2020 14  1 - 32 U/L Final   • Alkaline Phosphatase 2020 62  39 - 117 U/L Final   • Total Bilirubin 2020 0.3  0.2 - 1.2 mg/dL Final   • eGFR Non   Amer 05/21/2020 45* >60 mL/min/1.73 Final   • Globulin 05/21/2020 3.5  gm/dL Final   • A/G Ratio 05/21/2020 1.2  g/dL Final   • BUN/Creatinine Ratio 05/21/2020 16.0  7.0 - 25.0 Final   • Anion Gap 05/21/2020 12.0  5.0 - 15.0 mmol/L Final   • WBC 05/21/2020 16.91* 3.40 - 10.80 10*3/mm3 Final   • RBC 05/21/2020 4.28  3.77 - 5.28 10*6/mm3 Final   • Hemoglobin 05/21/2020 13.1  12.0 - 15.9 g/dL Final   • Hematocrit 05/21/2020 40.2  34.0 - 46.6 % Final   • MCV 05/21/2020 93.9  79.0 - 97.0 fL Final   • MCH 05/21/2020 30.6  26.6 - 33.0 pg Final   • MCHC 05/21/2020 32.6  31.5 - 35.7 g/dL Final   • RDW 05/21/2020 15.2  12.3 - 15.4 % Final   • RDW-SD 05/21/2020 51.8  37.0 - 54.0 fl Final   • MPV 05/21/2020 10.5  6.0 - 12.0 fL Final   • Platelets 05/21/2020 310  140 - 450 10*3/mm3 Final   • Neutrophil % 05/21/2020 70.0  42.7 - 76.0 % Final   • Lymphocyte % 05/21/2020 21.9  19.6 - 45.3 % Final   • Monocyte % 05/21/2020 6.2  5.0 - 12.0 % Final   • Eosinophil % 05/21/2020 0.7  0.3 - 6.2 % Final   • Basophil % 05/21/2020 0.3  0.0 - 1.5 % Final   • Immature Grans % 05/21/2020 0.9* 0.0 - 0.5 % Final   • Neutrophils, Absolute 05/21/2020 11.84* 1.70 - 7.00 10*3/mm3 Final   • Lymphocytes, Absolute 05/21/2020 3.71* 0.70 - 3.10 10*3/mm3 Final   • Monocytes, Absolute 05/21/2020 1.04* 0.10 - 0.90 10*3/mm3 Final   • Eosinophils, Absolute 05/21/2020 0.11  0.00 - 0.40 10*3/mm3 Final   • Basophils, Absolute 05/21/2020 0.05  0.00 - 0.20 10*3/mm3 Final   • Immature Grans, Absolute 05/21/2020 0.16* 0.00 - 0.05 10*3/mm3 Final   • nRBC 05/21/2020 0.0  0.0 - 0.2 /100 WBC Final     Assessment/Plan:       Problems Addressed this Visit        Cardiovascular and Mediastinum    Periodic headache syndrome, not intractable    Relevant Medications    gabapentin (NEURONTIN) 300 MG capsule       Nervous and Auditory    Multiple sclerosis (CMS/HCC) - Primary     Clinically and radiologically stable on Tysabri    Continue Tysabri CHENG             Relevant  Medications    gabapentin (NEURONTIN) 300 MG capsule       Other    Hepatitis C antibody test positive    Chronic fatigue     Sx improved on Nuvigil          Restless legs syndrome (RLS)     Sx worsening    Increase  mg BID

## 2020-07-02 ENCOUNTER — OFFICE VISIT (OUTPATIENT)
Dept: NEUROLOGY | Facility: CLINIC | Age: 70
End: 2020-07-02

## 2020-07-02 ENCOUNTER — INFUSION (OUTPATIENT)
Dept: ONCOLOGY | Facility: HOSPITAL | Age: 70
End: 2020-07-02

## 2020-07-02 VITALS
HEART RATE: 70 BPM | DIASTOLIC BLOOD PRESSURE: 79 MMHG | SYSTOLIC BLOOD PRESSURE: 146 MMHG | RESPIRATION RATE: 16 BRPM | TEMPERATURE: 97.9 F

## 2020-07-02 VITALS
BODY MASS INDEX: 24.01 KG/M2 | WEIGHT: 153 LBS | SYSTOLIC BLOOD PRESSURE: 126 MMHG | TEMPERATURE: 98.2 F | OXYGEN SATURATION: 94 % | DIASTOLIC BLOOD PRESSURE: 58 MMHG | HEIGHT: 67 IN | HEART RATE: 71 BPM

## 2020-07-02 DIAGNOSIS — G25.81 RESTLESS LEGS SYNDROME (RLS): ICD-10-CM

## 2020-07-02 DIAGNOSIS — R53.82 CHRONIC FATIGUE: ICD-10-CM

## 2020-07-02 DIAGNOSIS — G35 MULTIPLE SCLEROSIS (HCC): Primary | ICD-10-CM

## 2020-07-02 DIAGNOSIS — G43.C0 PERIODIC HEADACHE SYNDROME, NOT INTRACTABLE: ICD-10-CM

## 2020-07-02 DIAGNOSIS — R76.8 HEPATITIS C ANTIBODY TEST POSITIVE: ICD-10-CM

## 2020-07-02 PROCEDURE — 99214 OFFICE O/P EST MOD 30 MIN: CPT | Performed by: PSYCHIATRY & NEUROLOGY

## 2020-07-02 PROCEDURE — 63710000001 DIPHENHYDRAMINE PER 50 MG: Performed by: PSYCHIATRY & NEUROLOGY

## 2020-07-02 PROCEDURE — 96365 THER/PROPH/DIAG IV INF INIT: CPT

## 2020-07-02 PROCEDURE — 25010000002 NATALIZUMAB PER 1 MG: Performed by: PSYCHIATRY & NEUROLOGY

## 2020-07-02 RX ORDER — GABAPENTIN 300 MG/1
600 CAPSULE ORAL 2 TIMES DAILY
Qty: 360 CAPSULE | Refills: 1 | Status: SHIPPED | OUTPATIENT
Start: 2020-07-02 | End: 2021-01-14 | Stop reason: SDUPTHER

## 2020-07-02 RX ORDER — ACETAMINOPHEN 325 MG/1
650 TABLET ORAL ONCE
Status: CANCELLED | OUTPATIENT
Start: 2020-08-03

## 2020-07-02 RX ORDER — SODIUM CHLORIDE 9 MG/ML
250 INJECTION, SOLUTION INTRAVENOUS ONCE
Status: CANCELLED | OUTPATIENT
Start: 2020-08-03

## 2020-07-02 RX ORDER — DIPHENHYDRAMINE HCL 25 MG
25 CAPSULE ORAL ONCE
Status: COMPLETED | OUTPATIENT
Start: 2020-07-02 | End: 2020-07-02

## 2020-07-02 RX ORDER — ACETAMINOPHEN 325 MG/1
650 TABLET ORAL ONCE
Status: COMPLETED | OUTPATIENT
Start: 2020-07-02 | End: 2020-07-02

## 2020-07-02 RX ORDER — DIPHENHYDRAMINE HCL 25 MG
25 CAPSULE ORAL ONCE
Status: CANCELLED | OUTPATIENT
Start: 2020-08-03

## 2020-07-02 RX ADMIN — DIPHENHYDRAMINE HYDROCHLORIDE 25 MG: 25 CAPSULE ORAL at 09:05

## 2020-07-02 RX ADMIN — ACETAMINOPHEN 650 MG: 325 TABLET ORAL at 09:05

## 2020-07-02 RX ADMIN — NATALIZUMAB 300 MG: 300 INJECTION INTRAVENOUS at 09:10

## 2020-08-13 ENCOUNTER — INFUSION (OUTPATIENT)
Dept: ONCOLOGY | Facility: HOSPITAL | Age: 70
End: 2020-08-13

## 2020-08-13 VITALS
HEART RATE: 73 BPM | TEMPERATURE: 97.9 F | SYSTOLIC BLOOD PRESSURE: 138 MMHG | RESPIRATION RATE: 18 BRPM | DIASTOLIC BLOOD PRESSURE: 87 MMHG

## 2020-08-13 DIAGNOSIS — G35 MULTIPLE SCLEROSIS (HCC): Primary | ICD-10-CM

## 2020-08-13 PROCEDURE — 96365 THER/PROPH/DIAG IV INF INIT: CPT

## 2020-08-13 PROCEDURE — 63710000001 DIPHENHYDRAMINE PER 50 MG: Performed by: PSYCHIATRY & NEUROLOGY

## 2020-08-13 PROCEDURE — 25010000002 NATALIZUMAB PER 1 MG: Performed by: PSYCHIATRY & NEUROLOGY

## 2020-08-13 RX ORDER — DIPHENHYDRAMINE HCL 25 MG
25 CAPSULE ORAL ONCE
Status: COMPLETED | OUTPATIENT
Start: 2020-08-13 | End: 2020-08-13

## 2020-08-13 RX ORDER — ACETAMINOPHEN 325 MG/1
650 TABLET ORAL ONCE
Status: COMPLETED | OUTPATIENT
Start: 2020-08-13 | End: 2020-08-13

## 2020-08-13 RX ORDER — DIPHENHYDRAMINE HCL 25 MG
25 CAPSULE ORAL ONCE
Status: CANCELLED | OUTPATIENT
Start: 2020-09-14

## 2020-08-13 RX ORDER — SODIUM CHLORIDE 9 MG/ML
250 INJECTION, SOLUTION INTRAVENOUS ONCE
Status: CANCELLED | OUTPATIENT
Start: 2020-09-14

## 2020-08-13 RX ORDER — ACETAMINOPHEN 325 MG/1
650 TABLET ORAL ONCE
Status: CANCELLED | OUTPATIENT
Start: 2020-09-14

## 2020-08-13 RX ORDER — SODIUM CHLORIDE 9 MG/ML
250 INJECTION, SOLUTION INTRAVENOUS ONCE
Status: DISCONTINUED | OUTPATIENT
Start: 2020-08-13 | End: 2020-08-13 | Stop reason: HOSPADM

## 2020-08-13 RX ADMIN — NATALIZUMAB 300 MG: 300 INJECTION INTRAVENOUS at 10:27

## 2020-08-13 RX ADMIN — ACETAMINOPHEN 650 MG: 325 TABLET ORAL at 10:26

## 2020-08-13 RX ADMIN — DIPHENHYDRAMINE HYDROCHLORIDE 25 MG: 25 CAPSULE ORAL at 10:26

## 2020-10-12 ENCOUNTER — INFUSION (OUTPATIENT)
Dept: ONCOLOGY | Facility: HOSPITAL | Age: 70
End: 2020-10-12

## 2020-10-12 VITALS
TEMPERATURE: 97.7 F | RESPIRATION RATE: 16 BRPM | HEART RATE: 68 BPM | DIASTOLIC BLOOD PRESSURE: 87 MMHG | SYSTOLIC BLOOD PRESSURE: 178 MMHG

## 2020-10-12 DIAGNOSIS — G35 MULTIPLE SCLEROSIS (HCC): Primary | ICD-10-CM

## 2020-10-12 PROCEDURE — 25010000002 NATALIZUMAB PER 1 MG: Performed by: PSYCHIATRY & NEUROLOGY

## 2020-10-12 PROCEDURE — 96365 THER/PROPH/DIAG IV INF INIT: CPT

## 2020-10-12 PROCEDURE — 63710000001 DIPHENHYDRAMINE PER 50 MG: Performed by: PSYCHIATRY & NEUROLOGY

## 2020-10-12 RX ORDER — SODIUM CHLORIDE 9 MG/ML
250 INJECTION, SOLUTION INTRAVENOUS ONCE
Status: CANCELLED | OUTPATIENT
Start: 2020-11-05

## 2020-10-12 RX ORDER — DIPHENHYDRAMINE HCL 25 MG
25 CAPSULE ORAL ONCE
Status: CANCELLED | OUTPATIENT
Start: 2020-11-05

## 2020-10-12 RX ORDER — TIOTROPIUM BROMIDE INHALATION SPRAY 3.12 UG/1
SPRAY, METERED RESPIRATORY (INHALATION)
COMMUNITY
Start: 2020-09-17 | End: 2021-07-29

## 2020-10-12 RX ORDER — ACETAMINOPHEN 325 MG/1
650 TABLET ORAL ONCE
Status: CANCELLED | OUTPATIENT
Start: 2020-11-05

## 2020-10-12 RX ORDER — BUDESONIDE AND FORMOTEROL FUMARATE DIHYDRATE 160; 4.5 UG/1; UG/1
2 AEROSOL RESPIRATORY (INHALATION) 2 TIMES DAILY
COMMUNITY
Start: 2020-09-17 | End: 2021-07-29

## 2020-10-12 RX ORDER — ACETAMINOPHEN 325 MG/1
650 TABLET ORAL ONCE
Status: COMPLETED | OUTPATIENT
Start: 2020-10-12 | End: 2020-10-12

## 2020-10-12 RX ORDER — DIPHENHYDRAMINE HCL 25 MG
25 CAPSULE ORAL ONCE
Status: COMPLETED | OUTPATIENT
Start: 2020-10-12 | End: 2020-10-12

## 2020-10-12 RX ADMIN — DIPHENHYDRAMINE HYDROCHLORIDE 25 MG: 25 CAPSULE ORAL at 10:51

## 2020-10-12 RX ADMIN — ACETAMINOPHEN 650 MG: 325 TABLET ORAL at 10:51

## 2020-10-12 RX ADMIN — NATALIZUMAB 300 MG: 300 INJECTION INTRAVENOUS at 10:52

## 2020-11-23 ENCOUNTER — INFUSION (OUTPATIENT)
Dept: ONCOLOGY | Facility: HOSPITAL | Age: 70
End: 2020-11-23

## 2020-11-23 VITALS
HEART RATE: 67 BPM | RESPIRATION RATE: 18 BRPM | SYSTOLIC BLOOD PRESSURE: 165 MMHG | TEMPERATURE: 97.4 F | DIASTOLIC BLOOD PRESSURE: 81 MMHG

## 2020-11-23 DIAGNOSIS — G35 MULTIPLE SCLEROSIS (HCC): Primary | ICD-10-CM

## 2020-11-23 LAB
BASOPHILS # BLD AUTO: 0.03 10*3/MM3 (ref 0–0.2)
BASOPHILS NFR BLD AUTO: 0.3 % (ref 0–1.5)
DEPRECATED RDW RBC AUTO: 51.8 FL (ref 37–54)
EOSINOPHIL # BLD AUTO: 0.07 10*3/MM3 (ref 0–0.4)
EOSINOPHIL NFR BLD AUTO: 0.7 % (ref 0.3–6.2)
ERYTHROCYTE [DISTWIDTH] IN BLOOD BY AUTOMATED COUNT: 14.9 % (ref 12.3–15.4)
HCT VFR BLD AUTO: 38.2 % (ref 34–46.6)
HGB BLD-MCNC: 12.3 G/DL (ref 12–15.9)
IMM GRANULOCYTES # BLD AUTO: 0.04 10*3/MM3 (ref 0–0.05)
IMM GRANULOCYTES NFR BLD AUTO: 0.4 % (ref 0–0.5)
LYMPHOCYTES # BLD AUTO: 2.76 10*3/MM3 (ref 0.7–3.1)
LYMPHOCYTES NFR BLD AUTO: 28 % (ref 19.6–45.3)
MCH RBC QN AUTO: 30.6 PG (ref 26.6–33)
MCHC RBC AUTO-ENTMCNC: 32.2 G/DL (ref 31.5–35.7)
MCV RBC AUTO: 95 FL (ref 79–97)
MONOCYTES # BLD AUTO: 0.55 10*3/MM3 (ref 0.1–0.9)
MONOCYTES NFR BLD AUTO: 5.6 % (ref 5–12)
NEUTROPHILS NFR BLD AUTO: 6.4 10*3/MM3 (ref 1.7–7)
NEUTROPHILS NFR BLD AUTO: 65 % (ref 42.7–76)
NRBC BLD AUTO-RTO: 0.2 /100 WBC (ref 0–0.2)
PLATELET # BLD AUTO: 267 10*3/MM3 (ref 140–450)
PMV BLD AUTO: 9.8 FL (ref 6–12)
RBC # BLD AUTO: 4.02 10*6/MM3 (ref 3.77–5.28)
WBC # BLD AUTO: 9.85 10*3/MM3 (ref 3.4–10.8)

## 2020-11-23 PROCEDURE — 63710000001 DIPHENHYDRAMINE PER 50 MG: Performed by: PSYCHIATRY & NEUROLOGY

## 2020-11-23 PROCEDURE — 96365 THER/PROPH/DIAG IV INF INIT: CPT

## 2020-11-23 PROCEDURE — 85025 COMPLETE CBC W/AUTO DIFF WBC: CPT

## 2020-11-23 PROCEDURE — 25010000002 NATALIZUMAB PER 1 MG: Performed by: NURSE PRACTITIONER

## 2020-11-23 RX ORDER — ACETAMINOPHEN 325 MG/1
650 TABLET ORAL ONCE
Status: CANCELLED | OUTPATIENT
Start: 2020-12-17

## 2020-11-23 RX ORDER — ACETAMINOPHEN 325 MG/1
650 TABLET ORAL ONCE
Status: COMPLETED | OUTPATIENT
Start: 2020-11-23 | End: 2020-11-23

## 2020-11-23 RX ORDER — DIPHENHYDRAMINE HCL 25 MG
25 CAPSULE ORAL ONCE
Status: CANCELLED | OUTPATIENT
Start: 2020-12-17

## 2020-11-23 RX ORDER — SODIUM CHLORIDE 9 MG/ML
250 INJECTION, SOLUTION INTRAVENOUS ONCE
Status: CANCELLED | OUTPATIENT
Start: 2020-12-17

## 2020-11-23 RX ORDER — DIPHENHYDRAMINE HCL 25 MG
25 CAPSULE ORAL ONCE
Status: COMPLETED | OUTPATIENT
Start: 2020-11-23 | End: 2020-11-23

## 2020-11-23 RX ADMIN — ACETAMINOPHEN 650 MG: 325 TABLET ORAL at 10:08

## 2020-11-23 RX ADMIN — DIPHENHYDRAMINE HYDROCHLORIDE 25 MG: 25 CAPSULE ORAL at 10:08

## 2020-11-23 RX ADMIN — NATALIZUMAB 300 MG: 300 INJECTION INTRAVENOUS at 10:09

## 2020-12-04 LAB
CONV INDEX VALUE: 3.33
INTERPRETATION: ABNORMAL
INTERPRETATION: ABNORMAL
JCV ANTIBODY: POSITIVE

## 2021-01-04 ENCOUNTER — INFUSION (OUTPATIENT)
Dept: ONCOLOGY | Facility: HOSPITAL | Age: 71
End: 2021-01-04

## 2021-01-04 ENCOUNTER — OFFICE VISIT (OUTPATIENT)
Dept: NEUROLOGY | Facility: CLINIC | Age: 71
End: 2021-01-04

## 2021-01-04 VITALS
DIASTOLIC BLOOD PRESSURE: 72 MMHG | HEART RATE: 67 BPM | SYSTOLIC BLOOD PRESSURE: 155 MMHG | RESPIRATION RATE: 16 BRPM | TEMPERATURE: 96.4 F

## 2021-01-04 VITALS
TEMPERATURE: 96.4 F | DIASTOLIC BLOOD PRESSURE: 62 MMHG | BODY MASS INDEX: 25.46 KG/M2 | HEIGHT: 67 IN | SYSTOLIC BLOOD PRESSURE: 129 MMHG | WEIGHT: 162.2 LBS

## 2021-01-04 DIAGNOSIS — G35 MULTIPLE SCLEROSIS (HCC): Primary | ICD-10-CM

## 2021-01-04 DIAGNOSIS — Z72.0 TOBACCO ABUSE: Chronic | ICD-10-CM

## 2021-01-04 DIAGNOSIS — G43.C0 PERIODIC HEADACHE SYNDROME, NOT INTRACTABLE: Chronic | ICD-10-CM

## 2021-01-04 DIAGNOSIS — G25.81 RESTLESS LEGS SYNDROME (RLS): Chronic | ICD-10-CM

## 2021-01-04 DIAGNOSIS — G35 MULTIPLE SCLEROSIS (HCC): Primary | Chronic | ICD-10-CM

## 2021-01-04 PROCEDURE — 25010000002 NATALIZUMAB PER 1 MG: Performed by: NURSE PRACTITIONER

## 2021-01-04 PROCEDURE — 99214 OFFICE O/P EST MOD 30 MIN: CPT | Performed by: PSYCHIATRY & NEUROLOGY

## 2021-01-04 PROCEDURE — 96365 THER/PROPH/DIAG IV INF INIT: CPT

## 2021-01-04 PROCEDURE — 63710000001 DIPHENHYDRAMINE PER 50 MG: Performed by: NURSE PRACTITIONER

## 2021-01-04 RX ORDER — DIPHENHYDRAMINE HCL 25 MG
25 CAPSULE ORAL ONCE
Status: CANCELLED | OUTPATIENT
Start: 2021-01-28

## 2021-01-04 RX ORDER — ACETAMINOPHEN 325 MG/1
650 TABLET ORAL ONCE
Status: CANCELLED | OUTPATIENT
Start: 2021-01-28

## 2021-01-04 RX ORDER — DIPHENHYDRAMINE HCL 25 MG
25 CAPSULE ORAL ONCE
Status: COMPLETED | OUTPATIENT
Start: 2021-01-04 | End: 2021-01-04

## 2021-01-04 RX ORDER — ACETAMINOPHEN 325 MG/1
650 TABLET ORAL ONCE
Status: COMPLETED | OUTPATIENT
Start: 2021-01-04 | End: 2021-01-04

## 2021-01-04 RX ORDER — ZOLPIDEM TARTRATE 5 MG/1
5 TABLET ORAL NIGHTLY PRN
COMMUNITY
Start: 2020-12-17

## 2021-01-04 RX ORDER — SODIUM CHLORIDE 9 MG/ML
250 INJECTION, SOLUTION INTRAVENOUS ONCE
Status: CANCELLED | OUTPATIENT
Start: 2021-01-28

## 2021-01-04 RX ADMIN — NATALIZUMAB 300 MG: 300 INJECTION INTRAVENOUS at 11:16

## 2021-01-04 RX ADMIN — DIPHENHYDRAMINE HYDROCHLORIDE 25 MG: 25 CAPSULE ORAL at 11:14

## 2021-01-04 RX ADMIN — ACETAMINOPHEN 650 MG: 325 TABLET ORAL at 11:14

## 2021-01-04 NOTE — ASSESSMENT & PLAN NOTE
Leatha Wall  reports that she has been smoking. She has a 40.00 pack-year smoking history. She has never used smokeless tobacco.. I have educated her on the risk of diseases from using tobacco products such as cancer, COPD and heart disease.     I advised her to quit and she is not willing to quit.    I spent 5 minutes counseling the patient.

## 2021-01-04 NOTE — PROGRESS NOTES
Chief Complaint  Multiple Sclerosis (6 month follow up )    Subjective    History of Present Illness      Leatha Wall presents to [facility] for RRMS, tob abuse, fatigue, migraines, and RLS.     History of Present Illness     Hep C treated     MRI Brain 6/25/20 as compared to 4/8/19 moderate to severe T2 lesion load, without new/enlarging or enhancing lesions, no cord lesions      11/23/20   JCV 3.33  CBC - NCS   5/21/20:   JCV 2.06;  CBC, CMP - NCS  2/22/19:   JCV 1.38  10/12/18  JCV 1.39  5/23/18    JCV 1.44  12/13/16: JCV 2.21  6/27/17    JCV 1.87        RRMS      Started on O2 for COPD.  Continues to smoke 1 ppd.      Started Tysabri 2/20/17 switched to CHENG.     Tolerating Tysabri without side effects.  .      Fatigue is moderate. Stopped Nuvigil due to nausea..      Not walking in cold weather.    Caring for 11 yoa grand dtr     Problem history:     MSFC remarkable for 26/110 o/w within normal results.  Oral numbness resolved.  Bladder urgency and frequency slight increase.    Mild N/T in bottom of feet      Migraines          No current HA.        Located in occiput and behind eyes.  Quality of tightness and throbbing.  Moderate severity.        RLS     GBP controlling sx.     Left left drawing improved with addition of GBP but now both feet are cramping at night.  Occurs during sleep.       PMH:     Fatigue and heat are bothersome.  Echo -   EF 60%  C U/S -  0-49% on right, no stenosis on left     MRI cervical, my review, unremarkable  Labs below:     NMO negative  Hypercoaguable panel WNL  Vit D 17.2     Fatigue continues to wax and wane.  Last few days has had unsteady gait. Weakness on left arm/leg.  Numbness in left arm and right foot.  Left side of tongue numb for the last 7 to 14 weeks.   Continue to smoke Tob 1 ppd.      Patient called and noted overwhelming fatigue.  After steroids fatigue improved but then recurred on 8/5/16.  Frequent HA's every other day frontal and  "occipital location of squeezing sensation of moderate severity.  Prednisone taper rx on 7/22/16 improved energy.       6/22/16 awoke at 3:30 am and noted left sided weakness in arm and leg.  Gait was unsteady and worsened over the next 7 days and presented to [facility] ED.  Fatigue was overwhelming.  MRI Brain in ED, my review, with multiple T2 white matter lesions and one large black hole in left PVWM.  Given steroids in ED with improvement in sx by 50%.     5/2016 had episode of left arm clumsiness for a week.       Reports increased fatigue since the first of the year.       Vision is fuzzy and feel swollen.       Bladder frequency.      Tob 1 ppd.  Objective   Vital Signs:   /62   Temp 96.4 °F (35.8 °C)   Ht 170.2 cm (67\")   Wt 73.6 kg (162 lb 3.2 oz)   BMI 25.40 kg/m²     Physical Exam  Neurological:      Gait: Gait is intact.        Neurologic Exam     Mental Status   Registration: recalls 3 of 3 objects. Recall at 5 minutes: recalls 3 of 3 objects. Follows 3 step commands.   Attention: normal. Concentration: normal.   Level of consciousness: alert  Knowledge: good and consistent with education.   Able to name object. Able to read. Able to repeat. Able to write. Normal comprehension.     Cranial Nerves     CN II   Visual fields full to confrontation.   Visual acuity: normal  Right visual field deficit: none  Left visual field deficit: none     CN III, IV, VI   Right pupil: Shape: regular. Reactivity: brisk. Consensual response: intact.   Left pupil: Shape: regular. Reactivity: brisk. Consensual response: intact.   Nystagmus: none   Diplopia: none  Ophthalmoparesis: none  Upgaze: normal  Downgaze: normal  Conjugate gaze: present  Vestibulo-ocular reflex: present    CN V   Right facial sensation deficit: forehead  Left facial sensation deficit: forehead  Right corneal reflex: normal  Left corneal reflex: normal    CN VII   Right facial weakness: none  Left facial weakness: none    CN VIII   Hearing: " intact    CN IX, X   Palate: symmetric  Right gag reflex: normal  Left gag reflex: normal    CN XI   Right sternocleidomastoid strength: normal  Left sternocleidomastoid strength: normal    CN XII   Tongue: not atrophic  Fasciculations: absent  Tongue deviation: none    Motor Exam   Muscle bulk: normal  Overall muscle tone: normal  Right arm tone: normal  Left arm tone: normal  Right leg tone: normal  Left leg tone: normal    Strength   Right neck flexion: 5/5  Left neck flexion: 5/5  Right neck extension: 5/5  Left neck extension: 5/5  Right deltoid: 5/5  Left deltoid: 5/5  Right biceps: 5/5  Left biceps: 5/5  Right triceps: 5/5  Left triceps: 5/5  Right wrist flexion: 5/5  Left wrist flexion: 5/5  Right wrist extension: 5/5  Left wrist extension: 5/5  Right interossei: 5/5  Left interossei: 5/5  Right abdominals: 5/5  Left abdominals: 5/5  Right iliopsoas: 5/5  Left iliopsoas: 5/5  Right quadriceps: 5/5  Left quadriceps: 5/5  Right hamstrin/5  Left hamstrin/5  Right glutei: 5/5  Left glutei: 5/5  Right anterior tibial: 5/5  Left anterior tibial: 5/5  Right posterior tibial: 5/5  Left posterior tibial: 5/5  Right peroneal: 5/5  Left peroneal: 5/5  Right gastroc: 5/5  Left gastroc: 5/5    Sensory Exam   Right arm light touch: normal  Right leg light touch: normal  Right arm vibration: normal  Right leg vibration: normal  Right arm proprioception: normal  Right leg proprioception: normal  Right arm pinprick: normal  Right leg pinprick: normal  Sensory deficit distribution on left: C6    Gait, Coordination, and Reflexes     Gait  Gait: normal ( )    Tremor   Resting tremor: absent  Intention tremor: absent  Action tremor: absent    Reflexes   Reflexes 2+ except as noted.   Right ankle clonus: absent  Left ankle clonus: absent        Result Review :   The following data was reviewed by: Umang Perez MD on 2021:  Common labs    Common Labsle 20    1004 1004    BUN  19     Creatinine  1.19 (A)    eGFR Non African Am  45 (A)    Sodium  138    Potassium  4.7    Chloride  99    Calcium  9.1    Albumin  4.10    Total Bilirubin  0.3    Alkaline Phosphatase  62    AST (SGOT)  14    ALT (SGPT)  6    WBC 16.91 (A)  9.85   Hemoglobin 13.1  12.3   Hematocrit 40.2  38.2   Platelets 310  267   (A) Abnormal value                      Assessment and Plan    Problem List Items Addressed This Visit        Other    Multiple sclerosis (CMS/HCC) - Primary (Chronic)    Current Assessment & Plan     No new or worsening sx on Tysabri CHENG    Infusing today.      CMP         Relevant Orders    Comprehensive Metabolic Panel    Periodic headache syndrome, not intractable (Chronic)    Current Assessment & Plan     Headaches are unchanged.  Continue current treatment regimen.             Tobacco abuse (Chronic)    Current Assessment & Plan     Leatha Wall  reports that she has been smoking. She has a 40.00 pack-year smoking history. She has never used smokeless tobacco.. I have educated her on the risk of diseases from using tobacco products such as cancer, COPD and heart disease.     I advised her to quit and she is not willing to quit.    I spent 5 minutes counseling the patient.               Restless legs syndrome (RLS) (Chronic)    Current Assessment & Plan     Improved on GBP                Follow Up   No follow-ups on file.  Patient was given instructions and counseling regarding her condition or for health maintenance advice. Please see specific information pulled into the AVS if appropriate.

## 2021-01-14 DIAGNOSIS — G35 MULTIPLE SCLEROSIS (HCC): ICD-10-CM

## 2021-01-14 RX ORDER — GABAPENTIN 300 MG/1
600 CAPSULE ORAL 2 TIMES DAILY
Qty: 360 CAPSULE | Refills: 1 | Status: SHIPPED | OUTPATIENT
Start: 2021-01-14 | End: 2021-07-29 | Stop reason: SDUPTHER

## 2021-01-14 NOTE — TELEPHONE ENCOUNTER
DELETE AFTER REVIEWING: Telephone encounter to be sent to the clinical pool.  If patient has less than a 3 day supply left, send the encounter HIGH Priority.    Caller: CUATE    Relationship: WITH [facility]    Best call back number: [phone]    Medication needed:   Requested Prescriptions     Pending Prescriptions Disp Refills   • gabapentin (NEURONTIN) 300 MG capsule 360 capsule 1     Sig: Take 2 capsules by mouth 2 (Two) Times a Day.       When do you need the refill by: ASAP    What details did the patient provide when requesting the medication: STATES PT IS TAKING 2 TABLETS (600MG) TWICE DAILY. LAST FILLED FOR QUANTITY . PT COMPLETELY OUT OF MEDICATION.    Does the patient have less than a 3 day supply:  [x] Yes  [] No    What is the patient's preferred pharmacy:    [facility]  FX: [phone]        DELETE AFTER READING TO PATIENT: “Thank you for sharing this information with me. I will send a message to the clinical team. Please allow 48 hours for the clinical staff to follow up on this request.”

## 2021-02-22 ENCOUNTER — APPOINTMENT (OUTPATIENT)
Dept: ONCOLOGY | Facility: HOSPITAL | Age: 71
End: 2021-02-22

## 2021-03-03 ENCOUNTER — APPOINTMENT (OUTPATIENT)
Dept: ONCOLOGY | Facility: HOSPITAL | Age: 71
End: 2021-03-03

## 2021-03-09 ENCOUNTER — INFUSION (OUTPATIENT)
Dept: ONCOLOGY | Facility: HOSPITAL | Age: 71
End: 2021-03-09

## 2021-03-09 VITALS
DIASTOLIC BLOOD PRESSURE: 79 MMHG | HEART RATE: 68 BPM | RESPIRATION RATE: 16 BRPM | TEMPERATURE: 97.7 F | SYSTOLIC BLOOD PRESSURE: 160 MMHG

## 2021-03-09 DIAGNOSIS — G35 MULTIPLE SCLEROSIS (HCC): Primary | ICD-10-CM

## 2021-03-09 PROCEDURE — 96365 THER/PROPH/DIAG IV INF INIT: CPT

## 2021-03-09 PROCEDURE — 25010000002 NATALIZUMAB PER 1 MG: Performed by: NURSE PRACTITIONER

## 2021-03-09 PROCEDURE — 63710000001 DIPHENHYDRAMINE PER 50 MG: Performed by: NURSE PRACTITIONER

## 2021-03-09 RX ORDER — ACETAMINOPHEN 325 MG/1
650 TABLET ORAL ONCE
Status: CANCELLED | OUTPATIENT
Start: 2021-03-11

## 2021-03-09 RX ORDER — DIPHENHYDRAMINE HCL 25 MG
25 CAPSULE ORAL ONCE
Status: COMPLETED | OUTPATIENT
Start: 2021-03-09 | End: 2021-03-09

## 2021-03-09 RX ORDER — ACETAMINOPHEN 325 MG/1
650 TABLET ORAL ONCE
Status: COMPLETED | OUTPATIENT
Start: 2021-03-09 | End: 2021-03-09

## 2021-03-09 RX ORDER — SODIUM CHLORIDE 9 MG/ML
250 INJECTION, SOLUTION INTRAVENOUS ONCE
Status: CANCELLED | OUTPATIENT
Start: 2021-03-11

## 2021-03-09 RX ORDER — DIPHENHYDRAMINE HCL 25 MG
25 CAPSULE ORAL ONCE
Status: CANCELLED | OUTPATIENT
Start: 2021-03-11

## 2021-03-09 RX ADMIN — DIPHENHYDRAMINE HYDROCHLORIDE 25 MG: 25 CAPSULE ORAL at 08:51

## 2021-03-09 RX ADMIN — NATALIZUMAB 300 MG: 300 INJECTION INTRAVENOUS at 08:51

## 2021-03-09 RX ADMIN — ACETAMINOPHEN 650 MG: 325 TABLET ORAL at 08:51

## 2021-03-17 DIAGNOSIS — I71.40 ABDOMINAL AORTIC ANEURYSM (AAA) WITHOUT RUPTURE (HCC): Primary | ICD-10-CM

## 2021-04-20 ENCOUNTER — APPOINTMENT (OUTPATIENT)
Dept: ONCOLOGY | Facility: HOSPITAL | Age: 71
End: 2021-04-20

## 2021-04-30 DIAGNOSIS — G35 MULTIPLE SCLEROSIS (HCC): Primary | ICD-10-CM

## 2021-05-05 ENCOUNTER — INFUSION (OUTPATIENT)
Dept: ONCOLOGY | Facility: HOSPITAL | Age: 71
End: 2021-05-05

## 2021-05-05 VITALS
HEART RATE: 71 BPM | DIASTOLIC BLOOD PRESSURE: 99 MMHG | TEMPERATURE: 97.8 F | RESPIRATION RATE: 18 BRPM | SYSTOLIC BLOOD PRESSURE: 145 MMHG

## 2021-05-05 DIAGNOSIS — G35 MULTIPLE SCLEROSIS (HCC): Primary | ICD-10-CM

## 2021-05-05 LAB
ALBUMIN SERPL-MCNC: 3.9 G/DL (ref 3.5–5.2)
ALBUMIN/GLOB SERPL: 0.9 G/DL
ALP SERPL-CCNC: 90 U/L (ref 39–117)
ALT SERPL W P-5'-P-CCNC: 6 U/L (ref 1–33)
ANION GAP SERPL CALCULATED.3IONS-SCNC: 11 MMOL/L (ref 5–15)
AST SERPL-CCNC: 14 U/L (ref 1–32)
BASOPHILS # BLD AUTO: 0.03 10*3/MM3 (ref 0–0.2)
BASOPHILS NFR BLD AUTO: 0.3 % (ref 0–1.5)
BILIRUB SERPL-MCNC: 0.3 MG/DL (ref 0–1.2)
BUN SERPL-MCNC: 11 MG/DL (ref 8–23)
BUN/CREAT SERPL: 8.8 (ref 7–25)
CALCIUM SPEC-SCNC: 9.5 MG/DL (ref 8.6–10.5)
CHLORIDE SERPL-SCNC: 100 MMOL/L (ref 98–107)
CO2 SERPL-SCNC: 26 MMOL/L (ref 22–29)
CREAT SERPL-MCNC: 1.25 MG/DL (ref 0.57–1)
DEPRECATED RDW RBC AUTO: 53.6 FL (ref 37–54)
EOSINOPHIL # BLD AUTO: 0.16 10*3/MM3 (ref 0–0.4)
EOSINOPHIL NFR BLD AUTO: 1.5 % (ref 0.3–6.2)
ERYTHROCYTE [DISTWIDTH] IN BLOOD BY AUTOMATED COUNT: 14.8 % (ref 12.3–15.4)
GFR SERPL CREATININE-BSD FRML MDRD: 43 ML/MIN/1.73
GLOBULIN UR ELPH-MCNC: 4.2 GM/DL
GLUCOSE SERPL-MCNC: 99 MG/DL (ref 65–99)
HCT VFR BLD AUTO: 40.3 % (ref 34–46.6)
HGB BLD-MCNC: 12.4 G/DL (ref 12–15.9)
IMM GRANULOCYTES # BLD AUTO: 0.08 10*3/MM3 (ref 0–0.05)
IMM GRANULOCYTES NFR BLD AUTO: 0.7 % (ref 0–0.5)
LYMPHOCYTES # BLD AUTO: 2.31 10*3/MM3 (ref 0.7–3.1)
LYMPHOCYTES NFR BLD AUTO: 21.5 % (ref 19.6–45.3)
MCH RBC QN AUTO: 30.4 PG (ref 26.6–33)
MCHC RBC AUTO-ENTMCNC: 30.8 G/DL (ref 31.5–35.7)
MCV RBC AUTO: 98.8 FL (ref 79–97)
MONOCYTES # BLD AUTO: 0.55 10*3/MM3 (ref 0.1–0.9)
MONOCYTES NFR BLD AUTO: 5.1 % (ref 5–12)
NEUTROPHILS NFR BLD AUTO: 7.59 10*3/MM3 (ref 1.7–7)
NEUTROPHILS NFR BLD AUTO: 70.9 % (ref 42.7–76)
NRBC BLD AUTO-RTO: 0 /100 WBC (ref 0–0.2)
PLATELET # BLD AUTO: 382 10*3/MM3 (ref 140–450)
PMV BLD AUTO: 9.5 FL (ref 6–12)
POTASSIUM SERPL-SCNC: 4.4 MMOL/L (ref 3.5–5.2)
PROT SERPL-MCNC: 8.1 G/DL (ref 6–8.5)
RBC # BLD AUTO: 4.08 10*6/MM3 (ref 3.77–5.28)
SODIUM SERPL-SCNC: 137 MMOL/L (ref 136–145)
WBC # BLD AUTO: 10.72 10*3/MM3 (ref 3.4–10.8)

## 2021-05-05 PROCEDURE — 85025 COMPLETE CBC W/AUTO DIFF WBC: CPT

## 2021-05-05 PROCEDURE — 80053 COMPREHEN METABOLIC PANEL: CPT

## 2021-05-05 PROCEDURE — 96365 THER/PROPH/DIAG IV INF INIT: CPT

## 2021-05-05 PROCEDURE — 25010000002 NATALIZUMAB PER 1 MG: Performed by: NURSE PRACTITIONER

## 2021-05-05 PROCEDURE — 63710000001 DIPHENHYDRAMINE PER 50 MG: Performed by: NURSE PRACTITIONER

## 2021-05-05 RX ORDER — ACETAMINOPHEN 325 MG/1
650 TABLET ORAL ONCE
Status: COMPLETED | OUTPATIENT
Start: 2021-05-05 | End: 2021-05-05

## 2021-05-05 RX ORDER — DIPHENHYDRAMINE HCL 25 MG
25 CAPSULE ORAL ONCE
Status: COMPLETED | OUTPATIENT
Start: 2021-05-05 | End: 2021-05-05

## 2021-05-05 RX ORDER — ACETAMINOPHEN 325 MG/1
650 TABLET ORAL ONCE
Status: CANCELLED | OUTPATIENT
Start: 2021-06-01

## 2021-05-05 RX ORDER — DIPHENHYDRAMINE HCL 25 MG
25 CAPSULE ORAL ONCE
Status: CANCELLED | OUTPATIENT
Start: 2021-06-01

## 2021-05-05 RX ORDER — BUDESONIDE, GLYCOPYRROLATE, AND FORMOTEROL FUMARATE 160; 9; 4.8 UG/1; UG/1; UG/1
2 AEROSOL, METERED RESPIRATORY (INHALATION) 2 TIMES DAILY
COMMUNITY
Start: 2021-03-17

## 2021-05-05 RX ORDER — SODIUM CHLORIDE 9 MG/ML
250 INJECTION, SOLUTION INTRAVENOUS ONCE
Status: CANCELLED | OUTPATIENT
Start: 2021-06-01

## 2021-05-05 RX ADMIN — DIPHENHYDRAMINE HYDROCHLORIDE 25 MG: 25 CAPSULE ORAL at 10:29

## 2021-05-05 RX ADMIN — ACETAMINOPHEN 650 MG: 325 TABLET ORAL at 10:29

## 2021-05-05 RX ADMIN — NATALIZUMAB 300 MG: 300 INJECTION INTRAVENOUS at 10:31

## 2021-05-06 ENCOUNTER — TELEPHONE (OUTPATIENT)
Dept: NEUROLOGY | Facility: CLINIC | Age: 71
End: 2021-05-06

## 2021-05-06 NOTE — TELEPHONE ENCOUNTER
----- Message from Alesha Nguyen sent at 5/6/2021  2:31 PM EDT -----  Regarding: MEDICATIONS  Contact: [phone]  Patient states PCP prescribed doxycycline and prednisone today 05/06/2021 and she would like to know if it's okay to start medication. She states she infused tysabri yesterday 05/05/2021

## 2021-05-17 LAB
CONV INDEX VALUE: 3.05
INTERPRETATION: ABNORMAL
INTERPRETATION: ABNORMAL
JCPYV AB SERPL QL IA: POSITIVE

## 2021-05-26 ENCOUNTER — OFFICE VISIT (OUTPATIENT)
Dept: CARDIAC SURGERY | Facility: CLINIC | Age: 71
End: 2021-05-26

## 2021-05-26 VITALS
SYSTOLIC BLOOD PRESSURE: 139 MMHG | HEART RATE: 79 BPM | OXYGEN SATURATION: 95 % | WEIGHT: 158 LBS | DIASTOLIC BLOOD PRESSURE: 78 MMHG | BODY MASS INDEX: 24.8 KG/M2 | TEMPERATURE: 97 F | HEIGHT: 67 IN

## 2021-05-26 DIAGNOSIS — I71.40 ABDOMINAL AORTIC ANEURYSM (AAA) WITHOUT RUPTURE (HCC): Primary | ICD-10-CM

## 2021-05-26 PROCEDURE — 99212 OFFICE O/P EST SF 10 MIN: CPT | Performed by: THORACIC SURGERY (CARDIOTHORACIC VASCULAR SURGERY)

## 2021-05-26 NOTE — PROGRESS NOTES
"05/26/2021  Patient Information  Leatha Wall                                                                                          [address] [facility] [address]   1950  'PCP/Referring Physician'  Clare Edmonds, APRN  [phone]  No ref. provider found    Chief Complaint   Patient presents with   • Follow-up     1 yr f/u for U/S aorta        History of Present Illness:   The patient is a 70-year-old female who presents at this time for a follow-up for an abdominal aortic aneurysm.  She has had no abdominal pain or back pain or other problems.  She has had some problems with her lung since follow-up. She is following closely with her primary care provider.      Patient Active Problem List   Diagnosis   • Multiple sclerosis (CMS/HCC)   • Periodic headache syndrome, not intractable   • Hepatitis C antibody test positive   • Lactic acidosis   • Nausea vomiting and diarrhea   • Colitis   • Renal cyst   • Tobacco abuse   • Abdominal aortic aneurysm (AAA) without rupture (CMS/HCC)   • Chronic fatigue   • Restless legs syndrome (RLS)     Past Medical History:   Diagnosis Date   • Aneurysm of aorta (CMS/HCC)    • Anxiety and depression    • Arthritis    • Back disorder     \"PROTRUDING DISC\"   • COPD (chronic obstructive pulmonary disease) (CMS/HCC)    • Hepatitis C    • Hypertension    • MS (multiple sclerosis) (CMS/HCC)      Past Surgical History:   Procedure Laterality Date   • CHOLECYSTECTOMY     • HYSTERECTOMY     • SMALL INTESTINE SURGERY      As a child   • TOE SURGERY Left     Great   • TONSILLECTOMY      and Adnoids   • TUBAL ABDOMINAL LIGATION         Current Outpatient Medications:   •  albuterol sulfate  (90 Base) MCG/ACT inhaler, , Disp: , Rfl:   •  amLODIPine (NORVASC) 5 MG tablet, Take 5 mg by mouth Every Night., Disp: , Rfl:   •  benazepril (LOTENSIN) 40 MG tablet, Take 1 tablet by mouth Every Night., Disp: , Rfl:   •  Breztri Aerosphere 160-9-4.8 MCG/ACT aerosol inhaler, 2 puffs 2 " (Two) Times a Day., Disp: , Rfl:   •  gabapentin (NEURONTIN) 300 MG capsule, Take 2 capsules by mouth 2 (Two) Times a Day., Disp: 360 capsule, Rfl: 1  •  HYDROcodone-acetaminophen (NORCO)  MG per tablet, Take 1 tablet by mouth 2 (two) times a day., Disp: , Rfl:   •  natalizumab (TYSABRI) 300 MG/15ML injection, Infuse  into a venous catheter., Disp: , Rfl:   •  oxybutynin (DITROPAN) 5 MG tablet, Take 5 mg by mouth 2 (Two) Times a Day., Disp: , Rfl:   •  PHARMACY MEDS TO BED CONSULT, Daily., Disp: , Rfl:   •  zolpidem (AMBIEN) 5 MG tablet, Take 5 mg by mouth At Night As Needed., Disp: , Rfl:   •  fluticasone (FLONASE) 50 MCG/ACT nasal spray, INHALE ONE SPRAY in each NOSTRIL TWICE DAILY, Disp: , Rfl:   •  Spiriva Respimat 2.5 MCG/ACT aerosol solution inhaler, inhale TWO puffs BY MOUTH ONCE A DAY, Disp: , Rfl:   •  Symbicort 160-4.5 MCG/ACT inhaler, Inhale 2 puffs 2 (Two) Times a Day., Disp: , Rfl:   Allergies   Allergen Reactions   • Penicillins Other (See Comments)     WAS A YOUNG CHILD AND WAS NEVER TOLD WHAT HER REACTION WAS   • Sulfa Antibiotics Other (See Comments)     WAS A YOUNG CHILD AND WAS NEVER TOLD WHAT HER REACTION WAS     Social History     Socioeconomic History   • Marital status:      Spouse name: Not on file   • Number of children: 3   • Years of education: Not on file   • Highest education level: Not on file   Tobacco Use   • Smoking status: Current Every Day Smoker     Packs/day: 1.00     Years: 40.00     Pack years: 40.00     Types: Cigarettes   • Smokeless tobacco: Never Used   Substance and Sexual Activity   • Alcohol use: No   • Drug use: No   • Sexual activity: Defer     Family History   Problem Relation Age of Onset   • Lung cancer Father    • Diabetes Father    • Diabetes Brother    • Hypertension Brother    • Coronary artery disease Mother    • Heart attack Mother      Review of Systems   Constitutional: Negative for chills, fever, malaise/fatigue, night sweats and weight loss.  "  HENT: Negative for hearing loss, odynophagia and sore throat.    Cardiovascular: Positive for chest pain and dyspnea on exertion. Negative for leg swelling, orthopnea and palpitations.   Respiratory: Positive for cough and shortness of breath.    Hematologic/Lymphatic: Bruises/bleeds easily.   Skin: Negative for itching and rash.   Musculoskeletal: Positive for muscle cramps. Negative for arthritis, joint pain, joint swelling and myalgias.   Gastrointestinal: Negative for abdominal pain, constipation, diarrhea, hematemesis, hematochezia, nausea and vomiting.   Genitourinary: Negative for dysuria, frequency and hematuria.   Neurological: Positive for numbness. Negative for focal weakness, headaches and seizures.   Psychiatric/Behavioral: Negative for suicidal ideas.     Vitals:    05/26/21 0801   BP: 139/78   BP Location: Left arm   Pulse: 79   Temp: 97 °F (36.1 °C)   SpO2: 95%   Weight: 71.7 kg (158 lb)   Height: 170.2 cm (67\")      Physical Exam  Vitals and nursing note reviewed.   Cardiovascular:      Rate and Rhythm: Normal rate and regular rhythm.      Pulses: Normal pulses.      Heart sounds: Normal heart sounds.   Pulmonary:      Effort: Pulmonary effort is normal.      Breath sounds: Normal breath sounds.   Abdominal:      General: Abdomen is flat. Bowel sounds are normal.      Palpations: Abdomen is soft.   Musculoskeletal:      Cervical back: Normal range of motion and neck supple.         The ROS, past medical history, surgical history, family history, social history and vitals were reviewed by myself and corrected as needed.      Assessment/Plan:  The patient is a 70-year-old female who is here today for follow-up of an abdominal aortic aneurysm. She appears to be doing satisfactory.  Her ultrasound reveals her abdominal aorta to be 2.9 cm.  We will continue to watch her.  We will see her back in 2 years with an ultrasound at that time.    Patient Active Problem List   Diagnosis   • Multiple sclerosis " (CMS/HCC)   • Periodic headache syndrome, not intractable   • Hepatitis C antibody test positive   • Lactic acidosis   • Nausea vomiting and diarrhea   • Colitis   • Renal cyst   • Tobacco abuse   • Abdominal aortic aneurysm (AAA) without rupture (CMS/HCC)   • Chronic fatigue   • Restless legs syndrome (RLS)     CC: KAYLIN Mckeon editing for Deepak Connolly MD    I, Deepak Connolly MD, have read and agree with the editing done by Iraida Guzman, .

## 2021-06-16 ENCOUNTER — INFUSION (OUTPATIENT)
Dept: ONCOLOGY | Facility: HOSPITAL | Age: 71
End: 2021-06-16

## 2021-06-16 VITALS
SYSTOLIC BLOOD PRESSURE: 131 MMHG | TEMPERATURE: 97.6 F | DIASTOLIC BLOOD PRESSURE: 65 MMHG | RESPIRATION RATE: 18 BRPM | HEART RATE: 74 BPM

## 2021-06-16 DIAGNOSIS — G35 MULTIPLE SCLEROSIS (HCC): Primary | ICD-10-CM

## 2021-06-16 PROCEDURE — 96366 THER/PROPH/DIAG IV INF ADDON: CPT

## 2021-06-16 PROCEDURE — 96365 THER/PROPH/DIAG IV INF INIT: CPT

## 2021-06-16 PROCEDURE — 63710000001 DIPHENHYDRAMINE PER 50 MG: Performed by: NURSE PRACTITIONER

## 2021-06-16 PROCEDURE — 25010000002 NATALIZUMAB PER 1 MG: Performed by: NURSE PRACTITIONER

## 2021-06-16 RX ORDER — DIPHENHYDRAMINE HCL 25 MG
25 CAPSULE ORAL ONCE
Status: CANCELLED | OUTPATIENT
Start: 2021-07-15

## 2021-06-16 RX ORDER — ACETAMINOPHEN 325 MG/1
650 TABLET ORAL ONCE
Status: COMPLETED | OUTPATIENT
Start: 2021-06-16 | End: 2021-06-16

## 2021-06-16 RX ORDER — SODIUM CHLORIDE 9 MG/ML
250 INJECTION, SOLUTION INTRAVENOUS ONCE
Status: DISCONTINUED | OUTPATIENT
Start: 2021-06-16 | End: 2021-06-16 | Stop reason: HOSPADM

## 2021-06-16 RX ORDER — ACETAMINOPHEN 325 MG/1
650 TABLET ORAL ONCE
Status: CANCELLED | OUTPATIENT
Start: 2021-07-15

## 2021-06-16 RX ORDER — SODIUM CHLORIDE 9 MG/ML
250 INJECTION, SOLUTION INTRAVENOUS ONCE
Status: CANCELLED | OUTPATIENT
Start: 2021-07-15

## 2021-06-16 RX ORDER — DIPHENHYDRAMINE HCL 25 MG
25 CAPSULE ORAL ONCE
Status: COMPLETED | OUTPATIENT
Start: 2021-06-16 | End: 2021-06-16

## 2021-06-16 RX ADMIN — ACETAMINOPHEN 650 MG: 325 TABLET ORAL at 10:09

## 2021-06-16 RX ADMIN — NATALIZUMAB 300 MG: 300 INJECTION INTRAVENOUS at 10:11

## 2021-06-16 RX ADMIN — DIPHENHYDRAMINE HYDROCHLORIDE 25 MG: 25 CAPSULE ORAL at 10:09

## 2021-07-29 ENCOUNTER — INFUSION (OUTPATIENT)
Dept: ONCOLOGY | Facility: HOSPITAL | Age: 71
End: 2021-07-29

## 2021-07-29 ENCOUNTER — OFFICE VISIT (OUTPATIENT)
Dept: NEUROLOGY | Facility: CLINIC | Age: 71
End: 2021-07-29

## 2021-07-29 VITALS
SYSTOLIC BLOOD PRESSURE: 126 MMHG | WEIGHT: 151 LBS | HEART RATE: 77 BPM | BODY MASS INDEX: 23.7 KG/M2 | DIASTOLIC BLOOD PRESSURE: 76 MMHG | OXYGEN SATURATION: 93 % | HEIGHT: 67 IN

## 2021-07-29 VITALS
HEART RATE: 65 BPM | TEMPERATURE: 97.3 F | SYSTOLIC BLOOD PRESSURE: 151 MMHG | DIASTOLIC BLOOD PRESSURE: 74 MMHG | RESPIRATION RATE: 20 BRPM

## 2021-07-29 DIAGNOSIS — G35 MULTIPLE SCLEROSIS (HCC): Primary | ICD-10-CM

## 2021-07-29 DIAGNOSIS — G25.81 RESTLESS LEGS SYNDROME (RLS): Chronic | ICD-10-CM

## 2021-07-29 DIAGNOSIS — G43.C0 PERIODIC HEADACHE SYNDROME, NOT INTRACTABLE: Chronic | ICD-10-CM

## 2021-07-29 DIAGNOSIS — Z72.0 TOBACCO ABUSE: Chronic | ICD-10-CM

## 2021-07-29 DIAGNOSIS — G35 MULTIPLE SCLEROSIS (HCC): Primary | Chronic | ICD-10-CM

## 2021-07-29 PROCEDURE — 99214 OFFICE O/P EST MOD 30 MIN: CPT | Performed by: PSYCHIATRY & NEUROLOGY

## 2021-07-29 PROCEDURE — 25010000002 NATALIZUMAB PER 1 MG: Performed by: NURSE PRACTITIONER

## 2021-07-29 PROCEDURE — 96365 THER/PROPH/DIAG IV INF INIT: CPT

## 2021-07-29 PROCEDURE — 63710000001 DIPHENHYDRAMINE PER 50 MG: Performed by: NURSE PRACTITIONER

## 2021-07-29 RX ORDER — GABAPENTIN 300 MG/1
900 CAPSULE ORAL 2 TIMES DAILY
Qty: 540 CAPSULE | Refills: 1 | Status: SHIPPED | OUTPATIENT
Start: 2021-07-29 | End: 2022-05-19 | Stop reason: SDUPTHER

## 2021-07-29 RX ORDER — DIPHENHYDRAMINE HCL 25 MG
25 CAPSULE ORAL ONCE
Status: CANCELLED | OUTPATIENT
Start: 2021-09-08

## 2021-07-29 RX ORDER — DIPHENHYDRAMINE HCL 25 MG
25 CAPSULE ORAL ONCE
Status: COMPLETED | OUTPATIENT
Start: 2021-07-29 | End: 2021-07-29

## 2021-07-29 RX ORDER — ACETAMINOPHEN 325 MG/1
650 TABLET ORAL ONCE
Status: COMPLETED | OUTPATIENT
Start: 2021-07-29 | End: 2021-07-29

## 2021-07-29 RX ORDER — SODIUM CHLORIDE 9 MG/ML
250 INJECTION, SOLUTION INTRAVENOUS ONCE
Status: CANCELLED | OUTPATIENT
Start: 2021-09-08

## 2021-07-29 RX ORDER — ACETAMINOPHEN 325 MG/1
650 TABLET ORAL ONCE
Status: CANCELLED | OUTPATIENT
Start: 2021-09-08

## 2021-07-29 RX ORDER — NALOXONE HYDROCHLORIDE 4 MG/.1ML
SPRAY NASAL
COMMUNITY
Start: 2021-05-07

## 2021-07-29 RX ADMIN — DIPHENHYDRAMINE HYDROCHLORIDE 25 MG: 25 CAPSULE ORAL at 09:36

## 2021-07-29 RX ADMIN — NATALIZUMAB 300 MG: 300 INJECTION INTRAVENOUS at 09:37

## 2021-07-29 RX ADMIN — ACETAMINOPHEN 650 MG: 325 TABLET ORAL at 09:36

## 2021-07-29 NOTE — PROGRESS NOTES
Chief Complaint  Multiple Sclerosis    Subjective          Leatha Wall presents to [facility] NEUROLOGY     History of Present Illness    71 y.o. female returns in follow up.  Last visit on 1/4/21 continued Tysabri CHENG, ordered labs, GBP.     Hep C treated     MRI Brain 6/25/20 as compared to 4/8/19 moderate to severe T2 lesion load, without new/enlarging or enhancing lesions, no cord lesions        5/5/21       JCV 3.05, CBC,CMP - NCS  11/23/20   JCV 3.33  CBC - NCS   5/21/20:   JCV 2.06;  CBC, CMP - NCS  2/22/19:   JCV 1.38  10/12/18  JCV 1.39  5/23/18    JCV 1.44  12/13/16: JCV 2.21  6/27/17    JCV 1.87       RRMS      O2 qhs for COPD.  Continues to smoke 1 ppd.       Started Tysabri 2/20/17 switched to CHENG.     Right sided pain, gait unsteady worsening in last few months.     Tolerating Tysabri without side effects.  .      Fatigue is moderate. Stopped Nuvigil due to nausea..         Problem history:     MSFC remarkable for 26/110 o/w within normal results.  Oral numbness resolved.  Bladder urgency and frequency slight increase.    Mild N/T in bottom of feet      Migraines         HA are minor.      Located in occiput and behind eyes.  Quality of tightness and throbbing.  Moderate severity.        RLS     Right LE worsening sx.     GBP controlling sx.      Left left drawing improved with addition of GBP but now both feet are cramping at night.  Occurs during sleep.       PMH:     Fatigue and heat are bothersome.  Echo -   EF 60%  C U/S -  0-49% on right, no stenosis on left     MRI cervical, my review, unremarkable  Labs below:     NMO negative  Hypercoaguable panel WNL  Vit D 17.2     Fatigue continues to wax and wane.  Last few days has had unsteady gait. Weakness on left arm/leg.  Numbness in left arm and right foot.  Left side of tongue numb for the last 7 to 14 weeks.   Continue to smoke Tob 1 ppd.      Patient called and noted overwhelming fatigue.  After steroids fatigue improved  "but then recurred on 8/5/16.  Frequent HA's every other day frontal and occipital location of squeezing sensation of moderate severity.  Prednisone taper rx on 7/22/16 improved energy.       6/22/16 awoke at 3:30 am and noted left sided weakness in arm and leg.  Gait was unsteady and worsened over the next 7 days and presented to [facility] ED.  Fatigue was overwhelming.  MRI Brain in ED, my review, with multiple T2 white matter lesions and one large black hole in left PVWM.  Given steroids in ED with improvement in sx by 50%.     5/2016 had episode of left arm clumsiness for a week.       Reports increased fatigue since the first of the year.       Vision is fuzzy and feel swollen.       Bladder frequency.      Tob 1 ppd.    Objective   Vital Signs:   /76   Pulse 77   Ht 170.2 cm (67.01\")   Wt 68.5 kg (151 lb)   SpO2 93%   BMI 23.64 kg/m²     Physical Exam  Neurological:      Deep Tendon Reflexes:      Reflex Scores:       Tricep reflexes are 3+ on the right side and 3+ on the left side.       Bicep reflexes are 3+ on the right side and 3+ on the left side.       Brachioradialis reflexes are 3+ on the right side and 3+ on the left side.       Patellar reflexes are 3+ on the right side and 3+ on the left side.         Neurologic Exam     Mental Status   Registration: recalls 3 of 3 objects. Recall at 5 minutes: recalls 3 of 3 objects. Follows 3 step commands.   Attention: normal. Concentration: normal.   Level of consciousness: alert  Knowledge: good and consistent with education.   Able to name object. Able to read. Able to repeat. Able to write. Normal comprehension.     Cranial Nerves     CN II   Visual fields full to confrontation.   Visual acuity: normal  Right visual field deficit: none  Left visual field deficit: none     CN III, IV, VI   Right pupil: Shape: regular. Reactivity: brisk. Consensual response: intact.   Left pupil: Shape: regular. Reactivity: brisk. Consensual response: intact.   Nystagmus: " none   Diplopia: none  Ophthalmoparesis: none  Upgaze: normal  Downgaze: normal  Conjugate gaze: present  Vestibulo-ocular reflex: present    CN V   Right facial sensation deficit: forehead  Left facial sensation deficit: forehead  Right corneal reflex: normal  Left corneal reflex: normal    CN VII   Right facial weakness: none  Left facial weakness: none    CN VIII   Hearing: intact    CN IX, X   Palate: symmetric  Right gag reflex: normal  Left gag reflex: normal    CN XI   Right sternocleidomastoid strength: normal  Left sternocleidomastoid strength: normal    CN XII   Tongue: not atrophic  Fasciculations: absent  Tongue deviation: none    Motor Exam   Muscle bulk: normal  Overall muscle tone: normal  Right arm tone: normal  Left arm tone: normal  Right leg tone: normal  Left leg tone: normal    Strength   Right neck flexion: 5/5  Left neck flexion: 5/5  Right neck extension: 5/5  Left neck extension: 5/5  Right deltoid: 5/5  Left deltoid: 5/5  Right biceps: 5/5  Left biceps: 5/5  Right triceps: 5/5  Left triceps: 5/5  Right wrist flexion: 5/5  Left wrist flexion: 5/5  Right wrist extension: 5/5  Left wrist extension: 5/5  Right interossei: 5/5  Left interossei: 5/5  Right abdominals: 5/5  Left abdominals: 5/5  Right iliopsoas: 5/5  Left iliopsoas: 5/5  Right quadriceps: 5/5  Left quadriceps: 5/5  Right hamstrin/5  Left hamstrin/5  Right glutei: 5/5  Left glutei: 5/5  Right anterior tibial: 5/5  Left anterior tibial: 5/5  Right posterior tibial: 5/5  Left posterior tibial: 5/5  Right peroneal: 5/5  Left peroneal: 5/5  Right gastroc: 5/5  Left gastroc: 5/5    Sensory Exam   Right arm light touch: normal  Right leg light touch: normal  Right arm vibration: normal  Right leg vibration: normal  Right arm proprioception: normal  Right leg proprioception: normal  Right arm pinprick: normal  Right leg pinprick: normal  Sensory deficit distribution on left: C6    Gait, Coordination, and Reflexes     Gait  Gait:  wide-based ( )    Tremor   Resting tremor: absent  Intention tremor: absent  Action tremor: absent    Reflexes   Reflexes 2+ except as noted.   Right brachioradialis: 3+  Left brachioradialis: 3+  Right biceps: 3+  Left biceps: 3+  Right triceps: 3+  Left triceps: 3+  Right patellar: 3+  Left patellar: 3+  Right ankle clonus: present  Left ankle clonus: absent  Left pendular knee jerk: present       Result Review :                 Assessment and Plan    Diagnoses and all orders for this visit:    1. Multiple sclerosis (CMS/HCC) (Primary)  Assessment & Plan:  Worsening sx with B clonus at ankles    MRI B/C    Continue Tysabri     Orders:  -     MRI Brain With & Without Contrast; Future  -     MRI Cervical Spine With & Without Contrast; Future  -     gabapentin (NEURONTIN) 300 MG capsule; Take 3 capsules by mouth 2 (Two) Times a Day.  Dispense: 540 capsule; Refill: 1    2. Periodic headache syndrome, not intractable  Assessment & Plan:  Headaches are improving with treatment.  Continue current treatment regimen.          3. Restless legs syndrome (RLS)  Assessment & Plan:  Worsening sx    Increase  mg BID      4. Tobacco abuse      Follow Up   No follow-ups on file.  Patient was given instructions and counseling regarding her condition or for health maintenance advice. Please see specific information pulled into the AVS if appropriate.

## 2021-09-09 ENCOUNTER — INFUSION (OUTPATIENT)
Dept: ONCOLOGY | Facility: HOSPITAL | Age: 71
End: 2021-09-09

## 2021-09-09 VITALS
RESPIRATION RATE: 20 BRPM | TEMPERATURE: 97.3 F | BODY MASS INDEX: 23.93 KG/M2 | HEART RATE: 61 BPM | DIASTOLIC BLOOD PRESSURE: 75 MMHG | WEIGHT: 152.8 LBS | SYSTOLIC BLOOD PRESSURE: 155 MMHG

## 2021-09-09 DIAGNOSIS — G35 MULTIPLE SCLEROSIS (HCC): Primary | ICD-10-CM

## 2021-09-09 PROCEDURE — 25010000002 NATALIZUMAB PER 1 MG: Performed by: NURSE PRACTITIONER

## 2021-09-09 PROCEDURE — 63710000001 DIPHENHYDRAMINE PER 50 MG: Performed by: NURSE PRACTITIONER

## 2021-09-09 PROCEDURE — 96365 THER/PROPH/DIAG IV INF INIT: CPT

## 2021-09-09 RX ORDER — ACETAMINOPHEN 325 MG/1
650 TABLET ORAL ONCE
Status: CANCELLED | OUTPATIENT
Start: 2021-10-20

## 2021-09-09 RX ORDER — SODIUM CHLORIDE 9 MG/ML
250 INJECTION, SOLUTION INTRAVENOUS ONCE
Status: CANCELLED | OUTPATIENT
Start: 2021-10-20

## 2021-09-09 RX ORDER — DIPHENHYDRAMINE HCL 25 MG
25 CAPSULE ORAL ONCE
Status: CANCELLED | OUTPATIENT
Start: 2021-10-20

## 2021-09-09 RX ORDER — DIPHENHYDRAMINE HCL 25 MG
25 CAPSULE ORAL ONCE
Status: COMPLETED | OUTPATIENT
Start: 2021-09-09 | End: 2021-09-09

## 2021-09-09 RX ORDER — ACETAMINOPHEN 325 MG/1
650 TABLET ORAL ONCE
Status: COMPLETED | OUTPATIENT
Start: 2021-09-09 | End: 2021-09-09

## 2021-09-09 RX ADMIN — NATALIZUMAB 300 MG: 300 INJECTION INTRAVENOUS at 10:26

## 2021-09-09 RX ADMIN — ACETAMINOPHEN 650 MG: 325 TABLET ORAL at 10:26

## 2021-09-09 RX ADMIN — DIPHENHYDRAMINE HYDROCHLORIDE 25 MG: 25 CAPSULE ORAL at 10:26

## 2021-09-20 ENCOUNTER — APPOINTMENT (OUTPATIENT)
Dept: MRI IMAGING | Facility: HOSPITAL | Age: 71
End: 2021-09-20

## 2021-09-28 ENCOUNTER — HOSPITAL ENCOUNTER (OUTPATIENT)
Dept: MRI IMAGING | Facility: HOSPITAL | Age: 71
Discharge: HOME | End: 2021-09-28

## 2021-09-28 DIAGNOSIS — G35 MULTIPLE SCLEROSIS (HCC): Chronic | ICD-10-CM

## 2021-09-28 PROCEDURE — 72156 MRI NECK SPINE W/O & W/DYE: CPT

## 2021-09-28 PROCEDURE — A9577 INJ MULTIHANCE: HCPCS | Performed by: PSYCHIATRY & NEUROLOGY

## 2021-09-28 PROCEDURE — 0 GADOBENATE DIMEGLUMINE 529 MG/ML SOLUTION: Performed by: PSYCHIATRY & NEUROLOGY

## 2021-09-28 PROCEDURE — 70553 MRI BRAIN STEM W/O & W/DYE: CPT

## 2021-09-28 RX ADMIN — GADOBENATE DIMEGLUMINE 14 ML: 529 INJECTION, SOLUTION INTRAVENOUS at 11:47

## 2021-11-04 ENCOUNTER — INFUSION (OUTPATIENT)
Dept: ONCOLOGY | Facility: HOSPITAL | Age: 71
End: 2021-11-04

## 2021-11-04 VITALS
TEMPERATURE: 97.5 F | DIASTOLIC BLOOD PRESSURE: 88 MMHG | HEART RATE: 73 BPM | SYSTOLIC BLOOD PRESSURE: 161 MMHG | RESPIRATION RATE: 18 BRPM

## 2021-11-04 DIAGNOSIS — G35 MULTIPLE SCLEROSIS (HCC): Primary | ICD-10-CM

## 2021-11-04 LAB
ALBUMIN SERPL-MCNC: 4 G/DL (ref 3.5–5.2)
ALBUMIN/GLOB SERPL: 1.1 G/DL
ALP SERPL-CCNC: 71 U/L (ref 39–117)
ALT SERPL W P-5'-P-CCNC: 6 U/L (ref 1–33)
ANION GAP SERPL CALCULATED.3IONS-SCNC: 16 MMOL/L (ref 5–15)
AST SERPL-CCNC: 12 U/L (ref 1–32)
BASOPHILS # BLD AUTO: 0.04 10*3/MM3 (ref 0–0.2)
BASOPHILS NFR BLD AUTO: 0.4 % (ref 0–1.5)
BILIRUB SERPL-MCNC: 0.5 MG/DL (ref 0–1.2)
BUN SERPL-MCNC: 8 MG/DL (ref 8–23)
BUN/CREAT SERPL: 7.1 (ref 7–25)
CALCIUM SPEC-SCNC: 9.1 MG/DL (ref 8.6–10.5)
CHLORIDE SERPL-SCNC: 101 MMOL/L (ref 98–107)
CO2 SERPL-SCNC: 22 MMOL/L (ref 22–29)
CREAT SERPL-MCNC: 1.13 MG/DL (ref 0.57–1)
DEPRECATED RDW RBC AUTO: 55.5 FL (ref 37–54)
EOSINOPHIL # BLD AUTO: 0.12 10*3/MM3 (ref 0–0.4)
EOSINOPHIL NFR BLD AUTO: 1.2 % (ref 0.3–6.2)
ERYTHROCYTE [DISTWIDTH] IN BLOOD BY AUTOMATED COUNT: 15.4 % (ref 12.3–15.4)
GFR SERPL CREATININE-BSD FRML MDRD: 48 ML/MIN/1.73
GLOBULIN UR ELPH-MCNC: 3.5 GM/DL
GLUCOSE SERPL-MCNC: 56 MG/DL (ref 65–99)
HCT VFR BLD AUTO: 39.7 % (ref 34–46.6)
HGB BLD-MCNC: 12.6 G/DL (ref 12–15.9)
IMM GRANULOCYTES # BLD AUTO: 0.05 10*3/MM3 (ref 0–0.05)
IMM GRANULOCYTES NFR BLD AUTO: 0.5 % (ref 0–0.5)
LYMPHOCYTES # BLD AUTO: 2.24 10*3/MM3 (ref 0.7–3.1)
LYMPHOCYTES NFR BLD AUTO: 22.7 % (ref 19.6–45.3)
MCH RBC QN AUTO: 30.8 PG (ref 26.6–33)
MCHC RBC AUTO-ENTMCNC: 31.7 G/DL (ref 31.5–35.7)
MCV RBC AUTO: 97.1 FL (ref 79–97)
MONOCYTES # BLD AUTO: 0.64 10*3/MM3 (ref 0.1–0.9)
MONOCYTES NFR BLD AUTO: 6.5 % (ref 5–12)
NEUTROPHILS NFR BLD AUTO: 6.76 10*3/MM3 (ref 1.7–7)
NEUTROPHILS NFR BLD AUTO: 68.7 % (ref 42.7–76)
NRBC BLD AUTO-RTO: 0 /100 WBC (ref 0–0.2)
PLATELET # BLD AUTO: 335 10*3/MM3 (ref 140–450)
PMV BLD AUTO: 9.3 FL (ref 6–12)
POTASSIUM SERPL-SCNC: 3.9 MMOL/L (ref 3.5–5.2)
PROT SERPL-MCNC: 7.5 G/DL (ref 6–8.5)
RBC # BLD AUTO: 4.09 10*6/MM3 (ref 3.77–5.28)
SODIUM SERPL-SCNC: 139 MMOL/L (ref 136–145)
WBC # BLD AUTO: 9.85 10*3/MM3 (ref 3.4–10.8)

## 2021-11-04 PROCEDURE — 80053 COMPREHEN METABOLIC PANEL: CPT

## 2021-11-04 PROCEDURE — 63710000001 DIPHENHYDRAMINE PER 50 MG: Performed by: NURSE PRACTITIONER

## 2021-11-04 PROCEDURE — 85025 COMPLETE CBC W/AUTO DIFF WBC: CPT

## 2021-11-04 PROCEDURE — 96365 THER/PROPH/DIAG IV INF INIT: CPT

## 2021-11-04 PROCEDURE — 25010000002 NATALIZUMAB PER 1 MG: Performed by: NURSE PRACTITIONER

## 2021-11-04 RX ORDER — DIPHENHYDRAMINE HCL 25 MG
25 CAPSULE ORAL ONCE
Status: CANCELLED | OUTPATIENT
Start: 2021-12-01

## 2021-11-04 RX ORDER — ACETAMINOPHEN 325 MG/1
650 TABLET ORAL ONCE
Status: CANCELLED | OUTPATIENT
Start: 2021-12-01

## 2021-11-04 RX ORDER — ACETAMINOPHEN 325 MG/1
650 TABLET ORAL ONCE
Status: COMPLETED | OUTPATIENT
Start: 2021-11-04 | End: 2021-11-04

## 2021-11-04 RX ORDER — DIPHENHYDRAMINE HCL 25 MG
25 CAPSULE ORAL ONCE
Status: COMPLETED | OUTPATIENT
Start: 2021-11-04 | End: 2021-11-04

## 2021-11-04 RX ORDER — SODIUM CHLORIDE 9 MG/ML
250 INJECTION, SOLUTION INTRAVENOUS ONCE
Status: CANCELLED | OUTPATIENT
Start: 2021-12-01

## 2021-11-04 RX ADMIN — DIPHENHYDRAMINE HYDROCHLORIDE 25 MG: 25 CAPSULE ORAL at 09:53

## 2021-11-04 RX ADMIN — ACETAMINOPHEN 650 MG: 325 TABLET ORAL at 09:53

## 2021-11-04 RX ADMIN — NATALIZUMAB 300 MG: 300 INJECTION INTRAVENOUS at 09:54

## 2021-11-12 LAB
CONV INDEX VALUE: 2.68
INTERPRETATION: ABNORMAL
INTERPRETATION: ABNORMAL
JCPYV AB SERPL QL IA: POSITIVE

## 2021-12-15 RX ORDER — ACETAMINOPHEN 325 MG/1
650 TABLET ORAL ONCE
Status: CANCELLED | OUTPATIENT
Start: 2021-12-15

## 2021-12-15 RX ORDER — DIPHENHYDRAMINE HCL 25 MG
25 CAPSULE ORAL ONCE
Status: CANCELLED | OUTPATIENT
Start: 2021-12-15

## 2021-12-15 RX ORDER — SODIUM CHLORIDE 9 MG/ML
250 INJECTION, SOLUTION INTRAVENOUS ONCE
Status: CANCELLED | OUTPATIENT
Start: 2021-12-15

## 2021-12-16 ENCOUNTER — INFUSION (OUTPATIENT)
Dept: ONCOLOGY | Facility: HOSPITAL | Age: 71
End: 2021-12-16

## 2021-12-16 VITALS
DIASTOLIC BLOOD PRESSURE: 75 MMHG | RESPIRATION RATE: 18 BRPM | SYSTOLIC BLOOD PRESSURE: 146 MMHG | TEMPERATURE: 97.3 F | HEART RATE: 74 BPM

## 2021-12-16 DIAGNOSIS — G35 MULTIPLE SCLEROSIS (HCC): Primary | ICD-10-CM

## 2021-12-16 PROCEDURE — 25010000002 NATALIZUMAB PER 1 MG: Performed by: NURSE PRACTITIONER

## 2021-12-16 PROCEDURE — 96365 THER/PROPH/DIAG IV INF INIT: CPT

## 2021-12-16 PROCEDURE — 63710000001 DIPHENHYDRAMINE PER 50 MG: Performed by: NURSE PRACTITIONER

## 2021-12-16 RX ORDER — ACETAMINOPHEN 325 MG/1
650 TABLET ORAL ONCE
Status: COMPLETED | OUTPATIENT
Start: 2021-12-16 | End: 2021-12-16

## 2021-12-16 RX ORDER — ACETAMINOPHEN 325 MG/1
650 TABLET ORAL ONCE
Status: CANCELLED | OUTPATIENT
Start: 2022-01-13

## 2021-12-16 RX ORDER — SODIUM CHLORIDE 9 MG/ML
250 INJECTION, SOLUTION INTRAVENOUS ONCE
Status: CANCELLED | OUTPATIENT
Start: 2022-01-13

## 2021-12-16 RX ORDER — DIPHENHYDRAMINE HCL 25 MG
25 CAPSULE ORAL ONCE
Status: COMPLETED | OUTPATIENT
Start: 2021-12-16 | End: 2021-12-16

## 2021-12-16 RX ORDER — DIPHENHYDRAMINE HCL 25 MG
25 CAPSULE ORAL ONCE
Status: CANCELLED | OUTPATIENT
Start: 2022-01-13

## 2021-12-16 RX ADMIN — ACETAMINOPHEN 650 MG: 325 TABLET ORAL at 10:07

## 2021-12-16 RX ADMIN — DIPHENHYDRAMINE HYDROCHLORIDE 25 MG: 25 CAPSULE ORAL at 10:07

## 2021-12-16 RX ADMIN — NATALIZUMAB 300 MG: 300 INJECTION INTRAVENOUS at 10:07

## 2022-01-27 ENCOUNTER — INFUSION (OUTPATIENT)
Dept: ONCOLOGY | Facility: HOSPITAL | Age: 72
End: 2022-01-27

## 2022-01-27 VITALS
HEART RATE: 71 BPM | SYSTOLIC BLOOD PRESSURE: 127 MMHG | DIASTOLIC BLOOD PRESSURE: 69 MMHG | TEMPERATURE: 97.2 F | RESPIRATION RATE: 18 BRPM

## 2022-01-27 DIAGNOSIS — G35 MULTIPLE SCLEROSIS: Primary | ICD-10-CM

## 2022-01-27 PROCEDURE — 96365 THER/PROPH/DIAG IV INF INIT: CPT

## 2022-01-27 PROCEDURE — 25010000002 NATALIZUMAB PER 1 MG: Performed by: NURSE PRACTITIONER

## 2022-01-27 PROCEDURE — 63710000001 DIPHENHYDRAMINE PER 50 MG: Performed by: NURSE PRACTITIONER

## 2022-01-27 RX ORDER — DIPHENHYDRAMINE HCL 25 MG
25 CAPSULE ORAL ONCE
Status: COMPLETED | OUTPATIENT
Start: 2022-01-27 | End: 2022-01-27

## 2022-01-27 RX ORDER — ACETAMINOPHEN 325 MG/1
650 TABLET ORAL ONCE
Status: CANCELLED | OUTPATIENT
Start: 2022-02-10

## 2022-01-27 RX ORDER — ACETAMINOPHEN 325 MG/1
650 TABLET ORAL ONCE
Status: COMPLETED | OUTPATIENT
Start: 2022-01-27 | End: 2022-01-27

## 2022-01-27 RX ORDER — SODIUM CHLORIDE 9 MG/ML
250 INJECTION, SOLUTION INTRAVENOUS ONCE
Status: CANCELLED | OUTPATIENT
Start: 2022-02-10

## 2022-01-27 RX ORDER — DIPHENHYDRAMINE HCL 25 MG
25 CAPSULE ORAL ONCE
Status: CANCELLED | OUTPATIENT
Start: 2022-02-10

## 2022-01-27 RX ADMIN — ACETAMINOPHEN 650 MG: 325 TABLET ORAL at 09:46

## 2022-01-27 RX ADMIN — NATALIZUMAB 300 MG: 300 INJECTION INTRAVENOUS at 09:48

## 2022-01-27 RX ADMIN — DIPHENHYDRAMINE HYDROCHLORIDE 25 MG: 25 CAPSULE ORAL at 09:46

## 2022-03-11 ENCOUNTER — INFUSION (OUTPATIENT)
Dept: ONCOLOGY | Facility: HOSPITAL | Age: 72
End: 2022-03-11

## 2022-03-11 VITALS
SYSTOLIC BLOOD PRESSURE: 134 MMHG | HEART RATE: 70 BPM | TEMPERATURE: 97.4 F | RESPIRATION RATE: 18 BRPM | DIASTOLIC BLOOD PRESSURE: 71 MMHG

## 2022-03-11 DIAGNOSIS — G35 MULTIPLE SCLEROSIS: Primary | ICD-10-CM

## 2022-03-11 PROCEDURE — 96365 THER/PROPH/DIAG IV INF INIT: CPT

## 2022-03-11 PROCEDURE — 25010000002 NATALIZUMAB PER 1 MG: Performed by: NURSE PRACTITIONER

## 2022-03-11 PROCEDURE — 63710000001 DIPHENHYDRAMINE PER 50 MG: Performed by: NURSE PRACTITIONER

## 2022-03-11 RX ORDER — DIPHENHYDRAMINE HCL 25 MG
25 CAPSULE ORAL ONCE
Status: CANCELLED | OUTPATIENT
Start: 2022-03-24

## 2022-03-11 RX ORDER — SODIUM CHLORIDE 9 MG/ML
250 INJECTION, SOLUTION INTRAVENOUS ONCE
Status: DISCONTINUED | OUTPATIENT
Start: 2022-03-11 | End: 2022-03-11 | Stop reason: HOSPADM

## 2022-03-11 RX ORDER — SODIUM CHLORIDE 9 MG/ML
250 INJECTION, SOLUTION INTRAVENOUS ONCE
Status: CANCELLED | OUTPATIENT
Start: 2022-03-24

## 2022-03-11 RX ORDER — DIPHENHYDRAMINE HCL 25 MG
25 CAPSULE ORAL ONCE
Status: COMPLETED | OUTPATIENT
Start: 2022-03-11 | End: 2022-03-11

## 2022-03-11 RX ORDER — ACETAMINOPHEN 325 MG/1
650 TABLET ORAL ONCE
Status: CANCELLED | OUTPATIENT
Start: 2022-03-24

## 2022-03-11 RX ORDER — ACETAMINOPHEN 325 MG/1
650 TABLET ORAL ONCE
Status: COMPLETED | OUTPATIENT
Start: 2022-03-11 | End: 2022-03-11

## 2022-03-11 RX ADMIN — ACETAMINOPHEN 650 MG: 325 TABLET ORAL at 10:18

## 2022-03-11 RX ADMIN — DIPHENHYDRAMINE HYDROCHLORIDE 25 MG: 25 CAPSULE ORAL at 10:18

## 2022-03-11 RX ADMIN — NATALIZUMAB 300 MG: 300 INJECTION INTRAVENOUS at 10:19

## 2022-04-11 ENCOUNTER — TELEPHONE (OUTPATIENT)
Dept: NEUROLOGY | Facility: CLINIC | Age: 72
End: 2022-04-11

## 2022-04-11 RX ORDER — METHYLPREDNISOLONE 4 MG/1
TABLET ORAL
Qty: 1 EACH | Refills: 0 | Status: SHIPPED | OUTPATIENT
Start: 2022-04-11 | End: 2022-04-18

## 2022-04-11 NOTE — TELEPHONE ENCOUNTER
Caller: Leatha Wall    Relationship: Self    Best call back number: [phone]    Who are you requesting to speak with (clinical staff, provider,  specific staff member):   DR ECHEVARRIA    What was the call regarding: ABOUT ONE WEEK AGO, THE PT STARTED HAVING ISSUES WITH HER LEGS. SHE DESCRIBED THEM AS JUMPING AND UNSTEADY. PT IS CONCERNED ABOUT FALLING. PT IS SCHEDULED FOR F/U ON 4-18-22 BUT IS WANTING TO KNOW IF THERE'S ANYTHING DR ECHEVARRIA COULD RECOMMEND FOR THE PT.    Do you require a callback: YES, PLEASE.

## 2022-04-12 NOTE — TELEPHONE ENCOUNTER
Attempted to call Leatha to let her know  called in a medrol dose pack to take for her symptoms in the meantime, however her vm box has not been set up yet.

## 2022-04-18 ENCOUNTER — OFFICE VISIT (OUTPATIENT)
Dept: NEUROLOGY | Facility: CLINIC | Age: 72
End: 2022-04-18

## 2022-04-18 VITALS
WEIGHT: 147 LBS | HEIGHT: 67 IN | HEART RATE: 65 BPM | OXYGEN SATURATION: 97 % | BODY MASS INDEX: 23.07 KG/M2 | SYSTOLIC BLOOD PRESSURE: 132 MMHG | TEMPERATURE: 97.1 F | DIASTOLIC BLOOD PRESSURE: 76 MMHG

## 2022-04-18 DIAGNOSIS — R25.2 SPASTICITY: ICD-10-CM

## 2022-04-18 DIAGNOSIS — G25.81 RESTLESS LEGS SYNDROME (RLS): Chronic | ICD-10-CM

## 2022-04-18 DIAGNOSIS — G43.C0 PERIODIC HEADACHE SYNDROME, NOT INTRACTABLE: Chronic | ICD-10-CM

## 2022-04-18 DIAGNOSIS — Z72.0 TOBACCO ABUSE: Chronic | ICD-10-CM

## 2022-04-18 DIAGNOSIS — G35 MULTIPLE SCLEROSIS: Primary | Chronic | ICD-10-CM

## 2022-04-18 DIAGNOSIS — R94.5 ABNORMAL RESULTS OF LIVER FUNCTION STUDIES: ICD-10-CM

## 2022-04-18 PROCEDURE — 99214 OFFICE O/P EST MOD 30 MIN: CPT | Performed by: PSYCHIATRY & NEUROLOGY

## 2022-04-18 RX ORDER — BACLOFEN 10 MG/1
10 TABLET ORAL 3 TIMES DAILY
Qty: 90 TABLET | Refills: 1 | Status: SHIPPED | OUTPATIENT
Start: 2022-04-18 | End: 2022-06-03 | Stop reason: SDUPTHER

## 2022-04-18 RX ORDER — IPRATROPIUM BROMIDE AND ALBUTEROL SULFATE 2.5; .5 MG/3ML; MG/3ML
SOLUTION RESPIRATORY (INHALATION)
COMMUNITY
Start: 2022-03-21

## 2022-04-18 RX ORDER — OXYBUTYNIN CHLORIDE 10 MG/1
10 TABLET, EXTENDED RELEASE ORAL DAILY
COMMUNITY
Start: 2022-03-16 | End: 2023-02-10 | Stop reason: DRUGHIGH

## 2022-04-18 NOTE — PROGRESS NOTES
Chief Complaint  Multiple Sclerosis (CLINIC)    Subjective          Leatha Wall presents to [facility] NEUROLOGY     History of Present Illness    71 y.o. female returns in follow up.  Last visit on 7/29/21 continued Tysabri CHENG, ordered MRI B/C, labs, increased GBP.      Hep C treated     MRI Brain/Cerivcal, my review of films, 9/28/21 as compared to 6/25/20 moderate to severe T2 lesion load, without new/enlarging or enhancing lesions, no cord lesions      11/4/21     JCV 2.68 CBC,CMP - NCS   5/5/21       JCV 3.05, CBC,CMP - NCS  11/23/20   JCV 3.33  CBC - NCS   5/21/20:   JCV 2.06;  CBC, CMP - NCS  2/22/19:   JCV 1.38  10/12/18  JCV 1.39  5/23/18    JCV 1.44  12/13/16: JCV 2.21  6/27/17    JCV 1.87       RRMS      4/11/22 reported LE jumping, unsteady gait.  Called in medrol dose pack     Increased spasticity in B LE.  Poor balance.  L arm is weak and shaky.     O2 qhs for COPD.  Continues to smoke 1 ppd.       Started Tysabri 2/20/17 switched to CHENG.     Right sided pain, gait unsteady worsening in last few months.      Tolerating Tysabri without side effects.  .      Fatigue is moderate. Stopped Nuvigil due to nausea..         Problem history:     MSFC remarkable for 26/110 o/w within normal results.  Oral numbness resolved.  Bladder urgency and frequency slight increase.    Mild N/T in bottom of feet      Migraines          HA are minor.      Located in occiput and behind eyes.  Quality of tightness and throbbing.  Moderate severity.        RLS     Right LE worsening sx.      GBP controlling sx.      Left left drawing improved with addition of GBP but now both feet are cramping at night.  Occurs during sleep.       PMH:     Fatigue and heat are bothersome.  Echo -   EF 60%  C U/S -  0-49% on right, no stenosis on left     MRI cervical, my review, unremarkable  Labs below:     NMO negative  Hypercoaguable panel WNL  Vit D 17.2     Fatigue continues to wax and wane.  Last few days has had  "unsteady gait. Weakness on left arm/leg.  Numbness in left arm and right foot.  Left side of tongue numb for the last 7 to 14 weeks.   Continue to smoke Tob 1 ppd.      Patient called and noted overwhelming fatigue.  After steroids fatigue improved but then recurred on 8/5/16.  Frequent HA's every other day frontal and occipital location of squeezing sensation of moderate severity.  Prednisone taper rx on 7/22/16 improved energy.       6/22/16 awoke at 3:30 am and noted left sided weakness in arm and leg.  Gait was unsteady and worsened over the next 7 days and presented to [facility] ED.  Fatigue was overwhelming.  MRI Brain in ED, my review, with multiple T2 white matter lesions and one large black hole in left PVWM.  Given steroids in ED with improvement in sx by 50%.     5/2016 had episode of left arm clumsiness for a week.       Reports increased fatigue since the first of the year.       Vision is fuzzy and feel swollen.       Bladder frequency.      Tob 1 ppd.  Objective   Vital Signs:   /76   Pulse 65   Temp 97.1 °F (36.2 °C)   Ht 170.2 cm (67\")   Wt 66.7 kg (147 lb)   SpO2 97%   BMI 23.02 kg/m²     BMI has not been calculated during today's encounter.       Physical Exam  Neurological:      Deep Tendon Reflexes:      Reflex Scores:       Tricep reflexes are 3+ on the right side and 3+ on the left side.       Bicep reflexes are 3+ on the right side and 3+ on the left side.       Brachioradialis reflexes are 3+ on the right side and 3+ on the left side.       Patellar reflexes are 3+ on the right side and 3+ on the left side.       Achilles reflexes are 4+ on the right side and 4+ on the left side.         Neurologic Exam     Mental Status   Registration: recalls 3 of 3 objects. Recall at 5 minutes: recalls 3 of 3 objects. Follows 3 step commands.   Attention: normal. Concentration: normal.   Level of consciousness: alert  Knowledge: good and consistent with education.   Able to name object. Able to " read. Able to repeat. Able to write. Normal comprehension.     Cranial Nerves     CN II   Visual fields full to confrontation.   Visual acuity: normal  Right visual field deficit: none  Left visual field deficit: none     CN III, IV, VI   Right pupil: Shape: regular. Reactivity: brisk. Consensual response: intact.   Left pupil: Shape: regular. Reactivity: brisk. Consensual response: intact.   Nystagmus: none   Diplopia: none  Ophthalmoparesis: none  Upgaze: normal  Downgaze: normal  Conjugate gaze: present  Vestibulo-ocular reflex: present    CN V   Right facial sensation deficit: forehead  Left facial sensation deficit: forehead  Right corneal reflex: normal  Left corneal reflex: normal    CN VII   Right facial weakness: none  Left facial weakness: none    CN VIII   Hearing: intact    CN IX, X   Palate: symmetric  Right gag reflex: normal  Left gag reflex: normal    CN XI   Right sternocleidomastoid strength: normal  Left sternocleidomastoid strength: normal    CN XII   Tongue: not atrophic  Fasciculations: absent  Tongue deviation: none    Motor Exam   Muscle bulk: normal  Overall muscle tone: normal  Right arm tone: normal  Left arm tone: normal  Right leg tone: normal  Left leg tone: normal    Strength   Right neck flexion: 5/5  Left neck flexion: 5/5  Right neck extension: 5/5  Left neck extension: 5/5  Right deltoid: 5/5  Left deltoid: 5/5  Right biceps: 5/5  Left biceps: 5/5  Right triceps: 5/5  Left triceps: 5/5  Right wrist flexion: 5/5  Left wrist flexion: 5/5  Right wrist extension: 5/5  Left wrist extension: 5/5  Right interossei: 5/5  Left interossei: 5/5  Right abdominals: 5/5  Left abdominals: 5/5  Right iliopsoas: 5/5  Left iliopsoas: 5/5  Right quadriceps: 5/5  Left quadriceps: 5/5  Right hamstrin/5  Left hamstrin/5  Right glutei: 5/5  Left glutei: 5/5  Right anterior tibial: 5/5  Left anterior tibial: 5/5  Right posterior tibial: 5/5  Left posterior tibial: 5/5  Right peroneal: 5/5  Left  peroneal: 5/5  Right gastroc: 5/5  Left gastroc: 5/5    Sensory Exam   Right arm light touch: normal  Right leg light touch: normal  Right arm vibration: normal  Right leg vibration: normal  Right arm proprioception: normal  Right leg proprioception: normal  Right arm pinprick: normal  Right leg pinprick: normal  Sensory deficit distribution on left: C6    Gait, Coordination, and Reflexes     Gait  Gait: wide-based ( )    Tremor   Resting tremor: absent  Intention tremor: absent  Action tremor: absent    Reflexes   Reflexes 2+ except as noted.   Right brachioradialis: 3+  Left brachioradialis: 3+  Right biceps: 3+  Left biceps: 3+  Right triceps: 3+  Left triceps: 3+  Right patellar: 3+  Left patellar: 3+  Right achilles: 4+  Left achilles: 4+  Right ankle clonus: present  Left ankle clonus: absent  Left pendular knee jerk: present     Result Review :   The following data was reviewed by: Umang Perez MD on 04/18/2022:  Common labs    Common Labsle 5/5/21 5/5/21 11/4/21 11/4/21 3/31/22    1753 1753 0944 0944    Glucose  99  56 (A)    BUN  11  8    Creatinine  1.25 (A)  1.13 (A)    eGFR Non  Am  43 (A)  48 (A) 41   eGFR  Am     49   Sodium  137  139    Potassium  4.4  3.9    Chloride  100  101    Calcium  9.5  9.1    Albumin  3.90  4.00    Total Bilirubin  0.3  0.5    Alkaline Phosphatase  90  71    AST (SGOT)  14  12    ALT (SGPT)  6  6    WBC 10.72  9.85     Hemoglobin 12.4  12.6     Hematocrit 40.3  39.7     Platelets 382  335     (A) Abnormal value       Comments are available for some flowsheets but are not being displayed.           Data reviewed: Radiologic studies MRI B/C          Assessment and Plan    Diagnoses and all orders for this visit:    1. Multiple sclerosis (HCC) (Primary)  Assessment & Plan:  Worsening B LE spasticity    MRI B/C    Labs        Orders:  -     MRI Brain With & Without Contrast; Future  -     MRI Cervical Spine With & Without Contrast; Future  -     CBC &  Differential; Future  -     Comprehensive Metabolic Panel; Future  -     Stratify JCV, Antibody CHRISTIAN With / Reflex To Inhibition Assay ([facility]); Future  -     Hepatitis Panel, Acute; Future  -     SELINA + PE; Future    2. Periodic headache syndrome, not intractable  Assessment & Plan:  Headaches are improving with treatment.  Continue current treatment regimen.          3. Restless legs syndrome (RLS)  Assessment & Plan:  Sx stable on GBP      4. Tobacco abuse    5. Abnormal results of liver function studies   -     Hepatitis Panel, Acute; Future    6. Spasticity  Assessment & Plan:  Start Baclofen       Other orders  -     baclofen (LIORESAL) 10 MG tablet; Take 1 tablet by mouth 3 (Three) Times a Day.  Dispense: 90 tablet; Refill: 1      Follow Up   No follow-ups on file.  Patient was given instructions and counseling regarding her condition or for health maintenance advice. Please see specific information pulled into the AVS if appropriate.

## 2022-04-22 ENCOUNTER — INFUSION (OUTPATIENT)
Dept: ONCOLOGY | Facility: HOSPITAL | Age: 72
End: 2022-04-22

## 2022-04-22 VITALS
HEART RATE: 68 BPM | SYSTOLIC BLOOD PRESSURE: 163 MMHG | DIASTOLIC BLOOD PRESSURE: 68 MMHG | TEMPERATURE: 96.9 F | RESPIRATION RATE: 18 BRPM

## 2022-04-22 DIAGNOSIS — R94.5 ABNORMAL RESULTS OF LIVER FUNCTION STUDIES: ICD-10-CM

## 2022-04-22 DIAGNOSIS — G35 MULTIPLE SCLEROSIS: Primary | ICD-10-CM

## 2022-04-22 LAB
BASOPHILS # BLD AUTO: 0.06 10*3/MM3 (ref 0–0.2)
BASOPHILS NFR BLD AUTO: 0.5 % (ref 0–1.5)
DEPRECATED RDW RBC AUTO: 54.6 FL (ref 37–54)
EOSINOPHIL # BLD AUTO: 0.11 10*3/MM3 (ref 0–0.4)
EOSINOPHIL NFR BLD AUTO: 1 % (ref 0.3–6.2)
ERYTHROCYTE [DISTWIDTH] IN BLOOD BY AUTOMATED COUNT: 15.9 % (ref 12.3–15.4)
HCT VFR BLD AUTO: 35.4 % (ref 34–46.6)
HGB BLD-MCNC: 11.5 G/DL (ref 12–15.9)
IMM GRANULOCYTES # BLD AUTO: 0.08 10*3/MM3 (ref 0–0.05)
IMM GRANULOCYTES NFR BLD AUTO: 0.7 % (ref 0–0.5)
LYMPHOCYTES # BLD AUTO: 3.06 10*3/MM3 (ref 0.7–3.1)
LYMPHOCYTES NFR BLD AUTO: 26.8 % (ref 19.6–45.3)
MCH RBC QN AUTO: 30.5 PG (ref 26.6–33)
MCHC RBC AUTO-ENTMCNC: 32.5 G/DL (ref 31.5–35.7)
MCV RBC AUTO: 93.9 FL (ref 79–97)
MONOCYTES # BLD AUTO: 0.93 10*3/MM3 (ref 0.1–0.9)
MONOCYTES NFR BLD AUTO: 8.1 % (ref 5–12)
NEUTROPHILS NFR BLD AUTO: 62.9 % (ref 42.7–76)
NEUTROPHILS NFR BLD AUTO: 7.19 10*3/MM3 (ref 1.7–7)
NRBC BLD AUTO-RTO: 0 /100 WBC (ref 0–0.2)
PLATELET # BLD AUTO: 260 10*3/MM3 (ref 140–450)
PMV BLD AUTO: 10.8 FL (ref 6–12)
RBC # BLD AUTO: 3.77 10*6/MM3 (ref 3.77–5.28)
WBC NRBC COR # BLD: 11.43 10*3/MM3 (ref 3.4–10.8)

## 2022-04-22 PROCEDURE — 25010000002 NATALIZUMAB PER 1 MG: Performed by: NURSE PRACTITIONER

## 2022-04-22 PROCEDURE — 82784 ASSAY IGA/IGD/IGG/IGM EACH: CPT

## 2022-04-22 PROCEDURE — 96365 THER/PROPH/DIAG IV INF INIT: CPT

## 2022-04-22 PROCEDURE — 86334 IMMUNOFIX E-PHORESIS SERUM: CPT

## 2022-04-22 PROCEDURE — 84165 PROTEIN E-PHORESIS SERUM: CPT

## 2022-04-22 PROCEDURE — 63710000001 DIPHENHYDRAMINE PER 50 MG: Performed by: NURSE PRACTITIONER

## 2022-04-22 PROCEDURE — 85025 COMPLETE CBC W/AUTO DIFF WBC: CPT

## 2022-04-22 PROCEDURE — 84155 ASSAY OF PROTEIN SERUM: CPT

## 2022-04-22 RX ORDER — ACETAMINOPHEN 325 MG/1
650 TABLET ORAL ONCE
Status: CANCELLED | OUTPATIENT
Start: 2022-05-06

## 2022-04-22 RX ORDER — DIPHENHYDRAMINE HCL 25 MG
25 CAPSULE ORAL ONCE
Status: COMPLETED | OUTPATIENT
Start: 2022-04-22 | End: 2022-04-22

## 2022-04-22 RX ORDER — SODIUM CHLORIDE 9 MG/ML
250 INJECTION, SOLUTION INTRAVENOUS ONCE
Status: CANCELLED | OUTPATIENT
Start: 2022-05-06

## 2022-04-22 RX ORDER — DIPHENHYDRAMINE HCL 25 MG
25 CAPSULE ORAL ONCE
Status: CANCELLED | OUTPATIENT
Start: 2022-05-06

## 2022-04-22 RX ORDER — ACETAMINOPHEN 325 MG/1
650 TABLET ORAL ONCE
Status: COMPLETED | OUTPATIENT
Start: 2022-04-22 | End: 2022-04-22

## 2022-04-22 RX ADMIN — ACETAMINOPHEN 650 MG: 325 TABLET ORAL at 09:53

## 2022-04-22 RX ADMIN — NATALIZUMAB 300 MG: 300 INJECTION INTRAVENOUS at 10:07

## 2022-04-22 RX ADMIN — DIPHENHYDRAMINE HYDROCHLORIDE 25 MG: 25 CAPSULE ORAL at 09:53

## 2022-04-25 LAB
ALBUMIN SERPL ELPH-MCNC: 3.1 G/DL (ref 2.9–4.4)
ALBUMIN/GLOB SERPL: 0.9 {RATIO} (ref 0.7–1.7)
ALPHA1 GLOB SERPL ELPH-MCNC: 0.3 G/DL (ref 0–0.4)
ALPHA2 GLOB SERPL ELPH-MCNC: 1.2 G/DL (ref 0.4–1)
B-GLOBULIN SERPL ELPH-MCNC: 1 G/DL (ref 0.7–1.3)
GAMMA GLOB SERPL ELPH-MCNC: 1.2 G/DL (ref 0.4–1.8)
GLOBULIN SER-MCNC: 3.7 G/DL (ref 2.2–3.9)
IGA SERPL-MCNC: 133 MG/DL (ref 87–352)
IGG SERPL-MCNC: 1229 MG/DL (ref 586–1602)
IGM SERPL-MCNC: 71 MG/DL (ref 26–217)
INTERPRETATION SERPL IEP-IMP: ABNORMAL
LABORATORY COMMENT REPORT: ABNORMAL
M PROTEIN SERPL ELPH-MCNC: ABNORMAL G/DL
PROT SERPL-MCNC: 6.8 G/DL (ref 6–8.5)

## 2022-04-29 ENCOUNTER — TELEPHONE (OUTPATIENT)
Dept: NEUROLOGY | Facility: CLINIC | Age: 72
End: 2022-04-29

## 2022-04-29 NOTE — TELEPHONE ENCOUNTER
Called patient and gave provider's notes.  Patient was understanding and appreciative.      This is test to measure a virus level. They have been monitoring this level for you since 2017. The level can fluctuate up and down.

## 2022-04-29 NOTE — TELEPHONE ENCOUNTER
Called Savannah Ferris who is the emergency contact for Leatha and left vm with results as Leatha did not have a vm and did not answer the phone.      ----- Message from Vikas Sweeney DNP, APRN sent at 4/28/2022  6:32 PM EDT -----  Please notify pt her JCV is 2.83

## 2022-05-19 DIAGNOSIS — G35 MULTIPLE SCLEROSIS: Chronic | ICD-10-CM

## 2022-05-19 RX ORDER — GABAPENTIN 300 MG/1
900 CAPSULE ORAL 2 TIMES DAILY
Qty: 540 CAPSULE | Refills: 1 | Status: SHIPPED | OUTPATIENT
Start: 2022-05-19 | End: 2022-11-10 | Stop reason: SDUPTHER

## 2022-05-19 NOTE — TELEPHONE ENCOUNTER
Caller: Leatha Wall    Relationship: Self    Best call back number: [phone]     Requested Prescriptions:   Requested Prescriptions     Pending Prescriptions Disp Refills   • gabapentin (NEURONTIN) 300 MG capsule 540 capsule 1     Sig: Take 3 capsules by mouth 2 (Two) Times a Day.        Pharmacy where request should be sent: [address] - [phone]  - [phone] FX     Additional details provided by patient: PT STATES SHE HAS ONLY BEEN TAKING 4 TABLETS DAILY (1 IN THE AM AND 3 IN THE PM) SO SHE HASN'T NEEDED A REFILL IN A WHILE BUT SHE CURRENTLY HAS ABOUT 2 AND HALF DAYS WORTH OF MEDICATION LEFT AND PHARMACY ADVISED A REFILL WOULD NEED TO BE SENT FROM CLINIC     Does the patient have less than a 3 day supply:  [x] Yes  [] No    Yue Lynn Rep   05/19/22 09:00 EDT

## 2022-05-19 NOTE — TELEPHONE ENCOUNTER
Rx Refill Note  Requested Prescriptions     Pending Prescriptions Disp Refills   • gabapentin (NEURONTIN) 300 MG capsule 540 capsule 1     Sig: Take 3 capsules by mouth 2 (Two) Times a Day.      Last filled: 7/29/21 90 day with 1 refill pending to provider for approval  Last office visit with prescribing clinician: 4/18/2022      Next office visit with prescribing clinician: 6/23/2022     Carlie Vera MA  05/19/22, 11:12 EDT

## 2022-05-23 ENCOUNTER — HOSPITAL ENCOUNTER (OUTPATIENT)
Dept: MRI IMAGING | Facility: HOSPITAL | Age: 72
Discharge: HOME | End: 2022-05-23

## 2022-05-23 DIAGNOSIS — G35 MULTIPLE SCLEROSIS: Chronic | ICD-10-CM

## 2022-05-23 LAB — CREAT BLDA-MCNC: 1.2 MG/DL (ref 0.6–1.3)

## 2022-05-23 PROCEDURE — A9577 INJ MULTIHANCE: HCPCS | Performed by: PSYCHIATRY & NEUROLOGY

## 2022-05-23 PROCEDURE — 82565 ASSAY OF CREATININE: CPT

## 2022-05-23 PROCEDURE — 0 GADOBENATE DIMEGLUMINE 529 MG/ML SOLUTION: Performed by: PSYCHIATRY & NEUROLOGY

## 2022-05-23 PROCEDURE — 72156 MRI NECK SPINE W/O & W/DYE: CPT

## 2022-05-23 PROCEDURE — 70553 MRI BRAIN STEM W/O & W/DYE: CPT

## 2022-05-23 RX ADMIN — GADOBENATE DIMEGLUMINE 13 ML: 529 INJECTION, SOLUTION INTRAVENOUS at 14:27

## 2022-06-02 ENCOUNTER — TELEPHONE (OUTPATIENT)
Dept: NEUROLOGY | Facility: CLINIC | Age: 72
End: 2022-06-02

## 2022-06-02 DIAGNOSIS — G43.C0 PERIODIC HEADACHE SYNDROME, NOT INTRACTABLE: Chronic | ICD-10-CM

## 2022-06-02 DIAGNOSIS — R25.2 SPASTICITY: Chronic | ICD-10-CM

## 2022-06-02 DIAGNOSIS — G25.81 RESTLESS LEGS SYNDROME (RLS): Chronic | ICD-10-CM

## 2022-06-02 DIAGNOSIS — G35 MULTIPLE SCLEROSIS: Primary | Chronic | ICD-10-CM

## 2022-06-02 NOTE — TELEPHONE ENCOUNTER
Provider:DR. ECHEVARRIA  Caller: CLAUDE ENGEL  Relationship to Patient: SELF  Pharmacy: [facility]    Reason for Call: PT CALLING STATING THAT EVERY TIME SHE GET UP FROM SITTING HER LEGS ARE BOUNCY.     When was the patient last seen:4-18-22  When did it start: YESTERDAY AND PRETTY BAD TODAY  Where is it located: BOTH LEGS  Characteristics of symptom/severity: PRETTY BAD  Timing- Is it constant or intermittent: INTERMITTENT(ONLY WHEN SHE GET UP)  What makes it worse: NOTHING  What makes it better: NOTHING  What therapies/medications have you tried: SHE HAS TO USE THE CANE WHEN SHE WALKS.      PLEASE CALL HER BACK -[phone]

## 2022-06-03 ENCOUNTER — APPOINTMENT (OUTPATIENT)
Dept: ONCOLOGY | Facility: HOSPITAL | Age: 72
End: 2022-06-03

## 2022-06-03 RX ORDER — BACLOFEN 20 MG/1
20 TABLET ORAL 3 TIMES DAILY
Qty: 270 TABLET | Refills: 3 | Status: SHIPPED | OUTPATIENT
Start: 2022-06-03 | End: 2023-06-03

## 2022-06-09 NOTE — TELEPHONE ENCOUNTER
Relayed 's instructions of increasing Baclofen to 20mg TID which was sent into her pharmacy. She states understanding.      States she is Taking an antibiotic, azithromycin 250mg Monday wed and Friday and wants to know how many days does she need to be off of it before infusing?

## 2022-06-09 NOTE — TELEPHONE ENCOUNTER
Called patient back. Informed her Dr. Perez would like for her to hold infusion until infection resolved. She said I wont be off the medication for 5 months. Advised her to reach back out to us after her follow up visit with her Lung MD. She said that wont be until July. Informed her to reach out to us if any MS symptoms occur and if she needs us before her follow up but we do not want to make things worse by letting her infuse. Patient verbalized understanding.

## 2022-06-17 ENCOUNTER — APPOINTMENT (OUTPATIENT)
Dept: ONCOLOGY | Facility: HOSPITAL | Age: 72
End: 2022-06-17

## 2022-06-23 ENCOUNTER — OFFICE VISIT (OUTPATIENT)
Dept: NEUROLOGY | Facility: CLINIC | Age: 72
End: 2022-06-23

## 2022-06-23 VITALS
DIASTOLIC BLOOD PRESSURE: 68 MMHG | HEART RATE: 74 BPM | HEIGHT: 67 IN | TEMPERATURE: 96.6 F | SYSTOLIC BLOOD PRESSURE: 132 MMHG | WEIGHT: 147 LBS | BODY MASS INDEX: 23.07 KG/M2 | OXYGEN SATURATION: 96 %

## 2022-06-23 DIAGNOSIS — R25.2 SPASTICITY: Chronic | ICD-10-CM

## 2022-06-23 DIAGNOSIS — G43.C0 PERIODIC HEADACHE SYNDROME, NOT INTRACTABLE: Chronic | ICD-10-CM

## 2022-06-23 DIAGNOSIS — G25.81 RESTLESS LEGS SYNDROME (RLS): Chronic | ICD-10-CM

## 2022-06-23 DIAGNOSIS — Z86.73 HISTORY OF STROKE: ICD-10-CM

## 2022-06-23 DIAGNOSIS — G35 MULTIPLE SCLEROSIS: Primary | Chronic | ICD-10-CM

## 2022-06-23 PROCEDURE — 99214 OFFICE O/P EST MOD 30 MIN: CPT | Performed by: PSYCHIATRY & NEUROLOGY

## 2022-06-23 RX ORDER — ATORVASTATIN CALCIUM 80 MG/1
80 TABLET, FILM COATED ORAL DAILY
Qty: 30 TABLET | Refills: 11 | Status: SHIPPED | OUTPATIENT
Start: 2022-06-23 | End: 2023-06-23

## 2022-06-23 RX ORDER — AZITHROMYCIN 250 MG/1
250 TABLET, FILM COATED ORAL DAILY
COMMUNITY
End: 2022-06-23

## 2022-06-23 NOTE — PROGRESS NOTES
Chief Complaint  Multiple Sclerosis    Subjective        Leatha Wall presents to [facility] NEUROLOGY     History of Present Illness    72 y.o. female returns in follow up.  Last visit on 4/18/22 ordered MRI B/C, labs, continue Tysabri CHENG, GBP, rx Baclofen.      Hep C treated     MRI Brain/Cerivcal, my review of films, 5/23/22 as compared to 9/28/21 moderate to severe T2 lesion load, without new/enlarging or enhancing lesions, no cord lesions, small punctate diffusion abnl R post frontal       4/22/22     JCB 2.83 CBC - NCS   11/4/21     JCV 2.68 CBC,CMP - NCS   5/5/21       JCV 3.05, CBC,CMP - NCS  11/23/20   JCV 3.33  CBC - NCS   5/21/20:   JCV 2.06;  CBC, CMP - NCS  2/22/19:   JCV 1.38  10/12/18  JCV 1.39  5/23/18    JCV 1.44  12/13/16: JCV 2.21  6/27/17    JCV 1.87       RRMS      4/11/22 reported LE jumping, unsteady gait.  Called in medrol dose pack        Spasticity in B LE improved with medrol dose pack and baclofen.       O2 qhs for COPD.  Continues to smoke 1 ppd.       Started Tysabri 2/20/17 switched to CHENG.     Left sided pain slight improvement    Gait unsteady.      Tolerating Tysabri without side effects.  .      Fatigue is moderate. Stopped Nuvigil due to nausea..         Problem history:     MSFC remarkable for 26/110 o/w within normal results.  Oral numbness resolved.  Bladder urgency and frequency slight increase.    Mild N/T in bottom of feet      Migraines          HA are minor.      Located in occiput and behind eyes.  Quality of tightness and throbbing.  Moderate severity.        RLS     Right LE worsening sx.      GBP controlling sx.      Left left drawing improved with addition of GBP but now both feet are cramping at night.  Occurs during sleep.       PMH:     Fatigue and heat are bothersome.  Echo -   EF 60%  C U/S -  0-49% on right, no stenosis on left     MRI cervical, my review, unremarkable  Labs below:     NMO negative  Hypercoaguable panel WNL  Vit D  "17.2     Fatigue continues to wax and wane.  Last few days has had unsteady gait. Weakness on left arm/leg.  Numbness in left arm and right foot.  Left side of tongue numb for the last 7 to 14 weeks.   Continue to smoke Tob 1 ppd.      Patient called and noted overwhelming fatigue.  After steroids fatigue improved but then recurred on 8/5/16.  Frequent HA's every other day frontal and occipital location of squeezing sensation of moderate severity.  Prednisone taper rx on 7/22/16 improved energy.       6/22/16 awoke at 3:30 am and noted left sided weakness in arm and leg.  Gait was unsteady and worsened over the next 7 days and presented to [facility] ED.  Fatigue was overwhelming.  MRI Brain in ED, my review, with multiple T2 white matter lesions and one large black hole in left PVWM.  Given steroids in ED with improvement in sx by 50%.     5/2016 had episode of left arm clumsiness for a week.       Reports increased fatigue since the first of the year.       Vision is fuzzy and feel swollen.       Bladder frequency.      Tob 1 ppd.    Objective   Vital Signs:  /68   Pulse 74   Temp 96.6 °F (35.9 °C)   Ht 170.2 cm (67.01\")   Wt 66.7 kg (147 lb)   SpO2 96%   BMI 23.02 kg/m²   Estimated body mass index is 23.02 kg/m² as calculated from the following:    Height as of this encounter: 170.2 cm (67.01\").    Weight as of this encounter: 66.7 kg (147 lb).    BMI is within normal parameters. No other follow-up for BMI required.      Physical Exam  Neurological:      Deep Tendon Reflexes:      Reflex Scores:       Tricep reflexes are 3+ on the right side and 3+ on the left side.       Bicep reflexes are 3+ on the right side and 3+ on the left side.       Brachioradialis reflexes are 3+ on the right side and 3+ on the left side.       Patellar reflexes are 3+ on the right side and 3+ on the left side.       Achilles reflexes are 4+ on the right side and 4+ on the left side.         Neurologic Exam     Mental Status "   Registration: recalls 3 of 3 objects. Recall at 5 minutes: recalls 3 of 3 objects. Follows 3 step commands.   Attention: normal. Concentration: normal.   Level of consciousness: alert  Knowledge: good and consistent with education.   Able to name object. Able to read. Able to repeat. Able to write. Normal comprehension.     Cranial Nerves     CN II   Visual fields full to confrontation.   Visual acuity: normal  Right visual field deficit: none  Left visual field deficit: none     CN III, IV, VI   Right pupil: Shape: regular. Reactivity: brisk. Consensual response: intact.   Left pupil: Shape: regular. Reactivity: brisk. Consensual response: intact.   Nystagmus: none   Diplopia: none  Ophthalmoparesis: none  Upgaze: normal  Downgaze: normal  Conjugate gaze: present  Vestibulo-ocular reflex: present    CN V   Right facial sensation deficit: forehead  Left facial sensation deficit: forehead  Right corneal reflex: normal  Left corneal reflex: normal    CN VII   Right facial weakness: none  Left facial weakness: none    CN VIII   Hearing: intact    CN IX, X   Palate: symmetric  Right gag reflex: normal  Left gag reflex: normal    CN XI   Right sternocleidomastoid strength: normal  Left sternocleidomastoid strength: normal    CN XII   Tongue: not atrophic  Fasciculations: absent  Tongue deviation: none    Motor Exam   Muscle bulk: normal  Overall muscle tone: normal  Right arm tone: normal  Left arm tone: normal  Right leg tone: normal  Left leg tone: normal    Strength   Right neck flexion: 5/5  Left neck flexion: 5/5  Right neck extension: 5/5  Left neck extension: 5/5  Right deltoid: 5/5  Left deltoid: 5/5  Right biceps: 5/5  Left biceps: 5/5  Right triceps: 5/5  Left triceps: 5/5  Right wrist flexion: 5/5  Left wrist flexion: 5/5  Right wrist extension: 5/5  Left wrist extension: 5/5  Right interossei: 5/5  Left interossei: 5/5  Right abdominals: 5/5  Left abdominals: 5/5  Right iliopsoas: 5/5  Left iliopsoas:  5/5  Right quadriceps: 5/5  Left quadriceps: 5/5  Right hamstrin/5  Left hamstrin/5  Right glutei: 5/5  Left glutei: 5/5  Right anterior tibial: 5/5  Left anterior tibial: 5/5  Right posterior tibial: 5/5  Left posterior tibial: 5/5  Right peroneal: 5/5  Left peroneal: 5/5  Right gastroc: 5/5  Left gastroc: 5/5    Sensory Exam   Right arm light touch: normal  Right leg light touch: normal  Right arm vibration: normal  Right leg vibration: normal  Right arm proprioception: normal  Right leg proprioception: normal  Right arm pinprick: normal  Right leg pinprick: normal  Sensory deficit distribution on left: C6    Gait, Coordination, and Reflexes     Gait  Gait: wide-based ( )    Tremor   Resting tremor: absent  Intention tremor: absent  Action tremor: absent    Reflexes   Reflexes 2+ except as noted.   Right brachioradialis: 3+  Left brachioradialis: 3+  Right biceps: 3+  Left biceps: 3+  Right triceps: 3+  Left triceps: 3+  Right patellar: 3+  Left patellar: 3+  Right achilles: 4+  Left achilles: 4+  Right ankle clonus: present  Left ankle clonus: absent  Left pendular knee jerk: present     Result Review :  The following data was reviewed by: Umang Perez MD on 2022:  Common labs    Common Labsle 3/31/22 4/22/22 4/22/22 5/23/22     1003 1003    Creatinine    1.20   eGFR Non  Am 41      eGFR  Am 49      Total Protein   6.8    Albumin   3.1    WBC  11.43 (A)     Hemoglobin  11.5 (A)     Hematocrit  35.4     Platelets  260     (A) Abnormal value       Comments are available for some flowsheets but are not being displayed.           Data reviewed: Radiologic studies MRI B/C          Assessment and Plan   Diagnoses and all orders for this visit:    1. Multiple sclerosis (HCC) (Primary)  Assessment & Plan:  MRI B/C stable    Continue Tysabri.     Labs stable       2. Periodic headache syndrome, not intractable    3. Restless legs syndrome (RLS)  Assessment & Plan:  Sx stable       4.  Spasticity  Assessment & Plan:  Continue Baclofen      5. History of stroke  Assessment & Plan:  ASA  Start Lipitor 80 mg daily       Other orders  -     atorvastatin (Lipitor) 80 MG tablet; Take 1 tablet by mouth Daily.  Dispense: 30 tablet; Refill: 11           Follow Up   No follow-ups on file.  Patient was given instructions and counseling regarding her condition or for health maintenance advice. Please see specific information pulled into the AVS if appropriate.

## 2022-07-07 ENCOUNTER — INFUSION (OUTPATIENT)
Dept: ONCOLOGY | Facility: HOSPITAL | Age: 72
End: 2022-07-07

## 2022-07-07 VITALS
TEMPERATURE: 97.8 F | RESPIRATION RATE: 22 BRPM | DIASTOLIC BLOOD PRESSURE: 74 MMHG | HEART RATE: 60 BPM | SYSTOLIC BLOOD PRESSURE: 158 MMHG

## 2022-07-07 DIAGNOSIS — G35 MULTIPLE SCLEROSIS: Primary | ICD-10-CM

## 2022-07-07 PROCEDURE — 96365 THER/PROPH/DIAG IV INF INIT: CPT

## 2022-07-07 PROCEDURE — 63710000001 DIPHENHYDRAMINE PER 50 MG: Performed by: NURSE PRACTITIONER

## 2022-07-07 PROCEDURE — 25010000002 NATALIZUMAB PER 1 MG: Performed by: NURSE PRACTITIONER

## 2022-07-07 RX ORDER — ACETAMINOPHEN 325 MG/1
650 TABLET ORAL ONCE
Status: COMPLETED | OUTPATIENT
Start: 2022-07-07 | End: 2022-07-07

## 2022-07-07 RX ORDER — DIPHENHYDRAMINE HCL 25 MG
25 CAPSULE ORAL ONCE
Status: COMPLETED | OUTPATIENT
Start: 2022-07-07 | End: 2022-07-07

## 2022-07-07 RX ORDER — DIPHENHYDRAMINE HCL 25 MG
25 CAPSULE ORAL ONCE
Status: CANCELLED | OUTPATIENT
Start: 2022-07-15

## 2022-07-07 RX ORDER — SODIUM CHLORIDE 9 MG/ML
250 INJECTION, SOLUTION INTRAVENOUS ONCE
Status: DISCONTINUED | OUTPATIENT
Start: 2022-07-07 | End: 2022-07-07 | Stop reason: HOSPADM

## 2022-07-07 RX ORDER — ACETAMINOPHEN 325 MG/1
650 TABLET ORAL ONCE
Status: CANCELLED | OUTPATIENT
Start: 2022-07-15

## 2022-07-07 RX ORDER — SODIUM CHLORIDE 9 MG/ML
250 INJECTION, SOLUTION INTRAVENOUS ONCE
Status: CANCELLED | OUTPATIENT
Start: 2022-07-15

## 2022-07-07 RX ADMIN — DIPHENHYDRAMINE HYDROCHLORIDE 25 MG: 25 CAPSULE ORAL at 10:12

## 2022-07-07 RX ADMIN — ACETAMINOPHEN 650 MG: 325 TABLET ORAL at 10:12

## 2022-07-07 RX ADMIN — NATALIZUMAB 300 MG: 300 INJECTION INTRAVENOUS at 10:13

## 2022-07-29 ENCOUNTER — TELEPHONE (OUTPATIENT)
Dept: NEUROLOGY | Facility: CLINIC | Age: 72
End: 2022-07-29

## 2022-07-29 DIAGNOSIS — G35 MULTIPLE SCLEROSIS: Primary | ICD-10-CM

## 2022-07-29 RX ORDER — METHYLPREDNISOLONE 4 MG/1
TABLET ORAL
Qty: 1 EACH | Refills: 0 | Status: SHIPPED | OUTPATIENT
Start: 2022-07-29 | End: 2022-09-21

## 2022-08-18 ENCOUNTER — INFUSION (OUTPATIENT)
Dept: ONCOLOGY | Facility: HOSPITAL | Age: 72
End: 2022-08-18

## 2022-08-18 VITALS
SYSTOLIC BLOOD PRESSURE: 132 MMHG | DIASTOLIC BLOOD PRESSURE: 67 MMHG | TEMPERATURE: 97.9 F | RESPIRATION RATE: 20 BRPM | HEART RATE: 64 BPM

## 2022-08-18 DIAGNOSIS — G35 MULTIPLE SCLEROSIS: Primary | ICD-10-CM

## 2022-08-18 PROCEDURE — 63710000001 DIPHENHYDRAMINE PER 50 MG: Performed by: NURSE PRACTITIONER

## 2022-08-18 PROCEDURE — 96365 THER/PROPH/DIAG IV INF INIT: CPT

## 2022-08-18 PROCEDURE — 25010000002 NATALIZUMAB PER 1 MG: Performed by: NURSE PRACTITIONER

## 2022-08-18 RX ORDER — ACETAMINOPHEN 325 MG/1
650 TABLET ORAL ONCE
Status: CANCELLED | OUTPATIENT
Start: 2022-09-01

## 2022-08-18 RX ORDER — SODIUM CHLORIDE 9 MG/ML
250 INJECTION, SOLUTION INTRAVENOUS ONCE
Status: CANCELLED | OUTPATIENT
Start: 2022-09-01

## 2022-08-18 RX ORDER — DIPHENHYDRAMINE HCL 25 MG
25 CAPSULE ORAL ONCE
Status: CANCELLED | OUTPATIENT
Start: 2022-09-01

## 2022-08-18 RX ORDER — DIPHENHYDRAMINE HCL 25 MG
25 CAPSULE ORAL ONCE
Status: COMPLETED | OUTPATIENT
Start: 2022-08-18 | End: 2022-08-18

## 2022-08-18 RX ORDER — ACETAMINOPHEN 325 MG/1
650 TABLET ORAL ONCE
Status: COMPLETED | OUTPATIENT
Start: 2022-08-18 | End: 2022-08-18

## 2022-08-18 RX ADMIN — ACETAMINOPHEN 650 MG: 325 TABLET ORAL at 10:07

## 2022-08-18 RX ADMIN — NATALIZUMAB 300 MG: 300 INJECTION INTRAVENOUS at 10:08

## 2022-08-18 RX ADMIN — DIPHENHYDRAMINE HYDROCHLORIDE 25 MG: 25 CAPSULE ORAL at 10:07

## 2022-09-21 ENCOUNTER — HOSPITAL ENCOUNTER (EMERGENCY)
Facility: HOSPITAL | Age: 72
Discharge: HOME | End: 2022-09-21
Attending: EMERGENCY MEDICINE | Admitting: EMERGENCY MEDICINE

## 2022-09-21 ENCOUNTER — APPOINTMENT (OUTPATIENT)
Dept: GENERAL RADIOLOGY | Facility: HOSPITAL | Age: 72
End: 2022-09-21

## 2022-09-21 VITALS
OXYGEN SATURATION: 92 % | SYSTOLIC BLOOD PRESSURE: 153 MMHG | TEMPERATURE: 98.2 F | WEIGHT: 145 LBS | HEIGHT: 67 IN | DIASTOLIC BLOOD PRESSURE: 80 MMHG | HEART RATE: 78 BPM | BODY MASS INDEX: 22.76 KG/M2 | RESPIRATION RATE: 16 BRPM

## 2022-09-21 DIAGNOSIS — R60.0 PEDAL EDEMA: ICD-10-CM

## 2022-09-21 DIAGNOSIS — J44.1 COPD WITH ACUTE EXACERBATION: Primary | ICD-10-CM

## 2022-09-21 LAB
ALBUMIN SERPL-MCNC: 4.1 G/DL (ref 3.5–5.2)
ALBUMIN/GLOB SERPL: 1.3 G/DL
ALP SERPL-CCNC: 67 U/L (ref 39–117)
ALT SERPL W P-5'-P-CCNC: 13 U/L (ref 1–33)
ANION GAP SERPL CALCULATED.3IONS-SCNC: 10 MMOL/L (ref 5–15)
AST SERPL-CCNC: 14 U/L (ref 1–32)
BASOPHILS # BLD AUTO: 0.07 10*3/MM3 (ref 0–0.2)
BASOPHILS NFR BLD AUTO: 0.5 % (ref 0–1.5)
BILIRUB SERPL-MCNC: 0.7 MG/DL (ref 0–1.2)
BUN SERPL-MCNC: 15 MG/DL (ref 8–23)
BUN/CREAT SERPL: 15.6 (ref 7–25)
CALCIUM SPEC-SCNC: 9.3 MG/DL (ref 8.6–10.5)
CHLORIDE SERPL-SCNC: 103 MMOL/L (ref 98–107)
CO2 SERPL-SCNC: 27 MMOL/L (ref 22–29)
CREAT SERPL-MCNC: 0.96 MG/DL (ref 0.57–1)
DEPRECATED RDW RBC AUTO: 56 FL (ref 37–54)
EGFRCR SERPLBLD CKD-EPI 2021: 63.8 ML/MIN/1.73
EOSINOPHIL # BLD AUTO: 0.32 10*3/MM3 (ref 0–0.4)
EOSINOPHIL NFR BLD AUTO: 2.5 % (ref 0.3–6.2)
ERYTHROCYTE [DISTWIDTH] IN BLOOD BY AUTOMATED COUNT: 16.3 % (ref 12.3–15.4)
FLUAV SUBTYP SPEC NAA+PROBE: NOT DETECTED
FLUBV RNA ISLT QL NAA+PROBE: NOT DETECTED
GLOBULIN UR ELPH-MCNC: 3.2 GM/DL
GLUCOSE SERPL-MCNC: 88 MG/DL (ref 65–99)
HCT VFR BLD AUTO: 35 % (ref 34–46.6)
HGB BLD-MCNC: 11.7 G/DL (ref 12–15.9)
HOLD SPECIMEN: NORMAL
IMM GRANULOCYTES # BLD AUTO: 0.06 10*3/MM3 (ref 0–0.05)
IMM GRANULOCYTES NFR BLD AUTO: 0.5 % (ref 0–0.5)
LYMPHOCYTES # BLD AUTO: 4.19 10*3/MM3 (ref 0.7–3.1)
LYMPHOCYTES NFR BLD AUTO: 32.6 % (ref 19.6–45.3)
MAGNESIUM SERPL-MCNC: 2.3 MG/DL (ref 1.6–2.4)
MCH RBC QN AUTO: 31.1 PG (ref 26.6–33)
MCHC RBC AUTO-ENTMCNC: 33.4 G/DL (ref 31.5–35.7)
MCV RBC AUTO: 93.1 FL (ref 79–97)
MONOCYTES # BLD AUTO: 0.79 10*3/MM3 (ref 0.1–0.9)
MONOCYTES NFR BLD AUTO: 6.1 % (ref 5–12)
NEUTROPHILS NFR BLD AUTO: 57.8 % (ref 42.7–76)
NEUTROPHILS NFR BLD AUTO: 7.44 10*3/MM3 (ref 1.7–7)
NRBC BLD AUTO-RTO: 0.2 /100 WBC (ref 0–0.2)
NT-PROBNP SERPL-MCNC: 139 PG/ML (ref 0–900)
PLATELET # BLD AUTO: 252 10*3/MM3 (ref 140–450)
PMV BLD AUTO: 9.3 FL (ref 6–12)
POTASSIUM SERPL-SCNC: 4.3 MMOL/L (ref 3.5–5.2)
PROT SERPL-MCNC: 7.3 G/DL (ref 6–8.5)
RBC # BLD AUTO: 3.76 10*6/MM3 (ref 3.77–5.28)
SARS-COV-2 RNA PNL SPEC NAA+PROBE: NOT DETECTED
SODIUM SERPL-SCNC: 140 MMOL/L (ref 136–145)
T4 FREE SERPL-MCNC: 1.3 NG/DL (ref 0.93–1.7)
TROPONIN T SERPL-MCNC: 0.02 NG/ML (ref 0–0.03)
TSH SERPL DL<=0.05 MIU/L-ACNC: 1.84 UIU/ML (ref 0.27–4.2)
WBC NRBC COR # BLD: 12.87 10*3/MM3 (ref 3.4–10.8)
WHOLE BLOOD HOLD COAG: NORMAL
WHOLE BLOOD HOLD SPECIMEN: NORMAL

## 2022-09-21 PROCEDURE — 83880 ASSAY OF NATRIURETIC PEPTIDE: CPT

## 2022-09-21 PROCEDURE — 84484 ASSAY OF TROPONIN QUANT: CPT

## 2022-09-21 PROCEDURE — 36415 COLL VENOUS BLD VENIPUNCTURE: CPT

## 2022-09-21 PROCEDURE — 99283 EMERGENCY DEPT VISIT LOW MDM: CPT

## 2022-09-21 PROCEDURE — 87636 SARSCOV2 & INF A&B AMP PRB: CPT | Performed by: EMERGENCY MEDICINE

## 2022-09-21 PROCEDURE — 85025 COMPLETE CBC W/AUTO DIFF WBC: CPT

## 2022-09-21 PROCEDURE — 93005 ELECTROCARDIOGRAM TRACING: CPT

## 2022-09-21 PROCEDURE — 71045 X-RAY EXAM CHEST 1 VIEW: CPT

## 2022-09-21 PROCEDURE — 84443 ASSAY THYROID STIM HORMONE: CPT | Performed by: EMERGENCY MEDICINE

## 2022-09-21 PROCEDURE — 80053 COMPREHEN METABOLIC PANEL: CPT

## 2022-09-21 PROCEDURE — 83735 ASSAY OF MAGNESIUM: CPT | Performed by: EMERGENCY MEDICINE

## 2022-09-21 PROCEDURE — 84439 ASSAY OF FREE THYROXINE: CPT | Performed by: EMERGENCY MEDICINE

## 2022-09-21 RX ORDER — PREDNISONE 20 MG/1
40 TABLET ORAL DAILY
Qty: 10 TABLET | Refills: 0 | Status: SHIPPED | OUTPATIENT
Start: 2022-09-21 | End: 2022-09-29

## 2022-09-21 RX ORDER — SODIUM CHLORIDE 0.9 % (FLUSH) 0.9 %
10 SYRINGE (ML) INJECTION AS NEEDED
Status: DISCONTINUED | OUTPATIENT
Start: 2022-09-21 | End: 2022-09-21 | Stop reason: HOSPADM

## 2022-09-21 NOTE — ED PROVIDER NOTES
"Subjective   History of Present Illness  Pt presents with multiple complaints.    Bilateral edema to feet and ankles for about a month.    Increase in baseline chronic cough for a couple of weeks. Some dyspnea with exertion but not at rest.    Left arm numbness intermittently. History of MS and has had this problem with it. No weakness.    No fever, chest pain, vomiting or diarrhea.      PMH COPD, MS, HTN. Recently on steroids, no other med changes.    History provided by:  Patient      Review of Systems   Constitutional: Negative for fever.   Respiratory: Positive for cough and shortness of breath.    Cardiovascular: Positive for leg swelling. Negative for chest pain.   Gastrointestinal: Negative for abdominal pain, diarrhea and vomiting.   Neurological: Positive for numbness. Negative for weakness.   All other systems reviewed and are negative.      Past Medical History:   Diagnosis Date   • Aneurysm of aorta ([facility])    • Anxiety and depression    • Arthritis    • Back disorder     \"PROTRUDING DISC\"   • COPD (chronic obstructive pulmonary disease) ([facility])    • Hepatitis C    • Hypertension    • MS (multiple sclerosis) ([facility])        Allergies   Allergen Reactions   • Penicillins Other (See Comments)     WAS A YOUNG CHILD AND WAS NEVER TOLD WHAT HER REACTION WAS   • Sulfa Antibiotics Other (See Comments)     WAS A YOUNG CHILD AND WAS NEVER TOLD WHAT HER REACTION WAS       Past Surgical History:   Procedure Laterality Date   • CHOLECYSTECTOMY     • HYSTERECTOMY     • SMALL INTESTINE SURGERY      As a child   • TOE SURGERY Left     Great   • TONSILLECTOMY      and Adnoids   • TUBAL ABDOMINAL LIGATION         Family History   Problem Relation Age of Onset   • Lung cancer Father    • Diabetes Father    • Diabetes Brother    • Hypertension Brother    • Coronary artery disease Mother    • Heart attack Mother        Social History     Socioeconomic History   • Marital status:    • Number of children: 3   Tobacco Use   • " Smoking status: Current Every Day Smoker     Packs/day: 1.00     Years: 40.00     Pack years: 40.00     Types: Cigarettes   • Smokeless tobacco: Never Used   Vaping Use   • Vaping Use: Never used   Substance and Sexual Activity   • Alcohol use: No   • Drug use: No   • Sexual activity: Defer           Objective   Physical Exam  Vitals and nursing note reviewed.   Constitutional:       General: She is not in acute distress.     Appearance: Normal appearance. She is not ill-appearing.   HENT:      Head: Normocephalic and atraumatic.      Mouth/Throat:      Mouth: Mucous membranes are moist.   Eyes:      General: No scleral icterus.        Right eye: No discharge.         Left eye: No discharge.      Conjunctiva/sclera: Conjunctivae normal.   Cardiovascular:      Rate and Rhythm: Normal rate and regular rhythm.      Heart sounds: No murmur heard.  Pulmonary:      Effort: Pulmonary effort is normal. No respiratory distress.      Breath sounds: Normal breath sounds. No wheezing.   Abdominal:      General: Bowel sounds are normal. There is no distension.      Palpations: Abdomen is soft.      Tenderness: There is no abdominal tenderness. There is no guarding or rebound.   Musculoskeletal:         General: No swelling. Normal range of motion.      Cervical back: Normal range of motion and neck supple.      Right lower leg: Edema present.      Left lower leg: Edema present.      Comments: Minimal edema to ankles. No discoloration or calf tenderness.   Skin:     General: Skin is warm and dry.      Findings: No rash.   Neurological:      General: No focal deficit present.      Mental Status: She is alert and oriented to person, place, and time. Mental status is at baseline.      Comments: Speech fluent and articulate.  No facial droop.  5/5 strength in arms and legs.  Normal .  Mentation normal.   Psychiatric:         Mood and Affect: Mood normal.         Behavior: Behavior normal.         Thought Content: Thought content  normal.         Procedures           ED Course         EKG SR with a PVC.  Labs benign.  CXR NAD.  Negative eval. Discussed diuretics as first try vs stopping her amlodipine. She chose the latter.                                  MDM  Number of Diagnoses or Management Options     Amount and/or Complexity of Data Reviewed  Clinical lab tests: reviewed and ordered  Tests in the radiology section of CPT®: reviewed and ordered  Independent visualization of images, tracings, or specimens: yes        Final diagnoses:   COPD with acute exacerbation (HCC)   Pedal edema       ED Disposition  ED Disposition     ED Disposition   Discharge    Condition   Stable    Comment   --             Niharika Fermin MD  [address]  [facility] [address] [phone]    In 1 week           Medication List      New Prescriptions    predniSONE 20 MG tablet  Commonly known as: DELTASONE  Take 2 tablets by mouth Daily.           Where to Get Your Medications      These medications were sent to [facility] - [place], [place] - [address] - [phone]  - [phone] FX  [address], [facility] [address]    Phone: [phone]   · predniSONE 20 MG tablet          Jonnie Christensen MD  09/22/22 0039

## 2022-09-22 ENCOUNTER — TELEPHONE (OUTPATIENT)
Dept: OTHER | Facility: HOSPITAL | Age: 72
End: 2022-09-22

## 2022-09-22 NOTE — TELEPHONE ENCOUNTER
COPD Nurse Navigator attempted to reach patient by telephone in regard to ED visit 9/21/2022.  There was no answer at the time of the call.  No other questions or concerns at this time.  COPD Nurse Navigator continues to follow.

## 2022-09-23 LAB
QT INTERVAL: 394 MS
QTC INTERVAL: 451 MS

## 2022-09-27 ENCOUNTER — TELEPHONE (OUTPATIENT)
Dept: NEUROLOGY | Facility: CLINIC | Age: 72
End: 2022-09-27

## 2022-09-27 NOTE — TELEPHONE ENCOUNTER
Caller: Leatha Wall    Relationship: Self    Best call back number: [phone]    What is the best time to reach you: ANY    Who are you requesting to speak with (clinical staff, provider,  specific staff member): SWATHI    What was the call regarding:PATIENT TELEPHONED RE: INFUSIONS SCHED.ON THURS 9/29. SHE STATES SHE IS ON PREDNISONE (DELTASONE) 20 MG TABLETS, ALONG WITH AN ANTIBIOTIC THAT SHE TAKES 3x'S DAILY. SHE WOULD LIKE TO KNOW IF SHE CAN STILL DO INFUSION APPT ON 9/29/22 & IF MEDICINE WILL AFFECT THE APPT.    PLEASE CALL & ADVISE     THANK YOU

## 2022-09-27 NOTE — TELEPHONE ENCOUNTER
Spoke with patient to let her know Dr. Perez said she is good to infuse Tysabri this week. She is taking the medication 3 times a week and this could be a long term thing.

## 2022-09-29 ENCOUNTER — INFUSION (OUTPATIENT)
Dept: ONCOLOGY | Facility: HOSPITAL | Age: 72
End: 2022-09-29

## 2022-09-29 VITALS
TEMPERATURE: 97.5 F | HEART RATE: 72 BPM | RESPIRATION RATE: 18 BRPM | DIASTOLIC BLOOD PRESSURE: 79 MMHG | SYSTOLIC BLOOD PRESSURE: 178 MMHG

## 2022-09-29 DIAGNOSIS — G35 MULTIPLE SCLEROSIS: Primary | ICD-10-CM

## 2022-09-29 PROCEDURE — 25010000002 NATALIZUMAB PER 1 MG: Performed by: NURSE PRACTITIONER

## 2022-09-29 PROCEDURE — 63710000001 DIPHENHYDRAMINE PER 50 MG: Performed by: NURSE PRACTITIONER

## 2022-09-29 PROCEDURE — 96365 THER/PROPH/DIAG IV INF INIT: CPT

## 2022-09-29 RX ORDER — DIPHENHYDRAMINE HCL 25 MG
25 CAPSULE ORAL ONCE
Status: COMPLETED | OUTPATIENT
Start: 2022-09-29 | End: 2022-09-29

## 2022-09-29 RX ORDER — ACETAMINOPHEN 325 MG/1
650 TABLET ORAL ONCE
OUTPATIENT
Start: 2022-10-13

## 2022-09-29 RX ORDER — ACETAMINOPHEN 325 MG/1
650 TABLET ORAL ONCE
Status: COMPLETED | OUTPATIENT
Start: 2022-09-29 | End: 2022-09-29

## 2022-09-29 RX ORDER — DIPHENHYDRAMINE HCL 25 MG
25 CAPSULE ORAL ONCE
OUTPATIENT
Start: 2022-10-13

## 2022-09-29 RX ORDER — SODIUM CHLORIDE 9 MG/ML
250 INJECTION, SOLUTION INTRAVENOUS ONCE
OUTPATIENT
Start: 2022-10-13

## 2022-09-29 RX ADMIN — DIPHENHYDRAMINE HYDROCHLORIDE 25 MG: 25 CAPSULE ORAL at 10:31

## 2022-09-29 RX ADMIN — NATALIZUMAB 300 MG: 300 INJECTION INTRAVENOUS at 10:32

## 2022-09-29 RX ADMIN — ACETAMINOPHEN 650 MG: 325 TABLET ORAL at 10:31

## 2022-10-11 ENCOUNTER — OFFICE VISIT (OUTPATIENT)
Dept: NEUROLOGY | Facility: CLINIC | Age: 72
End: 2022-10-11

## 2022-10-11 VITALS
HEART RATE: 85 BPM | TEMPERATURE: 96.8 F | OXYGEN SATURATION: 98 % | SYSTOLIC BLOOD PRESSURE: 132 MMHG | DIASTOLIC BLOOD PRESSURE: 74 MMHG | HEIGHT: 67 IN | BODY MASS INDEX: 22.76 KG/M2 | WEIGHT: 145 LBS

## 2022-10-11 DIAGNOSIS — Z72.0 TOBACCO ABUSE: Chronic | ICD-10-CM

## 2022-10-11 DIAGNOSIS — G35 MULTIPLE SCLEROSIS: Primary | Chronic | ICD-10-CM

## 2022-10-11 DIAGNOSIS — G43.C0 PERIODIC HEADACHE SYNDROME, NOT INTRACTABLE: Chronic | ICD-10-CM

## 2022-10-11 DIAGNOSIS — G25.81 RESTLESS LEGS SYNDROME (RLS): Chronic | ICD-10-CM

## 2022-10-11 DIAGNOSIS — R25.2 SPASTICITY: Chronic | ICD-10-CM

## 2022-10-11 PROCEDURE — 99214 OFFICE O/P EST MOD 30 MIN: CPT | Performed by: PSYCHIATRY & NEUROLOGY

## 2022-10-11 RX ORDER — GUAIFENESIN 1200 MG/1
TABLET, EXTENDED RELEASE ORAL
COMMUNITY
Start: 2022-09-17

## 2022-10-11 RX ORDER — CHLORTHALIDONE 25 MG/1
12.5 TABLET ORAL DAILY
COMMUNITY
Start: 2022-10-04

## 2022-10-11 RX ORDER — AZITHROMYCIN 250 MG/1
TABLET, FILM COATED ORAL
COMMUNITY
Start: 2022-10-10 | End: 2023-02-10

## 2022-10-11 RX ORDER — PREDNISONE 10 MG/1
TABLET ORAL
COMMUNITY
Start: 2022-10-04 | End: 2022-10-11

## 2022-10-11 NOTE — PROGRESS NOTES
Chief Complaint  Multiple Sclerosis    Subjective        Leatha Wall presents to [facility] NEUROLOGY     History of Present Illness    72 y.o. female returns in follow up.  Last visit on 6/23/22 continued Tysabri, ASA, Lipitor, Baclofen,     Hep C treated     MRI Brain/Cerivcal  5/23/22 as compared to 9/28/21 moderate to severe T2 lesion load, without new/enlarging or enhancing lesions, no cord lesions, small punctate diffusion abnl R post frontal       9/21/22    CBC, CMP    4/22/22     JCB 2.83 CBC - NCS   11/4/21     JCV 2.68 CBC,CMP - NCS   5/5/21       JCV 3.05, CBC,CMP - NCS  11/23/20   JCV 3.33  CBC - NCS   5/21/20:   JCV 2.06;  CBC, CMP - NCS  2/22/19:   JCV 1.38  10/12/18  JCV 1.39  5/23/18    JCV 1.44  12/13/16: JCV 2.21  6/27/17    JCV 1.87       RRMS      Vision is blurry, extreme fatigue.      Covid July 12, 2022 tx with Paxlovid.      Spasticity in B LE improved with baclofen.       O2 qhs for COPD.  Continues to smoke 1 ppd.       Started Tysabri 2/20/17 switched to CHENG.     Gait unsteady.      Tolerating Tysabri without side effects.  .      Fatigue is moderate.         Problem history:     MSFC remarkable for 26/110 o/w within normal results.  Oral numbness resolved.  Bladder urgency and frequency slight increase.    Mild N/T in bottom of feet      Migraines          HA are minor.      Located in occiput and behind eyes.  Quality of tightness and throbbing.  Moderate severity.        RLS     Right LE controlled.      GBP controlling sx.      Left left drawing improved with addition of GBP but now both feet are cramping at night.  Occurs during sleep.       PMH:     Fatigue and heat are bothersome.  Echo -   EF 60%  C U/S -  0-49% on right, no stenosis on left     MRI cervical, my review, unremarkable  Labs below:     NMO negative  Hypercoaguable panel WNL  Vit D 17.2     Fatigue continues to wax and wane.  Last few days has had unsteady gait. Weakness on left arm/leg.  " Numbness in left arm and right foot.  Left side of tongue numb for the last 7 to 14 weeks.   Continue to smoke Tob 1 ppd.      Patient called and noted overwhelming fatigue.  After steroids fatigue improved but then recurred on 8/5/16.  Frequent HA's every other day frontal and occipital location of squeezing sensation of moderate severity.  Prednisone taper rx on 7/22/16 improved energy.       6/22/16 awoke at 3:30 am and noted left sided weakness in arm and leg.  Gait was unsteady and worsened over the next 7 days and presented to [facility] ED.  Fatigue was overwhelming.  MRI Brain in ED, my review, with multiple T2 white matter lesions and one large black hole in left PVWM.  Given steroids in ED with improvement in sx by 50%.     5/2016 had episode of left arm clumsiness for a week.       Reports increased fatigue since the first of the year.       Vision is fuzzy and feel swollen.       Bladder frequency.      Tob 1 ppd.     Objective   Vital Signs:  /74   Pulse 85   Temp 96.8 °F (36 °C)   Ht 170.2 cm (67.01\")   Wt 65.8 kg (145 lb)   SpO2 98%   BMI 22.70 kg/m²   Estimated body mass index is 22.7 kg/m² as calculated from the following:    Height as of this encounter: 170.2 cm (67.01\").    Weight as of this encounter: 65.8 kg (145 lb).    BMI is within normal parameters. No other follow-up for BMI required.      Physical Exam  Eyes:      Extraocular Movements: EOM normal.      Pupils: Pupils are equal, round, and reactive to light.   Neurological:      Mental Status: She is oriented to person, place, and time.      Cranial Nerves: Cranial nerves 2-12 are intact.      Motor: Motor strength is normal.      Gait: Gait is intact.      Deep Tendon Reflexes:      Reflex Scores:       Tricep reflexes are 3+ on the right side and 3+ on the left side.       Bicep reflexes are 3+ on the right side and 3+ on the left side.       Brachioradialis reflexes are 3+ on the right side and 3+ on the left side.       Patellar " reflexes are 3+ on the right side and 3+ on the left side.       Achilles reflexes are 4+ on the right side and 4+ on the left side.  Psychiatric:         Speech: Speech normal.          Neurologic Exam     Mental Status   Oriented to person, place, and time.   Registration: recalls 3 of 3 objects. Recall at 5 minutes: recalls 3 of 3 objects. Follows 3 step commands.   Attention: normal. Concentration: normal.   Speech: speech is normal   Level of consciousness: alert  Knowledge: good and consistent with education.   Able to name object. Able to read. Able to repeat. Able to write. Normal comprehension.     Cranial Nerves   Cranial nerves II through XII intact.     CN II   Visual fields full to confrontation.   Visual acuity: normal  Right visual field deficit: none  Left visual field deficit: none     CN III, IV, VI   Pupils are equal, round, and reactive to light.  Extraocular motions are normal.   Right pupil: Shape: regular. Reactivity: brisk. Consensual response: intact.   Left pupil: Shape: regular. Reactivity: brisk. Consensual response: intact.   Nystagmus: none   Diplopia: none  Ophthalmoparesis: none  Upgaze: normal  Downgaze: normal  Conjugate gaze: present  Vestibulo-ocular reflex: present    CN V   Facial sensation intact.   Right facial sensation deficit: forehead  Left facial sensation deficit: forehead  Right corneal reflex: normal  Left corneal reflex: normal    CN VII   Right facial weakness: none  Left facial weakness: none    CN VIII   Hearing: intact    CN IX, X   Palate: symmetric  Right gag reflex: normal  Left gag reflex: normal    CN XI   Right sternocleidomastoid strength: normal  Left sternocleidomastoid strength: normal    CN XII   Tongue: not atrophic  Fasciculations: absent  Tongue deviation: none    Motor Exam   Muscle bulk: normal  Overall muscle tone: normal  Right arm tone: normal  Left arm tone: normal  Right leg tone: normal  Left leg tone: normal    Strength   Strength 5/5  throughout.   Right neck flexion: 5/5  Left neck flexion: 5/5  Right neck extension: 5/5  Left neck extension: 5/5  Right deltoid: 5/5  Left deltoid: 5/5  Right biceps: 5/5  Left biceps: 5/5  Right triceps: 5/5  Left triceps: 5/5  Right wrist flexion: 5/5  Left wrist flexion: 5/5  Right wrist extension: 5/5  Left wrist extension: 5/5  Right interossei: 5/5  Left interossei: 5/5  Right abdominals: 5/5  Left abdominals: 5/5  Right iliopsoas: 5/5  Left iliopsoas: 5/5  Right quadriceps: 5/5  Left quadriceps: 5/5  Right hamstrin/5  Left hamstrin/5  Right glutei: 5/5  Left glutei: 5/5  Right anterior tibial: 5/5  Left anterior tibial: 5/5  Right posterior tibial: 5/5  Left posterior tibial: 5/5  Right peroneal: 5/5  Left peroneal: 5/5  Right gastroc: 5/5  Left gastroc: 5/5    Sensory Exam   Light touch normal.   Right arm light touch: normal  Right leg light touch: normal  Right arm vibration: normal  Right leg vibration: normal  Right arm proprioception: normal  Right leg proprioception: normal  Right arm pinprick: normal  Right leg pinprick: normal  Sensory deficit distribution on left: C6    Gait, Coordination, and Reflexes     Gait  Gait: normal ( )    Tremor   Resting tremor: absent  Intention tremor: absent  Action tremor: absent    Reflexes   Reflexes 2+ except as noted.   Right brachioradialis: 3+  Left brachioradialis: 3+  Right biceps: 3+  Left biceps: 3+  Right triceps: 3+  Left triceps: 3+  Right patellar: 3+  Left patellar: 3+  Right achilles: 4+  Left achilles: 4+  Right ankle clonus: present  Left ankle clonus: absent  Left pendular knee jerk: present     Result Review :  The following data was reviewed by: Umang Perez MD on 10/11/2022:  Common labs    Common Labs 22    1003 1003  1300 1300   Glucose     88   BUN     15   Creatinine   1.20  0.96   Sodium     140   Potassium     4.3   Chloride     103   Calcium     9.3   Total Protein  6.8      Albumin  3.1    4.10   Total Bilirubin     0.7   Alkaline Phosphatase     67   AST (SGOT)     14   ALT (SGPT)     13   WBC 11.43 (A)   12.87 (A)    Hemoglobin 11.5 (A)   11.7 (A)    Hematocrit 35.4   35.0    Platelets 260   252    (A) Abnormal value       Comments are available for some flowsheets but are not being displayed.                     Assessment and Plan   Diagnoses and all orders for this visit:    1. Multiple sclerosis (HCC) (Primary)  Assessment & Plan:  Stable on Tysabri      JCV      Orders:  -     Stratify JCV, Antibody CHRISTIAN With / Reflex To Inhibition Assay ([facility]); Future    2. Periodic headache syndrome, not intractable  Assessment & Plan:  Headaches are unchanged.  Continue current treatment regimen.          3. Restless legs syndrome (RLS)  Assessment & Plan:  Continue GBP       4. Spasticity  Assessment & Plan:  Baclofen       5. Tobacco abuse           Follow Up {Instructions Charge Capture  Follow-up Communications :23}  No follow-ups on file.  Patient was given instructions and counseling regarding her condition or for health maintenance advice. Please see specific information pulled into the AVS if appropriate.

## 2022-11-10 ENCOUNTER — TELEPHONE (OUTPATIENT)
Dept: NEUROLOGY | Facility: CLINIC | Age: 72
End: 2022-11-10

## 2022-11-10 DIAGNOSIS — G35 MULTIPLE SCLEROSIS: Chronic | ICD-10-CM

## 2022-11-10 RX ORDER — GABAPENTIN 300 MG/1
900 CAPSULE ORAL 2 TIMES DAILY
Qty: 540 CAPSULE | Refills: 1 | Status: SHIPPED | OUTPATIENT
Start: 2022-11-10 | End: 2023-11-10

## 2022-11-10 NOTE — TELEPHONE ENCOUNTER
Caller: Leatha Wall    Relationship: Self    Best call back number: [phone]    Requested Prescriptions:   Requested Prescriptions     Pending Prescriptions Disp Refills   • gabapentin (NEURONTIN) 300 MG capsule 540 capsule 1     Sig: Take 3 capsules by mouth 2 (Two) Times a Day.        Pharmacy where request should be sent: [address] - [phone]  - [phone] FX     Additional details provided by patient: PT STATES PHARMACY SENT IN A REFILL REQUEST.   I DO NO SEE ONE IN THE CHART.  PATIENT WILL BE OUT TOMORROW (11-11-22)      Does the patient have less than a 3 day supply:  [x] Yes  [] No    Yue King Rep   11/10/22 10:15 EST

## 2022-11-10 NOTE — TELEPHONE ENCOUNTER
Rx Refill Note  Requested Prescriptions     Pending Prescriptions Disp Refills   • gabapentin (NEURONTIN) 300 MG capsule 540 capsule 1     Sig: Take 3 capsules by mouth 2 (Two) Times a Day.      Last filled: 05/19/2022 540 with 1 refill.  Last office visit with prescribing clinician: 10/11/2022      Next office visit with prescribing clinician: 12/9/2022     Savannah Bustillos MA  11/10/22, 10:24 EST

## 2022-11-11 ENCOUNTER — APPOINTMENT (OUTPATIENT)
Dept: ONCOLOGY | Facility: HOSPITAL | Age: 72
End: 2022-11-11

## 2022-11-21 ENCOUNTER — TELEPHONE (OUTPATIENT)
Dept: NEUROLOGY | Facility: CLINIC | Age: 72
End: 2022-11-21

## 2022-11-21 NOTE — TELEPHONE ENCOUNTER
Provider: STEPHANIE ECHEVARRIA MD    Caller: YAHIR    Relationship to Patient: GRANDDAUGHTER   Phone Number: [phone]    Reason for Call: PT IS IN HOSPITAL D/T SEPTIC PNEUMONIA AT [facility] SINCE 11-18-22.  STATED SHE NEEDS TO CANCEL INFUSION APPT ON 11-23-22. CALLED S/W RODRIGO AT OFFICE FOR A NUMBER FOR INFUSION.  RODRIGO ASKED TO SEND MESSAGE TO B[facility].       DONNA UNABLE TO CANCEL INFUSION APPTS.        PLEASE CALL & ADVISE

## 2022-11-21 NOTE — TELEPHONE ENCOUNTER
Infusion Center was already closed today but will call them tomorrow and let them know and send Florencia an FYI as well. Thanks!

## 2022-11-22 NOTE — TELEPHONE ENCOUNTER
Called and s/w Radha at infusion to cancel Leatha's infusion for tomorrow as she is septic with pneumonia and @ [facility].    Granddaughter will call our office back when she is feeling better to r/s. Thanks!

## 2022-11-23 ENCOUNTER — APPOINTMENT (OUTPATIENT)
Dept: ONCOLOGY | Facility: HOSPITAL | Age: 72
End: 2022-11-23

## 2022-12-08 ENCOUNTER — TELEPHONE (OUTPATIENT)
Dept: NEUROLOGY | Facility: CLINIC | Age: 72
End: 2022-12-08

## 2022-12-08 NOTE — TELEPHONE ENCOUNTER
Provider: DR. ECHEVARRIA    Caller: YAHIR ENGEL    Relationship to Patient: GRANDDAUGHTER    Phone Number: [phone]    Reason for Call: THE PATIENT WAS HOSPITALIZED RECENTLY AND WAS STARTED ON DIALYSIS AND THE HOSPITAL INFORMED CAREGIVERS THAT NEUROLOGY WILL NEED TO CONSULT WITH NEPHROLOGY BEFORE CONTINUING ANY INFUSIONS. PATIENT IS NEEDING AN INFUSION AT THIS TIME BUT IS NOW IN A REHAB FACILITY.     When was the patient last seen: 10/11/22    When did it start: 11/25/22    NOTES: PATIENT'S NEPHROLOGIST IS DR. PAREDES AND HIS NUMBER [phone].

## 2022-12-08 NOTE — TELEPHONE ENCOUNTER
Spoke with Niharika. Informed her Dr. Perez wants to hold any further infusions and we can get her scheduled for a follow up once out of Rehab. She verbalized understanding.

## 2023-01-05 ENCOUNTER — TELEPHONE (OUTPATIENT)
Dept: NEUROLOGY | Facility: CLINIC | Age: 73
End: 2023-01-05
Payer: MEDICARE

## 2023-01-05 RX ORDER — LEVETIRACETAM 500 MG/1
500 TABLET ORAL 2 TIMES DAILY
Qty: 60 TABLET | Refills: 5 | Status: SHIPPED | OUTPATIENT
Start: 2023-01-05 | End: 2023-02-10

## 2023-01-05 NOTE — TELEPHONE ENCOUNTER
PATIENT REPORTS THAT WHEN SHE STANDS HER FEET JUMP    CAN SHE TAKE A MEDICATION FOR THIS?    PLEASE ADVISE    THANK YOU

## 2023-01-05 NOTE — TELEPHONE ENCOUNTER
Notified patient Dr. Perez wants patient to start Keppra to see if that will help. Patient verbalized understanding and stated she needs to get scheduled for her Tysabri infusions. I will notify infusion.

## 2023-01-11 ENCOUNTER — TELEPHONE (OUTPATIENT)
Dept: NEUROLOGY | Facility: CLINIC | Age: 73
End: 2023-01-11
Payer: MEDICARE

## 2023-01-11 NOTE — TELEPHONE ENCOUNTER
Spoke with patient to let her know that we will most likely not restart her Tysabri. Explained that since she was in the hospital for septic shock and on hemo dialysis that we need to wait or stop all together. She stated she is no longer on hemo dialysis but on very strong antibiotics. Advised her that we will re-evaluate at her follow up appointment. She verbalized understanding and will notify us if anything changes.

## 2023-01-11 NOTE — TELEPHONE ENCOUNTER
----- Message from Yesy Patel, Ila sent at 1/10/2023  8:31 AM EST -----  Regarding: Tysabri Infusions  Do you know when Ms. Wall will be starting back on her Tysabri infusions?    Thank you!  Yesy

## 2023-02-02 ENCOUNTER — TELEPHONE (OUTPATIENT)
Dept: NEUROLOGY | Facility: CLINIC | Age: 73
End: 2023-02-02
Payer: MEDICARE

## 2023-02-02 NOTE — TELEPHONE ENCOUNTER
Caller: Leatha Wall    Relationship: Self    Best call back number: [phone]    What was the call regarding: PATIENT IS CALLING TO FIND OUT WHEN SHE WILL BE ABLE TO RESUME HER INFUSION. HER MS SYMPTOMS ARE INCREASING AND SHE WOULD ALSO LIKE TO TRY PREDNISONE. SHE'S VERY UNSTEADY ON HER FEET AND HAS FALLEN REPEATEDLY IN THE BATHROOM. SHE HIT HER HEAD EACH TIME AND WAS SEEN AT [facility] AND THE CT'S CAME BACK CLEAN. SHE ENDED UP AT [facility] FOR REHAB AND JUST GOT OUT ON Sunday. YESTERDAY SHE WENT TO [facility] AND [facility] AND THEY COULDN'T FIND ANY ISSUE OTHER THAN THE BIG KNOTS ON HER HEAD. THEY SUSPECT MS FLARE UP.    Do you require a callback: YES    PLEASE REVIEW AND ADVISE

## 2023-02-03 NOTE — TELEPHONE ENCOUNTER
Spoke with patient. Was able to move up appointment to next Friday 2/10 @ 1030. Patient verbalized understanding.

## 2023-02-10 ENCOUNTER — OFFICE VISIT (OUTPATIENT)
Dept: NEUROLOGY | Facility: CLINIC | Age: 73
End: 2023-02-10
Payer: MEDICARE

## 2023-02-10 VITALS
WEIGHT: 145 LBS | OXYGEN SATURATION: 98 % | DIASTOLIC BLOOD PRESSURE: 68 MMHG | BODY MASS INDEX: 22.76 KG/M2 | HEIGHT: 67 IN | HEART RATE: 82 BPM | SYSTOLIC BLOOD PRESSURE: 120 MMHG

## 2023-02-10 DIAGNOSIS — G25.81 RESTLESS LEGS SYNDROME (RLS): Chronic | ICD-10-CM

## 2023-02-10 DIAGNOSIS — R25.2 SPASTICITY: Chronic | ICD-10-CM

## 2023-02-10 DIAGNOSIS — G43.C0 PERIODIC HEADACHE SYNDROME, NOT INTRACTABLE: Chronic | ICD-10-CM

## 2023-02-10 DIAGNOSIS — Z86.73 HISTORY OF STROKE: Chronic | ICD-10-CM

## 2023-02-10 DIAGNOSIS — G35 MULTIPLE SCLEROSIS: Primary | Chronic | ICD-10-CM

## 2023-02-10 PROCEDURE — 99214 OFFICE O/P EST MOD 30 MIN: CPT | Performed by: PSYCHIATRY & NEUROLOGY

## 2023-02-10 RX ORDER — OXYBUTYNIN CHLORIDE 5 MG/1
5 TABLET ORAL 2 TIMES DAILY
COMMUNITY
Start: 2023-01-27

## 2023-02-10 RX ORDER — AMLODIPINE BESYLATE 5 MG/1
5 TABLET ORAL DAILY
COMMUNITY

## 2023-02-10 RX ORDER — BACLOFEN 10 MG/1
TABLET ORAL
COMMUNITY
Start: 2023-01-27 | End: 2023-02-10 | Stop reason: DRUGHIGH

## 2023-02-10 RX ORDER — METOPROLOL TARTRATE 50 MG/1
50 TABLET, FILM COATED ORAL
COMMUNITY
Start: 2022-12-13 | End: 2023-02-10

## 2023-02-10 RX ORDER — BUSPIRONE HYDROCHLORIDE 7.5 MG/1
TABLET ORAL
COMMUNITY
Start: 2022-12-13 | End: 2023-02-10

## 2023-02-10 NOTE — PROGRESS NOTES
Chief Complaint    Multiple Sclerosis    Subjective        Leatha Wall presents to [facility] NEUROLOGY [facility]     History of Present Illness    72 y.o. female returns in follow up.  Last visit on 10/11/22 continued Tysabri, GBP, baclofen, ordered labs.      Admitted [facility] December acute resp failure and intubated.     Admitted [facility] for Sepsis, C diff colitis, CHRISTIANO on CKD requiring HD.      Hemodialysis for three days.       Hep C treated     MRI Brain/Cerivcal  5/23/22 as compared to 9/28/21 moderate to severe T2 lesion load, without new/enlarging or enhancing lesions, no cord lesions, small punctate diffusion abnl R post frontal       4/22/22     JCB 2.83 CBC - NCS   11/4/21     JCV 2.68 CBC,CMP - NCS   5/5/21       JCV 3.05, CBC,CMP - NCS  11/23/20   JCV 3.33  CBC - NCS   5/21/20:   JCV 2.06;  CBC, CMP - NCS  2/22/19:   JCV 1.38  10/12/18  JCV 1.39  5/23/18    JCV 1.44  12/13/16: JCV 2.21  6/27/17    JCV 1.87       RRMS      DMT:  DMF, Aubagio,     Off DMT due to infection.      Gait is unsteady and limbs are shaky.      Vision is blurry, extreme fatigue.       Spasticity in B LE      O2 qhs for COPD.  Continues to smoke 1 ppd.       Started Tysabri 2/20/17 switched to CHENG.     Gait unsteady.      Tolerating Tysabri without side effects.  .      Fatigue is moderate.         Problem history:     MSFC remarkable for 26/110 o/w within normal results.  Oral numbness resolved.  Bladder urgency and frequency slight increase.  Mild N/T in bottom of feet      Migraines          HA are minor.      Located in occiput and behind eyes.  Quality of tightness and throbbing.  Moderate severity.        RLS     Right LE controlled.      GBP controlling sx.      Left left drawing improved with addition of GBP but now both feet are cramping at night.  Occurs during sleep.       PMH:     Fatigue and heat are bothersome.  Echo -   EF 60%  C U/S -  0-49% on right, no stenosis on left     MRI cervical, my review,  "unremarkable  Labs below:     NMO negative  Hypercoaguable panel WNL  Vit D 17.2     Fatigue continues to wax and wane.  Last few days has had unsteady gait. Weakness on left arm/leg.  Numbness in left arm and right foot.  Left side of tongue numb for the last 7 to 14 weeks.   Continue to smoke Tob 1 ppd.      Patient called and noted overwhelming fatigue.  After steroids fatigue improved but then recurred on 8/5/16.  Frequent HA's every other day frontal and occipital location of squeezing sensation of moderate severity.  Prednisone taper rx on 7/22/16 improved energy.       6/22/16 awoke at 3:30 am and noted left sided weakness in arm and leg.  Gait was unsteady and worsened over the next 7 days and presented to [facility] ED.  Fatigue was overwhelming.  MRI Brain in ED, my review, with multiple T2 white matter lesions and one large black hole in left PVWM.  Given steroids in ED with improvement in sx by 50%.     5/2016 had episode of left arm clumsiness for a week.       Reports increased fatigue since the first of the year.       Vision is fuzzy and feel swollen.       Bladder frequency.      Tob 1 ppd.     Objective   Vital Signs:  /68   Pulse 82   Ht 170.2 cm (67.01\")   Wt 65.8 kg (145 lb)   SpO2 98%   BMI 22.70 kg/m²   Estimated body mass index is 22.7 kg/m² as calculated from the following:    Height as of this encounter: 170.2 cm (67.01\").    Weight as of this encounter: 65.8 kg (145 lb).       BMI is within normal parameters. No other follow-up for BMI required.      Physical Exam  Eyes:      Extraocular Movements: EOM normal.      Pupils: Pupils are equal, round, and reactive to light.   Neurological:      Mental Status: She is oriented to person, place, and time.      Cranial Nerves: Cranial nerves 2-12 are intact.      Deep Tendon Reflexes:      Reflex Scores:       Tricep reflexes are 3+ on the right side and 3+ on the left side.       Bicep reflexes are 3+ on the right side and 3+ on the left " side.       Brachioradialis reflexes are 3+ on the right side and 3+ on the left side.       Patellar reflexes are 3+ on the right side and 3+ on the left side.       Achilles reflexes are 4+ on the right side and 4+ on the left side.  Psychiatric:         Speech: Speech normal.          Neurologic Exam     Mental Status   Oriented to person, place, and time.   Registration: recalls 3 of 3 objects. Recall at 5 minutes: recalls 3 of 3 objects. Follows 3 step commands.   Attention: normal. Concentration: normal.   Speech: speech is normal   Level of consciousness: alert  Knowledge: good and consistent with education.   Able to name object. Able to read. Able to repeat. Able to write. Normal comprehension.     Cranial Nerves   Cranial nerves II through XII intact.     CN II   Visual fields full to confrontation.   Visual acuity: normal  Right visual field deficit: none  Left visual field deficit: none     CN III, IV, VI   Pupils are equal, round, and reactive to light.  Extraocular motions are normal.   Right pupil: Shape: regular. Reactivity: brisk. Consensual response: intact.   Left pupil: Shape: regular. Reactivity: brisk. Consensual response: intact.   Nystagmus: none   Diplopia: none  Ophthalmoparesis: none  Upgaze: normal  Downgaze: normal  Conjugate gaze: present  Vestibulo-ocular reflex: present    CN V   Facial sensation intact.   Right facial sensation deficit: forehead  Left facial sensation deficit: forehead  Right corneal reflex: normal  Left corneal reflex: normal    CN VII   Right facial weakness: none  Left facial weakness: none    CN VIII   Hearing: intact    CN IX, X   Palate: symmetric  Right gag reflex: normal  Left gag reflex: normal    CN XI   Right sternocleidomastoid strength: normal  Left sternocleidomastoid strength: normal    CN XII   Tongue: not atrophic  Fasciculations: absent  Tongue deviation: none    Motor Exam   Muscle bulk: normal  Overall muscle tone: normal  Right arm tone:  normal  Left arm tone: normal  Right leg tone: normal  Left leg tone: normal    Strength   Right neck flexion: 4/5  Left neck flexion: 4/5  Right neck extension: 4/5  Left neck extension: 4/5  Right deltoid: 4/5  Left deltoid: 4/5  Right biceps: 4/5  Left biceps: 4/5  Right triceps: 4/5  Left triceps: 4/5  Right wrist flexion: 4/5  Left wrist flexion: 4/5  Right wrist extension: 4/5  Left wrist extension: 4/5  Right interossei: 4/5  Left interossei: 4/5  Right abdominals: 4/5  Left abdominals: 4/5  Right iliopsoas: 4/5  Left iliopsoas: 4/5  Right quadriceps: 4/5  Left quadriceps: 4/5  Right hamstrin/5  Left hamstrin/5  Right glutei: 4/5  Left glutei: 4/5  Right anterior tibial: 4/5  Left anterior tibial: 4/5  Right posterior tibial: 4/5  Left posterior tibial: 4/5  Right peroneal: 4/5  Left peroneal: 4/5  Right gastroc: 4/5  Left gastroc: 4/5    Sensory Exam   Light touch normal.   Right arm light touch: normal  Right leg light touch: normal  Right arm vibration: normal  Right leg vibration: normal  Right arm proprioception: normal  Right leg proprioception: normal  Right arm pinprick: normal  Right leg pinprick: normal  Sensory deficit distribution on left: C6    Gait, Coordination, and Reflexes     Gait  Gait: spastic and wide-based ( )    Tremor   Resting tremor: absent  Intention tremor: absent  Action tremor: absent    Reflexes   Reflexes 2+ except as noted.   Right brachioradialis: 3+  Left brachioradialis: 3+  Right biceps: 3+  Left biceps: 3+  Right triceps: 3+  Left triceps: 3+  Right patellar: 3+  Left patellar: 3+  Right achilles: 4+  Left achilles: 4+  Right ankle clonus: present  Left ankle clonus: absent  Left pendular knee jerk: present     Result Review :  The following data was reviewed by: Umang Perez MD on 02/10/2023:  Common labs    Common Labs 22    1003 1003  1300 1300   Glucose     88   BUN     15   Creatinine   1.20  0.96   Sodium     140    Potassium     4.3   Chloride     103   Calcium     9.3   Total Protein  6.8      Albumin  3.1   4.10   Total Bilirubin     0.7   Alkaline Phosphatase     67   AST (SGOT)     14   ALT (SGPT)     13   WBC 11.43 (A)   12.87 (A)    Hemoglobin 11.5 (A)   11.7 (A)    Hematocrit 35.4   35.0    Platelets 260   252    (A) Abnormal value       Comments are available for some flowsheets but are not being displayed.                        Assessment and Plan   Diagnoses and all orders for this visit:    1. Multiple sclerosis (HCC) (Primary)  Assessment & Plan:  Off DMT due to infections.      2. History of stroke  Assessment & Plan:  ASA/statin        3. Periodic headache syndrome, not intractable    4. Restless legs syndrome (RLS)    5. Spasticity  Assessment & Plan:  Continue Baclofen              Follow Up   No follow-ups on file.  Patient was given instructions and counseling regarding her condition or for health maintenance advice. Please see specific information pulled into the AVS if appropriate.

## 2023-02-27 ENCOUNTER — TELEPHONE (OUTPATIENT)
Dept: NEUROLOGY | Facility: CLINIC | Age: 73
End: 2023-02-27
Payer: MEDICARE

## 2023-02-27 NOTE — TELEPHONE ENCOUNTER
Caller: FLORECITA    Relationship: Caregiver (non-relative)    Best call back number: [phone]    What was the call regarding: PATIENT WAS RELEASED FROM THE HOSPITAL ON Saturday AND SINCE THEN HAS HAD MULTIPLE FALLS AND SEVERE TREMORS. SHE'S HAVING AN MS FLARE THAT IS VERY BAD AND THE FAMILY IS HAVING TO CARRY HER TO THE BATHROOM. CAN YOU PLEASE SEND IN A MEDROL DOSE PACK OR IS THERE SOMETHING ELSE YOU CAN SUGGEST?    Do you require a callback: YES PLEASE

## 2023-02-28 NOTE — TELEPHONE ENCOUNTER
LVM asking Lali to call back so that we can find out where patient was in hospital for records. Left call back number.

## 2023-03-14 ENCOUNTER — TELEPHONE (OUTPATIENT)
Dept: NEUROLOGY | Facility: CLINIC | Age: 73
End: 2023-03-14
Payer: MEDICARE

## 2023-03-14 NOTE — TELEPHONE ENCOUNTER
Provider: SWATHI  Caller:CLAUDE  Phone Number: [phone]  Reason for Call:PT CALLED TO RESCHEDULE 8 WEEK FOLLOW UP APPT FOR MS DUE TO NOT HAVING A RIDE TO APPT. NEXT AVAILABLE IS NOT UNTIL AFTER HER 4 MONTH FOLLOW UP ON 06/12/2023. WAS UNSURE IF SHE NEEDED TO BE SEEN BEFORE THEN AS SHE HAS BEEN IN AND OUT OF THE HOSPITAL.    PLEASE REVIEW AND ADVISE.  THANK YOU.

## 2023-03-15 ENCOUNTER — TELEMEDICINE (OUTPATIENT)
Dept: NEUROLOGY | Facility: CLINIC | Age: 73
End: 2023-03-15
Payer: MEDICARE

## 2023-03-15 DIAGNOSIS — G43.C0 PERIODIC HEADACHE SYNDROME, NOT INTRACTABLE: Chronic | ICD-10-CM

## 2023-03-15 DIAGNOSIS — G35 MULTIPLE SCLEROSIS: Primary | Chronic | ICD-10-CM

## 2023-03-15 DIAGNOSIS — G25.81 RESTLESS LEGS SYNDROME (RLS): Chronic | ICD-10-CM

## 2023-03-15 PROCEDURE — 99214 OFFICE O/P EST MOD 30 MIN: CPT | Performed by: PSYCHIATRY & NEUROLOGY

## 2023-03-15 NOTE — PROGRESS NOTES
Chief Complaint  No chief complaint on file.    Subjective        Leatha Wall presents to [facility] NEUROLOGY [facility]     History of Present Illness    72 y.o. female returns in follow up.  Last visit on 2/10/23 remained off DMT.      Admitted [facility] 2/13/23 - 2/17/23 acute on chronic resp failure, L UL PNA.  Tx with IVMP transitioned to po prednisone.  Discharged to [place].     Admitted [facility] December acute resp failure and intubated.      Admitted [facility] for Sepsis, C diff colitis, CHRISTIANO on CKD requiring HD.       Hemodialysis for three days.        Hep C treated     MRI Brain/Cerivcal  5/23/22 as compared to 9/28/21 moderate to severe T2 lesion load, without new/enlarging or enhancing lesions, no cord lesions, small punctate diffusion abnl R post frontal       4/22/22     JCB 2.83 CBC - NCS   11/4/21     JCV 2.68 CBC,CMP - NCS   5/5/21       JCV 3.05, CBC,CMP - NCS  11/23/20   JCV 3.33  CBC - NCS   5/21/20:   JCV 2.06;  CBC, CMP - NCS  2/22/19:   JCV 1.38  10/12/18  JCV 1.39  5/23/18    JCV 1.44  12/13/16: JCV 2.21  6/27/17    JCV 1.87       RRMS      Using oxygen at night.  Living at home.  Trouble ambulating.      Started on prednisone 2.5 mg daily by PCP.      DMT:  DMF, Aubagio,      Off DMT due to infection.       Gait is unsteady and limbs are shaky.       Vision is blurry, extreme fatigue.       Spasticity in B LE      O2 qhs for COPD.  Continues to smoke 10 cigarettes a day.       Started Tysabri 2/20/17 switched to CHENG.     Gait unsteady.      Tolerating Tysabri without side effects.  .      Fatigue is moderate.         Problem history:     MSFC remarkable for 26/110 o/w within normal results.  Oral numbness resolved.  Bladder urgency and frequency slight increase.  Mild N/T in bottom of feet      Migraines          HA are minor.      Located in occiput and behind eyes.  Quality of tightness and throbbing.  Moderate severity.        RLS     Right LE controlled.      GBP controlling  "sx.      Left left drawing improved with addition of GBP but now both feet are cramping at night.  Occurs during sleep.       PMH:     Fatigue and heat are bothersome.  Echo -   EF 60%  C U/S -  0-49% on right, no stenosis on left     MRI cervical, my review, unremarkable  Labs below:     NMO negative  Hypercoaguable panel WNL  Vit D 17.2     Fatigue continues to wax and wane.  Last few days has had unsteady gait. Weakness on left arm/leg.  Numbness in left arm and right foot.  Left side of tongue numb for the last 7 to 14 weeks.   Continue to smoke Tob 1 ppd.      Patient called and noted overwhelming fatigue.  After steroids fatigue improved but then recurred on 8/5/16.  Frequent HA's every other day frontal and occipital location of squeezing sensation of moderate severity.  Prednisone taper rx on 7/22/16 improved energy.       6/22/16 awoke at 3:30 am and noted left sided weakness in arm and leg.  Gait was unsteady and worsened over the next 7 days and presented to [facility] ED.  Fatigue was overwhelming.  MRI Brain in ED, my review, with multiple T2 white matter lesions and one large black hole in left PVWM.  Given steroids in ED with improvement in sx by 50%.     5/2016 had episode of left arm clumsiness for a week.       Reports increased fatigue since the first of the year.       Vision is fuzzy and feel swollen.       Bladder frequency.      Tob 1 ppd.        Objective   Vital Signs:  There were no vitals taken for this visit.  Estimated body mass index is 22.7 kg/m² as calculated from the following:    Height as of 2/10/23: 170.2 cm (67.01\").    Weight as of 2/10/23: 65.8 kg (145 lb).       BMI is within normal parameters. No other follow-up for BMI required.      Physical Exam  Eyes:      Extraocular Movements: EOM normal.   Neurological:      Mental Status: She is oriented to person, place, and time.   Psychiatric:         Speech: Speech normal.          Neurologic Exam     Mental Status   Oriented to person, " place, and time.   Attention: normal. Concentration: normal.   Speech: speech is normal   Level of consciousness: alert  Knowledge: good.   Normal comprehension.     Cranial Nerves     CN III, IV, VI   Extraocular motions are normal.     CN VII   Facial expression full, symmetric.     CN VIII   CN VIII normal.     Motor Exam MAEW     Gait, Coordination, and Reflexes     Gait  Gait: wide-based     Result Review :  The following data was reviewed by: Umang Perez MD on 03/15/2023:  Common labs    Common Labs 4/22/22 4/22/22 5/23/22 9/21/22 9/21/22    1003 1003  1300 1300   Glucose     88   BUN     15   Creatinine   1.20  0.96   Sodium     140   Potassium     4.3   Chloride     103   Calcium     9.3   Total Protein  6.8      Albumin  3.1   4.10   Total Bilirubin     0.7   Alkaline Phosphatase     67   AST (SGOT)     14   ALT (SGPT)     13   WBC 11.43 (A)   12.87 (A)    Hemoglobin 11.5 (A)   11.7 (A)    Hematocrit 35.4   35.0    Platelets 260   252    (A) Abnormal value       Comments are available for some flowsheets but are not being displayed.                        Assessment and Plan   Diagnoses and all orders for this visit:    1. Multiple sclerosis (HCC) (Primary)  Assessment & Plan:  Remain off DMT    High risk of infection       2. Periodic headache syndrome, not intractable  Assessment & Plan:  Headaches are unchanged.  Continue current treatment regimen.          3. Restless legs syndrome (RLS)  Assessment & Plan:  Continue GBP              Follow Up   No follow-ups on file.  Patient was given instructions and counseling regarding her condition or for health maintenance advice. Please see specific information pulled into the AVS if appropriate.

## 2023-03-27 ENCOUNTER — TELEPHONE (OUTPATIENT)
Dept: NEUROLOGY | Facility: CLINIC | Age: 73
End: 2023-03-27
Payer: MEDICARE

## 2023-03-27 NOTE — TELEPHONE ENCOUNTER
[facility] CALLING TO FIND OUT WHY PATIENT HAS NOT HAD AN INFUSION FOR 2023.    IS PT GOING TO CONTINUE WITH INFUSION?    PLEASE CALL BACK AND ADVISE    PH: [phone] OPT[facility] 3    THANK YOU

## 2023-04-05 DIAGNOSIS — I71.40 ABDOMINAL AORTIC ANEURYSM (AAA) WITHOUT RUPTURE, UNSPECIFIED PART: Primary | ICD-10-CM

## 2023-05-22 DIAGNOSIS — G35 MULTIPLE SCLEROSIS: Chronic | ICD-10-CM

## 2023-05-22 RX ORDER — GABAPENTIN 300 MG/1
CAPSULE ORAL
Qty: 540 CAPSULE | Refills: 1 | Status: SHIPPED | OUTPATIENT
Start: 2023-05-22

## 2023-05-22 NOTE — TELEPHONE ENCOUNTER
Rx Refill Note  Requested Prescriptions     Pending Prescriptions Disp Refills   • gabapentin (NEURONTIN) 300 MG capsule [Pharmacy Med Name: gabapentin 300 mg capsule] 540 capsule 1     Sig: TAKE THREE CAPSULES BY MOUTH TWICE DAILY      Last filled: 11/10/22 540 with 1 refill.  Last office visit with prescribing clinician: 3/15/23      Next office visit with prescribing clinician: 6/12/23    Savannah Bustillos MA  05/22/23, 13:39 EDT

## 2023-06-12 ENCOUNTER — OFFICE VISIT (OUTPATIENT)
Dept: NEUROLOGY | Facility: CLINIC | Age: 73
End: 2023-06-12
Payer: MEDICARE

## 2023-06-12 VITALS
DIASTOLIC BLOOD PRESSURE: 68 MMHG | HEART RATE: 73 BPM | BODY MASS INDEX: 24.2 KG/M2 | HEIGHT: 67 IN | OXYGEN SATURATION: 90 % | SYSTOLIC BLOOD PRESSURE: 132 MMHG | WEIGHT: 154.2 LBS

## 2023-06-12 DIAGNOSIS — G43.C0 PERIODIC HEADACHE SYNDROME, NOT INTRACTABLE: Chronic | ICD-10-CM

## 2023-06-12 DIAGNOSIS — G25.81 RESTLESS LEGS SYNDROME (RLS): Chronic | ICD-10-CM

## 2023-06-12 DIAGNOSIS — Z72.0 TOBACCO ABUSE: Chronic | ICD-10-CM

## 2023-06-12 DIAGNOSIS — G35 MULTIPLE SCLEROSIS: Primary | Chronic | ICD-10-CM

## 2023-06-12 DIAGNOSIS — R25.2 SPASTICITY: Chronic | ICD-10-CM

## 2023-06-12 PROCEDURE — 1160F RVW MEDS BY RX/DR IN RCRD: CPT | Performed by: PSYCHIATRY & NEUROLOGY

## 2023-06-12 PROCEDURE — 99214 OFFICE O/P EST MOD 30 MIN: CPT | Performed by: PSYCHIATRY & NEUROLOGY

## 2023-06-12 PROCEDURE — 1159F MED LIST DOCD IN RCRD: CPT | Performed by: PSYCHIATRY & NEUROLOGY

## 2023-06-12 RX ORDER — OXYBUTYNIN CHLORIDE 10 MG/1
1 TABLET, EXTENDED RELEASE ORAL DAILY
COMMUNITY
Start: 2023-05-18

## 2023-06-12 RX ORDER — FAMOTIDINE 20 MG/1
1 TABLET, FILM COATED ORAL DAILY
COMMUNITY
Start: 2023-02-24 | End: 2023-06-12

## 2023-06-12 RX ORDER — ASPIRIN 81 MG/1
1 TABLET, COATED ORAL DAILY
COMMUNITY
Start: 2023-02-24

## 2023-06-12 NOTE — PROGRESS NOTES
Chief Complaint  No chief complaint on file.    Subjective        Leatha Wall presents to [facility] NEUROLOGY [facility]    History of Present Illness    73 y.o. female returns in follow up.  Last visit on 3/15/23 continued ASA/statin. Baclofen.      Using a walker for long distances and can ambulate in house.      Sx improved in the last month.     Prednisone 5 mg daily.      Using oxygen at night.      Decreased to Tob < 1 ppd     Problem history:    Admitted [facility] 2/13/23 - 2/17/23 acute on chronic resp failure, L UL PNA.  Tx with IVMP transitioned to po prednisone.  Discharged to [place].      Admitted [facility] December acute resp failure and intubated.      Admitted [facility] for Sepsis, C diff colitis, CHRISTIANO on CKD requiring HD.       Hemodialysis for three days.        Hep C treated     MRI Brain/Cerivcal  5/23/22 as compared to 9/28/21 moderate to severe T2 lesion load, without new/enlarging or enhancing lesions, no cord lesions, small punctate diffusion abnl R post frontal       4/22/22     JCB 2.83 CBC - NCS   11/4/21     JCV 2.68 CBC,CMP - NCS   5/5/21       JCV 3.05, CBC,CMP - NCS  11/23/20   JCV 3.33  CBC - NCS   5/21/20:   JCV 2.06;  CBC, CMP - NCS  2/22/19:   JCV 1.38  10/12/18  JCV 1.39  5/23/18    JCV 1.44  12/13/16: JCV 2.21  6/27/17    JCV 1.87       RRMS      Using oxygen at night.  Living at home.  Trouble ambulating.       Started on prednisone 2.5 mg daily by PCP.       DMT:  DMF, Aubagio,      Off DMT due to infection.       Spasticity in B LE stable.       O2 qhs for COPD.  Continues to smoke 10 cigarettes a day.       Started Tysabri 2/20/17 switched to CHENG.     Gait unsteady.      Tolerating Tysabri without side effects.  .      Fatigue is moderate.         Problem history:     MSFC remarkable for 26/110 o/w within normal results.  Oral numbness resolved.  Bladder urgency and frequency slight increase.  Mild N/T in bottom of feet      Migraines          HA are controlled.     "  Located in occiput and behind eyes.  Quality of tightness and throbbing.  Moderate severity.        RLS     Right LE controlled.      GBP controlling sx.      Left left drawing improved with addition of GBP but now both feet are cramping at night.  Occurs during sleep.       PMH:     Fatigue and heat are bothersome.  Echo -   EF 60%  C U/S -  0-49% on right, no stenosis on left     MRI cervical, my review, unremarkable  Labs below:     NMO negative  Hypercoaguable panel WNL  Vit D 17.2     Fatigue continues to wax and wane.  Last few days has had unsteady gait. Weakness on left arm/leg.  Numbness in left arm and right foot.  Left side of tongue numb for the last 7 to 14 weeks.   Continue to smoke Tob 1 ppd.      Patient called and noted overwhelming fatigue.  After steroids fatigue improved but then recurred on 8/5/16.  Frequent HA's every other day frontal and occipital location of squeezing sensation of moderate severity.  Prednisone taper rx on 7/22/16 improved energy.       6/22/16 awoke at 3:30 am and noted left sided weakness in arm and leg.  Gait was unsteady and worsened over the next 7 days and presented to [facility] ED.  Fatigue was overwhelming.  MRI Brain in ED, my review, with multiple T2 white matter lesions and one large black hole in left PVWM.  Given steroids in ED with improvement in sx by 50%.     5/2016 had episode of left arm clumsiness for a week.       Reports increased fatigue since the first of the year.       Vision is fuzzy and feel swollen.       Bladder frequency.      Tob 1 ppd.     Objective   Vital Signs:  /68   Pulse 73   Ht 170.2 cm (67.01\")   Wt 69.9 kg (154 lb 3.2 oz)   SpO2 90%   BMI 24.15 kg/m²   Estimated body mass index is 24.15 kg/m² as calculated from the following:    Height as of this encounter: 170.2 cm (67.01\").    Weight as of this encounter: 69.9 kg (154 lb 3.2 oz).       BMI is within normal parameters. No other follow-up for BMI required.      Physical " Exam  Eyes:      Extraocular Movements: EOM normal.      Pupils: Pupils are equal, round, and reactive to light.   Neurological:      Mental Status: She is oriented to person, place, and time.      Cranial Nerves: Cranial nerves 2-12 are intact.      Motor: Motor strength is normal.      Gait: Gait is intact.   Psychiatric:         Speech: Speech normal.        Neurologic Exam     Mental Status   Oriented to person, place, and time.   Speech: speech is normal   Level of consciousness: alert  Knowledge: good and consistent with education.   Normal comprehension.     Cranial Nerves   Cranial nerves II through XII intact.     CN II   Visual fields full to confrontation.   Visual acuity: normal  Right visual field deficit: none  Left visual field deficit: none     CN III, IV, VI   Pupils are equal, round, and reactive to light.  Extraocular motions are normal.   Nystagmus: none   Diplopia: none  Ophthalmoparesis: none  Upgaze: normal  Downgaze: normal  Conjugate gaze: present    CN V   Facial sensation intact.   Right corneal reflex: normal  Left corneal reflex: normal    CN VII   Right facial weakness: none  Left facial weakness: none    CN VIII   Hearing: intact    CN IX, X   Palate: symmetric  Right gag reflex: normal  Left gag reflex: normal    CN XI   Right sternocleidomastoid strength: normal  Left sternocleidomastoid strength: normal    CN XII   Tongue: not atrophic  Fasciculations: absent  Tongue deviation: none    Motor Exam   Muscle bulk: normal  Overall muscle tone: normal    Strength   Strength 5/5 throughout.     Sensory Exam   Light touch normal.     Gait, Coordination, and Reflexes     Gait  Gait: normal    Tremor   Resting tremor: absent  Intention tremor: absent  Action tremor: absent    Reflexes   Reflexes 2+ except as noted.    Result Review :  The following data was reviewed by: Umang Perez MD on 06/12/2023:  Common labs          9/21/2022    13:00   Common Labs   Glucose 88    BUN 15     Creatinine 0.96    Sodium 140    Potassium 4.3    Chloride 103    Calcium 9.3    Albumin 4.10    Total Bilirubin 0.7    Alkaline Phosphatase 67    AST (SGOT) 14    ALT (SGPT) 13    WBC 12.87    Hemoglobin 11.7    Hematocrit 35.0    Platelets 252                   Assessment and Plan   Diagnoses and all orders for this visit:    1. Multiple sclerosis (Primary)  Assessment & Plan:  Strength improving    Decrease prednisone 2.5 mg daily       2. Periodic headache syndrome, not intractable  Assessment & Plan:  Headaches are unchanged.  Continue current treatment regimen.          3. Restless legs syndrome (RLS)  Assessment & Plan:  GBP controlling sx       4. Spasticity    5. Tobacco abuse             Follow Up   No follow-ups on file.  Patient was given instructions and counseling regarding her condition or for health maintenance advice. Please see specific information pulled into the AVS if appropriate.

## 2023-10-12 ENCOUNTER — OFFICE VISIT (OUTPATIENT)
Dept: NEUROLOGY | Facility: CLINIC | Age: 73
End: 2023-10-12
Payer: MEDICARE

## 2023-10-12 VITALS
DIASTOLIC BLOOD PRESSURE: 70 MMHG | HEIGHT: 67 IN | WEIGHT: 141.8 LBS | SYSTOLIC BLOOD PRESSURE: 142 MMHG | BODY MASS INDEX: 22.26 KG/M2 | OXYGEN SATURATION: 92 % | HEART RATE: 73 BPM

## 2023-10-12 DIAGNOSIS — G43.C0 PERIODIC HEADACHE SYNDROME, NOT INTRACTABLE: Chronic | ICD-10-CM

## 2023-10-12 DIAGNOSIS — G35 MULTIPLE SCLEROSIS: Primary | Chronic | ICD-10-CM

## 2023-10-12 DIAGNOSIS — G25.81 RESTLESS LEGS SYNDROME (RLS): Chronic | ICD-10-CM

## 2023-10-12 DIAGNOSIS — R25.2 SPASTICITY: Chronic | ICD-10-CM

## 2023-10-12 DIAGNOSIS — Z72.0 TOBACCO ABUSE: Chronic | ICD-10-CM

## 2023-10-12 PROCEDURE — 1159F MED LIST DOCD IN RCRD: CPT | Performed by: PSYCHIATRY & NEUROLOGY

## 2023-10-12 PROCEDURE — 99214 OFFICE O/P EST MOD 30 MIN: CPT | Performed by: PSYCHIATRY & NEUROLOGY

## 2023-10-12 PROCEDURE — 1160F RVW MEDS BY RX/DR IN RCRD: CPT | Performed by: PSYCHIATRY & NEUROLOGY

## 2023-10-12 RX ORDER — POLYETHYLENE GLYCOL 3350 17 G/17G
POWDER, FOR SOLUTION ORAL
COMMUNITY
Start: 2023-09-21

## 2023-10-12 RX ORDER — PANTOPRAZOLE SODIUM 40 MG/1
TABLET, DELAYED RELEASE ORAL
COMMUNITY
Start: 2023-09-21

## 2023-10-12 RX ORDER — NIFEDIPINE 30 MG/1
TABLET, EXTENDED RELEASE ORAL
COMMUNITY
Start: 2023-09-21 | End: 2023-10-12 | Stop reason: ALTCHOICE

## 2023-10-12 RX ORDER — ACETAMINOPHEN 650 MG/1
TABLET, FILM COATED, EXTENDED RELEASE ORAL
COMMUNITY
Start: 2023-09-21

## 2023-10-12 RX ORDER — QUETIAPINE FUMARATE 50 MG/1
TABLET, FILM COATED ORAL
COMMUNITY
Start: 2023-09-21 | End: 2023-10-12 | Stop reason: ALTCHOICE

## 2023-10-12 RX ORDER — CARVEDILOL 3.12 MG/1
TABLET ORAL
COMMUNITY
Start: 2023-09-21

## 2023-10-12 RX ORDER — ATORVASTATIN CALCIUM 80 MG/1
TABLET, FILM COATED ORAL
COMMUNITY
Start: 2023-09-21

## 2023-10-12 RX ORDER — GABAPENTIN 300 MG/1
900 CAPSULE ORAL 2 TIMES DAILY
Qty: 540 CAPSULE | Refills: 1 | Status: SHIPPED | OUTPATIENT
Start: 2023-10-12 | End: 2024-10-11

## 2023-10-12 RX ORDER — LISINOPRIL 40 MG/1
TABLET ORAL
COMMUNITY
Start: 2023-09-21

## 2023-10-12 RX ORDER — CHOLECALCIFEROL (VITAMIN D3) 125 MCG
CAPSULE ORAL
COMMUNITY
Start: 2023-09-21

## 2023-10-12 NOTE — PROGRESS NOTES
Chief Complaint    Multiple Sclerosis    Subjective        Leatha Wall presents to [facility] NEUROLOGY  History of Present Illness    73 y.o. female returns in follow up.  Last visit on 6/12/23 continued ASA/statin. Baclofen, decreased prednisone 2.5 mg daily.       One month ago in [facility] for UTI for approx 5 days and d/c [facility] for 5 days.     Left leg weakness continues.      Home health PT/OT.      Stopped prednisone.      Using a walker for long distances and can ambulate in house.       Using a shower chair.      Using oxygen at night.       Decreased to Tob < 1 ppd      Admitted [facility] 2/13/23 - 2/17/23 acute on chronic resp failure, L UL PNA.  Tx with IVMP transitioned to po prednisone.  Discharged to [place].      Admitted [facility] December acute resp failure and intubated.      Admitted [facility] for Sepsis, C diff colitis, CHRISTIANO on CKD requiring HD.       Hemodialysis for three days.        Hep C treated     MRI Brain/Cerivcal  5/23/22 as compared to 9/28/21 moderate to severe T2 lesion load, without new/enlarging or enhancing lesions, no cord lesions, small punctate diffusion abnl R post frontal       4/22/22     JCB 2.83 CBC - NCS   11/4/21     JCV 2.68 CBC,CMP - NCS   5/5/21       JCV 3.05, CBC,CMP - NCS  11/23/20   JCV 3.33  CBC - NCS   5/21/20:   JCV 2.06;  CBC, CMP - NCS  2/22/19:   JCV 1.38  10/12/18  JCV 1.39  5/23/18    JCV 1.44  12/13/16: JCV 2.21  6/27/17    JCV 1.87          Problem history:     MS remarkable for 26/110 o/w within normal results.  Oral numbness resolved.  Bladder urgency and frequency slight increase.  Mild N/T in bottom of feet      Migraines          HA are minor.      Located in occiput and behind eyes.  Quality of tightness and throbbing.  Moderate severity.        RLS     Right LE controlled.      GBP controlling sx.      Left left drawing improved with addition of GBP but now both feet are cramping at night.  Occurs during sleep.       PMH:    "  Fatigue and heat are bothersome.  Echo -   EF 60%  C U/S -  0-49% on right, no stenosis on left     MRI cervical, my review, unremarkable  Labs below:     NMO negative  Hypercoaguable panel WNL  Vit D 17.2     Fatigue continues to wax and wane.  Last few days has had unsteady gait. Weakness on left arm/leg.  Numbness in left arm and right foot.  Left side of tongue numb for the last 7 to 14 weeks.   Continue to smoke Tob 1 ppd.      Patient called and noted overwhelming fatigue.  After steroids fatigue improved but then recurred on 8/5/16.  Frequent HA's every other day frontal and occipital location of squeezing sensation of moderate severity.  Prednisone taper rx on 7/22/16 improved energy.       6/22/16 awoke at 3:30 am and noted left sided weakness in arm and leg.  Gait was unsteady and worsened over the next 7 days and presented to [facility] ED.  Fatigue was overwhelming.  MRI Brain in ED, my review, with multiple T2 white matter lesions and one large black hole in left PVWM.  Given steroids in ED with improvement in sx by 50%.     5/2016 had episode of left arm clumsiness for a week.       Reports increased fatigue since the first of the year.       Vision is fuzzy and feel swollen.       Bladder frequency.      Tob 1 ppd.        Objective   Vital Signs:  /70   Pulse 73   Ht 170.2 cm (67.01\")   Wt 64.3 kg (141 lb 12.8 oz)   SpO2 92%   BMI 22.20 kg/mý   Estimated body mass index is 22.2 kg/mý as calculated from the following:    Height as of this encounter: 170.2 cm (67.01\").    Weight as of this encounter: 64.3 kg (141 lb 12.8 oz).       BMI is within normal parameters. No other follow-up for BMI required.    Neurologic Exam     Mental Status   Oriented to person, place, and time.   Speech: speech is normal   Level of consciousness: alert  Knowledge: good and consistent with education.   Normal comprehension.     Cranial Nerves   Cranial nerves II through XII intact.     CN II   Visual fields full to " confrontation.   Visual acuity: normal  Right visual field deficit: none  Left visual field deficit: none     CN III, IV, VI   Pupils are equal, round, and reactive to light.  Extraocular motions are normal.   Nystagmus: none   Diplopia: none  Ophthalmoparesis: none  Upgaze: normal  Downgaze: normal  Conjugate gaze: present    CN V   Facial sensation intact.   Right corneal reflex: normal  Left corneal reflex: normal    CN VII   Right facial weakness: none  Left facial weakness: none    CN VIII   Hearing: intact    CN IX, X   Palate: symmetric  Right gag reflex: normal  Left gag reflex: normal    CN XI   Right sternocleidomastoid strength: normal  Left sternocleidomastoid strength: normal    CN XII   Tongue: not atrophic  Fasciculations: absent  Tongue deviation: none    Motor Exam   Muscle bulk: normal  Overall muscle tone: normal    Strength   Strength 5/5 throughout.   Strength 5/5 except as noted.   Left iliopsoas: 3/5  Left quadriceps: 3/5  Left hamstring: 3/5  Left glutei: 3/5  Left anterior tibial: 3/5  Left posterior tibial: 3/5  Left peroneal: 3/5  Left gastroc: 3/5    Sensory Exam   Light touch normal.     Gait, Coordination, and Reflexes     Gait  Gait: normal    Tremor   Resting tremor: absent  Intention tremor: absent  Action tremor: absent    Reflexes   Reflexes 2+ except as noted.        Physical Exam  Eyes:      Extraocular Movements: EOM normal.      Pupils: Pupils are equal, round, and reactive to light.   Neurological:      Mental Status: She is oriented to person, place, and time.      Cranial Nerves: Cranial nerves 2-12 are intact.      Motor: Motor strength is normal.     Gait: Gait is intact.   Psychiatric:         Speech: Speech normal.        Result Review :  The following data was reviewed by: Umang Perez MD on 10/12/2023:                 Assessment and Plan   Diagnoses and all orders for this visit:    1. Multiple sclerosis (Primary)  Assessment & Plan:  Remain off DMT due to  recurrent UTI     Orders:  -     gabapentin (NEURONTIN) 300 MG capsule; Take 3 capsules by mouth 2 (Two) Times a Day.  Dispense: 540 capsule; Refill: 1    2. Periodic headache syndrome, not intractable    3. Restless legs syndrome (RLS)  Assessment & Plan:  Stable on GBP       4. Spasticity    5. Tobacco abuse             Follow Up   No follow-ups on file.  Patient was given instructions and counseling regarding her condition or for health maintenance advice. Please see specific information pulled into the AVS if appropriate.

## 2024-04-22 ENCOUNTER — TELEPHONE (OUTPATIENT)
Dept: NEUROLOGY | Facility: CLINIC | Age: 74
End: 2024-04-22
Payer: MEDICARE

## 2024-04-22 NOTE — TELEPHONE ENCOUNTER
Provider: SWATHI    Caller: YAHIR    Relationship to Patient: GRANDDAUGHTER    Phone Number: [phone]     Reason for Call: PT'S GRANDDAUGHTER CALLED AND IS WANTING TO NOW IF THERE IS ANY WAY PT CAN BE SEEN BEFORE 06/17/24. PT WAS SCHEDULED FOR 04/12/24 AND WAS RESCHEDULED DUE TO PROVIDER AND PT HAS BEEN DECLINING WITH WHAT GRANDDAUGHTER THINKS IS HER MS.     PLEASE REVIEW AND ADVISE.  THANK YOU

## 2024-04-25 ENCOUNTER — OFFICE VISIT (OUTPATIENT)
Dept: NEUROLOGY | Facility: CLINIC | Age: 74
End: 2024-04-25
Payer: MEDICARE

## 2024-04-25 VITALS
WEIGHT: 138 LBS | SYSTOLIC BLOOD PRESSURE: 130 MMHG | BODY MASS INDEX: 21.66 KG/M2 | DIASTOLIC BLOOD PRESSURE: 72 MMHG | HEIGHT: 67 IN | HEART RATE: 85 BPM | OXYGEN SATURATION: 97 %

## 2024-04-25 DIAGNOSIS — G43.C0 PERIODIC HEADACHE SYNDROME, NOT INTRACTABLE: Chronic | ICD-10-CM

## 2024-04-25 DIAGNOSIS — G35 MULTIPLE SCLEROSIS: Primary | Chronic | ICD-10-CM

## 2024-04-25 DIAGNOSIS — R25.2 SPASTICITY: Chronic | ICD-10-CM

## 2024-04-25 DIAGNOSIS — G25.81 RESTLESS LEGS SYNDROME (RLS): Chronic | ICD-10-CM

## 2024-04-25 PROCEDURE — 1159F MED LIST DOCD IN RCRD: CPT | Performed by: PSYCHIATRY & NEUROLOGY

## 2024-04-25 PROCEDURE — 1160F RVW MEDS BY RX/DR IN RCRD: CPT | Performed by: PSYCHIATRY & NEUROLOGY

## 2024-04-25 PROCEDURE — 99214 OFFICE O/P EST MOD 30 MIN: CPT | Performed by: PSYCHIATRY & NEUROLOGY

## 2024-04-25 RX ORDER — FLUTICASONE FUROATE, UMECLIDINIUM BROMIDE AND VILANTEROL TRIFENATATE 200; 62.5; 25 UG/1; UG/1; UG/1
POWDER RESPIRATORY (INHALATION)
COMMUNITY
Start: 2024-04-03

## 2024-04-25 RX ORDER — ONDANSETRON 4 MG/1
1 TABLET, FILM COATED ORAL DAILY
COMMUNITY
Start: 2024-01-08

## 2024-04-25 RX ORDER — PREDNISONE 5 MG/1
5 TABLET ORAL DAILY
Qty: 90 TABLET | Refills: 3 | Status: SHIPPED | OUTPATIENT
Start: 2024-04-25

## 2024-04-25 RX ORDER — BUPROPION HYDROCHLORIDE 150 MG/1
150 TABLET, EXTENDED RELEASE ORAL DAILY
COMMUNITY
Start: 2024-03-20 | End: 2024-04-25

## 2024-04-25 RX ORDER — AMLODIPINE BESYLATE 2.5 MG/1
2.5 TABLET ORAL DAILY
COMMUNITY
Start: 2024-04-15

## 2024-04-25 RX ORDER — VARENICLINE TARTRATE 0.5 (11)-1
KIT ORAL
COMMUNITY
Start: 2024-03-08 | End: 2024-04-25

## 2024-04-25 RX ORDER — PREDNISONE 1 MG/1
4 TABLET ORAL DAILY
Qty: 120 TABLET | Refills: 3 | Status: SHIPPED | OUTPATIENT
Start: 2024-04-25

## 2024-04-25 NOTE — PROGRESS NOTES
Chief Complaint  Multiple Sclerosis    Subjective        Leatha Wall presents to [facility] NEUROLOGY  History of Present Illness    73 y.o. female  returns in follow up.  Last visit on 6/12/23 continued ASA/statin. Baclofen, decreased prednisone 2.5 mg daily.       One month ago in [facility] for UTI for approx 5 days and d/c [facility] for 5 days.      Left leg weakness continues.       Using can ambulate in house.       Using a shower chair.       Using oxygen all the time.      Decreased to Tob < 1 ppd      Admitted [facility] 2/13/23 - 2/17/23 acute on chronic resp failure, L UL PNA.  Tx with IVMP transitioned to po prednisone.  Discharged to [facility].      Admitted [facility] [place] acute resp failure and intubated.      Admitted [facility] for Sepsis, C diff colitis, CHRISTIANO on CKD requiring HD.       Hemodialysis for three days.        Hep C treated     MRI Brain/Cerivcal  5/23/22 as compared to 9/28/21 moderate to severe T2 lesion load, without new/enlarging or enhancing lesions, no cord lesions, small punctate diffusion abnl R post frontal       4/22/22     JCB 2.83 CBC - NCS   11/4/21     JCV 2.68 CBC,CMP - NCS   5/5/21       JCV 3.05, CBC,CMP - NCS  11/23/20   JCV 3.33  CBC - NCS   5/21/20:   JCV 2.06;  CBC, CMP - NCS  2/22/19:   JCV 1.38  10/12/18  JCV 1.39  5/23/18    JCV 1.44  12/13/16: JCV 2.21  6/27/17    JCV 1.87          Problem history:     MSFC remarkable for 26/110 o/w within normal results.  Oral numbness resolved.  Bladder urgency and frequency slight increase.  Mild N/T in bottom of feet      Migraines          HA are minor.      Located in occiput and behind eyes.  Quality of tightness and throbbing.  Moderate severity.        RLS     Right LE controlled.      GBP controlling sx.      Left left drawing improved with addition of GBP but now both feet are cramping at night.  Occurs during sleep.       PMH:     Fatigue and heat are bothersome.  Echo -   EF 60%  C U/S -  0-49% on right,  "no stenosis on left     MRI cervical, my review, unremarkable  Labs below:     NMO negative  Hypercoaguable panel WNL  Vit D 17.2     Fatigue continues to wax and wane.  Last few days has had unsteady gait. Weakness on left arm/leg.  Numbness in left arm and right foot.  Left side of tongue numb for the last 7 to 14 weeks.   Continue to smoke Tob 1 ppd.      Patient called and noted overwhelming fatigue.  After steroids fatigue improved but then recurred on 8/5/16.  Frequent HA's every other day frontal and occipital location of squeezing sensation of moderate severity.  Prednisone taper rx on 7/22/16 improved energy.       6/22/16 awoke at 3:30 am and noted left sided weakness in arm and leg.  Gait was unsteady and worsened over the next 7 days and presented to [facility] ED.  Fatigue was overwhelming.  MRI Brain in ED, my review, with multiple T2 white matter lesions and one large black hole in left PVWM.  Given steroids in ED with improvement in sx by 50%.     5/2016 had episode of left arm clumsiness for a week.       Reports increased fatigue since the first of the year.       Vision is fuzzy and feel swollen.       Bladder frequency.      Tob 1 ppd.    Objective   Vital Signs:  /72   Pulse 85   Ht 170.2 cm (67.01\")   Wt 62.6 kg (138 lb) Comment: stated/in wheelchair today  SpO2 97%   BMI 21.61 kg/m²   Estimated body mass index is 21.61 kg/m² as calculated from the following:    Height as of this encounter: 170.2 cm (67.01\").    Weight as of this encounter: 62.6 kg (138 lb).       BMI is within normal parameters. No other follow-up for BMI required.    Neurologic Exam     Mental Status   Oriented to person, place, and time.   Speech: speech is normal   Level of consciousness: alert  Knowledge: good and consistent with education.   Normal comprehension.     Cranial Nerves   Cranial nerves II through XII intact.     CN II   Visual fields full to confrontation.   Visual acuity: normal  Right visual field " deficit: none  Left visual field deficit: none     CN III, IV, VI   Pupils are equal, round, and reactive to light.  Extraocular motions are normal.   Nystagmus: none   Diplopia: none  Ophthalmoparesis: none  Upgaze: normal  Downgaze: normal  Conjugate gaze: present    CN V   Facial sensation intact.   Right corneal reflex: normal  Left corneal reflex: normal    CN VII   Right facial weakness: none  Left facial weakness: none    CN VIII   Hearing: intact    CN IX, X   Palate: symmetric  Right gag reflex: normal  Left gag reflex: normal    CN XI   Right sternocleidomastoid strength: normal  Left sternocleidomastoid strength: normal    CN XII   Tongue: not atrophic  Fasciculations: absent  Tongue deviation: none    Motor Exam   Muscle bulk: normal  Overall muscle tone: normal    Strength   Strength 5/5 throughout.   Strength 5/5 except as noted.   Left iliopsoas: 3/5  Left quadriceps: 3/5  Left hamstring: 3/5  Left glutei: 3/5  Left anterior tibial: 3/5  Left posterior tibial: 3/5  Left peroneal: 3/5  Left gastroc: 3/5    Sensory Exam   Light touch normal.     Gait, Coordination, and Reflexes     Gait  Gait: normal    Tremor   Resting tremor: absent  Intention tremor: absent  Action tremor: absent    Reflexes   Reflexes 2+ except as noted.        Physical Exam  Eyes:      Extraocular Movements: EOM normal.      Pupils: Pupils are equal, round, and reactive to light.   Neurological:      Mental Status: She is oriented to person, place, and time.      Cranial Nerves: Cranial nerves 2-12 are intact.      Motor: Motor strength is normal.     Gait: Gait is intact.   Psychiatric:         Speech: Speech normal.        Result Review :    The following data was reviewed by: Umang Perez MD on 04/25/2024:                 Assessment and Plan     Diagnoses and all orders for this visit:    1. Multiple sclerosis (Primary)  Assessment & Plan:  Worsening endurance       2. Periodic headache syndrome, not intractable  Assessment  & Plan:  Stable             3. Restless legs syndrome (RLS)  Assessment & Plan:  Stable on meds       4. Spasticity    Other orders  -     predniSONE (DELTASONE) 5 MG tablet; Take 1 tablet by mouth Daily.  Dispense: 90 tablet; Refill: 3  -     predniSONE (DELTASONE) 1 MG tablet; Take 4 tablets by mouth Daily.  Dispense: 120 tablet; Refill: 3             Follow Up     No follow-ups on file.  Patient was given instructions and counseling regarding her condition or for health maintenance advice. Please see specific information pulled into the AVS if appropriate.

## 2024-07-08 DIAGNOSIS — G35 MULTIPLE SCLEROSIS: Chronic | ICD-10-CM

## 2024-07-08 RX ORDER — GABAPENTIN 300 MG/1
CAPSULE ORAL
Qty: 540 CAPSULE | Refills: 1 | Status: SHIPPED | OUTPATIENT
Start: 2024-07-08

## 2024-07-08 NOTE — TELEPHONE ENCOUNTER
Rx Refill Note  Requested Prescriptions     Pending Prescriptions Disp Refills    gabapentin (NEURONTIN) 300 MG capsule [Pharmacy Med Name: gabapentin 300 mg capsule] 540 capsule 1     Sig: TAKE THREE CAPSULES BY MOUTH TWICE DAILY      Last filled:  10/12/23 w/1  Last office visit with prescribing clinician: 4/25/2024      Next office visit with prescribing clinician: 10/29/2024     Marian Gaspar MA  07/08/24, 13:43 EDT

## 2024-09-26 ENCOUNTER — DOCUMENTATION (OUTPATIENT)
Dept: FAMILY MEDICINE CLINIC | Facility: CLINIC | Age: 74
End: 2024-09-26
Payer: MEDICARE

## 2024-09-26 RX ORDER — BUPROPION HYDROCHLORIDE 150 MG/1
150 TABLET, EXTENDED RELEASE ORAL 2 TIMES DAILY
Status: ON HOLD | COMMUNITY

## 2024-09-26 RX ORDER — NICOTINE 21 MG/24HR
1 PATCH, TRANSDERMAL 24 HOURS TRANSDERMAL EVERY 24 HOURS
Status: ON HOLD | COMMUNITY

## 2024-09-26 RX ORDER — FUROSEMIDE 20 MG
20 TABLET ORAL DAILY
Status: ON HOLD | COMMUNITY

## 2024-09-26 RX ORDER — ALBUTEROL SULFATE 1.25 MG/3ML
1 SOLUTION RESPIRATORY (INHALATION) EVERY 6 HOURS PRN
Status: ON HOLD | COMMUNITY

## 2024-09-26 RX ORDER — DILTIAZEM HYDROCHLORIDE 240 MG/1
240 CAPSULE, COATED, EXTENDED RELEASE ORAL DAILY
Status: ON HOLD | COMMUNITY

## 2024-09-26 RX ORDER — AZITHROMYCIN 250 MG/1
250 TABLET, FILM COATED ORAL 3 TIMES WEEKLY
Status: ON HOLD | COMMUNITY

## 2024-09-26 RX ORDER — PREDNISONE 10 MG/1
10 TABLET ORAL DAILY
Status: ON HOLD | COMMUNITY

## 2024-09-26 RX ORDER — CEPHALEXIN 500 MG/1
500 CAPSULE ORAL EVERY 8 HOURS
Status: ON HOLD | COMMUNITY

## 2024-09-28 ENCOUNTER — APPOINTMENT (OUTPATIENT)
Dept: CT IMAGING | Facility: HOSPITAL | Age: 74
DRG: 871 | End: 2024-09-28
Payer: MEDICARE

## 2024-09-28 ENCOUNTER — HOSPITAL ENCOUNTER (INPATIENT)
Facility: HOSPITAL | Age: 74
LOS: 3 days | Discharge: NURSING, REHAB OR LONG-TERM CARE | DRG: 871 | End: 2024-10-02
Attending: EMERGENCY MEDICINE | Admitting: INTERNAL MEDICINE
Payer: MEDICARE

## 2024-09-28 ENCOUNTER — APPOINTMENT (OUTPATIENT)
Dept: GENERAL RADIOLOGY | Facility: HOSPITAL | Age: 74
DRG: 871 | End: 2024-09-28
Payer: MEDICARE

## 2024-09-28 DIAGNOSIS — A41.9 SEVERE SEPSIS: ICD-10-CM

## 2024-09-28 DIAGNOSIS — G25.81 RESTLESS LEGS SYNDROME (RLS): Chronic | ICD-10-CM

## 2024-09-28 DIAGNOSIS — G35 MULTIPLE SCLEROSIS: Chronic | ICD-10-CM

## 2024-09-28 DIAGNOSIS — J18.9 COMMUNITY ACQUIRED PNEUMONIA, UNSPECIFIED LATERALITY: Primary | ICD-10-CM

## 2024-09-28 DIAGNOSIS — R13.10 DYSPHAGIA, UNSPECIFIED TYPE: ICD-10-CM

## 2024-09-28 DIAGNOSIS — N17.9 AKI (ACUTE KIDNEY INJURY): ICD-10-CM

## 2024-09-28 DIAGNOSIS — D72.829 LEUKOCYTOSIS, UNSPECIFIED TYPE: ICD-10-CM

## 2024-09-28 DIAGNOSIS — R65.20 SEVERE SEPSIS: ICD-10-CM

## 2024-09-28 PROBLEM — E78.5 HLD (HYPERLIPIDEMIA): Status: ACTIVE | Noted: 2024-09-28

## 2024-09-28 PROBLEM — D50.9 IRON DEFICIENCY ANEMIA: Status: ACTIVE | Noted: 2024-08-28

## 2024-09-28 PROBLEM — R19.7 DIARRHEA: Status: ACTIVE | Noted: 2024-09-28

## 2024-09-28 PROBLEM — F41.9 ANXIETY: Status: ACTIVE | Noted: 2020-01-01

## 2024-09-28 PROBLEM — G93.40 ENCEPHALOPATHY: Status: ACTIVE | Noted: 2024-09-28

## 2024-09-28 PROBLEM — N18.4 CHRONIC KIDNEY DISEASE, STAGE 4 (SEVERE): Status: ACTIVE | Noted: 2024-08-28

## 2024-09-28 PROBLEM — J96.21 ACUTE AND CHRONIC RESPIRATORY FAILURE WITH HYPOXIA: Status: ACTIVE | Noted: 2024-08-28

## 2024-09-28 PROBLEM — I10 HYPERTENSIVE DISORDER: Status: ACTIVE | Noted: 2024-09-28

## 2024-09-28 PROBLEM — J44.1 COPD WITH ACUTE EXACERBATION: Status: ACTIVE | Noted: 2024-09-28

## 2024-09-28 LAB
ALBUMIN SERPL-MCNC: 3.5 G/DL (ref 3.5–5.2)
ALBUMIN/GLOB SERPL: 1.3 G/DL
ALP SERPL-CCNC: 168 U/L (ref 39–117)
ALT SERPL W P-5'-P-CCNC: 41 U/L (ref 1–33)
AMMONIA BLD-SCNC: 22 UMOL/L (ref 11–51)
ANION GAP SERPL CALCULATED.3IONS-SCNC: 15 MMOL/L (ref 5–15)
ARTERIAL PATENCY WRIST A: ABNORMAL
AST SERPL-CCNC: 19 U/L (ref 1–32)
ATMOSPHERIC PRESS: ABNORMAL MM[HG]
B PARAPERT DNA SPEC QL NAA+PROBE: NOT DETECTED
B PERT DNA SPEC QL NAA+PROBE: NOT DETECTED
BACTERIA UR QL AUTO: ABNORMAL /HPF
BASE EXCESS BLDA CALC-SCNC: 1.7 MMOL/L (ref 0–2)
BASOPHILS # BLD AUTO: 0.04 10*3/MM3 (ref 0–0.2)
BASOPHILS NFR BLD AUTO: 0.2 % (ref 0–1.5)
BDY SITE: ABNORMAL
BILIRUB SERPL-MCNC: 0.6 MG/DL (ref 0–1.2)
BILIRUB UR QL STRIP: ABNORMAL
BODY TEMPERATURE: 37
BUN SERPL-MCNC: 45 MG/DL (ref 8–23)
BUN/CREAT SERPL: 20.3 (ref 7–25)
C PNEUM DNA NPH QL NAA+NON-PROBE: NOT DETECTED
CALCIUM SPEC-SCNC: 8.4 MG/DL (ref 8.6–10.5)
CHLORIDE SERPL-SCNC: 95 MMOL/L (ref 98–107)
CLARITY UR: CLEAR
CO2 BLDA-SCNC: 26.9 MMOL/L (ref 22–33)
CO2 SERPL-SCNC: 26 MMOL/L (ref 22–29)
COHGB MFR BLD: 1.5 % (ref 0–2)
COLOR UR: ABNORMAL
CREAT SERPL-MCNC: 2.22 MG/DL (ref 0.57–1)
D-LACTATE SERPL-SCNC: 3.3 MMOL/L (ref 0.5–2)
D-LACTATE SERPL-SCNC: 4.3 MMOL/L (ref 0.5–2)
DEPRECATED RDW RBC AUTO: 56.3 FL (ref 37–54)
EGFRCR SERPLBLD CKD-EPI 2021: 23 ML/MIN/1.73
EOSINOPHIL # BLD AUTO: 0 10*3/MM3 (ref 0–0.4)
EOSINOPHIL NFR BLD AUTO: 0 % (ref 0.3–6.2)
ERYTHROCYTE [DISTWIDTH] IN BLOOD BY AUTOMATED COUNT: 16.6 % (ref 12.3–15.4)
FLUAV SUBTYP SPEC NAA+PROBE: NOT DETECTED
FLUBV RNA ISLT QL NAA+PROBE: NOT DETECTED
GLOBULIN UR ELPH-MCNC: 2.6 GM/DL
GLUCOSE SERPL-MCNC: 148 MG/DL (ref 65–99)
GLUCOSE UR STRIP-MCNC: NEGATIVE MG/DL
HADV DNA SPEC NAA+PROBE: NOT DETECTED
HCO3 BLDA-SCNC: 25.7 MMOL/L (ref 20–26)
HCOV 229E RNA SPEC QL NAA+PROBE: NOT DETECTED
HCOV HKU1 RNA SPEC QL NAA+PROBE: NOT DETECTED
HCOV NL63 RNA SPEC QL NAA+PROBE: NOT DETECTED
HCOV OC43 RNA SPEC QL NAA+PROBE: NOT DETECTED
HCT VFR BLD AUTO: 36.1 % (ref 34–46.6)
HCT VFR BLD CALC: 33.3 % (ref 38–51)
HGB BLD-MCNC: 11.5 G/DL (ref 12–15.9)
HGB BLDA-MCNC: 10.9 G/DL (ref 14–18)
HGB UR QL STRIP.AUTO: NEGATIVE
HMPV RNA NPH QL NAA+NON-PROBE: NOT DETECTED
HPIV1 RNA ISLT QL NAA+PROBE: NOT DETECTED
HPIV2 RNA SPEC QL NAA+PROBE: NOT DETECTED
HPIV3 RNA NPH QL NAA+PROBE: NOT DETECTED
HPIV4 P GENE NPH QL NAA+PROBE: NOT DETECTED
HYALINE CASTS UR QL AUTO: ABNORMAL /LPF
IMM GRANULOCYTES # BLD AUTO: 0.26 10*3/MM3 (ref 0–0.05)
IMM GRANULOCYTES NFR BLD AUTO: 1 % (ref 0–0.5)
INHALED O2 CONCENTRATION: 32 %
KETONES UR QL STRIP: ABNORMAL
LEUKOCYTE ESTERASE UR QL STRIP.AUTO: ABNORMAL
LYMPHOCYTES # BLD AUTO: 1.01 10*3/MM3 (ref 0.7–3.1)
LYMPHOCYTES NFR BLD AUTO: 3.9 % (ref 19.6–45.3)
M PNEUMO IGG SER IA-ACNC: NOT DETECTED
MAGNESIUM SERPL-MCNC: 3 MG/DL (ref 1.6–2.4)
MCH RBC QN AUTO: 30 PG (ref 26.6–33)
MCHC RBC AUTO-ENTMCNC: 31.9 G/DL (ref 31.5–35.7)
MCV RBC AUTO: 94.3 FL (ref 79–97)
METHGB BLD QL: 0.1 % (ref 0–1.5)
MODALITY: ABNORMAL
MONOCYTES # BLD AUTO: 0.81 10*3/MM3 (ref 0.1–0.9)
MONOCYTES NFR BLD AUTO: 3.1 % (ref 5–12)
NEUTROPHILS NFR BLD AUTO: 23.9 10*3/MM3 (ref 1.7–7)
NEUTROPHILS NFR BLD AUTO: 91.8 % (ref 42.7–76)
NITRITE UR QL STRIP: NEGATIVE
NRBC BLD AUTO-RTO: 0.2 /100 WBC (ref 0–0.2)
NT-PROBNP SERPL-MCNC: 464.7 PG/ML (ref 0–900)
OXYHGB MFR BLDV: 91.4 % (ref 94–99)
PCO2 BLDA: 37.4 MM HG (ref 35–45)
PCO2 TEMP ADJ BLD: 37.4 MM HG (ref 35–45)
PH BLDA: 7.45 PH UNITS (ref 7.35–7.45)
PH UR STRIP.AUTO: <=5 [PH] (ref 5–8)
PH, TEMP CORRECTED: 7.45 PH UNITS
PLATELET # BLD AUTO: 205 10*3/MM3 (ref 140–450)
PMV BLD AUTO: 10 FL (ref 6–12)
PO2 BLDA: 62.4 MM HG (ref 83–108)
PO2 TEMP ADJ BLD: 62.4 MM HG (ref 83–108)
POTASSIUM SERPL-SCNC: 4.7 MMOL/L (ref 3.5–5.2)
PROCALCITONIN SERPL-MCNC: 61.99 NG/ML (ref 0–0.25)
PROT SERPL-MCNC: 6.1 G/DL (ref 6–8.5)
PROT UR QL STRIP: ABNORMAL
QT INTERVAL: 406 MS
QTC INTERVAL: 483 MS
RBC # BLD AUTO: 3.83 10*6/MM3 (ref 3.77–5.28)
RBC # UR STRIP: ABNORMAL /HPF
REF LAB TEST METHOD: ABNORMAL
RHINOVIRUS RNA SPEC NAA+PROBE: NOT DETECTED
RSV RNA NPH QL NAA+NON-PROBE: NOT DETECTED
SARS-COV-2 RNA NPH QL NAA+NON-PROBE: NOT DETECTED
SODIUM SERPL-SCNC: 136 MMOL/L (ref 136–145)
SP GR UR STRIP: 1.02 (ref 1–1.03)
SQUAMOUS #/AREA URNS HPF: ABNORMAL /HPF
TROPONIN T SERPL HS-MCNC: 50 NG/L
UROBILINOGEN UR QL STRIP: ABNORMAL
VENTILATOR MODE: ABNORMAL
WBC # UR STRIP: ABNORMAL /HPF
WBC NRBC COR # BLD AUTO: 26.02 10*3/MM3 (ref 3.4–10.8)

## 2024-09-28 PROCEDURE — 36600 WITHDRAWAL OF ARTERIAL BLOOD: CPT

## 2024-09-28 PROCEDURE — 71045 X-RAY EXAM CHEST 1 VIEW: CPT

## 2024-09-28 PROCEDURE — 83605 ASSAY OF LACTIC ACID: CPT | Performed by: EMERGENCY MEDICINE

## 2024-09-28 PROCEDURE — G0378 HOSPITAL OBSERVATION PER HR: HCPCS

## 2024-09-28 PROCEDURE — 82140 ASSAY OF AMMONIA: CPT | Performed by: INTERNAL MEDICINE

## 2024-09-28 PROCEDURE — 25010000002 METHYLPREDNISOLONE PER 125 MG: Performed by: EMERGENCY MEDICINE

## 2024-09-28 PROCEDURE — 82805 BLOOD GASES W/O2 SATURATION: CPT

## 2024-09-28 PROCEDURE — 36415 COLL VENOUS BLD VENIPUNCTURE: CPT

## 2024-09-28 PROCEDURE — 85025 COMPLETE CBC W/AUTO DIFF WBC: CPT | Performed by: EMERGENCY MEDICINE

## 2024-09-28 PROCEDURE — 99223 1ST HOSP IP/OBS HIGH 75: CPT | Performed by: INTERNAL MEDICINE

## 2024-09-28 PROCEDURE — 94799 UNLISTED PULMONARY SVC/PX: CPT

## 2024-09-28 PROCEDURE — 83735 ASSAY OF MAGNESIUM: CPT | Performed by: EMERGENCY MEDICINE

## 2024-09-28 PROCEDURE — 80053 COMPREHEN METABOLIC PANEL: CPT | Performed by: EMERGENCY MEDICINE

## 2024-09-28 PROCEDURE — 0202U NFCT DS 22 TRGT SARS-COV-2: CPT | Performed by: EMERGENCY MEDICINE

## 2024-09-28 PROCEDURE — 81001 URINALYSIS AUTO W/SCOPE: CPT | Performed by: EMERGENCY MEDICINE

## 2024-09-28 PROCEDURE — 82375 ASSAY CARBOXYHB QUANT: CPT

## 2024-09-28 PROCEDURE — 70450 CT HEAD/BRAIN W/O DYE: CPT

## 2024-09-28 PROCEDURE — 83050 HGB METHEMOGLOBIN QUAN: CPT

## 2024-09-28 PROCEDURE — 25010000002 CEFTRIAXONE PER 250 MG: Performed by: EMERGENCY MEDICINE

## 2024-09-28 PROCEDURE — 99285 EMERGENCY DEPT VISIT HI MDM: CPT

## 2024-09-28 PROCEDURE — 87040 BLOOD CULTURE FOR BACTERIA: CPT | Performed by: EMERGENCY MEDICINE

## 2024-09-28 PROCEDURE — 83880 ASSAY OF NATRIURETIC PEPTIDE: CPT | Performed by: EMERGENCY MEDICINE

## 2024-09-28 PROCEDURE — 87641 MR-STAPH DNA AMP PROBE: CPT | Performed by: PHYSICIAN ASSISTANT

## 2024-09-28 PROCEDURE — 71250 CT THORAX DX C-: CPT

## 2024-09-28 PROCEDURE — 84145 PROCALCITONIN (PCT): CPT | Performed by: EMERGENCY MEDICINE

## 2024-09-28 PROCEDURE — 25810000003 SEPSIS FLUID NS 0.9 % SOLUTION: Performed by: EMERGENCY MEDICINE

## 2024-09-28 PROCEDURE — 93005 ELECTROCARDIOGRAM TRACING: CPT | Performed by: EMERGENCY MEDICINE

## 2024-09-28 PROCEDURE — 94640 AIRWAY INHALATION TREATMENT: CPT

## 2024-09-28 PROCEDURE — 84484 ASSAY OF TROPONIN QUANT: CPT | Performed by: EMERGENCY MEDICINE

## 2024-09-28 PROCEDURE — 25010000002 VANCOMYCIN HCL IN NACL 1.5-0.9 GM/500ML-% SOLUTION

## 2024-09-28 RX ORDER — IPRATROPIUM BROMIDE AND ALBUTEROL SULFATE 2.5; .5 MG/3ML; MG/3ML
3 SOLUTION RESPIRATORY (INHALATION) ONCE
Status: COMPLETED | OUTPATIENT
Start: 2024-09-28 | End: 2024-09-28

## 2024-09-28 RX ORDER — VANCOMYCIN/0.9 % SOD CHLORIDE 1.5G/250ML
22 PLASTIC BAG, INJECTION (ML) INTRAVENOUS ONCE
Status: COMPLETED | OUTPATIENT
Start: 2024-09-28 | End: 2024-09-29

## 2024-09-28 RX ORDER — VANCOMYCIN/0.9 % SOD CHLORIDE 1.5G/250ML
22 PLASTIC BAG, INJECTION (ML) INTRAVENOUS ONCE
Status: DISCONTINUED | OUTPATIENT
Start: 2024-09-28 | End: 2024-09-28

## 2024-09-28 RX ORDER — METHYLPREDNISOLONE SODIUM SUCCINATE 125 MG/2ML
125 INJECTION, POWDER, LYOPHILIZED, FOR SOLUTION INTRAMUSCULAR; INTRAVENOUS ONCE
Status: COMPLETED | OUTPATIENT
Start: 2024-09-28 | End: 2024-09-28

## 2024-09-28 RX ORDER — L.ACID,PARA/B.BIFIDUM/S.THERM 8B CELL
1 CAPSULE ORAL DAILY
Status: DISCONTINUED | OUTPATIENT
Start: 2024-09-29 | End: 2024-10-02 | Stop reason: HOSPADM

## 2024-09-28 RX ORDER — SODIUM CHLORIDE, SODIUM LACTATE, POTASSIUM CHLORIDE, CALCIUM CHLORIDE 600; 310; 30; 20 MG/100ML; MG/100ML; MG/100ML; MG/100ML
75 INJECTION, SOLUTION INTRAVENOUS CONTINUOUS
Status: ACTIVE | OUTPATIENT
Start: 2024-09-28 | End: 2024-09-29

## 2024-09-28 RX ORDER — BUPROPION HYDROCHLORIDE 150 MG/1
150 TABLET, EXTENDED RELEASE ORAL 2 TIMES DAILY
Status: CANCELLED | OUTPATIENT
Start: 2024-09-28

## 2024-09-28 RX ORDER — METHYLPREDNISOLONE SODIUM SUCCINATE 125 MG/2ML
60 INJECTION, POWDER, LYOPHILIZED, FOR SOLUTION INTRAMUSCULAR; INTRAVENOUS EVERY 8 HOURS
Status: DISCONTINUED | OUTPATIENT
Start: 2024-09-29 | End: 2024-09-29

## 2024-09-28 RX ORDER — IPRATROPIUM BROMIDE AND ALBUTEROL SULFATE 2.5; .5 MG/3ML; MG/3ML
3 SOLUTION RESPIRATORY (INHALATION)
Status: DISPENSED | OUTPATIENT
Start: 2024-09-28 | End: 2024-09-29

## 2024-09-28 RX ADMIN — SODIUM CHLORIDE 2136 ML: 9 INJECTION, SOLUTION INTRAVENOUS at 18:56

## 2024-09-28 RX ADMIN — Medication 1500 MG: at 22:19

## 2024-09-28 RX ADMIN — METHYLPREDNISOLONE SODIUM SUCCINATE 125 MG: 125 INJECTION, POWDER, FOR SOLUTION INTRAMUSCULAR; INTRAVENOUS at 18:53

## 2024-09-28 RX ADMIN — SODIUM CHLORIDE 1000 MG: 900 INJECTION INTRAVENOUS at 18:58

## 2024-09-28 RX ADMIN — IPRATROPIUM BROMIDE AND ALBUTEROL SULFATE 3 ML: 2.5; .5 SOLUTION RESPIRATORY (INHALATION) at 17:56

## 2024-09-28 NOTE — ED PROVIDER NOTES
"Subjective   History of Present Illness  74-year-old female presents via EMS from her nursing home to be evaluated for shortness of breath.  Of note, the patient resides at [facility].  She has a history of COPD.  She wears 3 L of oxygen at baseline.  Over the past 2 days she has been experiencing gradually worsening dyspnea and cough.  She reportedly was diagnosed with pneumonia yesterday.  Today, she was noted by staff to be dyspneic and confused when compared to her baseline.  As a result, EMS was called and on their arrival the patient had oxygen saturations in the upper 80s/low 90s on supplemental oxygen while receiving a breathing treatment.  She was noted to have rhonchorous breath sounds and was brought to our facility on a nonrebreather.  The patient is a somewhat poor historian at this time.  The majority of her history was obtained from EMS personnel.      Review of Systems   Unable to perform ROS: Mental status change   Respiratory:  Positive for cough and shortness of breath.        Past Medical History:   Diagnosis Date    Aneurysm of aorta     Anxiety and depression     Arthritis     Back disorder     \"PROTRUDING DISC\"    Bilateral cataracts     Chronic respiratory failure with hypoxia     Cognitive communication deficit     COPD (chronic obstructive pulmonary disease)     Dysphagia, oropharyngeal phase     WOLF (generalized anxiety disorder)     Heavy tobacco smoker     Hepatitis C     Hyperlipidemia     Hypertension     Idiopathic gout     Idiopathic gout, unspecified site     MS (multiple sclerosis)     Multiple sclerosis     Pneumonia     Sepsis, unspecified organism     Stroke     Unspecified sequelae of cerebral infarction     Urinary tract infection        Allergies   Allergen Reactions    Penicillins Other (See Comments)     WAS A YOUNG CHILD AND WAS NEVER TOLD WHAT HER REACTION WAS    Sulfa Antibiotics Other (See Comments)     WAS A YOUNG CHILD AND WAS NEVER TOLD WHAT HER REACTION WAS "       Past Surgical History:   Procedure Laterality Date    CHOLECYSTECTOMY      HYSTERECTOMY      SMALL INTESTINE SURGERY      As a child    TOE SURGERY Left     Great    TONSILLECTOMY      and Adnoids    TUBAL ABDOMINAL LIGATION         Family History   Problem Relation Age of Onset    Heart disease Mother     Coronary artery disease Mother     Heart attack Mother     Lung cancer Father     Diabetes Father     Diabetes Brother     Hypertension Brother        Social History     Socioeconomic History    Marital status:     Number of children: 3   Tobacco Use    Smoking status: Every Day     Current packs/day: 1.00     Average packs/day: 1 pack/day for 40.0 years (40.0 ttl pk-yrs)     Types: Cigarettes     Passive exposure: Current    Smokeless tobacco: Never   Vaping Use    Vaping status: Never Used   Substance and Sexual Activity    Alcohol use: No    Drug use: No    Sexual activity: Defer           Objective   Physical Exam  Vitals and nursing note reviewed.   Constitutional:       Appearance: She is well-developed. She is not diaphoretic.      Comments: Somnolent but arousable elderly female   HENT:      Head: Normocephalic and atraumatic.   Eyes:      Pupils: Pupils are equal, round, and reactive to light.   Cardiovascular:      Rate and Rhythm: Normal rate and regular rhythm.      Heart sounds: Normal heart sounds. No murmur heard.     No friction rub. No gallop.   Pulmonary:      Breath sounds: Wheezing and rhonchi present. No rales.      Comments: Tachypneic, rhonchorous breath sounds noted throughout lung fields, mild wheezing noted, no accessory muscle use or retractions present  Abdominal:      General: Bowel sounds are normal. There is no distension.      Palpations: Abdomen is soft. There is no mass.      Tenderness: There is no abdominal tenderness. There is no guarding or rebound.   Musculoskeletal:         General: Normal range of motion.      Cervical back: Neck supple.      Right lower leg:  No edema.      Left lower leg: No edema.   Skin:     General: Skin is warm and dry.      Findings: No erythema or rash.   Neurological:      Comments: Somnolent but arousable, appears mildly confused, answering majority of questions correctly and following simple commands   Psychiatric:         Thought Content: Thought content normal.         Judgment: Judgment normal.         Critical Care    Performed by: Bhargav Bashir MD  Authorized by: Bhargav Bashir MD    Critical care provider statement:     Critical care was necessary to treat or prevent imminent or life-threatening deterioration of the following conditions:  Sepsis    Critical care was time spent personally by me on the following activities:  Development of treatment plan with patient or surrogate, evaluation of patient's response to treatment, examination of patient, obtaining history from patient or surrogate, ordering and performing treatments and interventions, ordering and review of laboratory studies, ordering and review of radiographic studies, pulse oximetry, re-evaluation of patient's condition and review of old charts             ED Course  ED Course as of 09/28/24 2346   Sat Sep 28, 2024   1734 74-year-old female presents via EMS from her nursing home to be evaluated for shortness of breath.  The patient resides at [facility].  She has a history of COPD.  She wears 3 L of oxygen at baseline.  Over the past 2 days she has been experiencing gradually worsening dyspnea and cough.  She reportedly was diagnosed with pneumonia yesterday.  Today, she was more dyspneic and confused so EMS was called and she was brought to our facility to be evaluated.  On arrival, the patient is currently on a nonrebreather.  She is somnolent but easily arousable.  Rhonchorous breath sounds noted bilaterally.  Nebs and steroids given for symptom relief.  Differential diagnosis is quite broad.  We will obtain labs and imaging, and we will reassess  following initial interventions. [DD]   1756 Labs remarkable for white blood cell count of 26,000. [DD]   1809 I personally and independently viewed the patient's x-ray images myself, and I am in agreement with the radiologist's reading for final interpretation, particularly regarding right lower lobe pneumonia. [DD]   1809 Broad-spectrum antibiotic coverage initiated.  Blood cultures obtained. [DD]   1809 The patient is anticoagulated.  Doubt pulmonary embolism at this time.  We will seek admission to the hospital pending return of workup. [DD]   1818 Lactate is greater than 4.  30 cc/kg crystalloid bolus given for severe sepsis. [DD]   1834 Procalcitonin is significantly elevated at 61.99. [DD]   1836 She is currently requiring 4.5 L of supplemental oxygen via nasal cannula. [DD]   1836 EKG interpreted independently by me revealed sinus rhythm with occasional PACs and a heart rate of 85. [DD]   1912 After reviewing the patient's labs and imaging, I discussed the patient's case with our hospitalist, Dr. Brock, and the patient will be admitted under her care for further evaluation and treatment.  The patient is hemodynamically stable at this time and is aware/agreeable with the plan. [DD]      ED Course User Index  [DD] Bhargav Bashir MD                                   Recent Results (from the past 24 hour(s))   Comprehensive Metabolic Panel    Collection Time: 09/28/24  5:45 PM    Specimen: Blood   Result Value Ref Range    Glucose 148 (H) 65 - 99 mg/dL    BUN 45 (H) 8 - 23 mg/dL    Creatinine 2.22 (H) 0.57 - 1.00 mg/dL    Sodium 136 136 - 145 mmol/L    Potassium 4.7 3.5 - 5.2 mmol/L    Chloride 95 (L) 98 - 107 mmol/L    CO2 26.0 22.0 - 29.0 mmol/L    Calcium 8.4 (L) 8.6 - 10.5 mg/dL    Total Protein 6.1 6.0 - 8.5 g/dL    Albumin 3.5 3.5 - 5.2 g/dL    ALT (SGPT) 41 (H) 1 - 33 U/L    AST (SGOT) 19 1 - 32 U/L    Alkaline Phosphatase 168 (H) 39 - 117 U/L    Total Bilirubin 0.6 0.0 - 1.2 mg/dL    Globulin 2.6  gm/dL    A/G Ratio 1.3 g/dL    BUN/Creatinine Ratio 20.3 7.0 - 25.0    Anion Gap 15.0 5.0 - 15.0 mmol/L    eGFR 23.0 (L) >60.0 mL/min/1.73   BNP    Collection Time: 09/28/24  5:45 PM    Specimen: Blood   Result Value Ref Range    proBNP 464.7 0.0 - 900.0 pg/mL   Single High Sensitivity Troponin T    Collection Time: 09/28/24  5:45 PM    Specimen: Blood   Result Value Ref Range    HS Troponin T 50 (H) <14 ng/L   Lactic Acid, Plasma    Collection Time: 09/28/24  5:45 PM    Specimen: Blood   Result Value Ref Range    Lactate 4.3 (C) 0.5 - 2.0 mmol/L   Procalcitonin    Collection Time: 09/28/24  5:45 PM    Specimen: Blood   Result Value Ref Range    Procalcitonin 61.99 (H) 0.00 - 0.25 ng/mL   Magnesium    Collection Time: 09/28/24  5:45 PM    Specimen: Blood   Result Value Ref Range    Magnesium 3.0 (H) 1.6 - 2.4 mg/dL   CBC Auto Differential    Collection Time: 09/28/24  5:45 PM    Specimen: Blood   Result Value Ref Range    WBC 26.02 (H) 3.40 - 10.80 10*3/mm3    RBC 3.83 3.77 - 5.28 10*6/mm3    Hemoglobin 11.5 (L) 12.0 - 15.9 g/dL    Hematocrit 36.1 34.0 - 46.6 %    MCV 94.3 79.0 - 97.0 fL    MCH 30.0 26.6 - 33.0 pg    MCHC 31.9 31.5 - 35.7 g/dL    RDW 16.6 (H) 12.3 - 15.4 %    RDW-SD 56.3 (H) 37.0 - 54.0 fl    MPV 10.0 6.0 - 12.0 fL    Platelets 205 140 - 450 10*3/mm3    Neutrophil % 91.8 (H) 42.7 - 76.0 %    Lymphocyte % 3.9 (L) 19.6 - 45.3 %    Monocyte % 3.1 (L) 5.0 - 12.0 %    Eosinophil % 0.0 (L) 0.3 - 6.2 %    Basophil % 0.2 0.0 - 1.5 %    Immature Grans % 1.0 (H) 0.0 - 0.5 %    Neutrophils, Absolute 23.90 (H) 1.70 - 7.00 10*3/mm3    Lymphocytes, Absolute 1.01 0.70 - 3.10 10*3/mm3    Monocytes, Absolute 0.81 0.10 - 0.90 10*3/mm3    Eosinophils, Absolute 0.00 0.00 - 0.40 10*3/mm3    Basophils, Absolute 0.04 0.00 - 0.20 10*3/mm3    Immature Grans, Absolute 0.26 (H) 0.00 - 0.05 10*3/mm3    nRBC 0.2 0.0 - 0.2 /100 WBC   Respiratory Panel PCR w/COVID-19(SARS-CoV-2) DANAE/OFELIA/PREET/PAD/COR/BELLA In-House, NP Swab in  UTM/VTM, 2 HR TAT - Swab, Nasopharynx    Collection Time: 09/28/24  5:46 PM    Specimen: Nasopharynx; Swab   Result Value Ref Range    ADENOVIRUS, PCR Not Detected Not Detected    Coronavirus 229E Not Detected Not Detected    Coronavirus HKU1 Not Detected Not Detected    Coronavirus NL63 Not Detected Not Detected    Coronavirus OC43 Not Detected Not Detected    COVID19 Not Detected Not Detected - Ref. Range    Human Metapneumovirus Not Detected Not Detected    Human Rhinovirus/Enterovirus Not Detected Not Detected    Influenza A PCR Not Detected Not Detected    Influenza B PCR Not Detected Not Detected    Parainfluenza Virus 1 Not Detected Not Detected    Parainfluenza Virus 2 Not Detected Not Detected    Parainfluenza Virus 3 Not Detected Not Detected    Parainfluenza Virus 4 Not Detected Not Detected    RSV, PCR Not Detected Not Detected    Bordetella pertussis pcr Not Detected Not Detected    Bordetella parapertussis PCR Not Detected Not Detected    Chlamydophila pneumoniae PCR Not Detected Not Detected    Mycoplasma pneumo by PCR Not Detected Not Detected   Blood Gas, Arterial With Co-Ox    Collection Time: 09/28/24  6:00 PM    Specimen: Arterial Blood   Result Value Ref Range    Site Right Radial     Stone's Test N/A     pH, Arterial 7.446 7.350 - 7.450 pH units    pCO2, Arterial 37.4 35.0 - 45.0 mm Hg    pO2, Arterial 62.4 (L) 83.0 - 108.0 mm Hg    HCO3, Arterial 25.7 20.0 - 26.0 mmol/L    Base Excess, Arterial 1.7 0.0 - 2.0 mmol/L    Hemoglobin, Blood Gas 10.9 (L) 14 - 18 g/dL    Hematocrit, Blood Gas 33.3 (L) 38.0 - 51.0 %    Oxyhemoglobin 91.4 (L) 94 - 99 %    Methemoglobin 0.10 0.00 - 1.50 %    Carboxyhemoglobin 1.5 0 - 2 %    CO2 Content 26.9 22 - 33 mmol/L    Temperature 37.0     Barometric Pressure for Blood Gas      Modality Nasal Cannula     FIO2 32 %    Ventilator Mode      pH, Temp Corrected 7.446 pH Units    pCO2, Temperature Corrected 37.4 35 - 45 mm Hg    pO2, Temperature Corrected 62.4 (L) 83 -  108 mm Hg   ECG 12 Lead Dyspnea    Collection Time: 09/28/24  6:15 PM   Result Value Ref Range    QT Interval 406 ms    QTC Interval 483 ms   Urinalysis With Culture If Indicated - Straight Cath    Collection Time: 09/28/24  6:48 PM    Specimen: Straight Cath; Urine   Result Value Ref Range    Color, UA Dark Yellow (A) Yellow, Straw    Appearance, UA Clear Clear    pH, UA <=5.0 5.0 - 8.0    Specific Gravity, UA 1.017 1.001 - 1.030    Glucose, UA Negative Negative    Ketones, UA Trace (A) Negative    Bilirubin, UA Small (1+) (A) Negative    Blood, UA Negative Negative    Protein, UA Trace (A) Negative    Leuk Esterase, UA Trace (A) Negative    Nitrite, UA Negative Negative    Urobilinogen, UA 1.0 E.U./dL 0.2 - 1.0 E.U./dL   Urinalysis, Microscopic Only - Straight Cath    Collection Time: 09/28/24  6:48 PM    Specimen: Straight Cath; Urine   Result Value Ref Range    RBC, UA None Seen None Seen, 0-2 /HPF    WBC, UA 0-2 None Seen, 0-2 /HPF    Bacteria, UA 2+ (A) None Seen, Trace /HPF    Squamous Epithelial Cells, UA 3-6 (A) None Seen, 0-2 /HPF    Hyaline Casts, UA 0-6 0 - 6 /LPF    Methodology Manual Light Microscopy    STAT Lactic Acid, Reflex    Collection Time: 09/28/24 10:51 PM    Specimen: Blood   Result Value Ref Range    Lactate 3.3 (C) 0.5 - 2.0 mmol/L   Ammonia    Collection Time: 09/28/24 10:51 PM    Specimen: Blood   Result Value Ref Range    Ammonia 22 11 - 51 umol/L     Note: In addition to lab results from this visit, the labs listed above may include labs taken at another facility or during a different encounter within the last 24 hours. Please correlate lab times with ED admission and discharge times for further clarification of the services performed during this visit.    CT Chest Without Contrast Diagnostic   Final Result   Impression:   1.There is extensive segmentally obstructive endobronchial debris in both lower lobes with associated atelectasis. Superimposed pneumonia would be difficult to  exclude. This could be from chronic aspiration or decreased mucus clearance.   2.Severe coronary artery disease.   3.Diminished attenuation of the blood pool suggesting anemia.            Electronically Signed: Wilman Vivar MD     9/28/2024 11:07 PM EDT     Workstation ID: [id number]      CT Head Without Contrast   Final Result   Impression:   1.No acute intracranial abnormality.   2.Moderate chronic small vessel ischemic change.   3.Stable moderate chronic white matter disease. Allowing for differences in technique with prior brain MRI, there are no definite new hypoattenuating lesions in the brain.            Electronically Signed: Wilman Vivar MD     9/28/2024 11:14 PM EDT     Workstation ID: [id number]      XR Chest 1 View   Final Result   Impression:   Right basilar consolidation consistent with pneumonia versus atelectasis. A follow-up chest PA and lateral in 6 weeks is suggested to reevaluate.         Electronically Signed: Escobar David MD     9/28/2024 6:02 PM EDT     Workstation ID: [id number]        Vitals:    09/28/24 2130 09/28/24 2231 09/28/24 2321 09/28/24 2331   BP: 112/53 106/87 114/67 92/50   BP Location:       Patient Position:       Pulse: 75 79 82 80   Resp:       Temp:       TempSrc:       SpO2: (!) 88% 93% 93% 94%   Weight:       Height:         Medications   ipratropium-albuterol (DUO-NEB) nebulizer solution 3 mL (3 mL Nebulization Not Given 9/28/24 2114)   methylPREDNISolone sodium succinate (SOLU-Medrol) injection 60 mg (has no administration in time range)   lactobacillus acidophilus (RISAQUAD) capsule 1 capsule (has no administration in time range)   Pharmacy to dose vancomycin (has no administration in time range)   vancomycin IVPB 1500 mg in 0.9% NaCl (Premix) 500 mL (1,500 mg Intravenous New Bag 9/28/24 2219)   cefepime 2000 mg IVPB in 100 mL NS (MBP) (has no administration in time range)   cefepime 2000 mg IVPB in 100 mL NS (MBP) (has no administration in time range)   lactated ringers  infusion (has no administration in time range)   Pharmacy Consult - med rec request (has no administration in time range)   vancomycin (dosing per levels) (has no administration in time range)   ipratropium-albuterol (DUO-NEB) nebulizer solution 3 mL (3 mL Nebulization Given 9/28/24 1756)   methylPREDNISolone sodium succinate (SOLU-Medrol) injection 125 mg (125 mg Intravenous Given 9/28/24 1853)   cefTRIAXone (ROCEPHIN) 1,000 mg in sodium chloride 0.9 % 100 mL MBP (0 mg Intravenous Stopped 9/28/24 1936)   sepsis fluid NS 0.9 % bolus 2,136 mL (2,136 mL Intravenous New Bag 9/28/24 1856)     ECG/EMG Results (last 24 hours)       Procedure Component Value Units Date/Time    ECG 12 Lead Dyspnea [[id number]] Collected: 09/28/24 1815     Updated: 09/28/24 2228     QT Interval 406 ms      QTC Interval 483 ms     Narrative:      Test Reason : Dyspnea  Blood Pressure :   */*   mmHG  Vent. Rate :  85 BPM     Atrial Rate :  92 BPM     P-R Int : 192 ms          QRS Dur :  72 ms      QT Int : 406 ms       P-R-T Axes :  60   2  74 degrees     QTc Int : 483 ms    Sinus rhythm with blocked premature atrial complexes  Otherwise normal ECG  When compared with ECG of 21-SEP-2022 12:56,  premature ventricular complexes are no longer present  Confirmed by MD Bashir Michael ([id number]) on 9/28/2024 10:28:04 PM    Referred By: JUAN RAMON           Confirmed By: Willy Bashir MD          ECG 12 Lead Dyspnea   Final Result   Test Reason : Dyspnea   Blood Pressure :   */*   mmHG   Vent. Rate :  85 BPM     Atrial Rate :  92 BPM      P-R Int : 192 ms          QRS Dur :  72 ms       QT Int : 406 ms       P-R-T Axes :  60   2  74 degrees      QTc Int : 483 ms      Sinus rhythm with blocked premature atrial complexes   Otherwise normal ECG   When compared with ECG of 21-SEP-2022 12:56,   premature ventricular complexes are no longer present   Confirmed by MD Bashir Michael ([id number]) on 9/28/2024 10:28:04 PM      Referred By: JUAN RAMON           Confirmed By: Willy  MD Mckay                    Medical Decision Making  Amount and/or Complexity of Data Reviewed  Labs: ordered.  Radiology: ordered.  ECG/medicine tests: ordered.    Risk  Prescription drug management.  Decision regarding hospitalization.        Final diagnoses:   Community acquired pneumonia, unspecified laterality   Severe sepsis   Leukocytosis, unspecified type   CHRISTIANO (acute kidney injury)       ED Disposition  ED Disposition       ED Disposition   Decision to Admit    Condition   --    Comment   Level of Care: Telemetry [5]   Diagnosis: Diarrhea [787.91.ICD-9-CM]                 No follow-up provider specified.       Medication List      No changes were made to your prescriptions during this visit.            Bhargav Bashir MD  09/28/24 8033

## 2024-09-29 PROBLEM — J18.9 PNEUMONIA: Status: ACTIVE | Noted: 2024-09-29

## 2024-09-29 LAB
ALBUMIN SERPL-MCNC: 3 G/DL (ref 3.5–5.2)
ALBUMIN/GLOB SERPL: 1.2 G/DL
ALP SERPL-CCNC: 140 U/L (ref 39–117)
ALT SERPL W P-5'-P-CCNC: 33 U/L (ref 1–33)
ANION GAP SERPL CALCULATED.3IONS-SCNC: 17 MMOL/L (ref 5–15)
AST SERPL-CCNC: 18 U/L (ref 1–32)
BASOPHILS # BLD AUTO: 0.03 10*3/MM3 (ref 0–0.2)
BASOPHILS NFR BLD AUTO: 0.1 % (ref 0–1.5)
BILIRUB SERPL-MCNC: 0.5 MG/DL (ref 0–1.2)
BUN SERPL-MCNC: 38 MG/DL (ref 8–23)
BUN/CREAT SERPL: 22.9 (ref 7–25)
CALCIUM SPEC-SCNC: 7.8 MG/DL (ref 8.6–10.5)
CHLORIDE SERPL-SCNC: 103 MMOL/L (ref 98–107)
CK SERPL-CCNC: 76 U/L (ref 20–180)
CO2 SERPL-SCNC: 18 MMOL/L (ref 22–29)
CREAT SERPL-MCNC: 1.66 MG/DL (ref 0.57–1)
D-LACTATE SERPL-SCNC: 2 MMOL/L (ref 0.5–2)
D-LACTATE SERPL-SCNC: 4.9 MMOL/L (ref 0.5–2)
DEPRECATED RDW RBC AUTO: 55.1 FL (ref 37–54)
EGFRCR SERPLBLD CKD-EPI 2021: 32.6 ML/MIN/1.73
EOSINOPHIL # BLD AUTO: 0 10*3/MM3 (ref 0–0.4)
EOSINOPHIL NFR BLD AUTO: 0 % (ref 0.3–6.2)
ERYTHROCYTE [DISTWIDTH] IN BLOOD BY AUTOMATED COUNT: 16.3 % (ref 12.3–15.4)
GLOBULIN UR ELPH-MCNC: 2.5 GM/DL
GLUCOSE SERPL-MCNC: 155 MG/DL (ref 65–99)
HCT VFR BLD AUTO: 30.7 % (ref 34–46.6)
HGB BLD-MCNC: 10.2 G/DL (ref 12–15.9)
IMM GRANULOCYTES # BLD AUTO: 0.33 10*3/MM3 (ref 0–0.05)
IMM GRANULOCYTES NFR BLD AUTO: 1.3 % (ref 0–0.5)
LYMPHOCYTES # BLD AUTO: 0.45 10*3/MM3 (ref 0.7–3.1)
LYMPHOCYTES NFR BLD AUTO: 1.8 % (ref 19.6–45.3)
MCH RBC QN AUTO: 30.6 PG (ref 26.6–33)
MCHC RBC AUTO-ENTMCNC: 33.2 G/DL (ref 31.5–35.7)
MCV RBC AUTO: 92.2 FL (ref 79–97)
MONOCYTES # BLD AUTO: 0.11 10*3/MM3 (ref 0.1–0.9)
MONOCYTES NFR BLD AUTO: 0.4 % (ref 5–12)
MRSA DNA SPEC QL NAA+PROBE: NEGATIVE
NEUTROPHILS NFR BLD AUTO: 24.77 10*3/MM3 (ref 1.7–7)
NEUTROPHILS NFR BLD AUTO: 96.4 % (ref 42.7–76)
NRBC BLD AUTO-RTO: 0 /100 WBC (ref 0–0.2)
PLATELET # BLD AUTO: 157 10*3/MM3 (ref 140–450)
PMV BLD AUTO: 10.3 FL (ref 6–12)
POTASSIUM SERPL-SCNC: 5 MMOL/L (ref 3.5–5.2)
PROT SERPL-MCNC: 5.5 G/DL (ref 6–8.5)
RBC # BLD AUTO: 3.33 10*6/MM3 (ref 3.77–5.28)
SODIUM SERPL-SCNC: 138 MMOL/L (ref 136–145)
VANCOMYCIN SERPL-MCNC: 17.8 MCG/ML (ref 5–40)
WBC NRBC COR # BLD AUTO: 25.69 10*3/MM3 (ref 3.4–10.8)

## 2024-09-29 PROCEDURE — 94799 UNLISTED PULMONARY SVC/PX: CPT

## 2024-09-29 PROCEDURE — 80053 COMPREHEN METABOLIC PANEL: CPT | Performed by: INTERNAL MEDICINE

## 2024-09-29 PROCEDURE — 92610 EVALUATE SWALLOWING FUNCTION: CPT

## 2024-09-29 PROCEDURE — 25810000003 LACTATED RINGERS SOLUTION: Performed by: INTERNAL MEDICINE

## 2024-09-29 PROCEDURE — 25010000002 METHYLPREDNISOLONE PER 40 MG: Performed by: INTERNAL MEDICINE

## 2024-09-29 PROCEDURE — 85025 COMPLETE CBC W/AUTO DIFF WBC: CPT | Performed by: INTERNAL MEDICINE

## 2024-09-29 PROCEDURE — 83605 ASSAY OF LACTIC ACID: CPT | Performed by: EMERGENCY MEDICINE

## 2024-09-29 PROCEDURE — 99233 SBSQ HOSP IP/OBS HIGH 50: CPT | Performed by: INTERNAL MEDICINE

## 2024-09-29 PROCEDURE — 97530 THERAPEUTIC ACTIVITIES: CPT

## 2024-09-29 PROCEDURE — 97161 PT EVAL LOW COMPLEX 20 MIN: CPT

## 2024-09-29 PROCEDURE — 80202 ASSAY OF VANCOMYCIN: CPT

## 2024-09-29 PROCEDURE — 25010000002 METHYLPREDNISOLONE PER 125 MG: Performed by: PHYSICIAN ASSISTANT

## 2024-09-29 PROCEDURE — 94664 DEMO&/EVAL PT USE INHALER: CPT

## 2024-09-29 PROCEDURE — 82550 ASSAY OF CK (CPK): CPT | Performed by: INTERNAL MEDICINE

## 2024-09-29 PROCEDURE — 25010000002 CEFEPIME PER 500 MG: Performed by: PHYSICIAN ASSISTANT

## 2024-09-29 PROCEDURE — 87899 AGENT NOS ASSAY W/OPTIC: CPT | Performed by: PHYSICIAN ASSISTANT

## 2024-09-29 PROCEDURE — 94761 N-INVAS EAR/PLS OXIMETRY MLT: CPT

## 2024-09-29 PROCEDURE — 25810000003 LACTATED RINGERS PER 1000 ML: Performed by: PHYSICIAN ASSISTANT

## 2024-09-29 RX ORDER — SODIUM CHLORIDE 0.9 % (FLUSH) 0.9 %
10 SYRINGE (ML) INJECTION AS NEEDED
Status: DISCONTINUED | OUTPATIENT
Start: 2024-09-29 | End: 2024-10-02 | Stop reason: HOSPADM

## 2024-09-29 RX ORDER — BUSPIRONE HYDROCHLORIDE 5 MG/1
5 TABLET ORAL 2 TIMES DAILY
COMMUNITY
End: 2024-10-02 | Stop reason: HOSPADM

## 2024-09-29 RX ORDER — ACETAMINOPHEN 650 MG/1
650 SUPPOSITORY RECTAL EVERY 4 HOURS PRN
Status: DISCONTINUED | OUTPATIENT
Start: 2024-09-29 | End: 2024-10-02 | Stop reason: HOSPADM

## 2024-09-29 RX ORDER — ACETAMINOPHEN 160 MG/5ML
650 SOLUTION ORAL EVERY 4 HOURS PRN
Status: DISCONTINUED | OUTPATIENT
Start: 2024-09-29 | End: 2024-10-02 | Stop reason: HOSPADM

## 2024-09-29 RX ORDER — VANCOMYCIN HYDROCHLORIDE 125 MG/1
125 CAPSULE ORAL DAILY
Status: DISCONTINUED | OUTPATIENT
Start: 2024-09-29 | End: 2024-10-02 | Stop reason: HOSPADM

## 2024-09-29 RX ORDER — BUDESONIDE AND FORMOTEROL FUMARATE DIHYDRATE 160; 4.5 UG/1; UG/1
2 AEROSOL RESPIRATORY (INHALATION)
Status: DISCONTINUED | OUTPATIENT
Start: 2024-09-29 | End: 2024-10-02 | Stop reason: HOSPADM

## 2024-09-29 RX ORDER — SODIUM CHLORIDE 0.9 % (FLUSH) 0.9 %
10 SYRINGE (ML) INJECTION EVERY 12 HOURS SCHEDULED
Status: DISCONTINUED | OUTPATIENT
Start: 2024-09-29 | End: 2024-10-02 | Stop reason: HOSPADM

## 2024-09-29 RX ORDER — METHYLPREDNISOLONE SODIUM SUCCINATE 40 MG/ML
40 INJECTION, POWDER, LYOPHILIZED, FOR SOLUTION INTRAMUSCULAR; INTRAVENOUS EVERY 12 HOURS
Status: DISCONTINUED | OUTPATIENT
Start: 2024-09-29 | End: 2024-09-30

## 2024-09-29 RX ORDER — ONDANSETRON 2 MG/ML
4 INJECTION INTRAMUSCULAR; INTRAVENOUS EVERY 6 HOURS PRN
Status: DISCONTINUED | OUTPATIENT
Start: 2024-09-29 | End: 2024-10-02 | Stop reason: HOSPADM

## 2024-09-29 RX ORDER — OXYBUTYNIN CHLORIDE 10 MG/1
10 TABLET, EXTENDED RELEASE ORAL DAILY
Status: DISCONTINUED | OUTPATIENT
Start: 2024-09-29 | End: 2024-10-02 | Stop reason: HOSPADM

## 2024-09-29 RX ORDER — LOPERAMIDE HCL 2 MG
2 CAPSULE ORAL EVERY 6 HOURS PRN
COMMUNITY
End: 2024-10-02 | Stop reason: HOSPADM

## 2024-09-29 RX ORDER — AMLODIPINE BESYLATE 2.5 MG/1
2.5 TABLET ORAL DAILY
Status: DISCONTINUED | OUTPATIENT
Start: 2024-09-29 | End: 2024-09-29

## 2024-09-29 RX ORDER — ATORVASTATIN CALCIUM 40 MG/1
80 TABLET, FILM COATED ORAL DAILY
Status: DISCONTINUED | OUTPATIENT
Start: 2024-09-29 | End: 2024-10-02 | Stop reason: HOSPADM

## 2024-09-29 RX ORDER — SODIUM CHLORIDE 9 MG/ML
40 INJECTION, SOLUTION INTRAVENOUS AS NEEDED
Status: DISCONTINUED | OUTPATIENT
Start: 2024-09-29 | End: 2024-10-02 | Stop reason: HOSPADM

## 2024-09-29 RX ORDER — HYDROCODONE BITARTRATE AND ACETAMINOPHEN 10; 325 MG/1; MG/1
1 TABLET ORAL EVERY 12 HOURS PRN
Status: DISCONTINUED | OUTPATIENT
Start: 2024-09-29 | End: 2024-10-02 | Stop reason: HOSPADM

## 2024-09-29 RX ORDER — GABAPENTIN 300 MG/1
300 CAPSULE ORAL EVERY 12 HOURS SCHEDULED
Status: DISCONTINUED | OUTPATIENT
Start: 2024-09-29 | End: 2024-10-02 | Stop reason: HOSPADM

## 2024-09-29 RX ORDER — ACETAMINOPHEN 325 MG/1
650 TABLET ORAL EVERY 4 HOURS PRN
Status: DISCONTINUED | OUTPATIENT
Start: 2024-09-29 | End: 2024-10-02 | Stop reason: HOSPADM

## 2024-09-29 RX ADMIN — ATORVASTATIN CALCIUM 80 MG: 40 TABLET, FILM COATED ORAL at 08:43

## 2024-09-29 RX ADMIN — IPRATROPIUM BROMIDE AND ALBUTEROL SULFATE 3 ML: 2.5; .5 SOLUTION RESPIRATORY (INHALATION) at 07:45

## 2024-09-29 RX ADMIN — GABAPENTIN 300 MG: 300 CAPSULE ORAL at 22:05

## 2024-09-29 RX ADMIN — SODIUM CHLORIDE, POTASSIUM CHLORIDE, SODIUM LACTATE AND CALCIUM CHLORIDE 75 ML/HR: 600; 310; 30; 20 INJECTION, SOLUTION INTRAVENOUS at 12:06

## 2024-09-29 RX ADMIN — HYDROCODONE BITARTRATE AND ACETAMINOPHEN 1 TABLET: 10; 325 TABLET ORAL at 11:56

## 2024-09-29 RX ADMIN — CEFEPIME 2000 MG: 2 INJECTION, POWDER, FOR SOLUTION INTRAVENOUS at 01:27

## 2024-09-29 RX ADMIN — OXYBUTYNIN CHLORIDE 10 MG: 10 TABLET, EXTENDED RELEASE ORAL at 08:43

## 2024-09-29 RX ADMIN — BUDESONIDE AND FORMOTEROL FUMARATE DIHYDRATE 2 PUFF: 160; 4.5 AEROSOL RESPIRATORY (INHALATION) at 07:45

## 2024-09-29 RX ADMIN — IPRATROPIUM BROMIDE AND ALBUTEROL SULFATE 3 ML: 2.5; .5 SOLUTION RESPIRATORY (INHALATION) at 13:24

## 2024-09-29 RX ADMIN — Medication 1 CAPSULE: at 08:43

## 2024-09-29 RX ADMIN — VANCOMYCIN HYDROCHLORIDE 125 MG: 125 CAPSULE ORAL at 18:01

## 2024-09-29 RX ADMIN — CEFEPIME 2000 MG: 2 INJECTION, POWDER, FOR SOLUTION INTRAVENOUS at 08:41

## 2024-09-29 RX ADMIN — Medication 10 ML: at 22:05

## 2024-09-29 RX ADMIN — SODIUM CHLORIDE, POTASSIUM CHLORIDE, SODIUM LACTATE AND CALCIUM CHLORIDE 75 ML/HR: 600; 310; 30; 20 INJECTION, SOLUTION INTRAVENOUS at 05:41

## 2024-09-29 RX ADMIN — APIXABAN 5 MG: 5 TABLET, FILM COATED ORAL at 08:44

## 2024-09-29 RX ADMIN — METHYLPREDNISOLONE SODIUM SUCCINATE 60 MG: 125 INJECTION, POWDER, FOR SOLUTION INTRAMUSCULAR; INTRAVENOUS at 05:59

## 2024-09-29 RX ADMIN — METHYLPREDNISOLONE SODIUM SUCCINATE 40 MG: 40 INJECTION, POWDER, FOR SOLUTION INTRAMUSCULAR; INTRAVENOUS at 18:01

## 2024-09-29 RX ADMIN — APIXABAN 5 MG: 5 TABLET, FILM COATED ORAL at 22:05

## 2024-09-29 RX ADMIN — SODIUM CHLORIDE, POTASSIUM CHLORIDE, SODIUM LACTATE AND CALCIUM CHLORIDE 1000 ML: 600; 310; 30; 20 INJECTION, SOLUTION INTRAVENOUS at 08:41

## 2024-09-29 RX ADMIN — TIOTROPIUM BROMIDE INHALATION SPRAY 2 PUFF: 3.12 SPRAY, METERED RESPIRATORY (INHALATION) at 07:50

## 2024-09-29 RX ADMIN — BUDESONIDE AND FORMOTEROL FUMARATE DIHYDRATE 2 PUFF: 160; 4.5 AEROSOL RESPIRATORY (INHALATION) at 21:18

## 2024-09-29 RX ADMIN — Medication 5 MG: at 22:08

## 2024-09-29 RX ADMIN — GABAPENTIN 300 MG: 300 CAPSULE ORAL at 08:43

## 2024-09-29 RX ADMIN — CEFEPIME 2000 MG: 2 INJECTION, POWDER, FOR SOLUTION INTRAVENOUS at 22:05

## 2024-09-29 NOTE — PAYOR COMM NOTE
"Leatha Engel (74 y.o. Female)       Date of Birth   1950    Social Security Number       Address   [address] [address]    Home Phone   [phone]    MRN   [id number]       Church   None    Marital Status                               Admission Date   9/28/24    Admission Type   Emergency    Admitting Provider   Emilie Bautista MD    Attending Provider   Emilie Bautista MD    Department, Room/Bed   [facility] 5G, S561/1       Discharge Date       Discharge Disposition       Discharge Destination                                 Attending Provider: Emilie Bautista MD    Allergies: Penicillins, Sulfa Antibiotics    Isolation: Spore   Infection: C.difficile (rule out) (09/28/24)   Code Status: CPR    Ht: 170.2 cm (67\")   Wt: 71.2 kg (157 lb)    Admission Cmt: None   Principal Problem: Sepsis due to pneumonia [J18.9,A41.9]                   Active Insurance as of 9/28/2024       Primary Coverage       Payor Plan Insurance Group Employer/Plan Group    [facility] MEDICARE REPLACEMENT WELLCARE MED ADV PPO        Payor Plan Address Payor Plan Phone Number Payor Plan Fax Number Effective Dates    [address] [address]   3/1/2024 - None Entered    [facility] [phone]         Subscriber Name Subscriber Birth Date Member ID       LEATHA ENGEL 1950 [id number]                     Emergency Contacts        (Rel.) Home Phone Work Phone Mobile Phone    Niharika Engel (Grandchild) [phone] -- [phone]    LUDWIN ARRIOLA (Neighbor) -- -- [phone]              [place]: [facility] [id number] Tax ID [id number]  Insurance Information                  WELLCARE [facility] MEDICARE REPLACEMENT/WELLCARE MED ADV PPO Phone: --    Subscriber: Leatha Engel MARCO Subscriber#: [id number]    Group#: -- Precert#: --             History & Physical        Vianca Beal DO at 09/28/24 2032              [facility] Medicine Services  HISTORY " "AND PHYSICAL    Patient Name: Leatha Wall  : 1950  MRN: [id number]  Primary Care Physician: Niharika Fermin MD  Date of admission: 2024    Subjective  Subjective     Chief Complaint:  hypoxia    HPI:  Leatha Wall is a 74 y.o. female with a past medical history significant for chronic respiratory failure with COPD and ongoing tobacco abuse on 3.5 L oxygen chronically, prediabetes, CKD IV, hypertension, HLD, MS on Prednisone, hepatitis C, iron deficiency anemia, gout, chronic pain, and debility. Also chronically anticoagulated on Eliquis for unclear reason ? Stroke.  There is no mention of anticoagulation on her recent visit at [facility] where she was treated a few weeks ago.      Patient was recently admitted to [facility] 24 to 24 with sepsis 2/2 pneumonia. She was then discharged to [facility] on Omnicef.   Now here with acute hypoxia. HPI limited secondary to patient disposition. Staff report patient became acutely hypoxic on baseline oxygen requiring NRB per EMS. Also apparently has been having diarrhea with a known history of C.diff.   Upon arrival, patient is having auditory and visual hallucinations. Per family and staff via telephone, patient is normally Aox4. Will admit for further evaluation and treatment.  Currently there are no reports of fever, cough, congestion, or chest pain. No headache or focal weakness/ paresthesias. No falls or syncope. No reported dysuria or flank pain. Will admit to inpatient.    Stat ammonia ordered but not yet drawn  Also looks like her WBC at [facility] was around 26-30 while being treated with iv solumedrol        Review of Systems   Unable to perform ROS: Acuity of condition            Personal History     Past Medical History:   Diagnosis Date    Aneurysm of aorta     Anxiety and depression     Arthritis     Back disorder     \"PROTRUDING DISC\"    Bilateral cataracts     Chronic respiratory failure with hypoxia     Cognitive communication " deficit     COPD (chronic obstructive pulmonary disease)     Dysphagia, oropharyngeal phase     WOLF (generalized anxiety disorder)     Heavy tobacco smoker     Hepatitis C     Hyperlipidemia     Hypertension     Idiopathic gout     Idiopathic gout, unspecified site     MS (multiple sclerosis)     Multiple sclerosis     Pneumonia     Sepsis, unspecified organism     Stroke     Unspecified sequelae of cerebral infarction     Urinary tract infection              Past Surgical History:   Procedure Laterality Date    CHOLECYSTECTOMY      HYSTERECTOMY      SMALL INTESTINE SURGERY      As a child    TOE SURGERY Left     Great    TONSILLECTOMY      and Adnoids    TUBAL ABDOMINAL LIGATION         Family History: family history includes Coronary artery disease in her mother; Diabetes in her brother and father; Heart attack in her mother; Heart disease in her mother; Hypertension in her brother; Lung cancer in her father.     Social History:  reports that she has been smoking cigarettes. She has a 40 pack-year smoking history. She has been exposed to tobacco smoke. She has never used smokeless tobacco. She reports that she does not drink alcohol and does not use drugs.  Social History     Social History Narrative    Lives in [place].        Medications:  Fluticasone-Umeclidin-Vilant, HYDROcodone-acetaminophen, albuterol, albuterol sulfate HFA, amLODIPine, atorvastatin, azithromycin, benazepril, buPROPion SR, cephalexin, dilTIAZem CD, furosemide, gabapentin, ipratropium-albuterol, melatonin, nicotine, nystatin, oxybutynin XL, and predniSONE    Allergies   Allergen Reactions    Penicillins Other (See Comments)     WAS A YOUNG CHILD AND WAS NEVER TOLD WHAT HER REACTION WAS    Sulfa Antibiotics Other (See Comments)     WAS A YOUNG CHILD AND WAS NEVER TOLD WHAT HER REACTION WAS       Objective  Objective     Vital Signs:   Temp:  [99.2 °F (37.3 °C)] 99.2 °F (37.3 °C)  Heart Rate:  [75-86] 75  Resp:  [22] 22  BP:  ()/(53-82) 112/53  Flow (L/min):  [3-5] 5    Physical Exam   Constitutional: somnolent  Eyes: PERRLA, sclerae anicteric, no conjunctival injection  HENT: NCAT, mucous membranes moist  Neck: Supple, no thyromegaly, no lymphadenopathy, trachea midline  Respiratory: rhonchi, wheezing nonlabored respirations   Cardiovascular: RRR, no murmurs, rubs, or gallops, palpable pedal pulses bilaterally  Gastrointestinal: Positive bowel sounds, soft, nontender, nondistended  Musculoskeletal: No bilateral ankle edema, no clubbing or cyanosis to extremities  Psychiatric: Appropriate affect, cooperative  Neurologic: disoriented, not following commands  Skin: No rashes      Result Review:  I have personally reviewed the results from the time of this admission to 9/28/2024 22:39 EDT and agree with these findings:  [x]  Laboratory list / accordion  []  Microbiology  []  Radiology  []  EKG/Telemetry   []  Cardiology/Vascular   []  Pathology  [x]  Old records  []  Other:  Most notable findings include: febrile, hypoxic. Cr 2.22. Bun 45. Lactate 4.3. procal 61.99. WBC 26.02. CXR shows pneumonia.    LAB RESULTS:      Lab 09/28/24  1745   WBC 26.02*   HEMOGLOBIN 11.5*   HEMATOCRIT 36.1   PLATELETS 205   NEUTROS ABS 23.90*   IMMATURE GRANS (ABS) 0.26*   LYMPHS ABS 1.01   MONOS ABS 0.81   EOS ABS 0.00   MCV 94.3   PROCALCITONIN 61.99*   LACTATE 4.3*         Lab 09/28/24  1745   SODIUM 136   POTASSIUM 4.7   CHLORIDE 95*   CO2 26.0   ANION GAP 15.0   BUN 45*   CREATININE 2.22*   EGFR 23.0*   GLUCOSE 148*   CALCIUM 8.4*   MAGNESIUM 3.0*         Lab 09/28/24  1745   TOTAL PROTEIN 6.1   ALBUMIN 3.5   GLOBULIN 2.6   ALT (SGPT) 41*   AST (SGOT) 19   BILIRUBIN 0.6   ALK PHOS 168*         Lab 09/28/24  1745   PROBNP 464.7   HSTROP T 50*                 Lab 09/28/24  1800   PH, ARTERIAL 7.446   PCO2, ARTERIAL 37.4   PO2 ART 62.4*   FIO2 32   HCO3 ART 25.7   BASE EXCESS ART 1.7   CARBOXYHEMOGLOBIN 1.5     Brief Urine Lab Results  (Last result in  the past 365 days)        Color   Clarity   Blood   Leuk Est   Nitrite   Protein   CREAT   Urine HCG        09/28/24 1848 Dark Yellow   Clear   Negative   Trace   Negative   Trace                 Microbiology Results (last 10 days)       Procedure Component Value - Date/Time    Respiratory Panel PCR w/COVID-19(SARS-CoV-2) DANAE/OFELIA/PREET/PAD/COR/BELLA In-House, NP Swab in UTM/VTM, 2 HR TAT - Swab, Nasopharynx [[id number]]  (Normal) Collected: 09/28/24 1746    Lab Status: Final result Specimen: Swab from Nasopharynx Updated: 09/28/24 1847     ADENOVIRUS, PCR Not Detected     Coronavirus 229E Not Detected     Coronavirus HKU1 Not Detected     Coronavirus NL63 Not Detected     Coronavirus OC43 Not Detected     COVID19 Not Detected     Human Metapneumovirus Not Detected     Human Rhinovirus/Enterovirus Not Detected     Influenza A PCR Not Detected     Influenza B PCR Not Detected     Parainfluenza Virus 1 Not Detected     Parainfluenza Virus 2 Not Detected     Parainfluenza Virus 3 Not Detected     Parainfluenza Virus 4 Not Detected     RSV, PCR Not Detected     Bordetella pertussis pcr Not Detected     Bordetella parapertussis PCR Not Detected     Chlamydophila pneumoniae PCR Not Detected     Mycoplasma pneumo by PCR Not Detected    Narrative:      In the setting of a positive respiratory panel with a viral infection PLUS a negative procalcitonin without other underlying concern for bacterial infection, consider observing off antibiotics or discontinuation of antibiotics and continue supportive care. If the respiratory panel is positive for atypical bacterial infection (Bordetella pertussis, Chlamydophila pneumoniae, or Mycoplasma pneumoniae), consider antibiotic de-escalation to target atypical bacterial infection.            XR Chest 1 View    Result Date: 9/28/2024  XR CHEST 1 VW Date of Exam: 9/28/2024 5:45 PM EDT Indication: sob Comparison: Chest AP dated 9/21/2022 Findings: There is consolidation in the right lung base.  The left lung appears grossly clear. The cardiac mediastinal silhouettes appear normal. No effusion is seen.      Impression: Impression: Right basilar consolidation consistent with pneumonia versus atelectasis. A follow-up chest PA and lateral in 6 weeks is suggested to reevaluate. Electronically Signed: Escobar David MD  9/28/2024 6:02 PM EDT  Workstation ID: [id number]     Results for orders placed during the hospital encounter of 09/07/16    Adult transthoracic echo complete    Interpretation Summary  · Left ventricular function is normal. Estimated EF = 60%.  · Mild mitral valve regurgitation is present  · Mild tricuspid valve regurgitation is present.      Assessment & Plan  Assessment & Plan       Sepsis due to pneumonia    Multiple sclerosis    Lactic acidosis    Tobacco abuse    Restless legs syndrome (RLS)    History of stroke    COPD with acute exacerbation    CHRISTIANO (acute kidney injury)    Acute and chronic respiratory failure with hypoxia    Anxiety    Chronic kidney disease, stage 4 (severe)    Iron deficiency anemia    Hypertensive disorder    HLD (hyperlipidemia)    Diarrhea    Encephalopathy      Acute on chronic respiratory Failure  Acute Exacerbation of COPD  Sepsis due to Pneumonia  Lactic Acidosis  Encephalopathy  - currently stable and non toxic appearing  - immunocompromised on chronic prednisone  - hypoxic requiring 4.5L  - febrile 99.9  - hypotensive  - lactic acid 4.3, procal 62.00. WBC 26 (which was close to last admission at [facility])  - respiratory panel negative  - ABG stable 7.4/37.4/62.4  - CXR shows Right basilar consolidation consistent with pneumonia versus atelectasis   - obtain CT chest and CT head  - started on Cefepime and Vancomycin  - blood cultures, sputum culture, urine antigens, MRSA PCR  - scheduled duo nebs  - scheduled 60 mg solumedrol  - continue Trelegy  - incentive spirometry  - as needed pulmonary toilet  - PT/OT/SLP  - neuro checks  - close monitor on telemetry  - am  "labs    - check ammonia  -hi    CHRISTIANO on CKD IV  - Cr 2.22 eGFR 23  - obtain UA  - IVF  - avoid nephrotoxins  - strict I&O    Diarrhea  - history of C. Diff on long term ABX  - stool studies pending  - add probiotics    Chronic Anticoagulation ? History of stroke  - not on eliquis per DC summary at [facility]  - consult to pharmacy for med rec    MS  - records indicate patient is prescribed 9 mg Prednisone daily but \"takes however much she wants\"  - holding oral corticosteroids, continue with scheduled solumedrol  - follows with Dr. otero per neurology    HTN  HLD  - continue statin  - continue norvasc      DVT prophylaxis:  Eliquis    CODE STATUS:  full code  Level Of Support Discussed With: Patient  Code Status (Patient has no pulse and is not breathing): CPR (Attempt to Resuscitate)  Medical Interventions (Patient has pulse or is breathing): Full Support      Expected Discharge  TBD      This note has been completed as part of a split-shared workflow.     Signature: Electronically signed by Gunnar Napier PA-C, 09/28/24, 8:51 PM EDT    Note addended as above. See changes to exam and plan of care              Electronically signed by Vianca Beal DO at 09/28/24 2240       Current Facility-Administered Medications   Medication Dose Route Frequency Provider Last Rate Last Admin    acetaminophen (TYLENOL) tablet 650 mg  650 mg Oral Q4H PRN Gunnar Napier PA-C        Or    acetaminophen (TYLENOL) 160 MG/5ML oral solution 650 mg  650 mg Oral Q4H PRN Gunnar Napier PA-C        Or    acetaminophen (TYLENOL) suppository 650 mg  650 mg Rectal Q4H PRN Gunnar Napier PA-C        apixaban (ELIQUIS) tablet 5 mg  5 mg Oral BID Gunnar Napier PA-C   5 mg at 09/29/24 0844    atorvastatin (LIPITOR) tablet 80 mg  80 mg Oral Daily Gunnar Napier PA-C   80 mg at 09/29/24 0843    budesonide-formoterol (SYMBICORT) 160-4.5 MCG/ACT inhaler 2 puff  2 puff Inhalation BID - RT Gunnar Napier PA-C   2 puff " at 09/29/24 0745    And    tiotropium (SPIRIVA RESPIMAT) 2.5 mcg/act aerosol solution inhaler  2 puff Inhalation Daily - RT Gunnar Napier PA-C   2 puff at 09/29/24 0750    cefepime 2000 mg IVPB in 100 mL NS (MBP)  2,000 mg Intravenous Q12H Gunnar Napier PA-C   2,000 mg at 09/29/24 0841    gabapentin (NEURONTIN) capsule 300 mg  300 mg Oral Q12H Vianca Beal DO   300 mg at 09/29/24 0843    HYDROcodone-acetaminophen (NORCO)  MG per tablet 1 tablet  1 tablet Oral Q12H PRN Vianca Beal DO        ipratropium-albuterol (DUO-NEB) nebulizer solution 3 mL  3 mL Nebulization Q6H While Awake - RT Gunnar Napier PA-C   3 mL at 09/29/24 0745    lactated ringers infusion  75 mL/hr Intravenous Continuous Gunnar Napier PA-C 75 mL/hr at 09/29/24 0541 75 mL/hr at 09/29/24 0541    lactobacillus acidophilus (RISAQUAD) capsule 1 capsule  1 capsule Oral Daily Gunnar Napier PA-C   1 capsule at 09/29/24 0843    melatonin tablet 5 mg  5 mg Oral Nightly PRN Gunnar Napier PA-SAÚL        methylPREDNISolone sodium succinate (SOLU-Medrol) injection 40 mg  40 mg Intravenous Q12H Emilie Bautista MD        ondansetron (ZOFRAN) injection 4 mg  4 mg Intravenous Q6H PRN Gunnar Napier PA-SAÚL        oxybutynin XL (DITROPAN-XL) 24 hr tablet 10 mg  10 mg Oral Daily Gunnar Napier PA-C   10 mg at 09/29/24 0843    Pharmacy Consult - med rec request   Does not apply Continuous PRN Gunnar Napier PA-SAÚL        sodium chloride 0.9 % flush 10 mL  10 mL Intravenous Q12H Gunnar Napier PA-C        sodium chloride 0.9 % flush 10 mL  10 mL Intravenous PRN Gunnar Napier PA-C        sodium chloride 0.9 % infusion 40 mL  40 mL Intravenous PRN Gunnar Napier PA-C         Lab Results (last 24 hours)       Procedure Component Value Units Date/Time    STAT Lactic Acid, Reflex [[id number]]  (Normal) Collected: 09/29/24 0744    Specimen: Blood Updated: 09/29/24 0817     Lactate 2.0 mmol/L       Comment: Falsely depressed results may occur on samples drawn from patients receiving N-Acetylcysteine (NAC) or Metamizole.       MRSA Screen, PCR (Inpatient) - Swab, Nares [[id number]]  (Normal) Collected: 09/28/24 2251    Specimen: Swab from Nares Updated: 09/29/24 0757     MRSA PCR Negative    Narrative:      The negative predictive value of this diagnostic test is high and should only be used to consider de-escalating anti-MRSA therapy. A positive result may indicate colonization with MRSA and must be correlated clinically.  MRSA Negative    STAT Lactic Acid, Reflex [[id number]]  (Abnormal) Collected: 09/29/24 0451    Specimen: Blood Updated: 09/29/24 0549     Lactate 4.9 mmol/L      Comment: Falsely depressed results may occur on samples drawn from patients receiving N-Acetylcysteine (NAC) or Metamizole.       Vancomycin, Random [[id number]]  (Normal) Collected: 09/29/24 0452    Specimen: Blood Updated: 09/29/24 0533     Vancomycin Random 17.80 mcg/mL     Narrative:      Therapeutic Ranges for Vancomycin    Vancomycin Random   5.0-40.0 mcg/mL  Vancomycin Trough   5.0-20.0 mcg/mL  Vancomycin Peak     20.0-40.0 mcg/mL    Comprehensive Metabolic Panel [[id number]]  (Abnormal) Collected: 09/29/24 0452    Specimen: Blood Updated: 09/29/24 0531     Glucose 155 mg/dL      BUN 38 mg/dL      Creatinine 1.66 mg/dL      Sodium 138 mmol/L      Potassium 5.0 mmol/L      Comment: Slight hemolysis detected by analyzer. Result may be falsely elevated.        Chloride 103 mmol/L      CO2 18.0 mmol/L      Calcium 7.8 mg/dL      Total Protein 5.5 g/dL      Albumin 3.0 g/dL      ALT (SGPT) 33 U/L      AST (SGOT) 18 U/L      Alkaline Phosphatase 140 U/L      Total Bilirubin 0.5 mg/dL      Globulin 2.5 gm/dL      Comment: Calculated Result        A/G Ratio 1.2 g/dL      BUN/Creatinine Ratio 22.9     Anion Gap 17.0 mmol/L      eGFR 32.6 mL/min/1.73     Narrative:      GFR Normal >60  Chronic Kidney Disease <60  Kidney Failure <15    The  GFR formula is only valid for adults with stable renal function between ages 18 and 70.    CBC & Differential [[id number]]  (Abnormal) Collected: 09/29/24 0451    Specimen: Blood Updated: 09/29/24 0520    Narrative:      The following orders were created for panel order CBC & Differential.  Procedure                               Abnormality         Status                     ---------                               -----------         ------                     CBC Auto Differential[[id number]]        Abnormal            Final result                 Please view results for these tests on the individual orders.    CBC Auto Differential [[id number]]  (Abnormal) Collected: 09/29/24 0451    Specimen: Blood Updated: 09/29/24 0520     WBC 25.69 10*3/mm3      RBC 3.33 10*6/mm3      Hemoglobin 10.2 g/dL      Hematocrit 30.7 %      MCV 92.2 fL      MCH 30.6 pg      MCHC 33.2 g/dL      RDW 16.3 %      RDW-SD 55.1 fl      MPV 10.3 fL      Platelets 157 10*3/mm3      Neutrophil % 96.4 %      Lymphocyte % 1.8 %      Monocyte % 0.4 %      Eosinophil % 0.0 %      Basophil % 0.1 %      Immature Grans % 1.3 %      Neutrophils, Absolute 24.77 10*3/mm3      Lymphocytes, Absolute 0.45 10*3/mm3      Monocytes, Absolute 0.11 10*3/mm3      Eosinophils, Absolute 0.00 10*3/mm3      Basophils, Absolute 0.03 10*3/mm3      Immature Grans, Absolute 0.33 10*3/mm3      nRBC 0.0 /100 WBC     STAT Lactic Acid, Reflex [[id number]]  (Abnormal) Collected: 09/28/24 2251    Specimen: Blood Updated: 09/28/24 2320     Lactate 3.3 mmol/L      Comment: Falsely depressed results may occur on samples drawn from patients receiving N-Acetylcysteine (NAC) or Metamizole.       Ammonia [[id number]]  (Normal) Collected: 09/28/24 2251    Specimen: Blood Updated: 09/28/24 2318     Ammonia 22 umol/L     Urinalysis, Microscopic Only - Straight Cath [[id number]]  (Abnormal) Collected: 09/28/24 1848    Specimen: Urine from Straight Cath Updated: 09/28/24 1932     RBC, UA None  Seen /HPF      WBC, UA 0-2 /HPF      Comment: Urine culture not indicated.        Bacteria, UA 2+ /HPF      Squamous Epithelial Cells, UA 3-6 /HPF      Hyaline Casts, UA 0-6 /LPF      Methodology Manual Light Microscopy    Urinalysis With Culture If Indicated - Straight Cath [[id number]]  (Abnormal) Collected: 09/28/24 1848    Specimen: Urine from Straight Cath Updated: 09/28/24 1907     Color, UA Dark Yellow     Appearance, UA Clear     pH, UA <=5.0     Specific Gravity, UA 1.017     Glucose, UA Negative     Ketones, UA Trace     Bilirubin, UA Small (1+)     Blood, UA Negative     Protein, UA Trace     Leuk Esterase, UA Trace     Nitrite, UA Negative     Urobilinogen, UA 1.0 E.U./dL    Narrative:      In absence of clinical symptoms, the presence of pyuria, bacteria, and/or nitrites on the urinalysis result does not correlate with infection.    Respiratory Panel PCR w/COVID-19(SARS-CoV-2) DANAE/OFELIA/PREET/PAD/COR/BELLA In-House, NP Swab in UTM/VTM, 2 HR TAT - Swab, Nasopharynx [[id number]]  (Normal) Collected: 09/28/24 1746    Specimen: Swab from Nasopharynx Updated: 09/28/24 1847     ADENOVIRUS, PCR Not Detected     Coronavirus 229E Not Detected     Coronavirus HKU1 Not Detected     Coronavirus NL63 Not Detected     Coronavirus OC43 Not Detected     COVID19 Not Detected     Human Metapneumovirus Not Detected     Human Rhinovirus/Enterovirus Not Detected     Influenza A PCR Not Detected     Influenza B PCR Not Detected     Parainfluenza Virus 1 Not Detected     Parainfluenza Virus 2 Not Detected     Parainfluenza Virus 3 Not Detected     Parainfluenza Virus 4 Not Detected     RSV, PCR Not Detected     Bordetella pertussis pcr Not Detected     Bordetella parapertussis PCR Not Detected     Chlamydophila pneumoniae PCR Not Detected     Mycoplasma pneumo by PCR Not Detected    Narrative:      In the setting of a positive respiratory panel with a viral infection PLUS a negative procalcitonin without other underlying concern for  "bacterial infection, consider observing off antibiotics or discontinuation of antibiotics and continue supportive care. If the respiratory panel is positive for atypical bacterial infection (Bordetella pertussis, Chlamydophila pneumoniae, or Mycoplasma pneumoniae), consider antibiotic de-escalation to target atypical bacterial infection.    Blood Culture - Blood, Arm, Right [[id number]] Collected: 09/28/24 1750    Specimen: Blood from Arm, Right Updated: 09/28/24 1826    Blood Culture - Blood, Arm, Left [[id number]] Collected: 09/28/24 1745    Specimen: Blood from Arm, Left Updated: 09/28/24 1826    Procalcitonin [[id number]]  (Abnormal) Collected: 09/28/24 1745    Specimen: Blood Updated: 09/28/24 1824     Procalcitonin 61.99 ng/mL     Narrative:      As a Marker for Sepsis (Non-Neonates):    1. <0.5 ng/mL represents a low risk of severe sepsis and/or septic shock.  2. >2 ng/mL represents a high risk of severe sepsis and/or septic shock.    As a Marker for Lower Respiratory Tract Infections that require antibiotic therapy:    PCT on Admission    Antibiotic Therapy       6-12 Hrs later    >0.5                Strongly Recommended  >0.25 - <0.5        Recommended   0.1 - 0.25          Discouraged              Remeasure/reassess PCT  <0.1                Strongly Discouraged     Remeasure/reassess PCT    As 28 day mortality risk marker: \"Change in Procalcitonin Result\" (>80% or <=80%) if Day 0 (or Day 1) and Day 4 values are available. Refer to http://www.[facility]-pct-calculator.com    Change in PCT <=80%  A decrease of PCT levels below or equal to 80% defines a positive change in PCT test result representing a higher risk for 28-day all-cause mortality of patients diagnosed with severe sepsis for septic shock.    Change in PCT >80%  A decrease of PCT levels of more than 80% defines a negative change in PCT result representing a lower risk for 28-day all-cause mortality of patients diagnosed with severe sepsis or septic " shock.       Comprehensive Metabolic Panel [[id number]]  (Abnormal) Collected: 09/28/24 1745    Specimen: Blood Updated: 09/28/24 1820     Glucose 148 mg/dL      BUN 45 mg/dL      Creatinine 2.22 mg/dL      Sodium 136 mmol/L      Potassium 4.7 mmol/L      Chloride 95 mmol/L      CO2 26.0 mmol/L      Calcium 8.4 mg/dL      Total Protein 6.1 g/dL      Albumin 3.5 g/dL      ALT (SGPT) 41 U/L      AST (SGOT) 19 U/L      Alkaline Phosphatase 168 U/L      Total Bilirubin 0.6 mg/dL      Globulin 2.6 gm/dL      Comment: Calculated Result        A/G Ratio 1.3 g/dL      BUN/Creatinine Ratio 20.3     Anion Gap 15.0 mmol/L      eGFR 23.0 mL/min/1.73     Narrative:      GFR Normal >60  Chronic Kidney Disease <60  Kidney Failure <15    The GFR formula is only valid for adults with stable renal function between ages 18 and 70.    BNP [[id number]]  (Normal) Collected: 09/28/24 1745    Specimen: Blood Updated: 09/28/24 1820     proBNP 464.7 pg/mL     Narrative:      This assay is used as an aid in the diagnosis of individuals suspected of having heart failure. It can be used as an aid in the diagnosis of acute decompensated heart failure (ADHF) in patients presenting with signs and symptoms of ADHF to the emergency department (ED). In addition, NT-proBNP of <300 pg/mL indicates ADHF is not likely.    Age Range Result Interpretation  NT-proBNP Concentration (pg/mL:      <50             Positive            >450                   Gray                 300-450                    Negative             <300    50-75           Positive            >900                  Gray                300-900                  Negative            <300      >75             Positive            >1800                  Gray                300-1800                  Negative            <300    Single High Sensitivity Troponin T [[id number]]  (Abnormal) Collected: 09/28/24 1745    Specimen: Blood Updated: 09/28/24 1820     HS Troponin T 50 ng/L     Narrative:       High Sensitive Troponin T Reference Range:  <14.0 ng/L- Negative Female for AMI  <22.0 ng/L- Negative Male for AMI  >=14 - Abnormal Female indicating possible myocardial injury.  >=22 - Abnormal Male indicating possible myocardial injury.   Clinicians would have to utilize clinical acumen, EKG, Troponin, and serial changes to determine if it is an Acute Myocardial Infarction or myocardial injury due to an underlying chronic condition.         Magnesium [[id number]]  (Abnormal) Collected: 09/28/24 1745    Specimen: Blood Updated: 09/28/24 1820     Magnesium 3.0 mg/dL     Lactic Acid, Plasma [[id number]]  (Abnormal) Collected: 09/28/24 1745    Specimen: Blood Updated: 09/28/24 1818     Lactate 4.3 mmol/L      Comment: Falsely depressed results may occur on samples drawn from patients receiving N-Acetylcysteine (NAC) or Metamizole.       Blood Gas, Arterial With Co-Ox [[id number]]  (Abnormal) Collected: 09/28/24 1800    Specimen: Arterial Blood Updated: 09/28/24 1805     Site Right Radial     Stone's Test N/A     pH, Arterial 7.446 pH units      pCO2, Arterial 37.4 mm Hg      pO2, Arterial 62.4 mm Hg      Comment: 84 Value below reference range        HCO3, Arterial 25.7 mmol/L      Base Excess, Arterial 1.7 mmol/L      Hemoglobin, Blood Gas 10.9 g/dL      Comment: 84 Value below reference range        Hematocrit, Blood Gas 33.3 %      Oxyhemoglobin 91.4 %      Comment: 84 Value below reference range        Methemoglobin 0.10 %      Carboxyhemoglobin 1.5 %      CO2 Content 26.9 mmol/L      Temperature 37.0     Barometric Pressure for Blood Gas --     Comment: N/A        Modality Nasal Cannula     FIO2 32 %      Ventilator Mode --     Comment: Meter: [id number]     :  [id number]        pH, Temp Corrected 7.446 pH Units      pCO2, Temperature Corrected 37.4 mm Hg      pO2, Temperature Corrected 62.4 mm Hg     CBC & Differential [[id number]]  (Abnormal) Collected: 09/28/24 1745    Specimen: Blood Updated: 09/28/24  1755    Narrative:      The following orders were created for panel order CBC & Differential.  Procedure                               Abnormality         Status                     ---------                               -----------         ------                     CBC Auto Differential[[id number]]        Abnormal            Final result                 Please view results for these tests on the individual orders.    CBC Auto Differential [[id number]]  (Abnormal) Collected: 09/28/24 1745    Specimen: Blood Updated: 09/28/24 1755     WBC 26.02 10*3/mm3      RBC 3.83 10*6/mm3      Hemoglobin 11.5 g/dL      Hematocrit 36.1 %      MCV 94.3 fL      MCH 30.0 pg      MCHC 31.9 g/dL      RDW 16.6 %      RDW-SD 56.3 fl      MPV 10.0 fL      Platelets 205 10*3/mm3      Neutrophil % 91.8 %      Lymphocyte % 3.9 %      Monocyte % 3.1 %      Eosinophil % 0.0 %      Basophil % 0.2 %      Immature Grans % 1.0 %      Neutrophils, Absolute 23.90 10*3/mm3      Lymphocytes, Absolute 1.01 10*3/mm3      Monocytes, Absolute 0.81 10*3/mm3      Eosinophils, Absolute 0.00 10*3/mm3      Basophils, Absolute 0.04 10*3/mm3      Immature Grans, Absolute 0.26 10*3/mm3      nRBC 0.2 /100 WBC           Imaging Results (Last 24 Hours)       Procedure Component Value Units Date/Time    CT Head Without Contrast [[id number]] Collected: 09/28/24 2303     Updated: 09/28/24 2317    Narrative:      CT HEAD WO CONTRAST    Date of Exam: 9/28/2024 9:40 PM EDT    Indication: hallucinations.    Comparison: Brain MRI 5/23/2022.    Technique: Axial CT images were obtained of the head without contrast administration.  Automated exposure control and iterative construction methods were used.      Findings:  Superficial soft tissues appear within normal limits. The calvarium is intact.  Paranasal sinuses and mastoid air cells appear well aerated.  Orbits are unremarkable.  There is no acute intracranial hemorrhage.  No mass effect or midline shift.  No   abnormal  extra-axial collections.  Gray-white differentiation is within normal limits. There is moderate patchy periventricular and juxtacortical white matter hypoattenuation. Allowing for differences in technique, there are no definite new   hypoattenuating lesions in the brain. Ventricular size and configuration is normal for age.      Impression:      Impression:  1.No acute intracranial abnormality.  2.Moderate chronic small vessel ischemic change.  3.Stable moderate chronic white matter disease. Allowing for differences in technique with prior brain MRI, there are no definite new hypoattenuating lesions in the brain.        Electronically Signed: Wilman Vivar MD    9/28/2024 11:14 PM EDT    Workstation ID: [id number]    CT Chest Without Contrast Diagnostic [[id number]] Collected: 09/28/24 2305     Updated: 09/28/24 2310    Narrative:      CT CHEST WO CONTRAST DIAGNOSTIC    Date of Exam: 9/28/2024 9:40 PM EDT    Indication: hypoxia.    Comparison: Chest radiograph 9/28/2024 and 9/21/2022.    Technique: Axial CT images were obtained of the chest without contrast administration.  Reconstructed coronal and sagittal images were also obtained. Automated exposure control and iterative construction methods were used.      Findings:  Thyroid, trachea and esophagus appear within normal limits. There is severe coronary artery calcification. Diminished attenuation of the blood pool would suggest anemia. There is moderate aortic and aortic branch vessel atherosclerosis. No aneurysm. No   pericardial effusion or mediastinal lymphadenopathy.    There is no pneumothorax or pleural effusion. There is segmentally obstructive endobronchial debris within both lower lobes fairly extensively with associated atelectasis. Superimposed pneumonia would be difficult to exclude based on this appearance.   There is minor atelectasis elsewhere in the lung bases. Trachea is patent. There are no discrete measurable concerning lung nodules. There is  mild subpleural interstitial thickening in the lung apices.    There are no acute findings in the superficial soft tissues. No acute findings in the limited images of the upper abdomen. There is a low-density right adrenal nodule measuring 18 mm compatible with adenoma. There is no acute osseous abnormality or   destructive bone lesion. There are mild thoracic degenerative changes.      Impression:      Impression:  1.There is extensive segmentally obstructive endobronchial debris in both lower lobes with associated atelectasis. Superimposed pneumonia would be difficult to exclude. This could be from chronic aspiration or decreased mucus clearance.  2.Severe coronary artery disease.  3.Diminished attenuation of the blood pool suggesting anemia.        Electronically Signed: Wilman Vivar MD    9/28/2024 11:07 PM EDT    Workstation ID: [id number]    XR Chest 1 View [[id number]] Collected: 09/28/24 1801     Updated: 09/28/24 1806    Narrative:      XR CHEST 1 VW    Date of Exam: 9/28/2024 5:45 PM EDT    Indication: sob    Comparison: Chest AP dated 9/21/2022   Findings:  There is consolidation in the right lung base. The left lung appears grossly clear. The cardiac mediastinal silhouettes appear normal. No effusion is seen.         Impression:      Impression:  Right basilar consolidation consistent with pneumonia versus atelectasis. A follow-up chest PA and lateral in 6 weeks is suggested to reevaluate.      Electronically Signed: Escobar David MD    9/28/2024 6:02 PM EDT    Workstation ID: [id number]          Orders (last 24 hrs)        Start     Ordered    09/30/24 2303  Auto Discontinue GI Panel in 48 Hours if not Collected  ONCE GI PANEL         09/28/24 2302    09/30/24 1946  Auto Discontinue in 48 Hours if not Collected  ONCE CDIFF         09/28/24 1945    09/30/24 0600  Vancomycin, Random  Morning Draw,   Status:  Canceled         09/29/24 0738    09/29/24 1800  methylPREDNISolone sodium succinate (SOLU-Medrol)  "injection 40 mg  Every 12 Hours         09/29/24 0738    09/29/24 1100  vancomycin 750 mg in sodium chloride 0.9 % 250 mL IVPB-VTB  Every 24 Hours,   Status:  Discontinued         09/29/24 0738    09/29/24 0955  Inpatient Admission  Once         09/29/24 0954    09/29/24 0930  budesonide-formoterol (SYMBICORT) 160-4.5 MCG/ACT inhaler 2 puff  2 Times Daily - RT        Placed in \"And\" Linked Group    09/29/24 0439    09/29/24 0930  tiotropium (SPIRIVA RESPIMAT) 2.5 mcg/act aerosol solution inhaler  Daily - RT        Placed in \"And\" Linked Group    09/29/24 0439 09/29/24 0900  amLODIPine (NORVASC) tablet 2.5 mg  Daily,   Status:  Discontinued         09/29/24 0439 09/29/24 0900  atorvastatin (LIPITOR) tablet 80 mg  Daily         09/29/24 0439 09/29/24 0900  oxybutynin XL (DITROPAN-XL) 24 hr tablet 10 mg  Daily         09/29/24 0439 09/29/24 0900  gabapentin (NEURONTIN) capsule 300 mg  Every 12 Hours Scheduled         09/29/24 0439 09/29/24 0900  lactobacillus acidophilus (RISAQUAD) capsule 1 capsule  Daily         09/28/24 2011 09/29/24 0900  sodium chloride 0.9 % flush 10 mL  Every 12 Hours Scheduled         09/29/24 0439 09/29/24 0900  cefepime 2000 mg IVPB in 100 mL NS (MBP)  Every 12 Hours         09/28/24 2028 09/29/24 0900  vancomycin (dosing per levels)  Daily,   Status:  Discontinued         09/28/24 2244 09/29/24 0830  lactated ringers bolus 1,000 mL  Once         09/29/24 0735    09/29/24 0800  Vital Signs  Every 4 Hours       09/29/24 0439 09/29/24 0800  Oral Care  2 Times Daily       09/29/24 0439 09/29/24 0751  STAT Lactic Acid, Reflex  PROCEDURE ONCE         09/29/24 0549    09/29/24 0600  Comprehensive Metabolic Panel  Morning Draw,   Status:  Canceled         09/29/24 0439 09/29/24 0600  CBC Auto Differential  Morning Draw,   Status:  Canceled         09/29/24 0439    09/29/24 0600  CBC & Differential  Morning Draw         09/28/24 8040    09/29/24 0600  Comprehensive " "Metabolic Panel  Morning Draw         09/28/24 2240    09/29/24 0600  CBC Auto Differential  PROCEDURE ONCE         09/28/24 2240    09/29/24 0600  Vancomycin, Random  Morning Draw         09/28/24 2244 09/29/24 0530  apixaban (ELIQUIS) tablet 5 mg  2 Times Daily         09/29/24 0439    09/29/24 0510  Inpatient Case Management  Consult  Once        Provider:  (Not yet assigned)    09/29/24 0510    09/29/24 0510  Wound Ostomy Eval & Treat  Once         09/29/24 0510    09/29/24 0500  Strict Intake & Output  Every Hour       09/29/24 0439    09/29/24 0440  Weigh Patient  Once         09/29/24 0439    09/29/24 0440  Insert Peripheral IV  Once         09/29/24 0439    09/29/24 0440  Saline Lock & Maintain IV Access  Continuous         09/29/24 0439    09/29/24 0440  Place Sequential Compression Device  Once         09/29/24 0439    09/29/24 0440  Maintain Sequential Compression Device  Continuous         09/29/24 0439    09/29/24 0440  Diet: Regular/House; Fluid Consistency: Thin (IDDSI 0)  Diet Effective Now         09/29/24 0439    09/29/24 0440  OT Consult: Eval & Treat ADL Performance Below Baseline  Once         09/29/24 0439    09/29/24 0440  PT Consult: Eval & Treat Functional Mobility Below Baseline  Once         09/29/24 0439    09/29/24 0440  SLP Consult: Eval & Treat Swallow Disorder  Once         09/29/24 0439    09/29/24 0440  Bowel Regimen Not Indicated  Once         09/29/24 0439    09/29/24 0439  HYDROcodone-acetaminophen (NORCO)  MG per tablet 1 tablet  Every 12 Hours PRN         09/29/24 0439    09/29/24 0439  Intake & Output  Every Shift       09/29/24 0439    09/29/24 0439  sodium chloride 0.9 % flush 10 mL  As Needed         09/29/24 0439    09/29/24 0439  sodium chloride 0.9 % infusion 40 mL  As Needed         09/29/24 0439    09/29/24 0439  acetaminophen (TYLENOL) tablet 650 mg  Every 4 Hours PRN        Placed in \"Or\" Linked Group    09/29/24 0439    09/29/24 0439  " "acetaminophen (TYLENOL) 160 MG/5ML oral solution 650 mg  Every 4 Hours PRN        Placed in \"Or\" Linked Group    09/29/24 0439    09/29/24 0439  acetaminophen (TYLENOL) suppository 650 mg  Every 4 Hours PRN        Placed in \"Or\" Linked Group    09/29/24 0439    09/29/24 0439  melatonin tablet 5 mg  Nightly PRN         09/29/24 0439    09/29/24 0439  ondansetron (ZOFRAN) injection 4 mg  Every 6 Hours PRN         09/29/24 0439    09/29/24 0400  methylPREDNISolone sodium succinate (SOLU-Medrol) injection 60 mg  Every 8 Hours,   Status:  Discontinued         09/28/24 2010 09/29/24 0151  STAT Lactic Acid, Reflex  PROCEDURE ONCE         09/28/24 2320 09/28/24 2303  Gastrointestinal Panel, PCR - Stool, Per Rectum  Once         09/28/24 2302 09/28/24 2200  cefepime 2000 mg IVPB in 100 mL NS (MBP)  Once         09/28/24 2028 09/28/24 2115  Ammonia  STAT         09/28/24 2114 09/28/24 2104  Pharmacy Consult - med rec request  Continuous PRN         09/28/24 2104 09/28/24 2045  STAT Lactic Acid, Reflex  PROCEDURE ONCE         09/28/24 1817 09/28/24 2045  lactated ringers infusion  Continuous         09/28/24 2029 09/28/24 2032  Respiratory Culture - Sputum, Cough  Once         09/28/24 2031 09/28/24 2032  MRSA Screen, PCR (Inpatient) - Swab, Nares  Once         09/28/24 2031 09/28/24 2032  S. Pneumo Ag Urine or CSF - Urine, Urine, Clean Catch  Once         09/28/24 2031 09/28/24 2031  CT Head Without Contrast  1 Time Imaging         09/28/24 2030 09/28/24 2030  vancomycin IVPB 1500 mg in 0.9% NaCl (Premix) 500 mL  Once         09/28/24 2024 09/28/24 2030  CT Chest Without Contrast Diagnostic  1 Time Imaging         09/28/24 2030 09/28/24 2025  ipratropium-albuterol (DUO-NEB) nebulizer solution 3 mL  Every 6 Hours While Awake - RT         09/28/24 2009 09/28/24 2013  Pharmacy to dose vancomycin  Continuous PRN,   Status:  Discontinued         09/28/24 2014 09/28/24 1957  Initiate " Observation Status  Once         09/28/24 1956 09/28/24 1948  Code Status and Medical Interventions: CPR (Attempt to Resuscitate); Full Support  Continuous         09/28/24 1948 09/28/24 1946  Clostridioides difficile Toxin - Stool, Per Rectum  Once         09/28/24 1945 09/28/24 1946  Patient Isolation Contact Spore  Continuous        Comments: Isolation Precautions Per Clostridium Difficile (CDI) Infection Control Policy / Process    09/28/24 1945 09/28/24 1946  Clostridioides difficile Toxin, PCR - Stool, Per Rectum  PROCEDURE ONCE         09/28/24 1945 09/28/24 1945  vancomycin IVPB 1500 mg in 0.9% NaCl (Premix) 500 mL  Once,   Status:  Discontinued         09/28/24 1919 09/28/24 1907  Urinalysis, Microscopic Only - Straight Cath  Once         09/28/24 1906 09/28/24 1834  sepsis fluid NS 0.9 % bolus 2,136 mL  Once         09/28/24 1818    09/28/24 1826  cefTRIAXone (ROCEPHIN) 1,000 mg in sodium chloride 0.9 % 100 mL MBP  Once         09/28/24 1810    09/28/24 1826  azithromycin (ZITHROMAX) 500 mg in sodium chloride 0.9 % 250 mL IVPB-VTB  Once,   Status:  Discontinued         09/28/24 1810 09/28/24 1820  Critical Care  Once        Comments: This order was created via procedure documentation    09/28/24 1819 09/28/24 1806  Blood Gas, Arterial With Co-Ox  PROCEDURE ONCE         09/28/24 1800    09/28/24 1750  ipratropium-albuterol (DUO-NEB) nebulizer solution 3 mL  Once         09/28/24 1734    09/28/24 1750  methylPREDNISolone sodium succinate (SOLU-Medrol) injection 125 mg  Once         09/28/24 1734    09/28/24 1735  Blood Culture - Blood, Arm, Left  Once         09/28/24 1734    09/28/24 1735  Blood Culture - Blood, Arm, Right  Once         09/28/24 1734    09/28/24 1735  Lactic Acid, Plasma  Once         09/28/24 1734    09/28/24 1735  Procalcitonin  Once         09/28/24 1734    09/28/24 1735  Magnesium  Once         09/28/24 1734    09/28/24 1735  ECG 12 Lead Dyspnea  Once          09/28/24 1734    09/28/24 1735  XR Chest 1 View  1 Time Imaging         09/28/24 1734    09/28/24 1734  Blood Gas, Arterial -With Co-Ox Panel: Yes  Once         09/28/24 1734    09/28/24 1734  Respiratory Panel PCR w/COVID-19(SARS-CoV-2) [facility]E/OFELIA/PREET/PAD/[facility]/BELLA In-House, NP Swab in UTM/VTM, 2 HR TAT - Swab, Nasopharynx  STAT         09/28/24 1734    09/28/24 1734  CBC & Differential  Once         09/28/24 1734    09/28/24 1734  Comprehensive Metabolic Panel  Once         09/28/24 1734    09/28/24 1734  Urinalysis With Culture If Indicated - Straight Cath  Once         09/28/24 1734    09/28/24 1734  BNP  Once         09/28/24 1734    09/28/24 1734  Single High Sensitivity Troponin T  Once         09/28/24 1734    09/28/24 1734  CBC Auto Differential  PROCEDURE ONCE         09/28/24 1734    Signed and Held  buPROPion SR (WELLBUTRIN SR) 12 hr tablet 150 mg  2 Times Daily,   Status:  Canceled         Signed and Held                  Physician Progress Notes (last 24 hours)  Notes from 09/28/24 0957 through 09/29/24 0957   No notes of this type exist for this encounter.       Consult Notes (last 24 hours)  Notes from 09/28/24 0957 through 09/29/24 0957   No notes of this type exist for this encounter.

## 2024-09-29 NOTE — THERAPY EVALUATION
"Patient Name: Leatha Wall  : 1950    MRN: [id number]                              Today's Date: 2024       Admit Date: 2024    Visit Dx:     ICD-10-CM ICD-9-CM   1. Community acquired pneumonia, unspecified laterality  J18.9 486   2. Severe sepsis  A41.9 038.9    R65.20 995.92   3. Leukocytosis, unspecified type  D72.829 288.60   4. CHRISTIANO (acute kidney injury)  N17.9 584.9   5. Dysphagia, unspecified type  R13.10 787.20     Patient Active Problem List   Diagnosis    Multiple sclerosis    Periodic headache syndrome, not intractable    Hepatitis C antibody test positive    Lactic acidosis    Nausea vomiting and diarrhea    Colitis    Renal cyst    Tobacco abuse    Abdominal aortic aneurysm (AAA) without rupture    Chronic fatigue    Restless legs syndrome (RLS)    Spasticity    History of stroke    COPD with acute exacerbation    Sepsis due to pneumonia    CHRISTIAON (acute kidney injury)    Acute and chronic respiratory failure with hypoxia    Anxiety    Chronic kidney disease, stage 4 (severe)    Iron deficiency anemia    Hypertensive disorder    HLD (hyperlipidemia)    Diarrhea    Encephalopathy    Pneumonia     Past Medical History:   Diagnosis Date    Aneurysm of aorta     Anxiety and depression     Arthritis     Back disorder     \"PROTRUDING DISC\"    Bilateral cataracts     Chronic respiratory failure with hypoxia     Cognitive communication deficit     COPD (chronic obstructive pulmonary disease)     Dysphagia, oropharyngeal phase     WOLF (generalized anxiety disorder)     Heavy tobacco smoker     Hepatitis C     Hyperlipidemia     Hypertension     Idiopathic gout     Idiopathic gout, unspecified site     MS (multiple sclerosis)     Multiple sclerosis     Pneumonia     Sepsis, unspecified organism     Stroke     Unspecified sequelae of cerebral infarction     Urinary tract infection      Past Surgical History:   Procedure Laterality Date    CHOLECYSTECTOMY      HYSTERECTOMY      SMALL INTESTINE " SURGERY      As a child    TOE SURGERY Left     Great    TONSILLECTOMY      and Adnoids    TUBAL ABDOMINAL LIGATION        General Information       Row Name 09/29/24 1559          Physical Therapy Time and Intention    Document Type evaluation  -AE     Mode of Treatment physical therapy  -AE       Row Name 09/29/24 1559          General Information    Patient Profile Reviewed yes  -AE     Prior Level of Function independent:;all household mobility;gait;transfer;bed mobility;ADL's;dressing;bathing  Pt reports using RW for mobility.  -AE     Existing Precautions/Restrictions fall;oxygen therapy device and L/min;other (see comments)  -AE     Barriers to Rehab medically complex;previous functional deficit  -AE       Row Name 09/29/24 1559          Living Environment    People in Home alone  -AE       Row Name 09/29/24 1559          Home Main Entrance    Number of Stairs, Main Entrance five  -AE     Stair Railings, Main Entrance railings on both sides of stairs  -AE       Row Name 09/29/24 1559          Stairs Within Home, Primary    Number of Stairs, Within Home, Primary none  -AE     Stair Railings, Within Home, Primary none  -AE       Row Name 09/29/24 1557          Cognition    Orientation Status (Cognition) oriented x 3  -AE       Row Name 09/29/24 1557          Safety Issues, Functional Mobility    Safety Issues Affecting Function (Mobility) awareness of need for assistance;insight into deficits/self-awareness;safety precaution awareness;safety precautions follow-through/compliance;sequencing abilities  -AE     Impairments Affecting Function (Mobility) balance;endurance/activity tolerance;shortness of breath;postural/trunk control;strength  -AE               User Key  (r) = Recorded By, (t) = Taken By, (c) = Cosigned By      Initials Name Provider Type    AE Sanjeev Gomez, PT Physical Therapist                   Mobility       Row Name 09/29/24 1602          Bed Mobility    Bed Mobility supine-sit  -AE      Supine-Sit [place] (Bed Mobility) contact guard;1 person assist;verbal cues  -AE     Assistive Device (Bed Mobility) head of bed elevated;bed rails  -AE     Comment, (Bed Mobility) VCs for hand placement and sequencing. Pt SOA with minimal activity this session.  -AE       Row Name 09/29/24 1602          Transfers    Comment, (Transfers) VCs for hand placement and sequencing. Pt demo no overt LOB.  -AE       Row Name 09/29/24 1602          Sit-Stand Transfer    Sit-Stand [place] (Transfers) contact guard;1 person assist;verbal cues  -AE     Assistive Device (Sit-Stand Transfers) walker, front-wheeled  -AE       Row Name 09/29/24 1602          Gait/Stairs (Locomotion)    [place] Level (Gait) minimum assist (75% patient effort);1 person assist;verbal cues  -AE     Assistive Device (Gait) walker, front-wheeled  -AE     Distance in Feet (Gait) 15  -AE     Deviations/Abnormal Patterns (Gait) bilateral deviations;aris decreased;gait speed decreased;stride length decreased  -AE     Bilateral Gait Deviations forward flexed posture;heel strike decreased  -AE     Comment, (Gait/Stairs) Pt demo step through gait pattern with slowed aris and forward flexed posture. Pt required increased cues for sequencing of AD. One brief standing rest break required d/t SOA. SaO2 remained in 90s on 4L O2 NC despite dyspnea. Further distance limited by fatigue.  -AE               User Key  (r) = Recorded By, (t) = Taken By, (c) = Cosigned By      Initials Name Provider Type    AE Sanjeev Gomez PT Physical Therapist                   Obj/Interventions       Row Name 09/29/24 1608          Range of Motion Comprehensive    General Range of Motion bilateral lower extremity ROM WFL  -AE       Row Name 09/29/24 1608          Strength Comprehensive (MMT)    General Manual Muscle Testing (MMT) Assessment lower extremity strength deficits identified  -AE     Comment, General Manual Muscle Testing (MMT) Assessment BLE grossly  4-/5; generalized weakness  -AE       Row Name 09/29/24 1608          Balance    Balance Assessment sitting static balance;sitting dynamic balance;sit to stand dynamic balance;standing static balance;standing dynamic balance  -AE     Static Sitting Balance standby assist  -AE     Dynamic Sitting Balance contact guard  -AE     Position, Sitting Balance unsupported  -AE     Sit to Stand Dynamic Balance contact guard;verbal cues  -AE     Static Standing Balance contact guard  -AE     Dynamic Standing Balance minimal assist  -AE     Position/Device Used, Standing Balance supported;walker, front-wheeled  -AE               User Key  (r) = Recorded By, (t) = Taken By, (c) = Cosigned By      Initials Name Provider Type    AE Sanjeev Gomez, PT Physical Therapist                   Goals/Plan       Row Name 09/29/24 1614          Bed Mobility Goal 1 (PT)    Activity/Assistive Device (Bed Mobility Goal 1, PT) sit to supine/supine to sit  -AE     [place] Level/Cues Needed (Bed Mobility Goal 1, PT) modified independence  -AE     Time Frame (Bed Mobility Goal 1, PT) short term goal (STG);5 days  -AE     Progress/Outcomes (Bed Mobility Goal 1, PT) new goal  -AE       Row Name 09/29/24 1614          Transfer Goal 1 (PT)    Activity/Assistive Device (Transfer Goal 1, PT) sit-to-stand/stand-to-sit;bed-to-chair/chair-to-bed  -AE     [place] Level/Cues Needed (Transfer Goal 1, PT) modified independence  -AE     Time Frame (Transfer Goal 1, PT) long term goal (LTG);10 days  -AE     Progress/Outcome (Transfer Goal 1, PT) new goal  -AE       Row Name 09/29/24 1614          Gait Training Goal 1 (PT)    Activity/Assistive Device (Gait Training Goal 1, PT) gait (walking locomotion);assistive device use  -AE     [place] Level (Gait Training Goal 1, PT) standby assist  -AE     Distance (Gait Training Goal 1, PT) 100ft  -AE     Time Frame (Gait Training Goal 1, PT) long term goal (LTG);10 days  -AE     Progress/Outcome (Gait  Training Goal 1, PT) new goal  -AE       Row Name 09/29/24 1614          Therapy Assessment/Plan (PT)    Planned Therapy Interventions (PT) balance training;bed mobility training;home exercise program;gait training;patient/family education;postural re-education;ROM (range of motion);strengthening;transfer training  -AE               User Key  (r) = Recorded By, (t) = Taken By, (c) = Cosigned By      Initials Name Provider Type    AE Sanjeev Gomez, PT Physical Therapist                   Clinical Impression       Row Name 09/29/24 1609          Pain    Pretreatment Pain Rating 0/10 - no pain  -AE     Posttreatment Pain Rating 0/10 - no pain  -AE       Row Name 09/29/24 1609          Plan of Care Review    Plan of Care Reviewed With patient  -AE     Progress no change  -AE     Outcome Evaluation Pt presents with generalized weakness and decreased activity tolerance with mobility. Pt ambulated 15ft with min A and RW for support. Significant SOA with activity. Recommend continued skilled IP PT interventions. Recommend D/C to SNF at this time.  -AE       Row Name 09/29/24 1608          Therapy Assessment/Plan (PT)    Patient/Family Therapy Goals Statement (PT) Get better  -AE     Rehab Potential (PT) fair, will monitor progress closely  -AE     Criteria for Skilled Interventions Met (PT) yes  -AE     Therapy Frequency (PT) daily  -AE     Predicted Duration of Therapy Intervention (PT) 1 week  -AE       Row Name 09/29/24 1601          Vital Signs    Pre Systolic BP Rehab 119  -AE     Pre Treatment Diastolic BP 72  -AE     Posttreatment Heart Rate (beats/min) 90  -AE     O2 Delivery Pre Treatment nasal cannula  -AE     O2 Delivery Intra Treatment nasal cannula  -AE     Post SpO2 (%) 96  -AE     O2 Delivery Post Treatment nasal cannula  -AE     Pre Patient Position Supine  -AE     Intra Patient Position Standing  -AE     Post Patient Position Sitting  -AE       Row Name 09/29/24 1605          Positioning and Restraints     Pre-Treatment Position in bed  -AE     Post Treatment Position chair  -AE     In Chair notified nsg;reclined;call light within reach;encouraged to call for assist;exit alarm on;waffle cushion;legs elevated  -AE               User Key  (r) = Recorded By, (t) = Taken By, (c) = Cosigned By      Initials Name Provider Type    AE Sanjeev Gomez, PT Physical Therapist                   Outcome Measures       Row Name 09/29/24 1615 09/29/24 0454       How much help from another person do you currently need...    Turning from your back to your side while in flat bed without using bedrails? 3  -AE 3  -MR    Moving from lying on back to sitting on the side of a flat bed without bedrails? 3  -AE 3  -MR    Moving to and from a bed to a chair (including a wheelchair)? 3  -AE 3  -MR    Standing up from a chair using your arms (e.g., wheelchair, bedside chair)? 3  -AE 2  -MR    Climbing 3-5 steps with a railing? 2  -AE 2  -MR    To walk in hospital room? 3  -AE 2  -MR    AM-PAC 6 Clicks Score (PT) 17  -AE 15  -MR    Highest Level of Mobility Goal 5 --> Static standing  -AE 4 --> Transfer to chair/commode  -MR      Row Name 09/29/24 1615          Functional Assessment    Outcome Measure Options AM-PAC 6 Clicks Basic Mobility (PT)  -AE               User Key  (r) = Recorded By, (t) = Taken By, (c) = Cosigned By      Initials Name Provider Type    AE Sanjeev Gomez, PT Physical Therapist    Rickie Looney, RN Registered Nurse                                 Physical Therapy Education       Title: PT OT SLP Therapies (In Progress)       Topic: Physical Therapy (In Progress)       Point: Mobility training (In Progress)       Learning Progress Summary             Patient Acceptance, E, NR by AE at 9/29/2024 1446                         Point: Home exercise program (Not Started)       Learner Progress:  Not documented in this visit.              Point: Body mechanics (In Progress)       Learning Progress Summary              Patient Acceptance, E, NR by AE at 9/29/2024 1446                         Point: Precautions (In Progress)       Learning Progress Summary             Patient Acceptance, E, NR by AE at 9/29/2024 1446                                         User Key       Initials Effective Dates Name Provider Type Discipline    AE 09/21/21 -  Sanjeev Gomez PT Physical Therapist PT                  PT Recommendation and Plan  Planned Therapy Interventions (PT): balance training, bed mobility training, home exercise program, gait training, patient/family education, postural re-education, ROM (range of motion), strengthening, transfer training  Plan of Care Reviewed With: patient  Progress: no change  Outcome Evaluation: Pt presents with generalized weakness and decreased activity tolerance with mobility. Pt ambulated 15ft with min A and RW for support. Significant SOA with activity. Recommend continued skilled IP PT interventions. Recommend D/C to SNF at this time.     Time Calculation:   PT Evaluation Complexity  History, PT Evaluation Complexity: 1-2 personal factors and/or comorbidities  Examination of Body Systems (PT Eval Complexity): total of 3 or more elements  Clinical Presentation (PT Evaluation Complexity): stable  Clinical Decision Making (PT Evaluation Complexity): low complexity  Overall Complexity (PT Evaluation Complexity): low complexity     PT Charges       Row Name 09/29/24 1616             Time Calculation    Start Time 1446  -AE      PT Received On 09/29/24  -AE      PT Goal Re-Cert Due Date 10/09/24  -AE         Timed Charges    71090 - PT Therapeutic Activity Minutes 11  -AE         Untimed Charges    PT Eval/Re-eval Minutes 47  -AE         Total Minutes    Timed Charges Total Minutes 11  -AE      Untimed Charges Total Minutes 47  -AE       Total Minutes 58  -AE                User Key  (r) = Recorded By, (t) = Taken By, (c) = Cosigned By      Initials Name Provider Type    AE Sanjeev Gomez PT Physical  Therapist                  Therapy Charges for Today       Code Description Service Date Service Provider Modifiers Qty    [id number]  PT THERAPEUTIC ACT EA 15 MIN 9/29/2024 Sanjeev Gomez, PT GP 1    [id number] HC PT EVAL LOW COMPLEXITY 4 9/29/2024 Sanjeev Gomez, PT GP 1            PT G-Codes  Outcome Measure Options: AM-PAC 6 Clicks Basic Mobility (PT)  AM-PAC 6 Clicks Score (PT): 17  PT Discharge Summary  Anticipated Discharge Disposition (PT): skilled nursing facility    Sanjeev Gomez PT  9/29/2024

## 2024-09-29 NOTE — PROGRESS NOTES
[facility] Medicine Services  PROGRESS NOTE    Patient Name: Leatha Wall  : 1950  MRN: [id number]    Date of Admission: 2024  Primary Care Physician: Niharika Fermin MD    Subjective   Subjective     CC: confusion, weakness    HPI:  Patient reports significant weakness worsening since last discharge  Was admitted to [facility] beginning of month w [facility], then home  Then to [facility] with PNA, then [facility]  Now here  Decline overall w weakness most in proximal legs impairing activity. More confused last night per granddaughter (d/w her by phone) and more to baseline today  Denies h/o aspiration or swallowing issues  Very poor cough clearance appreciated on my exam    Objective   Objective     Vital Signs:   Temp:  [97.1 °F (36.2 °C)-99.2 °F (37.3 °C)] 98 °F (36.7 °C)  Heart Rate:  [74-91] 88  Resp:  [20-24] 20  BP: ()/(50-92) 119/72  Flow (L/min):  [3-5] 4     Physical Exam:  Constitutional: No acute distress, awake, alert female sitting up in bed. Glasses on.  HENT: NCAT, mucous membranes moist  Respiratory: +significant upper resp sounds w very poor cough clearance obfuscated rest of lung exam, very mild increased work of breathing on 4 liters n/c (baseline 3.5 liters)   Cardiovascular: RRR, no murmurs, rubs, or gallops  Gastrointestinal: Soft, nontender, nondistended  Musculoskeletal: Muscle tone within normal limits, no joint effusions appreciated  Psychiatric: Appropriate affect, cooperative  Neurologic: Alert and oriented, facial movements symmetric, bilateral distal LE 5/5 strength, proximal strength 3/5 (significant diminished bilaterally), UE strength in tact, speech clear  Skin: No rashes    Results Reviewed:  LAB RESULTS:      Lab 24  0744 24  0451 24  2251 24  1745   WBC  --  25.69*  --  26.02*   HEMOGLOBIN  --  10.2*  --  11.5*   HEMATOCRIT  --  30.7*  --  36.1   PLATELETS  --  157  --  205   NEUTROS ABS  --  24.77*  --  23.90*    IMMATURE GRANS (ABS)  --  0.33*  --  0.26*   LYMPHS ABS  --  0.45*  --  1.01   MONOS ABS  --  0.11  --  0.81   EOS ABS  --  0.00  --  0.00   MCV  --  92.2  --  94.3   PROCALCITONIN  --   --   --  61.99*   LACTATE 2.0 4.9* 3.3* 4.3*   HSTROP T  --   --   --  50*         Lab 09/29/24  0452 09/28/24  1745   SODIUM 138 136   POTASSIUM 5.0 4.7   CHLORIDE 103 95*   CO2 18.0* 26.0   ANION GAP 17.0* 15.0   BUN 38* 45*   CREATININE 1.66* 2.22*   EGFR 32.6* 23.0*   GLUCOSE 155* 148*   CALCIUM 7.8* 8.4*   MAGNESIUM  --  3.0*         Lab 09/29/24  0452 09/28/24  1745   TOTAL PROTEIN 5.5* 6.1   ALBUMIN 3.0* 3.5   GLOBULIN 2.5 2.6   ALT (SGPT) 33 41*   AST (SGOT) 18 19   BILIRUBIN 0.5 0.6   ALK PHOS 140* 168*         Lab 09/28/24  1745   PROBNP 464.7   HSTROP T 50*                 Lab 09/28/24  1800   PH, ARTERIAL 7.446   PCO2, ARTERIAL 37.4   PO2 ART 62.4*   FIO2 32   HCO3 ART 25.7   BASE EXCESS ART 1.7   CARBOXYHEMOGLOBIN 1.5     Brief Urine Lab Results  (Last result in the past 365 days)        Color   Clarity   Blood   Leuk Est   Nitrite   Protein   CREAT   Urine HCG        09/28/24 1848 Dark Yellow   Clear   Negative   Trace   Negative   Trace                   Microbiology Results Abnormal       Procedure Component Value - Date/Time    MRSA Screen, PCR (Inpatient) - Swab, Nares [[id number]]  (Normal) Collected: 09/28/24 2251    Lab Status: Final result Specimen: Swab from Nares Updated: 09/29/24 6626     MRSA PCR Negative    Narrative:      The negative predictive value of this diagnostic test is high and should only be used to consider de-escalating anti-MRSA therapy. A positive result may indicate colonization with MRSA and must be correlated clinically.  MRSA Negative    Respiratory Panel PCR w/COVID-19(SARS-CoV-2) DANAE/OFELIA/PREET/PAD/COR/BELLA In-House, NP Swab in UTM/VTM, 2 HR TAT - Swab, Nasopharynx [[id number]]  (Normal) Collected: 09/28/24 1746    Lab Status: Final result Specimen: Swab from Nasopharynx Updated: 09/28/24  1847     ADENOVIRUS, PCR Not Detected     Coronavirus 229E Not Detected     Coronavirus HKU1 Not Detected     Coronavirus NL63 Not Detected     Coronavirus OC43 Not Detected     COVID19 Not Detected     Human Metapneumovirus Not Detected     Human Rhinovirus/Enterovirus Not Detected     Influenza A PCR Not Detected     Influenza B PCR Not Detected     Parainfluenza Virus 1 Not Detected     Parainfluenza Virus 2 Not Detected     Parainfluenza Virus 3 Not Detected     Parainfluenza Virus 4 Not Detected     RSV, PCR Not Detected     Bordetella pertussis pcr Not Detected     Bordetella parapertussis PCR Not Detected     Chlamydophila pneumoniae PCR Not Detected     Mycoplasma pneumo by PCR Not Detected    Narrative:      In the setting of a positive respiratory panel with a viral infection PLUS a negative procalcitonin without other underlying concern for bacterial infection, consider observing off antibiotics or discontinuation of antibiotics and continue supportive care. If the respiratory panel is positive for atypical bacterial infection (Bordetella pertussis, Chlamydophila pneumoniae, or Mycoplasma pneumoniae), consider antibiotic de-escalation to target atypical bacterial infection.            CT Head Without Contrast    Result Date: 9/28/2024  CT HEAD WO CONTRAST Date of Exam: 9/28/2024 9:40 PM EDT Indication: hallucinations. Comparison: Brain MRI 5/23/2022. Technique: Axial CT images were obtained of the head without contrast administration.  Automated exposure control and iterative construction methods were used. Findings: Superficial soft tissues appear within normal limits. The calvarium is intact.  Paranasal sinuses and mastoid air cells appear well aerated.  Orbits are unremarkable.  There is no acute intracranial hemorrhage.  No mass effect or midline shift.  No abnormal extra-axial collections.  Gray-white differentiation is within normal limits. There is moderate patchy periventricular and juxtacortical  white matter hypoattenuation. Allowing for differences in technique, there are no definite new hypoattenuating lesions in the brain. Ventricular size and configuration is normal for age.     Impression: Impression: 1.No acute intracranial abnormality. 2.Moderate chronic small vessel ischemic change. 3.Stable moderate chronic white matter disease. Allowing for differences in technique with prior brain MRI, there are no definite new hypoattenuating lesions in the brain. Electronically Signed: Wilman Vivar MD  9/28/2024 11:14 PM EDT  Workstation ID: [id number]    CT Chest Without Contrast Diagnostic    Result Date: 9/28/2024  CT CHEST WO CONTRAST DIAGNOSTIC Date of Exam: 9/28/2024 9:40 PM EDT Indication: hypoxia. Comparison: Chest radiograph 9/28/2024 and 9/21/2022. Technique: Axial CT images were obtained of the chest without contrast administration.  Reconstructed coronal and sagittal images were also obtained. Automated exposure control and iterative construction methods were used. Findings: Thyroid, trachea and esophagus appear within normal limits. There is severe coronary artery calcification. Diminished attenuation of the blood pool would suggest anemia. There is moderate aortic and aortic branch vessel atherosclerosis. No aneurysm. No pericardial effusion or mediastinal lymphadenopathy. There is no pneumothorax or pleural effusion. There is segmentally obstructive endobronchial debris within both lower lobes fairly extensively with associated atelectasis. Superimposed pneumonia would be difficult to exclude based on this appearance. There is minor atelectasis elsewhere in the lung bases. Trachea is patent. There are no discrete measurable concerning lung nodules. There is mild subpleural interstitial thickening in the lung apices. There are no acute findings in the superficial soft tissues. No acute findings in the limited images of the upper abdomen. There is a low-density right adrenal nodule measuring 18 mm  compatible with adenoma. There is no acute osseous abnormality or destructive bone lesion. There are mild thoracic degenerative changes.     Impression: Impression: 1.There is extensive segmentally obstructive endobronchial debris in both lower lobes with associated atelectasis. Superimposed pneumonia would be difficult to exclude. This could be from chronic aspiration or decreased mucus clearance. 2.Severe coronary artery disease. 3.Diminished attenuation of the blood pool suggesting anemia. Electronically Signed: Wilman Vivar MD  9/28/2024 11:07 PM EDT  Workstation ID: [id number]    XR Chest 1 View    Result Date: 9/28/2024  XR CHEST 1 VW Date of Exam: 9/28/2024 5:45 PM EDT Indication: sob Comparison: Chest AP dated 9/21/2022 Findings: There is consolidation in the right lung base. The left lung appears grossly clear. The cardiac mediastinal silhouettes appear normal. No effusion is seen.      Impression: Impression: Right basilar consolidation consistent with pneumonia versus atelectasis. A follow-up chest PA and lateral in 6 weeks is suggested to reevaluate. Electronically Signed: Escobar David MD  9/28/2024 6:02 PM EDT  Workstation ID: [id number]     Results for orders placed during the hospital encounter of 09/07/16    Adult transthoracic echo complete    Interpretation Summary  · Left ventricular function is normal. Estimated EF = 60%.  · Mild mitral valve regurgitation is present  · Mild tricuspid valve regurgitation is present.      Current medications:  Scheduled Meds:apixaban, 5 mg, Oral, BID  atorvastatin, 80 mg, Oral, Daily  budesonide-formoterol, 2 puff, Inhalation, BID - RT   And  tiotropium bromide monohydrate, 2 puff, Inhalation, Daily - RT  cefepime, 2,000 mg, Intravenous, Q12H  gabapentin, 300 mg, Oral, Q12H  ipratropium-albuterol, 3 mL, Nebulization, Q6H While Awake - RT  lactobacillus acidophilus, 1 capsule, Oral, Daily  methylPREDNISolone sodium succinate, 40 mg, Intravenous, Q12H  oxybutynin XL,  10 mg, Oral, Daily  sodium chloride, 10 mL, Intravenous, Q12H  vancomycin, 125 mg, Oral, Daily      Continuous Infusions:lactated ringers, 75 mL/hr, Last Rate: 75 mL/hr (09/29/24 1206)  pharmacy consult - MTM,       PRN Meds:.  acetaminophen **OR** acetaminophen **OR** acetaminophen    HYDROcodone-acetaminophen    melatonin    ondansetron    pharmacy consult - MTM    sodium chloride    sodium chloride    Assessment & Plan   Assessment & Plan     Active Hospital Problems    Diagnosis  POA    **Sepsis due to pneumonia [J18.9, A41.9]  Yes    Pneumonia [J18.9]  Yes    COPD with acute exacerbation [J44.1]  Yes    CHRISTIANO (acute kidney injury) [N17.9]  Yes    Hypertensive disorder [I10]  Yes    HLD (hyperlipidemia) [E78.5]  Yes    Diarrhea [R19.7]  Yes    Encephalopathy [G93.40]  Yes    Chronic kidney disease, stage 4 (severe) [N18.4]  Yes    Acute and chronic respiratory failure with hypoxia [J96.21]  Yes    Iron deficiency anemia [D50.9]  Yes    History of stroke [Z86.73]  Not Applicable    Restless legs syndrome (RLS) [G25.81]  Yes    Anxiety [F41.9]  Yes    Lactic acidosis [E87.20]  Yes    Tobacco abuse [Z72.0]  Yes    Multiple sclerosis [G35]  Yes      Resolved Hospital Problems   No resolved problems to display.        Brief Hospital Course to date:  Leatha Wall is a 74 y.o. female w MS on chronic prednisone, COPD on chronic 3.5 liters n/c, CKDIIIb, h/o C diff per charting [facility] and [facility]    Patient admitted to [facility] for COPD exacerbation 8/28-9/1, then back home, then to [facility] reportedly w pneumonia (records pending), then to [facility] and return here in less than 72 hours from that admission with confusion. PNA requiring intubation 12/2023    Sepsis and acute on chronic hypoxic respiratory failure, concern for recurrent aspiration PNA  -CT w chronic aspiration signs, concern for RLL pneumonia  -MRSA swab negative, d/c vancomycin. Cefepime for  now pending [facility] records of course there. Requested and pending  -IV methylpred for now, low threshold to de-escalate  -very poor cough clearance: flutter valve w RT assist, sputum culture if able  -speech assessment given apparent oropharyngeal weakness  -suspect encephalopathy 2/2 sepsis given improvement today, will monitor    CHRISTIANO on CKDIIIb - repeat in AM, appears 2/2 sepsis above    Concern for oropharyngeal dysphagia  -see above  -h/o of same in 12/2023 after acute resp failure requiring intubation    Acute on chronic debility   - reportedly significant worse in last few days-weeks  - CK wnl, proximal leg weakness most appreciated on current exam  - on review of neurology notes 4/2024: left leg weakness chronic, 3/5 at that time, uses cane at baseline  - PT/OT, close monitoring, likely high dose recent steroids    MS - methylpred for now, then back to home prednisone 9 mg daily  H/o C-diff - vancomycin ppx while on systemic abx  Active tobacco use - NRT while here, needs immediate cessation  Reported diarrhea - not persistent here (no sample x 24 hrs), low threshold for testing if recurrent    *Obtain [facility] records  *Review home med rec, unclear indication for eliquis and SIGNIFICANT bruising on exam    Expected Discharge Location and Transportation: SNF  Expected Discharge 10/1 (Discharge date is tentative pending patient's medical condition and is subject to change)  Expected Discharge Date: 10/1/2024; Expected Discharge Time:      VTE Prophylaxis:  Pharmacologic & mechanical VTE prophylaxis orders are present.         AM-PAC 6 Clicks Score (PT): 17 (09/29/24 3617)    CODE STATUS:   Code Status and Medical Interventions: CPR (Attempt to Resuscitate); Full Support   Ordered at: 09/28/24 4421     Level Of Support Discussed With:    Patient     Code Status (Patient has no pulse and is not breathing):    CPR (Attempt to Resuscitate)     Medical Interventions (Patient has pulse or is  breathing):    Full Support       Emilie Bautista MD  09/29/24    Total time spent: Time Spent: Time Spent: 55 minutes  Time spent includes time reviewing chart, face-to-face time, counseling patient/family/caregiver, ordering medications/tests/procedures, communicating with other health care professionals, documenting clinical information in the electronic health record, and coordination of care.

## 2024-09-29 NOTE — PLAN OF CARE
Goal Outcome Evaluation:  Plan of Care Reviewed With: patient        Progress: no change  Outcome Evaluation: Pt presents with generalized weakness and decreased activity tolerance with mobility. Pt ambulated 15ft with min A and RW for support. Significant SOA with activity. Recommend continued skilled IP PT interventions. Recommend D/C to SNF at this time.      Anticipated Discharge Disposition (PT): skilled nursing facility

## 2024-09-29 NOTE — PLAN OF CARE
Goal Outcome Evaluation:  Plan of Care Reviewed With: patient                  Anticipated Discharge Disposition (SLP): inpatient rehabilitation facility          SLP Swallowing Diagnosis: suspected pharyngeal dysphagia (09/29/24 0950)           Modified PO diet as precaution until MBS tomorrow.

## 2024-09-29 NOTE — H&P
"    [facility] Medicine Services  HISTORY AND PHYSICAL    Patient Name: Letaha Wall  : 1950  MRN: [id number]  Primary Care Physician: Niharika Fermin MD  Date of admission: 2024    Subjective   Subjective     Chief Complaint:  hypoxia    HPI:  Leatha Wall is a 74 y.o. female with a past medical history significant for chronic respiratory failure with COPD and ongoing tobacco abuse on 3.5 L oxygen chronically, prediabetes, CKD IV, hypertension, HLD, MS on Prednisone, hepatitis C, iron deficiency anemia, gout, chronic pain, and debility. Also chronically anticoagulated on Eliquis for unclear reason ? Stroke.  There is no mention of anticoagulation on her recent visit at [facility] where she was treated a few weeks ago.      Patient was recently admitted to [facility] 24 to 24 with sepsis 2/2 pneumonia. She was then discharged to [facility] on Omnicef.   Now here with acute hypoxia. HPI limited secondary to patient disposition. Staff report patient became acutely hypoxic on baseline oxygen requiring NRB per EMS. Also apparently has been having diarrhea with a known history of C.diff.   Upon arrival, patient is having auditory and visual hallucinations. Per family and staff via telephone, patient is normally Aox4. Will admit for further evaluation and treatment.  Currently there are no reports of fever, cough, congestion, or chest pain. No headache or focal weakness/ paresthesias. No falls or syncope. No reported dysuria or flank pain. Will admit to inpatient.    Stat ammonia ordered but not yet drawn  Also looks like her WBC at [facility] was around 26-30 while being treated with iv solumedrol        Review of Systems   Unable to perform ROS: Acuity of condition            Personal History     Past Medical History:   Diagnosis Date    Aneurysm of aorta     Anxiety and depression     Arthritis     Back disorder     \"PROTRUDING DISC\"    Bilateral cataracts     " Chronic respiratory failure with hypoxia     Cognitive communication deficit     COPD (chronic obstructive pulmonary disease)     Dysphagia, oropharyngeal phase     WOLF (generalized anxiety disorder)     Heavy tobacco smoker     Hepatitis C     Hyperlipidemia     Hypertension     Idiopathic gout     Idiopathic gout, unspecified site     MS (multiple sclerosis)     Multiple sclerosis     Pneumonia     Sepsis, unspecified organism     Stroke     Unspecified sequelae of cerebral infarction     Urinary tract infection              Past Surgical History:   Procedure Laterality Date    CHOLECYSTECTOMY      HYSTERECTOMY      SMALL INTESTINE SURGERY      As a child    TOE SURGERY Left     Great    TONSILLECTOMY      and Adnoids    TUBAL ABDOMINAL LIGATION         Family History: family history includes Coronary artery disease in her mother; Diabetes in her brother and father; Heart attack in her mother; Heart disease in her mother; Hypertension in her brother; Lung cancer in her father.     Social History:  reports that she has been smoking cigarettes. She has a 40 pack-year smoking history. She has been exposed to tobacco smoke. She has never used smokeless tobacco. She reports that she does not drink alcohol and does not use drugs.  Social History     Social History Narrative    Lives in [place].        Medications:  Fluticasone-Umeclidin-Vilant, HYDROcodone-acetaminophen, albuterol, albuterol sulfate HFA, amLODIPine, atorvastatin, azithromycin, benazepril, buPROPion SR, cephalexin, dilTIAZem CD, furosemide, gabapentin, ipratropium-albuterol, melatonin, nicotine, nystatin, oxybutynin XL, and predniSONE    Allergies   Allergen Reactions    Penicillins Other (See Comments)     WAS A YOUNG CHILD AND WAS NEVER TOLD WHAT HER REACTION WAS    Sulfa Antibiotics Other (See Comments)     WAS A YOUNG CHILD AND WAS NEVER TOLD WHAT HER REACTION WAS       Objective   Objective     Vital Signs:   Temp:  [99.2 °F (37.3 °C)] 99.2  °F (37.3 °C)  Heart Rate:  [75-86] 75  Resp:  [22] 22  BP: ()/(53-82) 112/53  Flow (L/min):  [3-5] 5    Physical Exam   Constitutional: somnolent  Eyes: PERRLA, sclerae anicteric, no conjunctival injection  HENT: NCAT, mucous membranes moist  Neck: Supple, no thyromegaly, no lymphadenopathy, trachea midline  Respiratory: rhonchi, wheezing nonlabored respirations   Cardiovascular: RRR, no murmurs, rubs, or gallops, palpable pedal pulses bilaterally  Gastrointestinal: Positive bowel sounds, soft, nontender, nondistended  Musculoskeletal: No bilateral ankle edema, no clubbing or cyanosis to extremities  Psychiatric: Appropriate affect, cooperative  Neurologic: disoriented, not following commands  Skin: No rashes      Result Review:  I have personally reviewed the results from the time of this admission to 9/28/2024 22:39 EDT and agree with these findings:  [x]  Laboratory list / accordion  []  Microbiology  []  Radiology  []  EKG/Telemetry   []  Cardiology/Vascular   []  Pathology  [x]  Old records  []  Other:  Most notable findings include: febrile, hypoxic. Cr 2.22. Bun 45. Lactate 4.3. procal 61.99. WBC 26.02. CXR shows pneumonia.    LAB RESULTS:      Lab 09/28/24  1745   WBC 26.02*   HEMOGLOBIN 11.5*   HEMATOCRIT 36.1   PLATELETS 205   NEUTROS ABS 23.90*   IMMATURE GRANS (ABS) 0.26*   LYMPHS ABS 1.01   MONOS ABS 0.81   EOS ABS 0.00   MCV 94.3   PROCALCITONIN 61.99*   LACTATE 4.3*         Lab 09/28/24  1745   SODIUM 136   POTASSIUM 4.7   CHLORIDE 95*   CO2 26.0   ANION GAP 15.0   BUN 45*   CREATININE 2.22*   EGFR 23.0*   GLUCOSE 148*   CALCIUM 8.4*   MAGNESIUM 3.0*         Lab 09/28/24  1745   TOTAL PROTEIN 6.1   ALBUMIN 3.5   GLOBULIN 2.6   ALT (SGPT) 41*   AST (SGOT) 19   BILIRUBIN 0.6   ALK PHOS 168*         Lab 09/28/24  1745   PROBNP 464.7   HSTROP T 50*                 Lab 09/28/24  1800   PH, ARTERIAL 7.446   PCO2, ARTERIAL 37.4   PO2 ART 62.4*   FIO2 32   HCO3 ART 25.7   BASE EXCESS ART 1.7    CARBOXYHEMOGLOBIN 1.5     Brief Urine Lab Results  (Last result in the past 365 days)        Color   Clarity   Blood   Leuk Est   Nitrite   Protein   CREAT   Urine HCG        09/28/24 1848 Dark Yellow   Clear   Negative   Trace   Negative   Trace                 Microbiology Results (last 10 days)       Procedure Component Value - Date/Time    Respiratory Panel PCR w/COVID-19(SARS-CoV-2) DANAE/OFELIA/PREET/PAD/COR/BELAL In-House, NP Swab in UTM/VTM, 2 HR TAT - Swab, Nasopharynx [[id number]]  (Normal) Collected: 09/28/24 1746    Lab Status: Final result Specimen: Swab from Nasopharynx Updated: 09/28/24 1847     ADENOVIRUS, PCR Not Detected     Coronavirus 229E Not Detected     Coronavirus HKU1 Not Detected     Coronavirus NL63 Not Detected     Coronavirus OC43 Not Detected     COVID19 Not Detected     Human Metapneumovirus Not Detected     Human Rhinovirus/Enterovirus Not Detected     Influenza A PCR Not Detected     Influenza B PCR Not Detected     Parainfluenza Virus 1 Not Detected     Parainfluenza Virus 2 Not Detected     Parainfluenza Virus 3 Not Detected     Parainfluenza Virus 4 Not Detected     RSV, PCR Not Detected     Bordetella pertussis pcr Not Detected     Bordetella parapertussis PCR Not Detected     Chlamydophila pneumoniae PCR Not Detected     Mycoplasma pneumo by PCR Not Detected    Narrative:      In the setting of a positive respiratory panel with a viral infection PLUS a negative procalcitonin without other underlying concern for bacterial infection, consider observing off antibiotics or discontinuation of antibiotics and continue supportive care. If the respiratory panel is positive for atypical bacterial infection (Bordetella pertussis, Chlamydophila pneumoniae, or Mycoplasma pneumoniae), consider antibiotic de-escalation to target atypical bacterial infection.            XR Chest 1 View    Result Date: 9/28/2024  XR CHEST 1 VW Date of Exam: 9/28/2024 5:45 PM EDT Indication: sob Comparison: Chest AP  dated 9/21/2022 Findings: There is consolidation in the right lung base. The left lung appears grossly clear. The cardiac mediastinal silhouettes appear normal. No effusion is seen.      Impression: Impression: Right basilar consolidation consistent with pneumonia versus atelectasis. A follow-up chest PA and lateral in 6 weeks is suggested to reevaluate. Electronically Signed: Escobar David MD  9/28/2024 6:02 PM EDT  Workstation ID: [id number]     Results for orders placed during the hospital encounter of 09/07/16    Adult transthoracic echo complete    Interpretation Summary  · Left ventricular function is normal. Estimated EF = 60%.  · Mild mitral valve regurgitation is present  · Mild tricuspid valve regurgitation is present.      Assessment & Plan   Assessment & Plan       Sepsis due to pneumonia    Multiple sclerosis    Lactic acidosis    Tobacco abuse    Restless legs syndrome (RLS)    History of stroke    COPD with acute exacerbation    CHRISTIANO (acute kidney injury)    Acute and chronic respiratory failure with hypoxia    Anxiety    Chronic kidney disease, stage 4 (severe)    Iron deficiency anemia    Hypertensive disorder    HLD (hyperlipidemia)    Diarrhea    Encephalopathy      Acute on chronic respiratory Failure  Acute Exacerbation of COPD  Sepsis due to Pneumonia  Lactic Acidosis  Encephalopathy  - currently stable and non toxic appearing  - immunocompromised on chronic prednisone  - hypoxic requiring 4.5L  - febrile 99.9  - hypotensive  - lactic acid 4.3, procal 62.00. WBC 26 (which was close to last admission at [facility])  - respiratory panel negative  - ABG stable 7.4/37.4/62.4  - CXR shows Right basilar consolidation consistent with pneumonia versus atelectasis   - obtain CT chest and CT head  - started on Cefepime and Vancomycin  - blood cultures, sputum culture, urine antigens, MRSA PCR  - scheduled duo nebs  - scheduled 60 mg solumedrol  - continue Trelegy  - incentive spirometry  - as needed pulmonary  "toilet  - PT/OT/SLP  - neuro checks  - close monitor on telemetry  - am labs    - check ammonia  -hi    CHRISTIANO on CKD IV  - Cr 2.22 eGFR 23  - obtain UA  - IVF  - avoid nephrotoxins  - strict I&O    Diarrhea  - history of C. Diff on long term ABX  - stool studies pending  - add probiotics    Chronic Anticoagulation ? History of stroke  - not on eliquis per DC summary at [facility]  - consult to pharmacy for med rec    MS  - records indicate patient is prescribed 9 mg Prednisone daily but \"takes however much she wants\"  - holding oral corticosteroids, continue with scheduled solumedrol  - follows with Dr. otero per neurology    HTN  HLD  - continue statin  - continue norvasc      DVT prophylaxis:  Eliquis    CODE STATUS:  full code  Level Of Support Discussed With: Patient  Code Status (Patient has no pulse and is not breathing): CPR (Attempt to Resuscitate)  Medical Interventions (Patient has pulse or is breathing): Full Support      Expected Discharge  TBD      This note has been completed as part of a split-shared workflow.     Signature: Electronically signed by Gunnar Napier PA-C, 09/28/24, 8:51 PM EDT    Note addended as above. See changes to exam and plan of care            "

## 2024-09-29 NOTE — ED NOTES
" Leatha Wall    Nursing Report ED to Floor:  Mental status: A&O X2  Ambulatory status: up with 2  Oxygen Therapy:  5L NC  Cardiac Rhythm: NSR  Admitted from: ED  Safety Concerns:  altered  Social Issues: none  ED Room #:  29    ED Nurse Phone Extension - [phone] or may call [phone].      HPI:   Chief Complaint   Patient presents with    Shortness of Breath    Altered Mental Status       Past Medical History:  Past Medical History:   Diagnosis Date    Aneurysm of aorta     Anxiety and depression     Arthritis     Back disorder     \"PROTRUDING DISC\"    Bilateral cataracts     Chronic respiratory failure with hypoxia     Cognitive communication deficit     COPD (chronic obstructive pulmonary disease)     Dysphagia, oropharyngeal phase     WOLF (generalized anxiety disorder)     Heavy tobacco smoker     Hepatitis C     Hyperlipidemia     Hypertension     Idiopathic gout     Idiopathic gout, unspecified site     MS (multiple sclerosis)     Multiple sclerosis     Pneumonia     Sepsis, unspecified organism     Stroke     Unspecified sequelae of cerebral infarction     Urinary tract infection         Past Surgical History:  Past Surgical History:   Procedure Laterality Date    CHOLECYSTECTOMY      HYSTERECTOMY      SMALL INTESTINE SURGERY      As a child    TOE SURGERY Left     Great    TONSILLECTOMY      and Adnoids    TUBAL ABDOMINAL LIGATION          Admitting Doctor:   Vianca Beal DO    Consulting Provider(s):  Consults       No orders found from 8/30/2024 to 9/29/2024.             Admitting Diagnosis:   There were no encounter diagnoses.    Most Recent Vitals:   Vitals:    09/28/24 1756 09/28/24 1800 09/28/24 1830 09/28/24 1900   BP:  108/62 97/82 116/74   BP Location:       Patient Position:       Pulse: 80 79 85 82   Resp: 22      Temp:       TempSrc:       SpO2: 93% 94% 96% 93%   Weight:       Height:           Active LDAs/IV Access:   Lines, Drains & Airways       Active LDAs       Name Placement date " Placement time Site Days    Peripheral IV 05/23/22 1320 Left Antecubital 05/23/22  1320  Antecubital  859                    Labs (abnormal labs have a star):   Labs Reviewed   COMPREHENSIVE METABOLIC PANEL - Abnormal; Notable for the following components:       Result Value    Glucose 148 (*)     BUN 45 (*)     Creatinine 2.22 (*)     Chloride 95 (*)     Calcium 8.4 (*)     ALT (SGPT) 41 (*)     Alkaline Phosphatase 168 (*)     eGFR 23.0 (*)     All other components within normal limits    Narrative:     GFR Normal >60  Chronic Kidney Disease <60  Kidney Failure <15    The GFR formula is only valid for adults with stable renal function between ages 18 and 70.   URINALYSIS W/ CULTURE IF INDICATED - Abnormal; Notable for the following components:    Color, UA Dark Yellow (*)     Ketones, UA Trace (*)     Bilirubin, UA Small (1+) (*)     Protein, UA Trace (*)     Leuk Esterase, UA Trace (*)     All other components within normal limits    Narrative:     In absence of clinical symptoms, the presence of pyuria, bacteria, and/or nitrites on the urinalysis result does not correlate with infection.   SINGLE HS TROPONIN T - Abnormal; Notable for the following components:    HS Troponin T 50 (*)     All other components within normal limits    Narrative:     High Sensitive Troponin T Reference Range:  <14.0 ng/L- Negative Female for AMI  <22.0 ng/L- Negative Male for AMI  >=14 - Abnormal Female indicating possible myocardial injury.  >=22 - Abnormal Male indicating possible myocardial injury.   Clinicians would have to utilize clinical acumen, EKG, Troponin, and serial changes to determine if it is an Acute Myocardial Infarction or myocardial injury due to an underlying chronic condition.        LACTIC ACID, PLASMA - Abnormal; Notable for the following components:    Lactate 4.3 (*)     All other components within normal limits   PROCALCITONIN - Abnormal; Notable for the following components:    Procalcitonin 61.99 (*)      "All other components within normal limits    Narrative:     As a Marker for Sepsis (Non-Neonates):    1. <0.5 ng/mL represents a low risk of severe sepsis and/or septic shock.  2. >2 ng/mL represents a high risk of severe sepsis and/or septic shock.    As a Marker for Lower Respiratory Tract Infections that require antibiotic therapy:    PCT on Admission    Antibiotic Therapy       6-12 Hrs later    >0.5                Strongly Recommended  >0.25 - <0.5        Recommended   0.1 - 0.25          Discouraged              Remeasure/reassess PCT  <0.1                Strongly Discouraged     Remeasure/reassess PCT    As 28 day mortality risk marker: \"Change in Procalcitonin Result\" (>80% or <=80%) if Day 0 (or Day 1) and Day 4 values are available. Refer to http://www.[email or web address]-pct-calculator.com    Change in PCT <=80%  A decrease of PCT levels below or equal to 80% defines a positive change in PCT test result representing a higher risk for 28-day all-cause mortality of patients diagnosed with severe sepsis for septic shock.    Change in PCT >80%  A decrease of PCT levels of more than 80% defines a negative change in PCT result representing a lower risk for 28-day all-cause mortality of patients diagnosed with severe sepsis or septic shock.      MAGNESIUM - Abnormal; Notable for the following components:    Magnesium 3.0 (*)     All other components within normal limits   CBC WITH AUTO DIFFERENTIAL - Abnormal; Notable for the following components:    WBC 26.02 (*)     Hemoglobin 11.5 (*)     RDW 16.6 (*)     RDW-SD 56.3 (*)     Neutrophil % 91.8 (*)     Lymphocyte % 3.9 (*)     Monocyte % 3.1 (*)     Eosinophil % 0.0 (*)     Immature Grans % 1.0 (*)     Neutrophils, Absolute 23.90 (*)     Immature Grans, Absolute 0.26 (*)     All other components within normal limits   BLOOD GAS, ARTERIAL W/CO-OXIMETRY - Abnormal; Notable for the following components:    pO2, Arterial 62.4 (*)     Hemoglobin, Blood Gas 10.9 (*)     " Hematocrit, Blood Gas 33.3 (*)     Oxyhemoglobin 91.4 (*)     pO2, Temperature Corrected 62.4 (*)     All other components within normal limits   URINALYSIS, MICROSCOPIC ONLY - Abnormal; Notable for the following components:    Bacteria, UA 2+ (*)     Squamous Epithelial Cells, UA 3-6 (*)     All other components within normal limits   RESPIRATORY PANEL PCR W/ COVID-19 (SARS-COV-2), NP SWAB IN UTM/VTP, 2 HR TAT - Normal    Narrative:     In the setting of a positive respiratory panel with a viral infection PLUS a negative procalcitonin without other underlying concern for bacterial infection, consider observing off antibiotics or discontinuation of antibiotics and continue supportive care. If the respiratory panel is positive for atypical bacterial infection (Bordetella pertussis, Chlamydophila pneumoniae, or Mycoplasma pneumoniae), consider antibiotic de-escalation to target atypical bacterial infection.   BNP (IN-HOUSE) - Normal    Narrative:     This assay is used as an aid in the diagnosis of individuals suspected of having heart failure. It can be used as an aid in the diagnosis of acute decompensated heart failure (ADHF) in patients presenting with signs and symptoms of ADHF to the emergency department (ED). In addition, NT-proBNP of <300 pg/mL indicates ADHF is not likely.    Age Range Result Interpretation  NT-proBNP Concentration (pg/mL:      <50             Positive            >450                   Gray                 300-450                    Negative             <300    50-75           Positive            >900                  Gray                300-900                  Negative            <300      >75             Positive            >1800                  Gray                300-1800                  Negative            <300   BLOOD CULTURE   BLOOD CULTURE   CLOSTRIDIOIDES DIFFICILE TOXIN    Narrative:     The following orders were created for panel order Clostridioides difficile Toxin - Stool, Per  Rectum.  Procedure                               Abnormality         Status                     ---------                               -----------         ------                     Clostridioides difficile...[[id number]]                                                   Please view results for these tests on the individual orders.   CLOSTRIDIOIDES DIFFICILE TOXIN, PCR   RESPIRATORY CULTURE   MRSA SCREEN, PCR   STREP PNEUMO AG, URINE OR CSF   BLOOD GAS, ARTERIAL   LACTIC ACID, REFLEX   CBC AND DIFFERENTIAL    Narrative:     The following orders were created for panel order CBC & Differential.  Procedure                               Abnormality         Status                     ---------                               -----------         ------                     CBC Auto Differential[[id number]]        Abnormal            Final result                 Please view results for these tests on the individual orders.       Meds Given in ED:   Medications   ipratropium-albuterol (DUO-NEB) nebulizer solution 3 mL (has no administration in time range)   methylPREDNISolone sodium succinate (SOLU-Medrol) injection 60 mg (has no administration in time range)   lactobacillus acidophilus (RISAQUAD) capsule 1 capsule (has no administration in time range)   Pharmacy to dose vancomycin (has no administration in time range)   vancomycin IVPB 1500 mg in 0.9% NaCl (Premix) 500 mL (has no administration in time range)   cefepime 2000 mg IVPB in 100 mL NS (MBP) (has no administration in time range)   cefepime 2000 mg IVPB in 100 mL NS (MBP) (has no administration in time range)   lactated ringers infusion (has no administration in time range)   ipratropium-albuterol (DUO-NEB) nebulizer solution 3 mL (3 mL Nebulization Given 9/28/24 6935)   methylPREDNISolone sodium succinate (SOLU-Medrol) injection 125 mg (125 mg Intravenous Given 9/28/24 8423)   cefTRIAXone (ROCEPHIN) 1,000 mg in sodium chloride 0.9 % 100 mL MBP (0 mg Intravenous  Stopped 9/28/24 1936)   sepsis fluid NS 0.9 % bolus 2,136 mL (2,136 mL Intravenous New Bag 9/28/24 1856)     lactated ringers, 75 mL/hr  Pharmacy to dose vancomycin,          Last NIH score:                                                          Dysphagia screening results:        Duncan Coma Scale:  No data recorded     CIWA:        Restraint Type:            Isolation Status:  No active isolations

## 2024-09-29 NOTE — THERAPY EVALUATION
"Acute Care - Speech Language Pathology   Swallow Initial Evaluation [facility]  Clinical Swallow Evaluation     Patient Name: Leatha Wall  : 1950  MRN: [id number]  Today's Date: 2024               Admit Date: 2024    Visit Dx:     ICD-10-CM ICD-9-CM   1. Community acquired pneumonia, unspecified laterality  J18.9 486   2. Severe sepsis  A41.9 038.9    R65.20 995.92   3. Leukocytosis, unspecified type  D72.829 288.60   4. CHRISTIANO (acute kidney injury)  N17.9 584.9   5. Dysphagia, unspecified type  R13.10 787.20     Patient Active Problem List   Diagnosis    Multiple sclerosis    Periodic headache syndrome, not intractable    Hepatitis C antibody test positive    Lactic acidosis    Nausea vomiting and diarrhea    Colitis    Renal cyst    Tobacco abuse    Abdominal aortic aneurysm (AAA) without rupture    Chronic fatigue    Restless legs syndrome (RLS)    Spasticity    History of stroke    COPD with acute exacerbation    Sepsis due to pneumonia    CHRISTIANO (acute kidney injury)    Acute and chronic respiratory failure with hypoxia    Anxiety    Chronic kidney disease, stage 4 (severe)    Iron deficiency anemia    Hypertensive disorder    HLD (hyperlipidemia)    Diarrhea    Encephalopathy    Pneumonia     Past Medical History:   Diagnosis Date    Aneurysm of aorta     Anxiety and depression     Arthritis     Back disorder     \"PROTRUDING DISC\"    Bilateral cataracts     Chronic respiratory failure with hypoxia     Cognitive communication deficit     COPD (chronic obstructive pulmonary disease)     Dysphagia, oropharyngeal phase     WOLF (generalized anxiety disorder)     Heavy tobacco smoker     Hepatitis C     Hyperlipidemia     Hypertension     Idiopathic gout     Idiopathic gout, unspecified site     MS (multiple sclerosis)     Multiple sclerosis     Pneumonia     Sepsis, unspecified organism     Stroke     Unspecified sequelae of cerebral infarction     Urinary tract infection      Past Surgical " History:   Procedure Laterality Date    CHOLECYSTECTOMY      HYSTERECTOMY      SMALL INTESTINE SURGERY      As a child    TOE SURGERY Left     Great    TONSILLECTOMY      and Adnoids    TUBAL ABDOMINAL LIGATION         SLP Recommendation and Plan  SLP Swallowing Diagnosis: suspected pharyngeal dysphagia (09/29/24 0950)  SLP Diet Recommendation: soft to chew textures, chopped, no mixed consistencies, nectar thick liquids, ice chips between meals after oral care, with supervision (09/29/24 0950)  Recommended Precautions and Strategies: general aspiration precautions, fatigue precautions (09/29/24 0950)  SLP Rec. for Method of Medication Administration: meds whole, with puree, as tolerated (09/29/24 0950)     Monitor for Signs of Aspiration: yes, notify SLP if any concerns (09/29/24 0950)  Recommended Diagnostics: VFSS (MBS) (09/29/24 0950)     Anticipated Discharge Disposition (SLP): inpatient rehabilitation facility (09/29/24 0950)           Oral Care Recommendations: Oral Care BID/PRN, Toothbrush (09/29/24 0950)                                        Plan of Care Reviewed With: patient      SWALLOW EVALUATION (Last 72 Hours)       SLP Adult Swallow Evaluation       Row Name 09/29/24 0950                   Rehab Evaluation    Document Type evaluation  -SM        Subjective Information no complaints  -SM        Patient Observations alert;cooperative  -SM        Patient Effort good  -SM           General Information    Patient Profile Reviewed yes  -SM        Pertinent History Of Current Problem MS on Prednisone. Recent OSH admission with sepsis/PNA. Adm hallucinations (Ox4 baseline), diarrhea (recent Cdiff), respiraotry failure with sepsis/PNA. CT chest with ? chronic aspiration or decreased mucus clearance.  -SM        Current Method of Nutrition regular textures;thin liquids  -SM        Prior Level of Function-Swallowing safe, efficient swallowing in all situations  -SM        Plans/Goals Discussed with  "patient;agreed upon  -        Barriers to Rehab none identified  -        Patient's Goals for Discharge return to regular diet  -           Pain    Additional Documentation Pain Scale: FACES Pre/Post-Treatment (Group)  -           Pain Scale: FACES Pre/Post-Treatment    Pain: FACES Scale, Pretreatment 4-->hurts little more  -SM        Posttreatment Pain Rating 4-->hurts little more  -SM        Pre/Posttreatment Pain Comment RN aware and managing  -           Oral Musculature and Cranial Nerve Assessment    Oral Motor General Assessment WFL  -SM           Clinical Swallow Eval    Oral Prep Phase WFL  -SM        Oral Transit WFL  -SM        Oral Residue WFL  -SM        Pharyngeal Phase suspected pharyngeal impairment  -        Esophageal Phase unremarkable  -           Pharyngeal Phase Concerns    Pharyngeal Phase Concerns other (see comments)  -        Pharyngeal Phase Concerns, Comment Wet cough/congestion baseline. Some general respiratory fatigue with progressive trials though no overt signs aspiration aside from baseline cough though multiple risk factors and concern aspiration. Discussed options with pt. She preferred MBS tomorrow vs FEES today (\"I'd rather not have a scope in my nose\"). Okayed modified PO diet as precaution pending MBS results.  -           SLP Evaluation Clinical Impression    SLP Swallowing Diagnosis suspected pharyngeal dysphagia  -        Functional Impact risk of aspiration/pneumonia  -           Recommendations    SLP Diet Recommendation soft to chew textures;chopped;no mixed consistencies;nectar thick liquids;ice chips between meals after oral care, with supervision  -        Recommended Diagnostics VFSS (MBS)  -        Recommended Precautions and Strategies general aspiration precautions;fatigue precautions  -        Oral Care Recommendations Oral Care BID/PRN;Toothbrush  -        SLP Rec. for Method of Medication Administration meds whole;with puree;as " tolerated  -SM        Monitor for Signs of Aspiration yes;notify SLP if any concerns  -SM        Anticipated Discharge Disposition (SLP) inpatient rehabilitation facility  -SM                  User Key  (r) = Recorded By, (t) = Taken By, (c) = Cosigned By      Initials Name Effective Dates    Mary Veronica MS CCC-SLP 02/03/23 -                     EDUCATION  The patient has been educated in the following areas:   Dysphagia (Swallowing Impairment) Oral Care/Hydration Modified Diet Instruction.                Time Calculation:    Time Calculation- SLP       Row Name 09/29/24 1059             Time Calculation- SLP    SLP Start Time 0850  -      SLP Received On 09/29/24  -         Untimed Charges    81829-PH Eval Oral Pharyng Swallow Minutes 40  -SM         Total Minutes    Untimed Charges Total Minutes 40  -SM       Total Minutes 40  -SM                User Key  (r) = Recorded By, (t) = Taken By, (c) = Cosigned By      Initials Name Provider Type    Mary Veronica MS CCC-SLP Speech and Language Pathologist                    Therapy Charges for Today       Code Description Service Date Service Provider Modifiers Qty    [id number] HC ST EVAL ORAL PHARYNG SWALLOW 3 9/29/2024 Mary Carson MS CCC-SLP GN 1                 Mary Carson MS CCC-SLP  9/29/2024

## 2024-09-29 NOTE — PROGRESS NOTES
Pharmacy Consult-Vancomycin Dosing  Leatha Wall is a  74 y.o. female receiving vancomycin therapy.     Indication:sepsis/PNA  Consulting Provider: hospitalist  ID Consult:     Goal Trough: 15-20    Current Antimicrobial Therapy  Anti-Infectives (From admission, onward)      Ordered     Dose/Rate Route Frequency Start Stop    09/28/24 2244  vancomycin (dosing per levels)        Ordering Provider: Umang Salgado, PharmD     Does not apply Daily 09/29/24 0900 10/06/24 0859    09/28/24 2028  cefepime 2000 mg IVPB in 100 mL NS (MBP)        Ordering Provider: Gunnar Napier PA-C    2,000 mg  over 4 Hours Intravenous Every 8 Hours 09/29/24 0514 10/06/24 0513    09/28/24 2028  cefepime 2000 mg IVPB in 100 mL NS (MBP)        Ordering Provider: Gunnar Napier PA-C    2,000 mg  over 30 Minutes Intravenous Once 09/28/24 2200      09/28/24 2024  vancomycin IVPB 1500 mg in 0.9% NaCl (Premix) 500 mL        Ordering Provider: Hugh Barker, Ila    22 mg/kg × 71.2 kg  333.3 mL/hr over 90 Minutes Intravenous Once 09/28/24 2030 09/28/24 2014  Pharmacy to dose vancomycin        Ordering Provider: Gunnar Napier PA-C     Does not apply Continuous PRN 09/28/24 2013 10/05/24 2012    09/28/24 1810  cefTRIAXone (ROCEPHIN) 1,000 mg in sodium chloride 0.9 % 100 mL MBP        Ordering Provider: Bhargav Bashir MD    1,000 mg  200 mL/hr over 30 Minutes Intravenous Once 09/28/24 1826 09/28/24 1936            Allergies  Allergies as of 09/28/2024 - Reviewed 09/28/2024   Allergen Reaction Noted    Penicillins Other (See Comments) 06/29/2016    Sulfa antibiotics Other (See Comments) 06/29/2016       Labs    Results from last 7 days   Lab Units 09/28/24  1745   BUN mg/dL 45*   CREATININE mg/dL 2.22*       Results from last 7 days   Lab Units 09/28/24  1745   WBC 10*3/mm3 26.02*       Evaluation of Dosing     Last Dose Received in the ED/Outside Facility: 1500mg  Is Patient on Dialysis or Renal Replacement: no    Ht -  "170.2 cm (67\")  Wt - 71.2 kg (157 lb)    Estimated Creatinine Clearance: 25.7 mL/min (A) (by C-G formula based on SCr of 2.22 mg/dL (H)).    Intake & Output (last 3 days)         09/26 0701 09/27 0700 09/27 0701 09/28 0700 09/28 0701 09/29 0700    IV Piggyback   100    Total Intake(mL/kg)   100 (1.4)    Net   +100                   Microbiology and Radiology  Microbiology Results (last 10 days)       Procedure Component Value - Date/Time    Respiratory Panel PCR w/COVID-19(SARS-CoV-2) [facility] In-House, NP Swab in UTM/VTM, 2 HR TAT - Swab, Nasopharynx [[id number]]  (Normal) Collected: 09/28/24 1746    Lab Status: Final result Specimen: Swab from Nasopharynx Updated: 09/28/24 1847     ADENOVIRUS, PCR Not Detected     Coronavirus 229E Not Detected     Coronavirus HKU1 Not Detected     Coronavirus NL63 Not Detected     Coronavirus OC43 Not Detected     COVID19 Not Detected     Human Metapneumovirus Not Detected     Human Rhinovirus/Enterovirus Not Detected     Influenza A PCR Not Detected     Influenza B PCR Not Detected     Parainfluenza Virus 1 Not Detected     Parainfluenza Virus 2 Not Detected     Parainfluenza Virus 3 Not Detected     Parainfluenza Virus 4 Not Detected     RSV, PCR Not Detected     Bordetella pertussis pcr Not Detected     Bordetella parapertussis PCR Not Detected     Chlamydophila pneumoniae PCR Not Detected     Mycoplasma pneumo by PCR Not Detected    Narrative:      In the setting of a positive respiratory panel with a viral infection PLUS a negative procalcitonin without other underlying concern for bacterial infection, consider observing off antibiotics or discontinuation of antibiotics and continue supportive care. If the respiratory panel is positive for atypical bacterial infection (Bordetella pertussis, Chlamydophila pneumoniae, or Mycoplasma pneumoniae), consider antibiotic de-escalation to target atypical bacterial infection.            Vancomycin Levels:            "             Assessment/Plan:    Pharmacy to dose vancomycin for sepsis/pneumonia. Goal AUC: 400-600 mg/L*hr  Patient received a loading dose of vancomycin 1500 mg (~20 mg/kg) at 2200 on  9/28  Vancomycin level was drawn at 0452 on 9/29 which resulted a level of 17.8  Given patient's clearance, will initiate maintenance of vancomycin 750 mg (~10.5 mg/kg) every 24 hours starting at 1100 on 9/29  Will assess clearance by obtaining vancomycin random level on 9/30 with AM labs.  Monitor cultures and sensitivities, renal function and clinical status.  Pharmacy will continue to follow and adjust regimen as necessary.     Artie Peña, PharmIRENA  9/29/2024  07:37 EDT

## 2024-09-29 NOTE — PROGRESS NOTES
Pharmacy Consult-Vancomycin Dosing  Leatha Wall is a  74 y.o. female receiving vancomycin therapy.     Indication:sepsis/PNA  Consulting Provider: hospitalist  ID Consult:     Goal Trough: 15-20    Current Antimicrobial Therapy  Anti-Infectives (From admission, onward)      Ordered     Dose/Rate Route Frequency Start Stop    09/28/24 2244  vancomycin (dosing per levels)        Ordering Provider: Umang Salgado, PharmD     Does not apply Daily 09/29/24 0900 10/06/24 0859    09/28/24 2028  cefepime 2000 mg IVPB in 100 mL NS (MBP)        Ordering Provider: Gunnar Napier PA-C    2,000 mg  over 4 Hours Intravenous Every 8 Hours 09/29/24 0514 10/06/24 0513    09/28/24 2028  cefepime 2000 mg IVPB in 100 mL NS (MBP)        Ordering Provider: Gunnar Napier PA-C    2,000 mg  over 30 Minutes Intravenous Once 09/28/24 2200      09/28/24 2024  vancomycin IVPB 1500 mg in 0.9% NaCl (Premix) 500 mL        Ordering Provider: Hugh Barker, Ila    22 mg/kg × 71.2 kg  333.3 mL/hr over 90 Minutes Intravenous Once 09/28/24 2030 09/28/24 2014  Pharmacy to dose vancomycin        Ordering Provider: Gunnar Napier PA-C     Does not apply Continuous PRN 09/28/24 2013 10/05/24 2012    09/28/24 1810  cefTRIAXone (ROCEPHIN) 1,000 mg in sodium chloride 0.9 % 100 mL MBP        Ordering Provider: Bhargav Bashir MD    1,000 mg  200 mL/hr over 30 Minutes Intravenous Once 09/28/24 1826 09/28/24 1936            Allergies  Allergies as of 09/28/2024 - Reviewed 09/28/2024   Allergen Reaction Noted    Penicillins Other (See Comments) 06/29/2016    Sulfa antibiotics Other (See Comments) 06/29/2016       Labs    Results from last 7 days   Lab Units 09/28/24  1745   BUN mg/dL 45*   CREATININE mg/dL 2.22*       Results from last 7 days   Lab Units 09/28/24  1745   WBC 10*3/mm3 26.02*       Evaluation of Dosing     Last Dose Received in the ED/Outside Facility: 1500mg  Is Patient on Dialysis or Renal Replacement: no    Ht -  "170.2 cm (67\")  Wt - 71.2 kg (157 lb)    Estimated Creatinine Clearance: 25.7 mL/min (A) (by C-G formula based on SCr of 2.22 mg/dL (H)).    Intake & Output (last 3 days)         09/26 0701 09/27 0700 09/27 0701 09/28 0700 09/28 0701 09/29 0700    IV Piggyback   100    Total Intake(mL/kg)   100 (1.4)    Net   +100                   Microbiology and Radiology  Microbiology Results (last 10 days)       Procedure Component Value - Date/Time    Respiratory Panel PCR w/COVID-19(SARS-CoV-2) [facility] In-House, NP Swab in UTM/VTM, 2 HR TAT - Swab, Nasopharynx [[id number]]  (Normal) Collected: 09/28/24 1746    Lab Status: Final result Specimen: Swab from Nasopharynx Updated: 09/28/24 1847     ADENOVIRUS, PCR Not Detected     Coronavirus 229E Not Detected     Coronavirus HKU1 Not Detected     Coronavirus NL63 Not Detected     Coronavirus OC43 Not Detected     COVID19 Not Detected     Human Metapneumovirus Not Detected     Human Rhinovirus/Enterovirus Not Detected     Influenza A PCR Not Detected     Influenza B PCR Not Detected     Parainfluenza Virus 1 Not Detected     Parainfluenza Virus 2 Not Detected     Parainfluenza Virus 3 Not Detected     Parainfluenza Virus 4 Not Detected     RSV, PCR Not Detected     Bordetella pertussis pcr Not Detected     Bordetella parapertussis PCR Not Detected     Chlamydophila pneumoniae PCR Not Detected     Mycoplasma pneumo by PCR Not Detected    Narrative:      In the setting of a positive respiratory panel with a viral infection PLUS a negative procalcitonin without other underlying concern for bacterial infection, consider observing off antibiotics or discontinuation of antibiotics and continue supportive care. If the respiratory panel is positive for atypical bacterial infection (Bordetella pertussis, Chlamydophila pneumoniae, or Mycoplasma pneumoniae), consider antibiotic de-escalation to target atypical bacterial infection.            Vancomycin Levels:            "             Assessment/Plan:  The patient will be started on a bolus of 20mg/kg for a dose of 1500mg . Given the patient's current renal status, will dose per random levels and obtain a random level with morning labs before ordering another dose.  Due to infection severity, will target a trough of 15-20.    Pharmacy will continue to follow the patient’s culture results and clinical progress daily.    Umang Salgado, JohannaD

## 2024-09-30 ENCOUNTER — APPOINTMENT (OUTPATIENT)
Dept: GENERAL RADIOLOGY | Facility: HOSPITAL | Age: 74
DRG: 871 | End: 2024-09-30
Payer: MEDICARE

## 2024-09-30 LAB
ANION GAP SERPL CALCULATED.3IONS-SCNC: 11 MMOL/L (ref 5–15)
BASOPHILS # BLD AUTO: 0.02 10*3/MM3 (ref 0–0.2)
BASOPHILS NFR BLD AUTO: 0.1 % (ref 0–1.5)
BUN SERPL-MCNC: 38 MG/DL (ref 8–23)
BUN/CREAT SERPL: 29 (ref 7–25)
CALCIUM SPEC-SCNC: 8.1 MG/DL (ref 8.6–10.5)
CHLORIDE SERPL-SCNC: 105 MMOL/L (ref 98–107)
CO2 SERPL-SCNC: 22 MMOL/L (ref 22–29)
CREAT SERPL-MCNC: 1.31 MG/DL (ref 0.57–1)
DEPRECATED RDW RBC AUTO: 54 FL (ref 37–54)
EGFRCR SERPLBLD CKD-EPI 2021: 43.4 ML/MIN/1.73
EOSINOPHIL # BLD AUTO: 0 10*3/MM3 (ref 0–0.4)
EOSINOPHIL NFR BLD AUTO: 0 % (ref 0.3–6.2)
ERYTHROCYTE [DISTWIDTH] IN BLOOD BY AUTOMATED COUNT: 16.1 % (ref 12.3–15.4)
GLUCOSE SERPL-MCNC: 182 MG/DL (ref 65–99)
HCT VFR BLD AUTO: 25.1 % (ref 34–46.6)
HGB BLD-MCNC: 8.2 G/DL (ref 12–15.9)
IMM GRANULOCYTES # BLD AUTO: 0.14 10*3/MM3 (ref 0–0.05)
IMM GRANULOCYTES NFR BLD AUTO: 0.8 % (ref 0–0.5)
LYMPHOCYTES # BLD AUTO: 0.38 10*3/MM3 (ref 0.7–3.1)
LYMPHOCYTES NFR BLD AUTO: 2.1 % (ref 19.6–45.3)
MCH RBC QN AUTO: 30.1 PG (ref 26.6–33)
MCHC RBC AUTO-ENTMCNC: 32.7 G/DL (ref 31.5–35.7)
MCV RBC AUTO: 92.3 FL (ref 79–97)
MONOCYTES # BLD AUTO: 0.29 10*3/MM3 (ref 0.1–0.9)
MONOCYTES NFR BLD AUTO: 1.6 % (ref 5–12)
NEUTROPHILS NFR BLD AUTO: 17.05 10*3/MM3 (ref 1.7–7)
NEUTROPHILS NFR BLD AUTO: 95.4 % (ref 42.7–76)
NRBC BLD AUTO-RTO: 0 /100 WBC (ref 0–0.2)
PLATELET # BLD AUTO: 143 10*3/MM3 (ref 140–450)
PMV BLD AUTO: 10.5 FL (ref 6–12)
POTASSIUM SERPL-SCNC: 4.7 MMOL/L (ref 3.5–5.2)
RBC # BLD AUTO: 2.72 10*6/MM3 (ref 3.77–5.28)
S PNEUM AG SPEC QL LA: NEGATIVE
SODIUM SERPL-SCNC: 138 MMOL/L (ref 136–145)
WBC NRBC COR # BLD AUTO: 17.88 10*3/MM3 (ref 3.4–10.8)

## 2024-09-30 PROCEDURE — 74230 X-RAY XM SWLNG FUNCJ C+: CPT

## 2024-09-30 PROCEDURE — 94664 DEMO&/EVAL PT USE INHALER: CPT

## 2024-09-30 PROCEDURE — 92611 MOTION FLUOROSCOPY/SWALLOW: CPT | Performed by: SPEECH-LANGUAGE PATHOLOGIST

## 2024-09-30 PROCEDURE — 97165 OT EVAL LOW COMPLEX 30 MIN: CPT

## 2024-09-30 PROCEDURE — 94799 UNLISTED PULMONARY SVC/PX: CPT

## 2024-09-30 PROCEDURE — 80048 BASIC METABOLIC PNL TOTAL CA: CPT | Performed by: INTERNAL MEDICINE

## 2024-09-30 PROCEDURE — 25010000002 METHYLPREDNISOLONE PER 40 MG: Performed by: INTERNAL MEDICINE

## 2024-09-30 PROCEDURE — 85025 COMPLETE CBC W/AUTO DIFF WBC: CPT | Performed by: INTERNAL MEDICINE

## 2024-09-30 PROCEDURE — 25010000002 CEFEPIME PER 500 MG: Performed by: PHYSICIAN ASSISTANT

## 2024-09-30 PROCEDURE — 25010000002 CEFTRIAXONE PER 250 MG: Performed by: INTERNAL MEDICINE

## 2024-09-30 PROCEDURE — 99232 SBSQ HOSP IP/OBS MODERATE 35: CPT | Performed by: INTERNAL MEDICINE

## 2024-09-30 RX ORDER — PREDNISONE 20 MG/1
40 TABLET ORAL
Status: DISCONTINUED | OUTPATIENT
Start: 2024-10-01 | End: 2024-10-02 | Stop reason: HOSPADM

## 2024-09-30 RX ORDER — ALBUTEROL SULFATE 0.83 MG/ML
2.5 SOLUTION RESPIRATORY (INHALATION) EVERY 6 HOURS PRN
Status: DISCONTINUED | OUTPATIENT
Start: 2024-09-30 | End: 2024-10-02 | Stop reason: HOSPADM

## 2024-09-30 RX ORDER — DOXYCYCLINE 100 MG/1
100 CAPSULE ORAL EVERY 12 HOURS SCHEDULED
Status: DISCONTINUED | OUTPATIENT
Start: 2024-09-30 | End: 2024-10-02 | Stop reason: HOSPADM

## 2024-09-30 RX ADMIN — Medication 10 ML: at 22:13

## 2024-09-30 RX ADMIN — ATORVASTATIN CALCIUM 80 MG: 40 TABLET, FILM COATED ORAL at 09:10

## 2024-09-30 RX ADMIN — BARIUM SULFATE 20 ML: 400 PASTE ORAL at 11:33

## 2024-09-30 RX ADMIN — Medication 10 ML: at 09:24

## 2024-09-30 RX ADMIN — OXYBUTYNIN CHLORIDE 10 MG: 10 TABLET, EXTENDED RELEASE ORAL at 09:10

## 2024-09-30 RX ADMIN — VANCOMYCIN HYDROCHLORIDE 125 MG: 125 CAPSULE ORAL at 09:10

## 2024-09-30 RX ADMIN — DOXYCYCLINE 100 MG: 100 CAPSULE ORAL at 12:20

## 2024-09-30 RX ADMIN — SODIUM CHLORIDE 2000 MG: 900 INJECTION INTRAVENOUS at 17:18

## 2024-09-30 RX ADMIN — HYDROCODONE BITARTRATE AND ACETAMINOPHEN 1 TABLET: 10; 325 TABLET ORAL at 22:13

## 2024-09-30 RX ADMIN — METHYLPREDNISOLONE SODIUM SUCCINATE 40 MG: 40 INJECTION, POWDER, FOR SOLUTION INTRAMUSCULAR; INTRAVENOUS at 05:52

## 2024-09-30 RX ADMIN — HYDROCODONE BITARTRATE AND ACETAMINOPHEN 1 TABLET: 10; 325 TABLET ORAL at 09:21

## 2024-09-30 RX ADMIN — GABAPENTIN 300 MG: 300 CAPSULE ORAL at 22:13

## 2024-09-30 RX ADMIN — DOXYCYCLINE 100 MG: 100 CAPSULE ORAL at 22:13

## 2024-09-30 RX ADMIN — TIOTROPIUM BROMIDE INHALATION SPRAY 2 PUFF: 3.12 SPRAY, METERED RESPIRATORY (INHALATION) at 10:46

## 2024-09-30 RX ADMIN — CEFEPIME 2000 MG: 2 INJECTION, POWDER, FOR SOLUTION INTRAVENOUS at 09:23

## 2024-09-30 RX ADMIN — GABAPENTIN 300 MG: 300 CAPSULE ORAL at 09:10

## 2024-09-30 RX ADMIN — BUDESONIDE AND FORMOTEROL FUMARATE DIHYDRATE 2 PUFF: 160; 4.5 AEROSOL RESPIRATORY (INHALATION) at 19:12

## 2024-09-30 RX ADMIN — BARIUM SULFATE 100 ML: 0.81 POWDER, FOR SUSPENSION ORAL at 11:33

## 2024-09-30 RX ADMIN — BUDESONIDE AND FORMOTEROL FUMARATE DIHYDRATE 2 PUFF: 160; 4.5 AEROSOL RESPIRATORY (INHALATION) at 10:46

## 2024-09-30 RX ADMIN — Medication 1 CAPSULE: at 09:10

## 2024-09-30 NOTE — PLAN OF CARE
Goal Outcome Evaluation:  Plan of Care Reviewed With: patient        Progress: no change         Anticipated Discharge Disposition (SLP): inpatient rehabilitation facility, No further SLP services warranted          SLP Swallowing Diagnosis: functional oral phase, functional pharyngeal phase (09/30/24 2695)

## 2024-09-30 NOTE — PROGRESS NOTES
[facility] Medicine Services  PROGRESS NOTE    Patient Name: Leatha Wall  : 1950  MRN: [id number]    Date of Admission: 2024  Primary Care Physician: Niharika Fermin MD    Subjective   Subjective     CC: confusion, weakness    HPI:  Patient feeling better  Discouraged that she feels she has been discharged too soon in past admissions  Has not worked w [employer] valve yet    [facility] records personally reviewed:   Admitted there -. Start prednisone 10 mg + azithromycin thrice weekly for COPD management. Start diltiazem + eliquis.   Initially on 5 liters n/c - evaluated by ID there who believed abx could be deescalated to doxycycline alone w testing + rhinovirus. Was MRSA colonizer at that time but NOT thought to have active MRSA infection (clarification of previous report by granddaughter by phone).   ECHO: EF 60-65%    Objective   Objective     Vital Signs:   Temp:  [97.1 °F (36.2 °C)-98.3 °F (36.8 °C)] 98 °F (36.7 °C)  Heart Rate:  [74-91] 77  Resp:  [18-22] 18  BP: (119-137)/(62-73) 136/68  Flow (L/min):  [3-4] 3     Physical Exam:  Constitutional: No acute distress, awake, alert female sitting up in bed. Glasses on.  HENT: NCAT, mucous membranes moist  Respiratory: +significant upper resp sounds w very poor cough clearance oonly mildly improved from prior, on baseline 3 liters n/c now  Cardiovascular: RRR, no murmurs, rubs, or gallops, no significant edema appreciated in legs  Gastrointestinal: Soft, nontender, nondistended  Musculoskeletal: Muscle tone within normal limits, no joint effusions appreciated  Psychiatric: Appropriate affect, cooperative  Neurologic: Alert and oriented, facial movements symmetric, speech clear  Skin: No rashes, superficial bruises throughout    Results Reviewed:  LAB RESULTS:      Lab 24  0347 24  0744 24  0451 24  2251 24  1745   WBC 17.88*  --  25.69*  --  26.02*   HEMOGLOBIN 8.2*  --  10.2*  --   11.5*   HEMATOCRIT 25.1*  --  30.7*  --  36.1   PLATELETS 143  --  157  --  205   NEUTROS ABS 17.05*  --  24.77*  --  23.90*   IMMATURE GRANS (ABS) 0.14*  --  0.33*  --  0.26*   LYMPHS ABS 0.38*  --  0.45*  --  1.01   MONOS ABS 0.29  --  0.11  --  0.81   EOS ABS 0.00  --  0.00  --  0.00   MCV 92.3  --  92.2  --  94.3   PROCALCITONIN  --   --   --   --  61.99*   LACTATE  --  2.0 4.9* 3.3* 4.3*   HSTROP T  --   --   --   --  50*         Lab 09/30/24  0347 09/29/24  0452 09/28/24  1745   SODIUM 138 138 136   POTASSIUM 4.7 5.0 4.7   CHLORIDE 105 103 95*   CO2 22.0 18.0* 26.0   ANION GAP 11.0 17.0* 15.0   BUN 38* 38* 45*   CREATININE 1.31* 1.66* 2.22*   EGFR 43.4* 32.6* 23.0*   GLUCOSE 182* 155* 148*   CALCIUM 8.1* 7.8* 8.4*   MAGNESIUM  --   --  3.0*         Lab 09/29/24  0452 09/28/24  1745   TOTAL PROTEIN 5.5* 6.1   ALBUMIN 3.0* 3.5   GLOBULIN 2.5 2.6   ALT (SGPT) 33 41*   AST (SGOT) 18 19   BILIRUBIN 0.5 0.6   ALK PHOS 140* 168*         Lab 09/28/24  1745   PROBNP 464.7   HSTROP T 50*                 Lab 09/28/24  1800   PH, ARTERIAL 7.446   PCO2, ARTERIAL 37.4   PO2 ART 62.4*   FIO2 32   HCO3 ART 25.7   BASE EXCESS ART 1.7   CARBOXYHEMOGLOBIN 1.5     Brief Urine Lab Results  (Last result in the past 365 days)        Color   Clarity   Blood   Leuk Est   Nitrite   Protein   CREAT   Urine HCG        09/28/24 1848 Dark Yellow   Clear   Negative   Trace   Negative   Trace                   Microbiology Results Abnormal       Procedure Component Value - Date/Time    S. Pneumo Ag Urine or CSF - Urine, Urine, Clean Catch [[id number]]  (Normal) Collected: 09/29/24 1003    Lab Status: Final result Specimen: Urine, Clean Catch Updated: 09/30/24 0706     Strep Pneumo Ag Negative    Blood Culture - Blood, Arm, Left [[id number]]  (Normal) Collected: 09/28/24 1745    Lab Status: Preliminary result Specimen: Blood from Arm, Left Updated: 09/29/24 1831     Blood Culture No growth at 24 hours    Blood Culture - Blood, Arm, Right  [[id number]]  (Normal) Collected: 09/28/24 1750    Lab Status: Preliminary result Specimen: Blood from Arm, Right Updated: 09/29/24 1831     Blood Culture No growth at 24 hours    MRSA Screen, PCR (Inpatient) - Swab, Nares [[id number]]  (Normal) Collected: 09/28/24 2251    Lab Status: Final result Specimen: Swab from Nares Updated: 09/29/24 0757     MRSA PCR Negative    Narrative:      The negative predictive value of this diagnostic test is high and should only be used to consider de-escalating anti-MRSA therapy. A positive result may indicate colonization with MRSA and must be correlated clinically.  MRSA Negative    Respiratory Panel PCR w/COVID-19(SARS-CoV-2) DANAE/OFELIA/PREET/PAD/COR/BELLA In-House, NP Swab in UTM/VTM, 2 HR TAT - Swab, Nasopharynx [[id number]]  (Normal) Collected: 09/28/24 1746    Lab Status: Final result Specimen: Swab from Nasopharynx Updated: 09/28/24 1847     ADENOVIRUS, PCR Not Detected     Coronavirus 229E Not Detected     Coronavirus HKU1 Not Detected     Coronavirus NL63 Not Detected     Coronavirus OC43 Not Detected     COVID19 Not Detected     Human Metapneumovirus Not Detected     Human Rhinovirus/Enterovirus Not Detected     Influenza A PCR Not Detected     Influenza B PCR Not Detected     Parainfluenza Virus 1 Not Detected     Parainfluenza Virus 2 Not Detected     Parainfluenza Virus 3 Not Detected     Parainfluenza Virus 4 Not Detected     RSV, PCR Not Detected     Bordetella pertussis pcr Not Detected     Bordetella parapertussis PCR Not Detected     Chlamydophila pneumoniae PCR Not Detected     Mycoplasma pneumo by PCR Not Detected    Narrative:      In the setting of a positive respiratory panel with a viral infection PLUS a negative procalcitonin without other underlying concern for bacterial infection, consider observing off antibiotics or discontinuation of antibiotics and continue supportive care. If the respiratory panel is positive for atypical bacterial infection (Bordetella  pertussis, Chlamydophila pneumoniae, or Mycoplasma pneumoniae), consider antibiotic de-escalation to target atypical bacterial infection.            CT Head Without Contrast    Result Date: 9/28/2024  CT HEAD WO CONTRAST Date of Exam: 9/28/2024 9:40 PM EDT Indication: hallucinations. Comparison: Brain MRI 5/23/2022. Technique: Axial CT images were obtained of the head without contrast administration.  Automated exposure control and iterative construction methods were used. Findings: Superficial soft tissues appear within normal limits. The calvarium is intact.  Paranasal sinuses and mastoid air cells appear well aerated.  Orbits are unremarkable.  There is no acute intracranial hemorrhage.  No mass effect or midline shift.  No abnormal extra-axial collections.  Gray-white differentiation is within normal limits. There is moderate patchy periventricular and juxtacortical white matter hypoattenuation. Allowing for differences in technique, there are no definite new hypoattenuating lesions in the brain. Ventricular size and configuration is normal for age.     Impression: Impression: 1.No acute intracranial abnormality. 2.Moderate chronic small vessel ischemic change. 3.Stable moderate chronic white matter disease. Allowing for differences in technique with prior brain MRI, there are no definite new hypoattenuating lesions in the brain. Electronically Signed: Wilman Vivar MD  9/28/2024 11:14 PM EDT  Workstation ID: [id number]    CT Chest Without Contrast Diagnostic    Result Date: 9/28/2024  CT CHEST WO CONTRAST DIAGNOSTIC Date of Exam: 9/28/2024 9:40 PM EDT Indication: hypoxia. Comparison: Chest radiograph 9/28/2024 and 9/21/2022. Technique: Axial CT images were obtained of the chest without contrast administration.  Reconstructed coronal and sagittal images were also obtained. Automated exposure control and iterative construction methods were used. Findings: Thyroid, trachea and esophagus appear within normal limits.  There is severe coronary artery calcification. Diminished attenuation of the blood pool would suggest anemia. There is moderate aortic and aortic branch vessel atherosclerosis. No aneurysm. No pericardial effusion or mediastinal lymphadenopathy. There is no pneumothorax or pleural effusion. There is segmentally obstructive endobronchial debris within both lower lobes fairly extensively with associated atelectasis. Superimposed pneumonia would be difficult to exclude based on this appearance. There is minor atelectasis elsewhere in the lung bases. Trachea is patent. There are no discrete measurable concerning lung nodules. There is mild subpleural interstitial thickening in the lung apices. There are no acute findings in the superficial soft tissues. No acute findings in the limited images of the upper abdomen. There is a low-density right adrenal nodule measuring 18 mm compatible with adenoma. There is no acute osseous abnormality or destructive bone lesion. There are mild thoracic degenerative changes.     Impression: Impression: 1.There is extensive segmentally obstructive endobronchial debris in both lower lobes with associated atelectasis. Superimposed pneumonia would be difficult to exclude. This could be from chronic aspiration or decreased mucus clearance. 2.Severe coronary artery disease. 3.Diminished attenuation of the blood pool suggesting anemia. Electronically Signed: Wilman Vivar MD  9/28/2024 11:07 PM EDT  Workstation ID: [id number]    XR Chest 1 View    Result Date: 9/28/2024  XR CHEST 1 VW Date of Exam: 9/28/2024 5:45 PM EDT Indication: sob Comparison: Chest AP dated 9/21/2022 Findings: There is consolidation in the right lung base. The left lung appears grossly clear. The cardiac mediastinal silhouettes appear normal. No effusion is seen.      Impression: Impression: Right basilar consolidation consistent with pneumonia versus atelectasis. A follow-up chest PA and lateral in 6 weeks is suggested to  reevaluate. Electronically Signed: Escobar David MD  9/28/2024 6:02 PM EDT  Workstation ID: [id number]     Results for orders placed during the hospital encounter of 09/07/16    Adult transthoracic echo complete    Interpretation Summary  · Left ventricular function is normal. Estimated EF = 60%.  · Mild mitral valve regurgitation is present  · Mild tricuspid valve regurgitation is present.      Current medications:  Scheduled Meds:apixaban, 5 mg, Oral, BID  atorvastatin, 80 mg, Oral, Daily  budesonide-formoterol, 2 puff, Inhalation, BID - RT   And  tiotropium bromide monohydrate, 2 puff, Inhalation, Daily - RT  cefepime, 2,000 mg, Intravenous, Q12H  gabapentin, 300 mg, Oral, Q12H  lactobacillus acidophilus, 1 capsule, Oral, Daily  methylPREDNISolone sodium succinate, 40 mg, Intravenous, Q12H  oxybutynin XL, 10 mg, Oral, Daily  sodium chloride, 10 mL, Intravenous, Q12H  vancomycin, 125 mg, Oral, Daily      Continuous Infusions:pharmacy consult - MTM,       PRN Meds:.  acetaminophen **OR** acetaminophen **OR** acetaminophen    HYDROcodone-acetaminophen    melatonin    ondansetron    pharmacy consult - MTM    sodium chloride    sodium chloride    Assessment & Plan   Assessment & Plan     Active Hospital Problems    Diagnosis  POA    **Sepsis due to pneumonia [J18.9, A41.9]  Yes    Pneumonia [J18.9]  Yes    COPD with acute exacerbation [J44.1]  Yes    CHRISTIANO (acute kidney injury) [N17.9]  Yes    Hypertensive disorder [I10]  Yes    HLD (hyperlipidemia) [E78.5]  Yes    Diarrhea [R19.7]  Yes    Encephalopathy [G93.40]  Yes    Chronic kidney disease, stage 4 (severe) [N18.4]  Yes    Acute and chronic respiratory failure with hypoxia [J96.21]  Yes    Iron deficiency anemia [D50.9]  Yes    History of stroke [Z86.73]  Not Applicable    Restless legs syndrome (RLS) [G25.81]  Yes    Anxiety [F41.9]  Yes    Lactic acidosis [E87.20]  Yes    Tobacco abuse [Z72.0]  Yes    Multiple sclerosis [G35]  Yes      Resolved Hospital Problems    No resolved problems to display.        Brief Hospital Course to date:  Leatha Wall is a 74 y.o. female w MS on chronic prednisone, COPD on chronic 3.5 liters n/c, CKDIIIb, h/o C diff per charting who presents with weakness and confusion from [facility]    Patient admitted to [facility] for COPD exacerbation 8/28-9/1, then back home, then to [facility] reportedly w pneumonia (records pending), then to [facility] and return here in less than 72 hours from that admission with confusion. PNA requiring intubation 12/2023    Sepsis 2/2 recurrent aspiration PNA - improved  -CT w chronic aspiration signs, concern for RLL pneumonia  -reviewed records from [facility] similar findings, treated as COPD exacerbation w doxy  -3rd respiratory admission in 1 month, do worry about aspiration as cuase of recurrent issues v severe COPD w back -to-back exacerbations  -IV rocephin, doxycycline given improvement  -PO prednisone transition today, would do longer taper back to home dose 9 mg daily given recurrent admissions  -flutter valve given weak cough  -resume azith thrice weekly at discharge    Acute on chronic hypoxic resp failure 2/2 above + COPD  -resolved to baseline 3.5 liters n/c o2 need    CHRISTIANO on CKDIIIb - resolved to baseline    Concern for oropharyngeal dysphagia  -speech OK for regular diet  -remain concern for intermittent aspiration and overall weakness    Acute on chronic debility   - on review of neurology notes 4/2024: left leg weakness chronic, 3/5 at that time, uses cane at baseline  - PT/OT, close monitoring, likely high dose recent steroids not helping. Wean as above    Acute on chronic anemia  -baseline ~10 per notes from [facility]  -on eliquis prior without indication (see below), now stopped. Significant superficial bruising/bleeding  -d/w nursing: no reported melena/bloody stools, monitor for this. Suspect 2/2 above superficial bleeding and will hopefully improve now that eliquis  "off    MS - prednisone taper as above for now, then back to home prednisone 9 mg daily  H/o C-diff - vancomycin ppx while on systemic abx  Active tobacco use - stopped x 3 weeks, encourage this    *Of note, patient was on eliquis on admission. I reviewed [facility] records - initial concern for Afib and was placed on eliquis for this reason. However, cardiology evaluated her there and wrote \"no A-fib, will sign off\" as they believed this to be nsr w PAC's. I agree with this. Therefore, STOP eliquis     Expected Discharge Location and Transportation: SNF  Expected Discharge 10/1 (Discharge date is tentative pending patient's medical condition and is subject to change)  Expected Discharge Date: 10/1/2024; Expected Discharge Time:      VTE Prophylaxis:  Pharmacologic & mechanical VTE prophylaxis orders are present.         AM-PAC 6 Clicks Score (PT): 17 (09/29/24 2000)    CODE STATUS:   Code Status and Medical Interventions: CPR (Attempt to Resuscitate); Full Support   Ordered at: 09/28/24 1948     Level Of Support Discussed With:    Patient     Code Status (Patient has no pulse and is not breathing):    CPR (Attempt to Resuscitate)     Medical Interventions (Patient has pulse or is breathing):    Full Support       Emilie Bautista MD  09/30/24      "

## 2024-09-30 NOTE — CASE MANAGEMENT/SOCIAL WORK
Discharge Planning Assessment  [facility]     Patient Name: Leatha Wall  MRN: [id number]  Today's Date: 9/30/2024    Admit Date: 9/28/2024    Plan: SNF   Discharge Needs Assessment       Row Name 09/30/24 1333       Living Environment    People in Home alone    Current Living Arrangements home    Potentially Unsafe Housing Conditions none    In the past 12 months has the electric, gas, oil, or water company threatened to shut off services in your home? No    Primary Care Provided by self    Provides Primary Care For no one    Family Caregiver if Needed none    Quality of Family Relationships unable to assess    Able to Return to Prior Arrangements yes       Resource/Environmental Concerns    Resource/Environmental Concerns none    Transportation Concerns none       Transportation Needs    In the past 12 months, has lack of transportation kept you from medical appointments or from getting medications? no    In the past 12 months, has lack of transportation kept you from meetings, work, or from getting things needed for daily living? No       Food Insecurity    Within the past 12 months, you worried that your food would run out before you got the money to buy more. Never true    Within the past 12 months, the food you bought just didn't last and you didn't have money to get more. Never true       Transition Planning    Patient/Family Anticipated Services at Transition skilled nursing    Transportation Anticipated health plan transportation       Discharge Needs Assessment    Readmission Within the Last 30 Days no previous admission in last 30 days    Equipment Currently Used at Home wheelchair;walker, rolling;commode;shower chair    Concerns to be Addressed discharge planning    Anticipated Changes Related to Illness none    Equipment Needed After Discharge none    Discharge Facility/Level of Care Needs nursing facility, skilled                   Discharge Plan       Row Name 09/30/24 0748       Plan    Plan  SNF    Patient/Family in Agreement with Plan yes    Plan Comments Met with Ms. Wall at the bedside to initiate discharge plan. She lives alone in a [facility] mobile home. It has 5 steps with 2 handrails. She is independent with activities of daily living at baseline. Her neighbor checks on her several times a day and transports to appointments. She uses a rolling walker for mobility. She also has a wheelchair, bedside commode, and a shower chair. Her primary care provider is Dr. Niharika Fermin. She is not current with home health or outpatient services at this time. She denies problems with accessing medical care or obtaining medication. She came to the hospital from [facility] and would like to return at discharge. Called [facility] liaison, and she reported that patient will need a new referral and insurance precert.  asked liaison to start insurance precert today.  will continue to follow plan of care and assist with discharge planning needs as indicated.    Final Discharge Disposition Code 03 - skilled nursing facility (SNF)                  Continued Care and Services - Admitted Since 9/28/2024    No active coordination exists for this encounter.       Expected Discharge Date and Time       Expected Discharge Date Expected Discharge Time    Oct 1, 2024            Demographic Summary       Row Name 09/30/24 1332       General Information    Admission Type inpatient    Arrived From emergency department    Referral Source admission list    Reason for Consult discharge planning    Preferred Language English       Contact Information    Permission Granted to Share Info With     Contact Information Comments Wall,Niharikajulian Mc [phone]        LUDWIN ARRIOLA Neighbor   [phone]                   Functional Status       Row Name 09/30/24 1332       Functional Status    Usual Activity Tolerance moderate    Current Activity Tolerance other (see  comments)  see PT notes       Physical Activity    On average, how many days per week do you engage in moderate to strenuous exercise (like a brisk walk)? 0 days    On average, how many minutes do you engage in exercise at this level? 0 min    Number of minutes of exercise per week 0       Assessment of Health Literacy    How often do you have someone help you read hospital materials? Never    How often do you have problems learning about your medical condition because of difficulty understanding written information? Never    How often do you have a problem understanding what is told to you about your medical condition? Never    How confident are you filling out medical forms by yourself? Quite a bit    Health Literacy Good       Functional Status, IADL    Medications independent    Meal Preparation independent    Housekeeping independent    Laundry independent    Shopping assistive person       Mental Status    General Appearance WDL WDL       Mental Status Summary    Recent Changes in Mental Status/Cognitive Functioning no changes                   Psychosocial    No documentation.                  Abuse/Neglect    No documentation.                  Legal    No documentation.                  Substance Abuse    No documentation.                  Patient Forms    No documentation.                     Arlen Hernandez RN

## 2024-09-30 NOTE — NURSING NOTE
WOC consulted for compression wraps.    Will consult PT to evaluate for compression wraps.    For nursing - Please remove wraps (POA) on legs so BLE can be assessed.     Pressure injury prevention protocol.    Sign off.    Demario Neal RN, BSN, CCRN, CWOCN  Wound, Ostomy and Continence (WOC) Department  [facility]    
Stable

## 2024-09-30 NOTE — MBS/VFSS/FEES
"Acute Care - Speech Language Pathology   Swallow Initial Evaluation [facility]  Modified Barium Swallow Study (MBS)       Patient Name: Leatha Wall  : 1950  MRN: [id number]  Today's Date: 2024               Admit Date: 2024    Visit Dx:     ICD-10-CM ICD-9-CM   1. Community acquired pneumonia, unspecified laterality  J18.9 486   2. Severe sepsis  A41.9 038.9    R65.20 995.92   3. Leukocytosis, unspecified type  D72.829 288.60   4. CHRISTIANO (acute kidney injury)  N17.9 584.9   5. Dysphagia, unspecified type  R13.10 787.20     Patient Active Problem List   Diagnosis    Multiple sclerosis    Periodic headache syndrome, not intractable    Hepatitis C antibody test positive    Lactic acidosis    Nausea vomiting and diarrhea    Colitis    Renal cyst    Tobacco abuse    Abdominal aortic aneurysm (AAA) without rupture    Chronic fatigue    Restless legs syndrome (RLS)    Spasticity    History of stroke    COPD with acute exacerbation    Sepsis due to pneumonia    CHRISTIANO (acute kidney injury)    Acute and chronic respiratory failure with hypoxia    Anxiety    Chronic kidney disease, stage 4 (severe)    Iron deficiency anemia    Hypertensive disorder    HLD (hyperlipidemia)    Diarrhea    Encephalopathy    Pneumonia     Past Medical History:   Diagnosis Date    Aneurysm of aorta     Anxiety and depression     Arthritis     Back disorder     \"PROTRUDING DISC\"    Bilateral cataracts     Chronic respiratory failure with hypoxia     Cognitive communication deficit     COPD (chronic obstructive pulmonary disease)     Dysphagia, oropharyngeal phase     WOLF (generalized anxiety disorder)     Heavy tobacco smoker     Hepatitis C     Hyperlipidemia     Hypertension     Idiopathic gout     Idiopathic gout, unspecified site     MS (multiple sclerosis)     Multiple sclerosis     Pneumonia     Sepsis, unspecified organism     Stroke     Unspecified sequelae of cerebral infarction     Urinary tract infection      Past " Surgical History:   Procedure Laterality Date    CHOLECYSTECTOMY      HYSTERECTOMY      SMALL INTESTINE SURGERY      As a child    TOE SURGERY Left     Great    TONSILLECTOMY      and Adnoids    TUBAL ABDOMINAL LIGATION         SLP Recommendation and Plan  SLP Swallowing Diagnosis: functional oral phase, functional pharyngeal phase (09/30/24 1105)  SLP Diet Recommendation: regular textures, thin liquids (09/30/24 1105)  Recommended Precautions and Strategies: general aspiration precautions, fatigue precautions (09/30/24 1105)  SLP Rec. for Method of Medication Administration: meds whole, with puree, as tolerated (09/30/24 1105)     Monitor for Signs of Aspiration: yes, notify SLP if any concerns (09/30/24 1105)     Swallow Criteria for Skilled Therapeutic Interventions Met: no problems identified which require skilled intervention (09/30/24 1105)  Anticipated Discharge Disposition (SLP): inpatient rehabilitation facility, No further SLP services warranted (09/30/24 1105)     Therapy Frequency (Swallow): evaluation only (09/30/24 1105)     Oral Care Recommendations: Oral Care BID/PRN, Toothbrush (09/30/24 1105)                                        Plan of Care Reviewed With: patient  Progress: no change      SWALLOW EVALUATION (Last 72 Hours)       SLP Adult Swallow Evaluation       Row Name 09/30/24 1105       Rehab Evaluation    Document Type evaluation  -CJ    Subjective Information no complaints  -CJ    Patient Observations alert;cooperative  -CJ    Patient/Family/Caregiver Comments/Observations no family present  -CJ    Patient Effort good  -CJ    Symptoms Noted During/After Treatment none  -CJ       General Information    Patient Profile Reviewed yes  -CJ    Pertinent History Of Current Problem see initial eval  -CJ    Current Method of Nutrition regular textures;thin liquids  -CJ    Precautions/Limitations, Vision WFL;for purposes of eval  -CJ    Precautions/Limitations, Hearing WFL;for purposes of eval  -CJ     Prior Level of Function-Communication WFL  -    Prior Level of Function-Swallowing safe, efficient swallowing in all situations  -    Plans/Goals Discussed with patient;agreed upon  -    Barriers to Rehab none identified  -    Patient's Goals for Discharge --       Pain    Additional Documentation Pain Scale: FACES Pre/Post-Treatment (Group)  -       Pain Scale: FACES Pre/Post-Treatment    Pain: FACES Scale, Pretreatment 0-->no hurt  -    Posttreatment Pain Rating 0-->no hurt  -    Pre/Posttreatment Pain Comment --       Oral Motor Structure and Function    Dentition Assessment upper dentures/partial in place  -    Mucosal Quality moist, healthy  -       Oral Musculature and Cranial Nerve Assessment    Oral Motor General Assessment [facility]  -       Clinical Swallow Eval    Oral Prep Phase --    Oral Transit --    Oral Residue --    Pharyngeal Phase --    Esophageal Phase --       Pharyngeal Phase Concerns    Pharyngeal Phase Concerns --    Pharyngeal Phase Concerns, Comment --       MBS/VFSS    Utensils Used spoon;cup;straw  -    Consistencies Trialed regular textures;thin liquids;pudding thick  -       MBS/VFSS Interpretation    Oral Prep Phase impaired oral phase of swallowing  -    Oral Transit Phase WFL  -    Oral Residue WF  -       Oral Preparatory Phase    Oral Preparatory Phase prolonged manipulation  -CJ    Prolonged Manipulation regular textures;other (see comments)  partially edentulous  -       Initiation of Pharyngeal Swallow    Initiation of Pharyngeal Swallow bolus in pyriform sinuses  -    Pharyngeal Phase functional pharyngeal phase of swallowing  -    Anatomical abnormalities noted prominent cricopharyngeous/ cricopharyngeal bar  -    Anatomical abnormalities functional impact no functional impact on swallowing  -    Rosenbek's Scale thin:;pudding/puree:;regular textures:;1--->level 1  -CJ    Pharyngeal Residue no significant pharyngeal residue noted  -CJ     Attempted Compensatory Maneuvers bolus size;bolus presentation style  -    Successful Compensatory Maneuver Competency patient able to;demonstrate compensations;independently  -    Pharyngeal Phase, Comment Grossly functional pharyngeal swallowing fnx. No penetration or aspiration evidenced across this evaluation. However pt is noted w/ congestive cough throughout evaluation but again NO aspiration evidenced. Noted prom CP but did not impact swallowing. Will advance to regular diet w/ thin liquids and sign off  -       SLP Evaluation Clinical Impression    SLP Swallowing Diagnosis functional oral phase;functional pharyngeal phase  -    Functional Impact no impact on function  -    Swallow Criteria for Skilled Therapeutic Interventions Met no problems identified which require skilled intervention  -       Recommendations    Therapy Frequency (Swallow) evaluation only  -    SLP Diet Recommendation regular textures;thin liquids  -    Recommended Diagnostics --    Recommended Precautions and Strategies general aspiration precautions;fatigue precautions  -    Oral Care Recommendations Oral Care BID/PRN;Toothbrush  -    SLP Rec. for Method of Medication Administration meds whole;with puree;as tolerated  -    Monitor for Signs of Aspiration yes;notify SLP if any concerns  -    Anticipated Discharge Disposition (SLP) inpatient rehabilitation facility;No further SLP services warranted  -              User Key  (r) = Recorded By, (t) = Taken By, (c) = Cosigned By      Initials Name Effective Dates    Mary Veronica, MS CCC-SLP 02/03/23 -     Lynda Crenshaw, MS CCC-SLP 06/03/24 -                     EDUCATION  The patient has been educated in the following areas:   Dysphagia (Swallowing Impairment) Oral Care/Hydration Modified Diet Instruction.                Time Calculation:    Time Calculation- SLP       Row Name 09/30/24 7711             Time Calculation- SLP    SLP Start Time 8142   -SUDHEER      SLP Received On 09/30/24  -         Untimed Charges    32997-[employer] Motion Fluoro Eval Swallow Minutes 54  -CJ         Total Minutes    Untimed Charges Total Minutes 54  -CJ       Total Minutes 54  -CJ                User Key  (r) = Recorded By, (t) = Taken By, (c) = Cosigned By      Initials Name Provider Type    Lynda Crenshaw MS CCC-SLP Speech and Language Pathologist                    Therapy Charges for Today       Code Description Service Date Service Provider Modifiers Qty    [id number]  ST MOTION FLUORO EVAL SWALLOW 4 9/30/2024 Lynda Xiao MS CCC-SLP GN 1                 Lynda Xiao MS CCC-SLP  9/30/2024

## 2024-09-30 NOTE — PLAN OF CARE
Goal Outcome Evaluation:  Plan of Care Reviewed With: patient           Outcome Evaluation: Pt presents below baseline with self care/mobility d/t weakness and decreased activity tolerance.  Pt min A LBD, CGA commode transfer,  CGA to ambulate with RW.  Pt with SOA with actiivty with O2 sat dropping to 86% and recovering to 96% with PLB and rest. Reommend SNF upon d/c.      Anticipated Discharge Disposition (OT): skilled nursing facility

## 2024-09-30 NOTE — THERAPY EVALUATION
"Patient Name: Leatha Wall  : 1950    MRN: [id number]                              Today's Date: 2024       Admit Date: 2024    Visit Dx:     ICD-10-CM ICD-9-CM   1. Community acquired pneumonia, unspecified laterality  J18.9 486   2. Severe sepsis  A41.9 038.9    R65.20 995.92   3. Leukocytosis, unspecified type  D72.829 288.60   4. CHRISTIANO (acute kidney injury)  N17.9 584.9   5. Dysphagia, unspecified type  R13.10 787.20     Patient Active Problem List   Diagnosis    Multiple sclerosis    Periodic headache syndrome, not intractable    Hepatitis C antibody test positive    Lactic acidosis    Nausea vomiting and diarrhea    Colitis    Renal cyst    Tobacco abuse    Abdominal aortic aneurysm (AAA) without rupture    Chronic fatigue    Restless legs syndrome (RLS)    Spasticity    History of stroke    COPD with acute exacerbation    Sepsis due to pneumonia    CHRISTIANO (acute kidney injury)    Acute and chronic respiratory failure with hypoxia    Anxiety    Chronic kidney disease, stage 4 (severe)    Iron deficiency anemia    Hypertensive disorder    HLD (hyperlipidemia)    Diarrhea    Encephalopathy    Pneumonia     Past Medical History:   Diagnosis Date    Aneurysm of aorta     Anxiety and depression     Arthritis     Back disorder     \"PROTRUDING DISC\"    Bilateral cataracts     Chronic respiratory failure with hypoxia     Cognitive communication deficit     COPD (chronic obstructive pulmonary disease)     Dysphagia, oropharyngeal phase     WOLF (generalized anxiety disorder)     Heavy tobacco smoker     Hepatitis C     Hyperlipidemia     Hypertension     Idiopathic gout     Idiopathic gout, unspecified site     MS (multiple sclerosis)     Multiple sclerosis     Pneumonia     Sepsis, unspecified organism     Stroke     Unspecified sequelae of cerebral infarction     Urinary tract infection      Past Surgical History:   Procedure Laterality Date    CHOLECYSTECTOMY      HYSTERECTOMY      SMALL INTESTINE " SURGERY      As a child    TOE SURGERY Left     Great    TONSILLECTOMY      and Adnoids    TUBAL ABDOMINAL LIGATION        General Information       Row Name 09/30/24 1515          OT Time and Intention    Document Type evaluation  -     Mode of Treatment occupational therapy  -       Row Name 09/30/24 1515          General Information    Patient Profile Reviewed yes  -AC     Prior Level of Function independent:;all household mobility;community mobility;ADL's  uses RW  -     Existing Precautions/Restrictions fall;oxygen therapy device and L/min;other (see comments)  -     Barriers to Rehab medically complex;previous functional deficit  -       Row Name 09/30/24 1515          Occupational Profile    Environmental Supports and Barriers (Occupational Profile) tub shower with shower seat  -       Row Name 09/30/24 1515          Living Environment    People in Home alone  -       Row Name 09/30/24 1515          Home Main Entrance    Number of Stairs, Main Entrance five  -     Stair Railings, Main Entrance railings on both sides of stairs  -       Row Name 09/30/24 1515          Cognition    Orientation Status (Cognition) oriented x 3  -       Row Name 09/30/24 1515          Safety Issues, Functional Mobility    Safety Issues Affecting Function (Mobility) insight into deficits/self-awareness;safety precaution awareness  -     Impairments Affecting Function (Mobility) balance;endurance/activity tolerance;shortness of breath;postural/trunk control;strength  -               User Key  (r) = Recorded By, (t) = Taken By, (c) = Cosigned By      Initials Name Provider Type    AC Amber Lazaro OT Occupational Therapist                     Mobility/ADL's       Row Name 09/30/24 1516          Bed Mobility    Bed Mobility supine-sit;sit-supine  -AC     Supine-Sit [place] (Bed Mobility) standby assist  -AC     Sit-Supine [place] (Bed Mobility) standby assist  -     Assistive Device (Bed Mobility)  bed rails;head of bed elevated  -       Row Name 09/30/24 1516          Transfers    Transfers sit-stand transfer;toilet transfer  -       Row Name 09/30/24 1516          Sit-Stand Transfer    Sit-Stand [place] (Transfers) contact guard;1 person assist;verbal cues  -     Assistive Device (Sit-Stand Transfers) walker, front-wheeled  -       Row Name 09/30/24 1516          Toilet Transfer    [place] Level (Toilet Transfer) contact guard;verbal cues  -     Assistive Device (Toilet Transfer) commode, bedside without drop arms;walker, front-wheeled  -AC       Row Name 09/30/24 1516          Functional Mobility    Functional Mobility- Ind. Level contact guard assist  -     Functional Mobility- Device walker, front-wheeled  -     Functional Mobility-Distance (Feet) 30  -       Row Name 09/30/24 1516          Activities of Daily Living    BADL Assessment/Intervention lower body dressing;toileting  -       Row Name 09/30/24 1516          Lower Body Dressing Assessment/Training    [place] Level (Lower Body Dressing) don;socks;minimum assist (75% patient effort)  -     Position (Lower Body Dressing) edge of bed sitting;unsupported sitting  -       Row Name 09/30/24 1516          Toileting Assessment/Training    [place] Level (Toileting) change pad/brief;adjust/manage clothing;set up;supervision  -     Assistive Devices (Toileting) commode, bedside without drop arms  -     Position (Toileting) unsupported sitting  -               User Key  (r) = Recorded By, (t) = Taken By, (c) = Cosigned By      Initials Name Provider Type    Amber Stephenson OT Occupational Therapist                   Obj/Interventions       Row Name 09/30/24 1518          Sensory Assessment (Somatosensory)    Sensory Assessment (Somatosensory) UE sensation intact  -       Row Name 09/30/24 1518          Vision Assessment/Intervention    Visual Impairment/Limitations corrective lenses for reading  -        Row Name 09/30/24 1518          Range of Motion Comprehensive    General Range of Motion bilateral upper extremity ROM WNL  -AC       Row Name 09/30/24 1518          Strength Comprehensive (MMT)    Comment, General Manual Muscle Testing (MMT) Assessment BUE grossly 4-/5  -AC       Row Name 09/30/24 1518          Balance    Balance Assessment sitting static balance;sitting dynamic balance;standing static balance;standing dynamic balance  -AC     Static Sitting Balance standby assist  -AC     Dynamic Sitting Balance standby assist  -AC     Position, Sitting Balance unsupported  -AC     Static Standing Balance contact guard  -AC     Dynamic Standing Balance contact guard  -AC     Position/Device Used, Standing Balance supported;walker, front-wheeled  -AC               User Key  (r) = Recorded By, (t) = Taken By, (c) = Cosigned By      Initials Name Provider Type    AC Amber Lazaro, OT Occupational Therapist                   Goals/Plan       [facility] Name 09/30/24 1522          Transfer Goal 1 (OT)    Activity/Assistive Device (Transfer Goal 1, OT) bed-to-chair/chair-to-bed;toilet;walker, rolling  -AC     [place] Level/Cues Needed (Transfer Goal 1, OT) standby assist  -AC     Time Frame (Transfer Goal 1, OT) long term goal (LTG);10 days  -AC     Progress/Outcome (Transfer Goal 1, OT) new goal;goal ongoing  -AC       Row Name 09/30/24 1522          Dressing Goal 1 (OT)    Activity/Device (Dressing Goal 1, OT) lower body dressing  -AC     [place]/Cues Needed (Dressing Goal 1, OT) set-up required;supervision required  -AC     Time Frame (Dressing Goal 1, OT) short term goal (STG);5 days  -AC     Progress/Outcome (Dressing Goal 1, OT) new goal;goal ongoing  -       Row Name 09/30/24 1522          Problem Specific Goal 1 (OT)    Problem Specific Goal 1 (OT) Pt will complete 10 min BUE TE/self care/mobility incorporating PLB, EC/WS with O2 sat 90% or better  -AC     Time Frame (Problem Specific Goal 1, OT) long term  goal (LTG);10 days  -     Progress/Outcome (Problem Specific Goal 1, OT) new goal;goal ongoing  -       Row Name 09/30/24 1522          Therapy Assessment/Plan (OT)    Planned Therapy Interventions (OT) activity tolerance training;BADL retraining;functional balance retraining;occupation/activity based interventions;patient/caregiver education/training;strengthening exercise;transfer/mobility retraining  -               User Key  (r) = Recorded By, (t) = Taken By, (c) = Cosigned By      Initials Name Provider Type    AC Amber Lazaro, OT Occupational Therapist                   Clinical Impression       Row Name 09/30/24 1519          Pain Assessment    Pretreatment Pain Rating 0/10 - no pain  -     Posttreatment Pain Rating 0/10 - no pain  -       Row Name 09/30/24 1519          Plan of Care Review    Plan of Care Reviewed With patient  -     Outcome Evaluation Pt presents below baseline with self care/mobility d/t weakness and decreased activity tolerance.  Pt min A LBD, CGA commode transfer,  CGA to ambulate with RW.  Pt with SOA with actiivty with O2 sat dropping to 86% and recovering to 96% with PLB and rest. Reommend SNF upon d/c.  -       Row Name 09/30/24 1519          Therapy Assessment/Plan (OT)    Patient/Family Therapy Goal Statement (OT) return to PLOF  -     Rehab Potential (OT) good, to achieve stated therapy goals  -     Criteria for Skilled Therapeutic Interventions Met (OT) yes;skilled treatment is necessary  -     Therapy Frequency (OT) daily  -       Row Name 09/30/24 1519          Therapy Plan Review/Discharge Plan (OT)    Anticipated Discharge Disposition (OT) skilled nursing facility  -       Row Name 09/30/24 1519          Vital Signs    Pre Systolic BP Rehab 135  -AC     Pre Treatment Diastolic BP 58  -AC     Pretreatment Heart Rate (beats/min) 73  -AC     Posttreatment Heart Rate (beats/min) 71  -AC     O2 Delivery Pre Treatment supplemental O2  -AC     Intra SpO2  (%) 86  -AC     O2 Delivery Intra Treatment supplemental O2  -AC     Post SpO2 (%) 96  -AC     O2 Delivery Post Treatment supplemental O2  -AC     Pre Patient Position Supine  -AC     Post Patient Position Supine  -AC       Row Name 09/30/24 1519          Positioning and Restraints    Pre-Treatment Position in bed  -AC     Post Treatment Position bed  -AC     In Bed notified nsg;fowlers;call light within reach;encouraged to call for assist;exit alarm on  -AC               User Key  (r) = Recorded By, (t) = Taken By, (c) = Cosigned By      Initials Name Provider Type    Amber Stephenson, OT Occupational Therapist                   Outcome Measures       Row Name 09/30/24 1524          How much help from another is currently needed...    Putting on and taking off regular lower body clothing? 3  -AC     Bathing (including washing, rinsing, and drying) 3  -AC     Toileting (which includes using toilet bed pan or urinal) 3  -AC     Putting on and taking off regular upper body clothing 4  -AC     Taking care of personal grooming (such as brushing teeth) 3  -AC     Eating meals 4  -AC     AM-PAC 6 Clicks Score (OT) 20  -AC       Row Name 09/30/24 1524          Functional Assessment    Outcome Measure Options AM-PAC 6 Clicks Daily Activity (OT)  -AC               User Key  (r) = Recorded By, (t) = Taken By, (c) = Cosigned By      Initials Name Provider Type    Amber Stephenson, FENG Occupational Therapist                    Occupational Therapy Education       Title: PT OT SLP Therapies (In Progress)       Topic: Occupational Therapy (In Progress)       Point: ADL training (In Progress)       Description:   Instruct learner(s) on proper safety adaptation and remediation techniques during self care or transfers.   Instruct in proper use of assistive devices.                  Learning Progress Summary             Patient Acceptance, E, NR by BETTIE at 9/30/2024 1524                         Point: Home exercise program (Not  Started)       Description:   Instruct learner(s) on appropriate technique for monitoring, assisting and/or progressing therapeutic exercises/activities.                  Learner Progress:  Not documented in this visit.              Point: Precautions (Not Started)       Description:   Instruct learner(s) on prescribed precautions during self-care and functional transfers.                  Learner Progress:  Not documented in this visit.              Point: Body mechanics (Not Started)       Description:   Instruct learner(s) on proper positioning and spine alignment during self-care, functional mobility activities and/or exercises.                  Learner Progress:  Not documented in this visit.                              User Key       Initials Effective Dates Name Provider Type Discipline     02/03/23 -  Amber Lazaro, OT Occupational Therapist OT                  OT Recommendation and Plan  Planned Therapy Interventions (OT): activity tolerance training, BADL retraining, functional balance retraining, occupation/activity based interventions, patient/caregiver education/training, strengthening exercise, transfer/mobility retraining  Therapy Frequency (OT): daily  Plan of Care Review  Plan of Care Reviewed With: patient  Outcome Evaluation: Pt presents below baseline with self care/mobility d/t weakness and decreased activity tolerance.  Pt min A LBD, CGA commode transfer,  CGA to ambulate with RW.  Pt with SOA with actiivty with O2 sat dropping to 86% and recovering to 96% with PLB and rest. Reommend SNF upon d/c.     Time Calculation:   Evaluation Complexity (OT)  Review Occupational Profile/Medical/Therapy History Complexity: brief/low complexity  Assessment, Occupational Performance/Identification of Deficit Complexity: 1-3 performance deficits  Clinical Decision Making Complexity (OT): problem focused assessment/low complexity  Overall Complexity of Evaluation (OT): low complexity     Time Calculation-  OT       Row Name 09/30/24 1430             Time Calculation- OT    OT Start Time 1430  -AC      OT Received On 09/30/24  -AC      OT Goal Re-Cert Due Date 10/10/24  -AC         Untimed Charges    OT Eval/Re-eval Minutes 50  -AC         Total Minutes    Untimed Charges Total Minutes 50  -AC       Total Minutes 50  -AC                User Key  (r) = Recorded By, (t) = Taken By, (c) = Cosigned By      Initials Name Provider Type    AC Amber Lazaro, OT Occupational Therapist                  Therapy Charges for Today       Code Description Service Date Service Provider Modifiers Qty    [id number]  OT EVAL LOW COMPLEXITY 4 9/30/2024 Amber Lazaro OT GO 1                 Amber Lazaro OT  9/30/2024

## 2024-10-01 LAB
ANION GAP SERPL CALCULATED.3IONS-SCNC: 8 MMOL/L (ref 5–15)
BUN SERPL-MCNC: 43 MG/DL (ref 8–23)
BUN/CREAT SERPL: 31.9 (ref 7–25)
CALCIUM SPEC-SCNC: 8.2 MG/DL (ref 8.6–10.5)
CHLORIDE SERPL-SCNC: 108 MMOL/L (ref 98–107)
CO2 SERPL-SCNC: 24 MMOL/L (ref 22–29)
CREAT SERPL-MCNC: 1.35 MG/DL (ref 0.57–1)
DEPRECATED RDW RBC AUTO: 55.3 FL (ref 37–54)
EGFRCR SERPLBLD CKD-EPI 2021: 41.8 ML/MIN/1.73
ERYTHROCYTE [DISTWIDTH] IN BLOOD BY AUTOMATED COUNT: 16.2 % (ref 12.3–15.4)
GLUCOSE SERPL-MCNC: 149 MG/DL (ref 65–99)
HCT VFR BLD AUTO: 25.4 % (ref 34–46.6)
HGB BLD-MCNC: 8 G/DL (ref 12–15.9)
MCH RBC QN AUTO: 29.6 PG (ref 26.6–33)
MCHC RBC AUTO-ENTMCNC: 31.5 G/DL (ref 31.5–35.7)
MCV RBC AUTO: 94.1 FL (ref 79–97)
PLATELET # BLD AUTO: 151 10*3/MM3 (ref 140–450)
PMV BLD AUTO: 10.8 FL (ref 6–12)
POTASSIUM SERPL-SCNC: 4.6 MMOL/L (ref 3.5–5.2)
RBC # BLD AUTO: 2.7 10*6/MM3 (ref 3.77–5.28)
SODIUM SERPL-SCNC: 140 MMOL/L (ref 136–145)
WBC NRBC COR # BLD AUTO: 14.7 10*3/MM3 (ref 3.4–10.8)

## 2024-10-01 PROCEDURE — 94799 UNLISTED PULMONARY SVC/PX: CPT

## 2024-10-01 PROCEDURE — 85027 COMPLETE CBC AUTOMATED: CPT | Performed by: INTERNAL MEDICINE

## 2024-10-01 PROCEDURE — 94761 N-INVAS EAR/PLS OXIMETRY MLT: CPT

## 2024-10-01 PROCEDURE — 29581 APPL MULTLAYER CMPRN SYS LEG: CPT

## 2024-10-01 PROCEDURE — 94664 DEMO&/EVAL PT USE INHALER: CPT

## 2024-10-01 PROCEDURE — 97116 GAIT TRAINING THERAPY: CPT

## 2024-10-01 PROCEDURE — 80048 BASIC METABOLIC PNL TOTAL CA: CPT | Performed by: INTERNAL MEDICINE

## 2024-10-01 PROCEDURE — 25010000002 CEFTRIAXONE PER 250 MG: Performed by: INTERNAL MEDICINE

## 2024-10-01 PROCEDURE — 99232 SBSQ HOSP IP/OBS MODERATE 35: CPT | Performed by: INTERNAL MEDICINE

## 2024-10-01 PROCEDURE — 97110 THERAPEUTIC EXERCISES: CPT

## 2024-10-01 PROCEDURE — 63710000001 PREDNISONE PER 1 MG: Performed by: INTERNAL MEDICINE

## 2024-10-01 PROCEDURE — 97164 PT RE-EVAL EST PLAN CARE: CPT

## 2024-10-01 RX ADMIN — GABAPENTIN 300 MG: 300 CAPSULE ORAL at 09:47

## 2024-10-01 RX ADMIN — VANCOMYCIN HYDROCHLORIDE 125 MG: 125 CAPSULE ORAL at 09:48

## 2024-10-01 RX ADMIN — Medication 10 ML: at 20:28

## 2024-10-01 RX ADMIN — TIOTROPIUM BROMIDE INHALATION SPRAY 2 PUFF: 3.12 SPRAY, METERED RESPIRATORY (INHALATION) at 09:56

## 2024-10-01 RX ADMIN — GABAPENTIN 300 MG: 300 CAPSULE ORAL at 20:28

## 2024-10-01 RX ADMIN — OXYBUTYNIN CHLORIDE 10 MG: 10 TABLET, EXTENDED RELEASE ORAL at 09:47

## 2024-10-01 RX ADMIN — SODIUM CHLORIDE 2000 MG: 900 INJECTION INTRAVENOUS at 17:14

## 2024-10-01 RX ADMIN — HYDROCODONE BITARTRATE AND ACETAMINOPHEN 1 TABLET: 10; 325 TABLET ORAL at 22:11

## 2024-10-01 RX ADMIN — BUDESONIDE AND FORMOTEROL FUMARATE DIHYDRATE 2 PUFF: 160; 4.5 AEROSOL RESPIRATORY (INHALATION) at 09:56

## 2024-10-01 RX ADMIN — Medication 1 CAPSULE: at 09:47

## 2024-10-01 RX ADMIN — HYDROCODONE BITARTRATE AND ACETAMINOPHEN 1 TABLET: 10; 325 TABLET ORAL at 10:01

## 2024-10-01 RX ADMIN — DOXYCYCLINE 100 MG: 100 CAPSULE ORAL at 09:47

## 2024-10-01 RX ADMIN — Medication 10 ML: at 09:48

## 2024-10-01 RX ADMIN — DOXYCYCLINE 100 MG: 100 CAPSULE ORAL at 20:28

## 2024-10-01 RX ADMIN — PREDNISONE 40 MG: 20 TABLET ORAL at 09:47

## 2024-10-01 RX ADMIN — BUDESONIDE AND FORMOTEROL FUMARATE DIHYDRATE 2 PUFF: 160; 4.5 AEROSOL RESPIRATORY (INHALATION) at 19:02

## 2024-10-01 RX ADMIN — ATORVASTATIN CALCIUM 80 MG: 40 TABLET, FILM COATED ORAL at 09:47

## 2024-10-01 NOTE — PLAN OF CARE
Goal Outcome Evaluation:  Plan of Care Reviewed With: patient        Progress: no change  Outcome Evaluation: PT wound eval complete. Patient with moderate edema in B LE. Patient would benefit from MLW to improve venous return and manage BLE edema/ girth. PT provided education about wraps and signs/symptoms to watch for if wraps too tight. PT also provided education about skin integrity. PT will check back in a couple of days to monitor skin and wraps

## 2024-10-01 NOTE — THERAPY TREATMENT NOTE
"Patient Name: Leatha Wall  : 1950    MRN: [id number]                              Today's Date: 10/1/2024       Admit Date: 2024    Visit Dx:     ICD-10-CM ICD-9-CM   1. Community acquired pneumonia, unspecified laterality  J18.9 486   2. Severe sepsis  A41.9 038.9    R65.20 995.92   3. Leukocytosis, unspecified type  D72.829 288.60   4. CHRISTIANO (acute kidney injury)  N17.9 584.9   5. Dysphagia, unspecified type  R13.10 787.20     Patient Active Problem List   Diagnosis    Multiple sclerosis    Periodic headache syndrome, not intractable    Hepatitis C antibody test positive    Lactic acidosis    Nausea vomiting and diarrhea    Colitis    Renal cyst    Tobacco abuse    Abdominal aortic aneurysm (AAA) without rupture    Chronic fatigue    Restless legs syndrome (RLS)    Spasticity    History of stroke    COPD with acute exacerbation    Sepsis due to pneumonia    CHRISTIANO (acute kidney injury)    Acute and chronic respiratory failure with hypoxia    Anxiety    Chronic kidney disease, stage 4 (severe)    Iron deficiency anemia    Hypertensive disorder    HLD (hyperlipidemia)    Diarrhea    Encephalopathy    Pneumonia     Past Medical History:   Diagnosis Date    Aneurysm of aorta     Anxiety and depression     Arthritis     Back disorder     \"PROTRUDING DISC\"    Bilateral cataracts     Chronic respiratory failure with hypoxia     Cognitive communication deficit     COPD (chronic obstructive pulmonary disease)     Dysphagia, oropharyngeal phase     WOLF (generalized anxiety disorder)     Heavy tobacco smoker     Hepatitis C     Hyperlipidemia     Hypertension     Idiopathic gout     Idiopathic gout, unspecified site     MS (multiple sclerosis)     Multiple sclerosis     Pneumonia     Sepsis, unspecified organism     Stroke     Unspecified sequelae of cerebral infarction     Urinary tract infection      Past Surgical History:   Procedure Laterality Date    CHOLECYSTECTOMY      HYSTERECTOMY      SMALL INTESTINE " SURGERY      As a child    TOE SURGERY Left     Great    TONSILLECTOMY      and Adnoids    TUBAL ABDOMINAL LIGATION        General Information       Row Name 10/01/24 1042          Physical Therapy Time and Intention    Document Type therapy note (daily note)  -LR     Mode of Treatment physical therapy;individual therapy  -       Row Name 10/01/24 1042          General Information    Patient Profile Reviewed yes  -LR     Existing Precautions/Restrictions fall;oxygen therapy device and L/min  -LR     Barriers to Rehab medically complex;previous functional deficit  -LR       Row Name 10/01/24 1042          Cognition    Orientation Status (Cognition) oriented x 4  -LR       Row Name 10/01/24 1042          Safety Issues, Functional Mobility    Safety Issues Affecting Function (Mobility) safety precautions follow-through/compliance;safety precaution awareness  -LR     Impairments Affecting Function (Mobility) balance;endurance/activity tolerance;shortness of breath;strength;pain  -LR               User Key  (r) = Recorded By, (t) = Taken By, (c) = Cosigned By      Initials Name Provider Type    LR Nasreen Vicente, PT Physical Therapist                   Mobility       Row Name 10/01/24 1042          Bed Mobility    Bed Mobility supine-sit;sit-supine  -LR     Supine-Sit [place] (Bed Mobility) verbal cues;contact guard  -LR     Sit-Supine [place] (Bed Mobility) verbal cues;contact guard  -LR     Assistive Device (Bed Mobility) head of bed elevated;bed rails  -LR     Comment, (Bed Mobility) Verbal cues for correct technique for t/f to and from EOB. Denied dizziness upon sitting up.  -       Row Name 10/01/24 1042          Transfers    Comment, (Transfers) Verbal cues for correct hand placement with t/f and sequencing of steps to t/f to BSC.  -       Row Name 10/01/24 1042          Bed-Chair Transfer    Bed-Chair [place] (Transfers) not tested  -       Row Name 10/01/24 1042          Sit-Stand  Transfer    Sit-Stand [place] (Transfers) verbal cues;contact guard  -LR     Assistive Device (Sit-Stand Transfers) walker, 4-wheeled  -LR       Row Name 10/01/24 1042          Gait/Stairs (Locomotion)    [place] Level (Gait) verbal cues;minimum assist (75% patient effort)  -LR     Assistive Device (Gait) walker, front-wheeled  -LR     Distance in Feet (Gait) 20  -LR     Deviations/Abnormal Patterns (Gait) bilateral deviations;aris decreased;gait speed decreased;stride length decreased  -LR     Bilateral Gait Deviations forward flexed posture;heel strike decreased  -LR     [place] Level (Stairs) not tested  -LR     Comment, (Gait/Stairs) Patient ambulated with step to gait pattern at slow pace with forward flexed posture. Verbal cues for upright posture, increased LE weight bearing, decreased UE weight bearing, and increased step length. Improved slightly with cues for correction. Cues for PLB. Gait limited by fatigue, weakness, and SOA. O2 sats down to 87% on supplemental O2.  -LR               User Key  (r) = Recorded By, (t) = Taken By, (c) = Cosigned By      Initials Name Provider Type    LR Nasreen Vicente, PT Physical Therapist                   Obj/Interventions       Row Name 10/01/24 1042          Motor Skills    Therapeutic Exercise ankle;knee;hip;other (see comments)  cues for technique, no assist required, required a few brief rest breaks d/t SOA, O2 sats remained >92% with ther ex  -       Row Name 10/01/24 1042          Hip (Therapeutic Exercise)    Hip (Therapeutic Exercise) AROM (active range of motion);strengthening exercise;isometric exercises  -     Hip AROM (Therapeutic Exercise) bilateral;external rotation;internal rotation;supine;10 repetitions  -LR     Hip Isometrics (Therapeutic Exercise) bilateral;gluteal sets;supine;10 repetitions  -LR     Hip Strengthening (Therapeutic Exercise) bilateral;heel slides;aBduction;supine;10 repetitions  -       Row Name 10/01/24  1042          Knee (Therapeutic Exercise)    Knee (Therapeutic Exercise) strengthening exercise;isometric exercises  -LR     Knee Isometrics (Therapeutic Exercise) bilateral;quad sets;supine;10 repetitions  -LR     Knee Strengthening (Therapeutic Exercise) bilateral;SLR (straight leg raise);supine;10 repetitions;SAQ (short arc quad)  -LR       Row Name 10/01/24 1042          Ankle (Therapeutic Exercise)    Ankle (Therapeutic Exercise) AROM (active range of motion)  -LR     Ankle AROM (Therapeutic Exercise) bilateral;dorsiflexion;plantarflexion;supine;10 repetitions  -LR       Row Name 10/01/24 1042          Balance    Balance Assessment sitting static balance;sitting dynamic balance;standing static balance;standing dynamic balance  -LR     Static Sitting Balance standby assist  -LR     Dynamic Sitting Balance standby assist  -LR     Position, Sitting Balance unsupported;sitting edge of bed  -LR     Static Standing Balance contact guard  -LR     Dynamic Standing Balance contact guard  -LR     Position/Device Used, Standing Balance supported;walker, rolling  -LR               User Key  (r) = Recorded By, (t) = Taken By, (c) = Cosigned By      Initials Name Provider Type    LR Nasreen Vicente, PT Physical Therapist                   Goals/Plan       Row Name 10/01/24 1042          Bed Mobility Goal 1 (PT)    Activity/Assistive Device (Bed Mobility Goal 1, PT) sit to supine/supine to sit  -LR     [place] Level/Cues Needed (Bed Mobility Goal 1, PT) modified independence  -LR     Time Frame (Bed Mobility Goal 1, PT) short term goal (STG);5 days  -LR     Progress/Outcomes (Bed Mobility Goal 1, PT) goal ongoing;continuing progress toward goal  -LR       Row Name 10/01/24 1042          Transfer Goal 1 (PT)    Activity/Assistive Device (Transfer Goal 1, PT) sit-to-stand/stand-to-sit;bed-to-chair/chair-to-bed;walker, rolling  -LR     [place] Level/Cues Needed (Transfer Goal 1, PT) modified independence  -LR      Time Frame (Transfer Goal 1, PT) long term goal (LTG);10 days  -LR     Progress/Outcome (Transfer Goal 1, PT) continuing progress toward goal;goal ongoing  -LR       Row Name 10/01/24 1042          Gait Training Goal 1 (PT)    Activity/Assistive Device (Gait Training Goal 1, PT) gait (walking locomotion);assistive device use  -LR     [place] Level (Gait Training Goal 1, PT) standby assist  -LR     Distance (Gait Training Goal 1, PT) 100ft  -LR     Time Frame (Gait Training Goal 1, PT) long term goal (LTG);10 days  -LR     Progress/Outcome (Gait Training Goal 1, PT) progress slower than expected;goal ongoing  -LR               User Key  (r) = Recorded By, (t) = Taken By, (c) = Cosigned By      Initials Name Provider Type    LR Nasreen Vicente, PT Physical Therapist                   Clinical Impression       Row Name 10/01/24 1042          Pain    Pretreatment Pain Rating 5/10  -LR     Posttreatment Pain Rating 5/10  -LR     Pain Location - Side/Orientation Bilateral  -LR     Pain Location lower  -LR     Pain Location - back  -LR     Pain Intervention(s) Ambulation/increased activity;Repositioned  -LR       Row Name 10/01/24 1042          Plan of Care Review    Plan of Care Reviewed With patient  -LR     Progress improving  -LR     Outcome Evaluation Patient ambulated 20 feet with RW, CGA, step to gait pattern, limited by fatigue, weakness, and SOA. Improved tolerance to activity but continues to desat and become SOA with activity. Good effort with LE ther ex for strengthening. Encouraged patient to start ambulating to and from the bathroom with nursing vs using BSC at bedside for increased activity throughout the day. Patient currently below baseline functioning, demonstrating decreased functional mobility status, impaired balance, decreased endurance, and decreased strength. Will address these deficits to promote return to PLOF. Recommend SNF at d/c.  -LR       Row Name 10/01/24 1042          Therapy  Assessment/Plan (PT)    Rehab Potential (PT) fair, will monitor progress closely  -LR     Criteria for Skilled Interventions Met (PT) yes;meets criteria;skilled treatment is necessary  -LR     Therapy Frequency (PT) daily  -LR       Row Name 10/01/24 1042          Vital Signs    Pre Systolic BP Rehab 150  -LR     Pre Treatment Diastolic BP 68  -LR     Pretreatment Heart Rate (beats/min) 71  -LR     Posttreatment Heart Rate (beats/min) 78  -LR     Pre SpO2 (%) 94  -LR     O2 Delivery Pre Treatment supplemental O2  -LR     Intra SpO2 (%) 87  -LR     O2 Delivery Intra Treatment supplemental O2  -LR     Post SpO2 (%) 93  -LR     O2 Delivery Post Treatment supplemental O2  -LR     Pre Patient Position Supine  -LR     Intra Patient Position Sitting  -LR     Post Patient Position Sitting  -LR       Row Name 10/01/24 1042          Positioning and Restraints    Pre-Treatment Position in bed  -LR     Post Treatment Position bed  -LR     In Bed notified nsg;supine;call light within reach;encouraged to call for assist;exit alarm on;side rails up x2  patient declined t/f to chair d/t not sleeping well overnight  -LR               User Key  (r) = Recorded By, (t) = Taken By, (c) = Cosigned By      Initials Name Provider Type    LR Nasreen Vicente, PT Physical Therapist                   Outcome Measures       Row Name 10/01/24 1042 10/01/24 1005       How much help from another person do you currently need...    Turning from your back to your side while in flat bed without using bedrails? 3  -LR 3  -AM    Moving from lying on back to sitting on the side of a flat bed without bedrails? 3  -LR 3  -AM    Moving to and from a bed to a chair (including a wheelchair)? 3  -LR 3  -AM    Standing up from a chair using your arms (e.g., wheelchair, bedside chair)? 3  -LR 3  -AM    Climbing 3-5 steps with a railing? 2  -LR 2  -AM    To walk in hospital room? 3  -LR 3  -AM    AM-PAC 6 Clicks Score (PT) 17  -LR 17  -AM    Highest Level  of Mobility Goal 5 --> Static standing  -LR 5 --> Static standing  -AM      Row Name 10/01/24 1042          Functional Assessment    Outcome Measure Options AM-PAC 6 Clicks Basic Mobility (PT)  -LR               User Key  (r) = Recorded By, (t) = Taken By, (c) = Cosigned By      Initials Name Provider Type    LR Nasreen Vicente, PT Physical Therapist    Eric Amezcua RN Registered Nurse                                 Physical Therapy Education       Title: PT OT SLP Therapies (In Progress)       Topic: Physical Therapy (Done)       Point: Mobility training (Done)       Learning Progress Summary             Patient Acceptance, E,D, VU,NR by LR at 10/1/2024 1042    Comment: Educated on benefits of mobility and being OOB, safety with mobility, PLB, LE HEP, correct supine<->sit t/f technique, correct sit<->stand t/f technique, correct gait mechanics, and progression of POC.    Acceptance, E, NR by AE at 9/29/2024 1446                         Point: Home exercise program (Done)       Learning Progress Summary             Patient Acceptance, E,D, VU,NR by LR at 10/1/2024 1042    Comment: Educated on benefits of mobility and being OOB, safety with mobility, PLB, LE HEP, correct supine<->sit t/f technique, correct sit<->stand t/f technique, correct gait mechanics, and progression of POC.                         Point: Body mechanics (Done)       Learning Progress Summary             Patient Acceptance, E,D, VU,NR by LR at 10/1/2024 1042    Comment: Educated on benefits of mobility and being OOB, safety with mobility, PLB, LE HEP, correct supine<->sit t/f technique, correct sit<->stand t/f technique, correct gait mechanics, and progression of POC.    Acceptance, E, NR by AE at 9/29/2024 1446                         Point: Precautions (Done)       Learning Progress Summary             Patient Acceptance, E,D, VU,NR by LR at 10/1/2024 1042    Comment: Educated on benefits of mobility and being OOB, safety with  mobility, PLB, LE HEP, correct supine<->sit t/f technique, correct sit<->stand t/f technique, correct gait mechanics, and progression of POC.    Acceptance, E, NR by AE at 9/29/2024 1446                                         User Key       Initials Effective Dates Name Provider Type Discipline    LR 02/03/23 -  Nasreen Vicente, PT Physical Therapist PT    AE 09/21/21 -  Sanjeev Gomez, PT Physical Therapist PT                  PT Recommendation and Plan     Plan of Care Reviewed With: patient  Progress: improving  Outcome Evaluation: Patient ambulated 20 feet with RW, CGA, step to gait pattern, limited by fatigue, weakness, and SOA. Improved tolerance to activity but continues to desat and become SOA with activity. Good effort with LE ther ex for strengthening. Encouraged patient to start ambulating to and from the bathroom with nursing vs using BSC at bedside for increased activity throughout the day. Patient currently below baseline functioning, demonstrating decreased functional mobility status, impaired balance, decreased endurance, and decreased strength. Will address these deficits to promote return to PLOF. Recommend SNF at d/c.     Time Calculation:         PT Charges       Row Name 10/01/24 1042             Time Calculation    Start Time 1042  -LR      PT Received On 10/01/24  -LR      PT Goal Re-Cert Due Date 10/09/24  -LR         Timed Charges    67135 - PT Therapeutic Exercise Minutes 10  -LR      66441 - Gait Training Minutes  10  -LR      35066 - PT Therapeutic Activity Minutes 8  -LR         Total Minutes    Timed Charges Total Minutes 28  -LR       Total Minutes 28  -LR                User Key  (r) = Recorded By, (t) = Taken By, (c) = Cosigned By      Initials Name Provider Type    LR Nasreen Vicente, PT Physical Therapist                  Therapy Charges for Today       Code Description Service Date Service Provider Modifiers Qty    96488759457 HC PT THER PROC EA 15 MIN 10/1/2024  Nasreen Vicente, PT GP 1    75494682494 HC GAIT TRAINING EA 15 MIN 10/1/2024 Nasreen Vicente, PT GP 1            PT G-Codes  Outcome Measure Options: AM-PAC 6 Clicks Basic Mobility (PT)  AM-PAC 6 Clicks Score (PT): 17  AM-PAC 6 Clicks Score (OT): 20  PT Discharge Summary  Anticipated Discharge Disposition (PT): skilled nursing facility    Nasreen Vicente, PT  10/1/2024

## 2024-10-01 NOTE — PLAN OF CARE
Goal Outcome Evaluation:  Plan of Care Reviewed With: patient        Progress: improving  Outcome Evaluation: Patient ambulated 20 feet with RW, CGA, step to gait pattern, limited by fatigue, weakness, and SOA. Improved tolerance to activity but continues to desat and become SOA with activity. Good effort with LE ther ex for strengthening. Encouraged patient to start ambulating to and from the bathroom with nursing vs using BSC at bedside for increased activity throughout the day. Patient currently below baseline functioning, demonstrating decreased functional mobility status, impaired balance, decreased endurance, and decreased strength. Will address these deficits to promote return to PLOF. Recommend SNF at d/c.      Anticipated Discharge Disposition (PT): skilled nursing facility

## 2024-10-01 NOTE — PROGRESS NOTES
[facility] Medicine Services  PROGRESS NOTE    Patient Name: Leatha Wall  : 1950  MRN: [id number]    Date of Admission: 2024  Primary Care Physician: Niharika Fermin MD    Subjective   Subjective     CC:  Resp failure    HPI:  No acute events. States she feels ok. Breathing is around her baseline.       Objective   Objective     Vital Signs:   Temp:  [97.7 °F (36.5 °C)-98.1 °F (36.7 °C)] 98.1 °F (36.7 °C)  Heart Rate:  [62-73] 73  Resp:  [18] 18  BP: (150-168)/(66-73) 150/68  Flow (L/min):  [3] 3     Physical Exam:  Constitutional: No acute distress, awake, alert; chronically ill   Respiratory: Coarse; + rhonchi   Cardiovascular: RRR, no murmurs  Gastrointestinal: Positive bowel sounds, soft, nontender, nondistended  Musculoskeletal: No bilateral ankle edema  Psychiatric: Appropriate affect, cooperative  Neurologic: Oriented x 3, strength symmetric in all extremities, Cranial Nerves grossly intact to confrontation, speech clear      Results Reviewed:  LAB RESULTS:      Lab 10/01/24  0345 24  0347 24  0744 24  0451 24  2251 24  1745   WBC 14.70* 17.88*  --  25.69*  --  26.02*   HEMOGLOBIN 8.0* 8.2*  --  10.2*  --  11.5*   HEMATOCRIT 25.4* 25.1*  --  30.7*  --  36.1   PLATELETS 151 143  --  157  --  205   NEUTROS ABS  --  17.05*  --  24.77*  --  23.90*   IMMATURE GRANS (ABS)  --  0.14*  --  0.33*  --  0.26*   LYMPHS ABS  --  0.38*  --  0.45*  --  1.01   MONOS ABS  --  0.29  --  0.11  --  0.81   EOS ABS  --  0.00  --  0.00  --  0.00   MCV 94.1 92.3  --  92.2  --  94.3   PROCALCITONIN  --   --   --   --   --  61.99*   LACTATE  --   --  2.0 4.9* 3.3* 4.3*   HSTROP T  --   --   --   --   --  50*         Lab 10/01/24  0345 24  03424  0452 24  1745   SODIUM 140 138 138 136   POTASSIUM 4.6 4.7 5.0 4.7   CHLORIDE 108* 105 103 95*   CO2 24.0 22.0 18.0* 26.0   ANION GAP 8.0 11.0 17.0* 15.0   BUN 43* 38* 38* 45*   CREATININE 1.35* 1.31*  1.66* 2.22*   EGFR 41.8* 43.4* 32.6* 23.0*   GLUCOSE 149* 182* 155* 148*   CALCIUM 8.2* 8.1* 7.8* 8.4*   MAGNESIUM  --   --   --  3.0*         Lab 09/29/24  0452 09/28/24  1745   TOTAL PROTEIN 5.5* 6.1   ALBUMIN 3.0* 3.5   GLOBULIN 2.5 2.6   ALT (SGPT) 33 41*   AST (SGOT) 18 19   BILIRUBIN 0.5 0.6   ALK PHOS 140* 168*         Lab 09/28/24  1745   PROBNP 464.7   HSTROP T 50*                 Lab 09/28/24  1800   PH, ARTERIAL 7.446   PCO2, ARTERIAL 37.4   PO2 ART 62.4*   FIO2 32   HCO3 ART 25.7   BASE EXCESS ART 1.7   CARBOXYHEMOGLOBIN 1.5     Brief Urine Lab Results  (Last result in the past 365 days)        Color   Clarity   Blood   Leuk Est   Nitrite   Protein   CREAT   Urine HCG        09/28/24 1848 Dark Yellow   Clear   Negative   Trace   Negative   Trace                   Microbiology Results Abnormal       Procedure Component Value - Date/Time    Blood Culture - Blood, Arm, Left [[id number]]  (Normal) Collected: 09/28/24 1745    Lab Status: Preliminary result Specimen: Blood from Arm, Left Updated: 09/30/24 1831     Blood Culture No growth at 2 days    Blood Culture - Blood, Arm, Right [[id number]]  (Normal) Collected: 09/28/24 1750    Lab Status: Preliminary result Specimen: Blood from Arm, Right Updated: 09/30/24 1831     Blood Culture No growth at 2 days    S. Pneumo Ag Urine or CSF - Urine, Urine, Clean Catch [[id number]]  (Normal) Collected: 09/29/24 1003    Lab Status: Final result Specimen: Urine, Clean Catch Updated: 09/30/24 0706     Strep Pneumo Ag Negative    MRSA Screen, PCR (Inpatient) - Swab, Nares [[id number]]  (Normal) Collected: 09/28/24 2251    Lab Status: Final result Specimen: Swab from Nares Updated: 09/29/24 0757     MRSA PCR Negative    Narrative:      The negative predictive value of this diagnostic test is high and should only be used to consider de-escalating anti-MRSA therapy. A positive result may indicate colonization with MRSA and must be correlated clinically.  MRSA Negative     Respiratory Panel PCR w/COVID-19(SARS-CoV-2) DANAE/OFELIA/PREET/PAD/COR/BELLA In-House, NP Swab in UTM/VTM, 2 HR TAT - Swab, Nasopharynx [[id number]]  (Normal) Collected: 09/28/24 1746    Lab Status: Final result Specimen: Swab from Nasopharynx Updated: 09/28/24 9912     ADENOVIRUS, PCR Not Detected     Coronavirus 229E Not Detected     Coronavirus HKU1 Not Detected     Coronavirus NL63 Not Detected     Coronavirus OC43 Not Detected     COVID19 Not Detected     Human Metapneumovirus Not Detected     Human Rhinovirus/Enterovirus Not Detected     Influenza A PCR Not Detected     Influenza B PCR Not Detected     Parainfluenza Virus 1 Not Detected     Parainfluenza Virus 2 Not Detected     Parainfluenza Virus 3 Not Detected     Parainfluenza Virus 4 Not Detected     RSV, PCR Not Detected     Bordetella pertussis pcr Not Detected     Bordetella parapertussis PCR Not Detected     Chlamydophila pneumoniae PCR Not Detected     Mycoplasma pneumo by PCR Not Detected    Narrative:      In the setting of a positive respiratory panel with a viral infection PLUS a negative procalcitonin without other underlying concern for bacterial infection, consider observing off antibiotics or discontinuation of antibiotics and continue supportive care. If the respiratory panel is positive for atypical bacterial infection (Bordetella pertussis, Chlamydophila pneumoniae, or Mycoplasma pneumoniae), consider antibiotic de-escalation to target atypical bacterial infection.            FL Video Swallow With Speech Single Contrast    Result Date: 9/30/2024  FL VIDEO SWALLOW W SPEECH SINGLE-CONTRAST Date of Exam: 9/30/2024 11:17 AM EDT Indication: dysphagia.   Comparison: None available. Technique:   The speech pathologist administered food and/or liquid mixed with barium to the patient with cine/video imaging.  Imaging assistance was provided to the speech pathologist and an image was saved. Fluoroscopic Time: 30 seconds Number of Images: 6 associated  fluoroscopic loops were saved Findings: No aspiration was seen during fluoroscopic guided modified barium swallowing series. Please see speech therapy report for full details and recommendations.     Impression: Impression: Fluoroscopy provided for a modified barium swallow. No aspiration was seen during swallowing evaluation. Please see speech therapy report for full details and recommendations. Report dictated by: Mindy Arora PA-c  I have personally reviewed this case and agree with the findings above: Electronically Signed: Lobo Tsai MD  9/30/2024 9:38 PM EDT  Workstation ID: [id number]     Results for orders placed during the hospital encounter of 09/07/16    Adult transthoracic echo complete    Interpretation Summary  · Left ventricular function is normal. Estimated EF = 60%.  · Mild mitral valve regurgitation is present  · Mild tricuspid valve regurgitation is present.      Current medications:  Scheduled Meds:atorvastatin, 80 mg, Oral, Daily  budesonide-formoterol, 2 puff, Inhalation, BID - RT   And  tiotropium bromide monohydrate, 2 puff, Inhalation, Daily - RT  cefTRIAXone, 2,000 mg, Intravenous, Q24H  doxycycline, 100 mg, Oral, Q12H  gabapentin, 300 mg, Oral, Q12H  lactobacillus acidophilus, 1 capsule, Oral, Daily  oxybutynin XL, 10 mg, Oral, Daily  predniSONE, 40 mg, Oral, Daily With Breakfast  sodium chloride, 10 mL, Intravenous, Q12H  vancomycin, 125 mg, Oral, Daily      Continuous Infusions:   PRN Meds:.  acetaminophen **OR** acetaminophen **OR** acetaminophen    albuterol    HYDROcodone-acetaminophen    melatonin    ondansetron    sodium chloride    sodium chloride    Assessment & Plan   Assessment & Plan     Active Hospital Problems    Diagnosis  POA    **Sepsis due to pneumonia [J18.9, A41.9]  Yes    Pneumonia [J18.9]  Yes    COPD with acute exacerbation [J44.1]  Yes    CHRISTIANO (acute kidney injury) [N17.9]  Yes    Hypertensive disorder [I10]  Yes    HLD (hyperlipidemia) [E78.5]  Yes    Diarrhea  [R19.7]  Yes    Encephalopathy [G93.40]  Yes    Chronic kidney disease, stage 4 (severe) [N18.4]  Yes    Acute and chronic respiratory failure with hypoxia [J96.21]  Yes    Iron deficiency anemia [D50.9]  Yes    History of stroke [Z86.73]  Not Applicable    Restless legs syndrome (RLS) [G25.81]  Yes    Anxiety [F41.9]  Yes    Lactic acidosis [E87.20]  Yes    Tobacco abuse [Z72.0]  Yes    Multiple sclerosis [G35]  Yes      Resolved Hospital Problems   No resolved problems to display.        Brief Hospital Course to date:  Leatha Wall is a 74 y.o. female w MS on chronic prednisone, COPD on chronic 3.5 liters n/c, CKDIIIb, h/o C diff per charting who presents with weakness and confusion from [facility]     Patient admitted to [facility] for COPD exacerbation 8/28-9/1, then back home, then to [facility] reportedly w pneumonia (records pending), then to [facility] and return here in less than 72 hours from that admission with confusion. PNA requiring intubation 12/2023     Sepsis 2/2 recurrent aspiration PNA - improved  -CT w chronic aspiration signs, concern for RLL pneumonia  -reviewed records from [facility] similar findings, treated as COPD exacerbation w doxy  -3rd respiratory admission in 1 month, do worry about aspiration as cause of recurrent issues v severe COPD w back -to-back exacerbations  - Continue rocephin/doxy   - Continue prednisone 40 -- plan to wean back to home 9 mg dialy dose   -flutter valve given weak cough  -resume azith three times weekly at discharge     Acute on chronic hypoxic resp failure 2/2 above + COPD  -resolved to baseline 3.5 liters n/c o2 need     CHRISTIANO on CKDIIIb - resolved to baseline     Concern for oropharyngeal dysphagia  -speech OK for regular diet  -remain concern for intermittent aspiration and overall weakness     Acute on chronic debility   - on review of neurology notes 4/2024: left leg weakness chronic, 3/5 at that time, uses cane at baseline  -  "PT/OT, close monitoring, likely high dose recent steroids not helping. Plan to taper on DC      Acute on chronic anemia  -baseline ~10 per notes from [facility]; trended down to around 8  -on eliquis prior without indication (see below), now stopped. Significant superficial bruising/bleeding  - no obvious signs of bleeding. H/H overall stable  - Trend     MS - prednisone taper as above for now, then back to home prednisone 9 mg daily  H/o C-diff - vancomycin ppx while on systemic abx  Active tobacco use - stopped x 3 weeks; encouraged continued  cessation      *Of note, patient was on eliquis on admission. My partner reviewed [facility] records - initial concern for Afib and was placed on eliquis for this reason. However, cardiology evaluated her there and wrote \"no A-fib, will sign off\" as they believed this to be nsr w PAC's. Continue off eliquis         Expected Discharge Location and Transportation: [facility]  Expected Discharge   Expected Discharge Date: 10/1/2024; Expected Discharge Time:      VTE Prophylaxis:  Mechanical VTE prophylaxis orders are present.         AM-PAC 6 Clicks Score (PT): 15 (10/01/24 1210)    CODE STATUS:   Code Status and Medical Interventions: CPR (Attempt to Resuscitate); Full Support   Ordered at: 09/28/24 1948     Level Of Support Discussed With:    Patient     Code Status (Patient has no pulse and is not breathing):    CPR (Attempt to Resuscitate)     Medical Interventions (Patient has pulse or is breathing):    Full Support       Maria Esther Nguyen, DO  10/01/24      "

## 2024-10-01 NOTE — THERAPY WOUND CARE TREATMENT
"Acute Care - Wound/Debridement Initial Evaluation  [facility]     Patient Name: Leatha Wall  : 1950  MRN: [id number]  Today's Date: 10/1/2024                Admit Date: 2024    Visit Dx:    ICD-10-CM ICD-9-CM   1. Community acquired pneumonia, unspecified laterality  J18.9 486   2. Severe sepsis  A41.9 038.9    R65.20 995.92   3. Leukocytosis, unspecified type  D72.829 288.60   4. CHRISTIANO (acute kidney injury)  N17.9 584.9   5. Dysphagia, unspecified type  R13.10 787.20       Patient Active Problem List   Diagnosis    Multiple sclerosis    Periodic headache syndrome, not intractable    Hepatitis C antibody test positive    Lactic acidosis    Nausea vomiting and diarrhea    Colitis    Renal cyst    Tobacco abuse    Abdominal aortic aneurysm (AAA) without rupture    Chronic fatigue    Restless legs syndrome (RLS)    Spasticity    History of stroke    COPD with acute exacerbation    Sepsis due to pneumonia    CHRISTIANO (acute kidney injury)    Acute and chronic respiratory failure with hypoxia    Anxiety    Chronic kidney disease, stage 4 (severe)    Iron deficiency anemia    Hypertensive disorder    HLD (hyperlipidemia)    Diarrhea    Encephalopathy    Pneumonia        Past Medical History:   Diagnosis Date    Aneurysm of aorta     Anxiety and depression     Arthritis     Back disorder     \"PROTRUDING DISC\"    Bilateral cataracts     Chronic respiratory failure with hypoxia     Cognitive communication deficit     COPD (chronic obstructive pulmonary disease)     Dysphagia, oropharyngeal phase     WOLF (generalized anxiety disorder)     Heavy tobacco smoker     Hepatitis C     Hyperlipidemia     Hypertension     Idiopathic gout     Idiopathic gout, unspecified site     MS (multiple sclerosis)     Multiple sclerosis     Pneumonia     Sepsis, unspecified organism     Stroke     Unspecified sequelae of cerebral infarction     Urinary tract infection         Past Surgical History:   Procedure Laterality Date    " CHOLECYSTECTOMY      HYSTERECTOMY      SMALL INTESTINE SURGERY      As a child    TOE SURGERY Left     Great    TONSILLECTOMY      and Adnoids    TUBAL ABDOMINAL LIGATION             Wound 09/29/24 0501 coccyx Pressure Injury (Active)   Dressing Appearance dry;intact 10/01/24 1005       Wound 09/29/24 0502 Left breast MASD (Moisture associated skin damage) (Active)   Periwound redness 10/01/24 1005      Lymphedema       Row Name 10/01/24 1610             Lymphedema Edema Assessment    Ptting Edema Category By severity  -KW      Pitting Edema --  L moderate, R mild  -KW         Skin Changes/Observations    Location/Assessment Lower Extremity  -KW      Lower Extremity Conditions bilateral:;dry;crust;scaly  -KW      Lower Extremity Color/Pigment bilateral:;purple;red  -KW         Lymphedema Pulses/Capillary Refill    Lymphedema Pulses/Capillary Refill capillary refill  -KW      Capillary Refill lower extremity capillary refill  -KW      Lower Extremity Capillary Refill right:;left:;less than 3 seconds  -KW         Lymphedema Measurements    Measurement Type(s) Quick Girth  -KW      Quick Girth Areas Lower extremities  -KW         LLE Quick Girth (cm)    Met-heads 22.5 cm  -KW      Smallest ankle 23.2 cm  -KW      Largest calf 34 cm  -KW         RLE Quick Girth (cm)    Met-heads 22 cm  -KW      Smallest ankle 23 cm  -KW      Largest calf 32.3 cm  -KW      RLE Quick Girth Total 77.3  -KW         Compression/Skin Care    Compression/Skin Care skin care;wrapping location  -KW      Skin Care washed/dried;lotion applied  -KW      Wrapping Location lower extremity  -KW      Wrapping Location LE bilateral:;foot to knee  -KW      Wrapping Comments compressiogrip size 4/5 doubled and overlapping for gradient compression  -KW                User Key  (r) = Recorded By, (t) = Taken By, (c) = Cosigned By      Initials Name Provider Type    Kathy Singh, PT Physical Therapist                    WOUND DEBRIDEMENT                      PT Assessment (Last 12 Hours)       PT Evaluation and Treatment       Row Name 10/01/24 1610          Physical Therapy Time and Intention    Subjective Information no complaints  -KW     Document Type wound care;evaluation  -KW     Mode of Treatment physical therapy;individual therapy  -KW       Row Name 10/01/24 1610          General Information    Patient Profile Reviewed yes  -KW       Row Name             Wound 09/29/24 0501 coccyx Pressure Injury    Wound - Properties Group Placement Date: 09/29/24  -MR Placement Time: 0501  -MR Present on Original Admission: Y  -MR Location: coccyx  -MR Primary Wound Type: Pressure inj  -MR    Retired Wound - Properties Group Placement Date: 09/29/24  -MR Placement Time: 0501  -MR Present on Original Admission: Y  -MR Location: coccyx  -MR Primary Wound Type: Pressure inj  -MR    Retired Wound - Properties Group Date first assessed: 09/29/24  -MR Time first assessed: 0501  -MR Present on Original Admission: Y  -MR Location: coccyx  -MR Primary Wound Type: Pressure inj  -MR      Row Name             Wound 09/29/24 0502 Left breast MASD (Moisture associated skin damage)    Wound - Properties Group Placement Date: 09/29/24  -MR Placement Time: 0502  -MR Present on Original Admission: Y  -MR Side: Left  -MR Location: breast  -MR Primary Wound Type: MASD  -MR    Retired Wound - Properties Group Placement Date: 09/29/24  -MR Placement Time: 0502  -MR Present on Original Admission: Y  -MR Side: Left  -MR Location: breast  -MR Primary Wound Type: MASD  -MR    Retired Wound - Properties Group Date first assessed: 09/29/24  -MR Time first assessed: 0502  -MR Present on Original Admission: Y  -MR Side: Left  -MR Location: breast  -MR Primary Wound Type: MASD  -MR      Row Name             Wound 09/29/24 0502 Right breast MASD (Moisture associated skin damage)    Wound - Properties Group Placement Date: 09/29/24  -MR Placement Time: 0502  -MR Present on Original Admission: Y   -MR Side: Right  -MR Location: breast  -MR Primary Wound Type: MASD  -MR    Retired Wound - Properties Group Placement Date: 09/29/24  -MR Placement Time: 0502  -MR Present on Original Admission: Y  -MR Side: Right  -MR Location: breast  -MR Primary Wound Type: MASD  -MR    Retired Wound - Properties Group Date first assessed: 09/29/24  -MR Time first assessed: 0502  -MR Present on Original Admission: Y  -MR Side: Right  -MR Location: breast  -MR Primary Wound Type: MASD  -MR      Row Name             Wound 09/29/24 0503 Left lower leg Venous Ulcer    Wound - Properties Group Placement Date: 09/29/24  -MR Placement Time: 0503  -MR Present on Original Admission: Y  -MR Side: Left  -MR Orientation: lower  -MR Location: leg  -MR Primary Wound Type: Venous ulcer  -MR    Retired Wound - Properties Group Placement Date: 09/29/24  -MR Placement Time: 0503  -MR Present on Original Admission: Y  -MR Side: Left  -MR Orientation: lower  -MR Location: leg  -MR Primary Wound Type: Venous ulcer  -MR    Retired Wound - Properties Group Date first assessed: 09/29/24  -MR Time first assessed: 0503  -MR Present on Original Admission: Y  -MR Side: Left  -MR Location: leg  -MR Primary Wound Type: Venous ulcer  -MR      Row Name             Wound 09/29/24 0504 Right lower leg Venous Ulcer    Wound - Properties Group Placement Date: 09/29/24  -MR Placement Time: 0504  -MR Side: Right  -MR Orientation: lower  -MR Location: leg  -MR Primary Wound Type: Venous ulcer  -MR    Retired Wound - Properties Group Placement Date: 09/29/24  -MR Placement Time: 0504  -MR Side: Right  -MR Orientation: lower  -MR Location: leg  -MR Primary Wound Type: Venous ulcer  -MR    Retired Wound - Properties Group Date first assessed: 09/29/24  -MR Time first assessed: 0504  -MR Side: Right  -MR Location: leg  -MR Primary Wound Type: Venous ulcer  -MR      Row Name 10/01/24 1610          Plan of Care Review    Plan of Care Reviewed With patient  -KW     Progress  no change  -KW     Outcome Evaluation PT wound eval complete. Patient with moderate edema in B LE. Patient would benefit from MLW to improve venous return and manage BLE edema/ girth. PT provided education about wraps and signs/symptoms to watch for if wraps too tight. PT also provided education about skin integrity. PT will check back in a couple of days to monitor skin and wraps  -KW       Row Name 10/01/24 1610          Positioning and Restraints    Pre-Treatment Position in bed  -KW     Post Treatment Position bed  -KW     In Bed supine;call light within reach;encouraged to call for assist  -KW               User Key  (r) = Recorded By, (t) = Taken By, (c) = Cosigned By      Initials Name Provider Type    Kathy Singh, NURY Physical Therapist    Rickie Looney, RN Registered Nurse                  Physical Therapy Education       Title: PT OT SLP Therapies (In Progress)       Topic: Physical Therapy (Done)       Point: Mobility training (Done)       Learning Progress Summary             Patient Acceptance, E,D, VU,NR by LR at 10/1/2024 1042    Comment: Educated on benefits of mobility and being OOB, safety with mobility, PLB, LE HEP, correct supine<->sit t/f technique, correct sit<->stand t/f technique, correct gait mechanics, and progression of POC.    Acceptance, E, NR by AE at 9/29/2024 1446                         Point: Home exercise program (Done)       Learning Progress Summary             Patient Acceptance, E,D, VU,NR by LR at 10/1/2024 1042    Comment: Educated on benefits of mobility and being OOB, safety with mobility, PLB, LE HEP, correct supine<->sit t/f technique, correct sit<->stand t/f technique, correct gait mechanics, and progression of POC.                         Point: Body mechanics (Done)       Learning Progress Summary             Patient Acceptance, E,D, VU,NR by LR at 10/1/2024 1042    Comment: Educated on benefits of mobility and being OOB, safety with mobility, PLB, LE  HEP, correct supine<->sit t/f technique, correct sit<->stand t/f technique, correct gait mechanics, and progression of POC.    Acceptance, E, NR by AE at 9/29/2024 1446                         Point: Precautions (Done)       Learning Progress Summary             Patient Acceptance, E,D, VU,NR by LR at 10/1/2024 1042    Comment: Educated on benefits of mobility and being OOB, safety with mobility, PLB, LE HEP, correct supine<->sit t/f technique, correct sit<->stand t/f technique, correct gait mechanics, and progression of POC.    Acceptance, E, NR by AE at 9/29/2024 1446                                         User Key       Initials Effective Dates Name Provider Type Discipline    LR 02/03/23 -  Nasreen Vicente, PT Physical Therapist PT    AE 09/21/21 -  Sanjeev Gomez, PT Physical Therapist PT                    Recommendation and Plan  Anticipated Discharge Disposition (PT): skilled nursing facility  Planned Therapy Interventions (PT): balance training, bed mobility training, home exercise program, gait training, patient/family education, postural re-education, ROM (range of motion), strengthening, transfer training  Therapy Frequency (PT): daily  Plan of Care Reviewed With: patient   Progress: no change       Progress: no change  Outcome Evaluation: PT wound eval complete. Patient with moderate edema in B LE. Patient would benefit from MLW to improve venous return and manage BLE edema/ girth. PT provided education about wraps and signs/symptoms to watch for if wraps too tight. PT also provided education about skin integrity. PT will check back in a couple of days to monitor skin and wraps  Plan of Care Reviewed With: patient            Time Calculation   PT Charges       Row Name 10/01/24 1610 10/01/24 1042          Time Calculation    Start Time 1610  -KW 1042  -LR     PT Received On -- 10/01/24  -LR     PT Goal Re-Cert Due Date -- 10/09/24  -LR        Timed Charges    00006 - PT Therapeutic Exercise  Minutes -- 10  -LR     27094 - Gait Training Minutes  -- 10  -LR     37233 - PT Therapeutic Activity Minutes -- 8  -LR        Untimed Charges    PT Eval/Re-eval Minutes 26  -KW --     Wound Care 56310 Multilayer comp below knee  -KW --     11266-Gqvmilcfaw comp below knee 28  -KW --        Total Minutes    Timed Charges Total Minutes -- 28  -LR     Untimed Charges Total Minutes 54  -KW --      Total Minutes 54  -KW 28  -LR               User Key  (r) = Recorded By, (t) = Taken By, (c) = Cosigned By      Initials Name Provider Type    Nasreen Wills, PT Physical Therapist    Kathy Singh, PT Physical Therapist                      Therapy Charges for Today       Code Description Service Date Service Provider Modifiers Qty    65076605560 HC PT RE-EVAL ESTABLISHED PLAN 2 10/1/2024 Kathy Roper, PT GP 1    91492311107 HC PT MULTI LAYER COMP SYS BELOW KNEE 10/1/2024 Kathy Roper, PT GP 1              PT G-Codes  Outcome Measure Options: AM-PAC 6 Clicks Basic Mobility (PT)  AM-PAC 6 Clicks Score (PT): 15  AM-PAC 6 Clicks Score (OT): 20       Kathy Roper PT  10/1/2024

## 2024-10-02 VITALS
HEART RATE: 74 BPM | TEMPERATURE: 97.3 F | OXYGEN SATURATION: 93 % | BODY MASS INDEX: 24.64 KG/M2 | SYSTOLIC BLOOD PRESSURE: 151 MMHG | DIASTOLIC BLOOD PRESSURE: 66 MMHG | WEIGHT: 157 LBS | RESPIRATION RATE: 18 BRPM | HEIGHT: 67 IN

## 2024-10-02 PROBLEM — E87.20 LACTIC ACIDOSIS: Status: RESOLVED | Noted: 2019-11-06 | Resolved: 2024-10-02

## 2024-10-02 PROBLEM — N17.9 AKI (ACUTE KIDNEY INJURY): Status: RESOLVED | Noted: 2024-09-28 | Resolved: 2024-10-02

## 2024-10-02 PROBLEM — R19.7 DIARRHEA: Status: RESOLVED | Noted: 2024-09-28 | Resolved: 2024-10-02

## 2024-10-02 PROBLEM — J18.9 PNEUMONIA: Status: RESOLVED | Noted: 2024-09-29 | Resolved: 2024-10-02

## 2024-10-02 PROBLEM — A41.9 SEPSIS DUE TO PNEUMONIA: Status: RESOLVED | Noted: 2024-09-28 | Resolved: 2024-10-02

## 2024-10-02 PROBLEM — J18.9 SEPSIS DUE TO PNEUMONIA: Status: RESOLVED | Noted: 2024-09-28 | Resolved: 2024-10-02

## 2024-10-02 PROBLEM — G93.40 ENCEPHALOPATHY: Status: RESOLVED | Noted: 2024-09-28 | Resolved: 2024-10-02

## 2024-10-02 LAB
ANION GAP SERPL CALCULATED.3IONS-SCNC: 9 MMOL/L (ref 5–15)
BUN SERPL-MCNC: 34 MG/DL (ref 8–23)
BUN/CREAT SERPL: 32.1 (ref 7–25)
CALCIUM SPEC-SCNC: 8.3 MG/DL (ref 8.6–10.5)
CHLORIDE SERPL-SCNC: 109 MMOL/L (ref 98–107)
CO2 SERPL-SCNC: 24 MMOL/L (ref 22–29)
CREAT SERPL-MCNC: 1.06 MG/DL (ref 0.57–1)
DEPRECATED RDW RBC AUTO: 52.5 FL (ref 37–54)
EGFRCR SERPLBLD CKD-EPI 2021: 55.9 ML/MIN/1.73
ERYTHROCYTE [DISTWIDTH] IN BLOOD BY AUTOMATED COUNT: 15.7 % (ref 12.3–15.4)
GLUCOSE SERPL-MCNC: 177 MG/DL (ref 65–99)
HCT VFR BLD AUTO: 26.1 % (ref 34–46.6)
HGB BLD-MCNC: 8.5 G/DL (ref 12–15.9)
MCH RBC QN AUTO: 29.7 PG (ref 26.6–33)
MCHC RBC AUTO-ENTMCNC: 32.6 G/DL (ref 31.5–35.7)
MCV RBC AUTO: 91.3 FL (ref 79–97)
PLATELET # BLD AUTO: 148 10*3/MM3 (ref 140–450)
PMV BLD AUTO: 10.2 FL (ref 6–12)
POTASSIUM SERPL-SCNC: 4.2 MMOL/L (ref 3.5–5.2)
RBC # BLD AUTO: 2.86 10*6/MM3 (ref 3.77–5.28)
SODIUM SERPL-SCNC: 142 MMOL/L (ref 136–145)
WBC NRBC COR # BLD AUTO: 10.02 10*3/MM3 (ref 3.4–10.8)

## 2024-10-02 PROCEDURE — 85027 COMPLETE CBC AUTOMATED: CPT | Performed by: INTERNAL MEDICINE

## 2024-10-02 PROCEDURE — 80048 BASIC METABOLIC PNL TOTAL CA: CPT | Performed by: INTERNAL MEDICINE

## 2024-10-02 PROCEDURE — 94799 UNLISTED PULMONARY SVC/PX: CPT

## 2024-10-02 PROCEDURE — 94664 DEMO&/EVAL PT USE INHALER: CPT

## 2024-10-02 PROCEDURE — 94761 N-INVAS EAR/PLS OXIMETRY MLT: CPT

## 2024-10-02 PROCEDURE — 99239 HOSP IP/OBS DSCHRG MGMT >30: CPT | Performed by: INTERNAL MEDICINE

## 2024-10-02 PROCEDURE — 63710000001 PREDNISONE PER 1 MG: Performed by: INTERNAL MEDICINE

## 2024-10-02 RX ORDER — CEFDINIR 300 MG/1
300 CAPSULE ORAL 2 TIMES DAILY
Start: 2024-10-02 | End: 2024-10-04

## 2024-10-02 RX ORDER — GABAPENTIN 300 MG/1
300 CAPSULE ORAL 2 TIMES DAILY
Qty: 6 CAPSULE | Refills: 0 | Status: SHIPPED | OUTPATIENT
Start: 2024-10-02

## 2024-10-02 RX ORDER — VANCOMYCIN HYDROCHLORIDE 125 MG/1
125 CAPSULE ORAL DAILY
Start: 2024-10-02 | End: 2024-10-04

## 2024-10-02 RX ORDER — DOXYCYCLINE 100 MG/1
100 CAPSULE ORAL 2 TIMES DAILY
Start: 2024-10-02 | End: 2024-10-04

## 2024-10-02 RX ORDER — L.ACID,PARA/B.BIFIDUM/S.THERM 8B CELL
1 CAPSULE ORAL DAILY
Start: 2024-10-02

## 2024-10-02 RX ORDER — PREDNISONE 20 MG/1
TABLET ORAL
Start: 2024-10-02 | End: 2024-10-10

## 2024-10-02 RX ORDER — AMLODIPINE BESYLATE 2.5 MG/1
5 TABLET ORAL DAILY
Start: 2024-10-02

## 2024-10-02 RX ORDER — HYDROCODONE BITARTRATE AND ACETAMINOPHEN 10; 325 MG/1; MG/1
1 TABLET ORAL EVERY 6 HOURS PRN
Qty: 10 TABLET | Refills: 0 | Status: SHIPPED | OUTPATIENT
Start: 2024-10-02

## 2024-10-02 RX ADMIN — HYDROCODONE BITARTRATE AND ACETAMINOPHEN 1 TABLET: 10; 325 TABLET ORAL at 09:13

## 2024-10-02 RX ADMIN — PREDNISONE 40 MG: 20 TABLET ORAL at 09:08

## 2024-10-02 RX ADMIN — ATORVASTATIN CALCIUM 80 MG: 40 TABLET, FILM COATED ORAL at 09:08

## 2024-10-02 RX ADMIN — Medication 10 ML: at 09:13

## 2024-10-02 RX ADMIN — OXYBUTYNIN CHLORIDE 10 MG: 10 TABLET, EXTENDED RELEASE ORAL at 09:08

## 2024-10-02 RX ADMIN — TIOTROPIUM BROMIDE INHALATION SPRAY 2 PUFF: 3.12 SPRAY, METERED RESPIRATORY (INHALATION) at 09:02

## 2024-10-02 RX ADMIN — VANCOMYCIN HYDROCHLORIDE 125 MG: 125 CAPSULE ORAL at 09:07

## 2024-10-02 RX ADMIN — DOXYCYCLINE 100 MG: 100 CAPSULE ORAL at 09:08

## 2024-10-02 RX ADMIN — Medication 1 CAPSULE: at 09:09

## 2024-10-02 RX ADMIN — GABAPENTIN 300 MG: 300 CAPSULE ORAL at 09:08

## 2024-10-02 RX ADMIN — BUDESONIDE AND FORMOTEROL FUMARATE DIHYDRATE 2 PUFF: 160; 4.5 AEROSOL RESPIRATORY (INHALATION) at 09:02

## 2024-10-02 NOTE — DISCHARGE SUMMARY
[facility] Medicine Services  DISCHARGE SUMMARY    Patient Name: Leatha Wall  : 1950  MRN: [id number]    Date of Admission: 2024  5:33 PM  Date of Discharge:  10/02/24    Primary Care Physician: Niharika Fermin MD    Consults       No orders found from 2024 to 2024.            Hospital Course     Presenting Problem: resp failure     Active Hospital Problems    Diagnosis  POA    COPD with acute exacerbation [J44.1]  Yes    Hypertensive disorder [I10]  Yes    HLD (hyperlipidemia) [E78.5]  Yes    Chronic kidney disease, stage 4 (severe) [N18.4]  Yes    Acute and chronic respiratory failure with hypoxia [J96.21]  Yes    Iron deficiency anemia [D50.9]  Yes    History of stroke [Z86.73]  Not Applicable    Restless legs syndrome (RLS) [G25.81]  Yes    Anxiety [F41.9]  Yes    Tobacco abuse [Z72.0]  Yes    Multiple sclerosis [G35]  Yes      Resolved Hospital Problems    Diagnosis Date Resolved POA    **Sepsis due to pneumonia [J18.9, A41.9] 10/02/2024 Yes    Pneumonia [J18.9] 10/02/2024 Yes    CHRISTIANO (acute kidney injury) [N17.9] 10/02/2024 Yes    Diarrhea [R19.7] 10/02/2024 Yes    Encephalopathy [G93.40] 10/02/2024 Yes    Lactic acidosis [E87.20] 10/02/2024 Yes          Hospital Course:  Leatha Wall is a 74 y.o. female w MS on chronic prednisone, COPD on chronic 3.5 liters n/c, CKDIIIb, h/o C diff per charting who [facility] and [facility]     Patient admitted to [facility] for COPD exacerbation -, then back home, then to [facility] reportedly w pneumonia , then to [facility] and return here in less than 72 hours from that admission with confusion. History of PNA requiring intubation 2023     Sepsis 2/2 recurrent aspiration PNA - improved  -CT w chronic aspiration signs, concern for RLL pneumonia  -reviewed records from [facility] similar findings, treated as COPD exacerbation w doxy  -3rd respiratory  "admission in 1 month, do worry about aspiration as cause of recurrent issues v severe COPD w back -to-back exacerbations  - resp PCR negative; Bcx NGTD; MRSA neg; strep antigen negative   - Patient was on rocephin/doxy. Will change to omnicef and doxy to complete 2 more days   - Continue prednisone taper on DC back to home 9 mg daily dose   - resume azithromycin three times weekly once antibiotics are complete      Acute on chronic hypoxic resp failure 2/2 above + COPD  -resolved to baseline 3.5 liters n/c o2 need     CHRISTIANO on CKDIIIb - resolved to baseline. Continue to hold diuretics and lisinopril on DC. Recommend monitoring BMP if resumed      Concern for oropharyngeal dysphagia  -speech OK for regular diet  -remain concern for intermittent aspiration and overall weakness     Acute on chronic debility   - on review of neurology notes 4/2024: left leg weakness chronic, 3/5 at that time, uses cane at baseline  - PT/OT, close monitoring, likely high dose recent steroids not helping. Plan to taper on DC as outlined above.      Acute on chronic anemia  -baseline ~10 per notes from [facility]; trended down to around 8 and now stable.   -on eliquis prior without indication (see below), now stopped. Significant superficial bruising/bleeding     MS - prednisone taper as above for now, then back to home prednisone 9 mg daily  H/o C-diff - vancomycin ppx while on systemic abx. Continue PO vanc x 2 more doses as antibiotics complete   Active tobacco use - stopped x 3 weeks; encouraged continued  cessation      *Of note, patient was on eliquis on admission. My partner reviewed [facility] records - initial concern for Afib and was placed on eliquis for this reason. However, cardiology evaluated her there and wrote \"no A-fib, will sign off\" as they believed this to be nsr w PAC's. Continue off eliquis     Discharge Follow Up Recommendations for outpatient labs/diagnostics:   PCP Dr. Fermin 1 week     Day of Discharge     HPI:   No " acute events. Feels ok. Slept well. Breathing is stable. Reviewed plans for rehab today and she is agreeable. Answered all questions to the best of my ability.       Vital Signs:   Temp:  [97.5 °F (36.4 °C)-98.2 °F (36.8 °C)] 98.2 °F (36.8 °C)  Heart Rate:  [67-79] 79  Resp:  [18] 18  BP: (150-166)/(67-85) 164/84  Flow (L/min):  [3] 3      Physical Exam:  Constitutional: No acute distress, awake, alert; chronically ill appearing   Respiratory: Clear to auscultation bilaterally, respiratory effort normal ; no rhonchi   Cardiovascular: RRR, no murmurs  Gastrointestinal: Positive bowel sounds, soft, nontender, nondistended  Musculoskeletal: No bilateral ankle edema  Psychiatric: Appropriate affect, cooperative  Neurologic: Oriented x 3, strength symmetric in all extremities, Cranial Nerves grossly intact to confrontation, speech clear      Pertinent  and/or Most Recent Results     LAB RESULTS:      Lab 10/02/24  0436 10/01/24  0345 09/30/24  0347 09/29/24  0744 09/29/24  0451 09/28/24  2251 09/28/24  1745   WBC 10.02 14.70* 17.88*  --  25.69*  --  26.02*   HEMOGLOBIN 8.5* 8.0* 8.2*  --  10.2*  --  11.5*   HEMATOCRIT 26.1* 25.4* 25.1*  --  30.7*  --  36.1   PLATELETS 148 151 143  --  157  --  205   NEUTROS ABS  --   --  17.05*  --  24.77*  --  23.90*   IMMATURE GRANS (ABS)  --   --  0.14*  --  0.33*  --  0.26*   LYMPHS ABS  --   --  0.38*  --  0.45*  --  1.01   MONOS ABS  --   --  0.29  --  0.11  --  0.81   EOS ABS  --   --  0.00  --  0.00  --  0.00   MCV 91.3 94.1 92.3  --  92.2  --  94.3   PROCALCITONIN  --   --   --   --   --   --  61.99*   LACTATE  --   --   --  2.0 4.9* 3.3* 4.3*         Lab 10/02/24  0436 10/01/24  0345 09/30/24  0347 09/29/24  0452 09/28/24  1745   SODIUM 142 140 138 138 136   POTASSIUM 4.2 4.6 4.7 5.0 4.7   CHLORIDE 109* 108* 105 103 95*   CO2 24.0 24.0 22.0 18.0* 26.0   ANION GAP 9.0 8.0 11.0 17.0* 15.0   BUN 34* 43* 38* 38* 45*   CREATININE 1.06* 1.35* 1.31* 1.66* 2.22*   EGFR 55.9* 41.8*  43.4* 32.6* 23.0*   GLUCOSE 177* 149* 182* 155* 148*   CALCIUM 8.3* 8.2* 8.1* 7.8* 8.4*   MAGNESIUM  --   --   --   --  3.0*         Lab 09/29/24  0452 09/28/24  1745   TOTAL PROTEIN 5.5* 6.1   ALBUMIN 3.0* 3.5   GLOBULIN 2.5 2.6   ALT (SGPT) 33 41*   AST (SGOT) 18 19   BILIRUBIN 0.5 0.6   ALK PHOS 140* 168*         Lab 09/28/24  1745   PROBNP 464.7   HSTROP T 50*                 Lab 09/28/24  1800   PH, ARTERIAL 7.446   PCO2, ARTERIAL 37.4   PO2 ART 62.4*   FIO2 32   HCO3 ART 25.7   BASE EXCESS ART 1.7   CARBOXYHEMOGLOBIN 1.5     Brief Urine Lab Results  (Last result in the past 365 days)        Color   Clarity   Blood   Leuk Est   Nitrite   Protein   CREAT   Urine HCG        09/28/24 1848 Dark Yellow   Clear   Negative   Trace   Negative   Trace                 Microbiology Results (last 10 days)       Procedure Component Value - Date/Time    S. Pneumo Ag Urine or CSF - Urine, Urine, Clean Catch [[id number]]  (Normal) Collected: 09/29/24 1003    Lab Status: Final result Specimen: Urine, Clean Catch Updated: 09/30/24 0706     Strep Pneumo Ag Negative    MRSA Screen, PCR (Inpatient) - Swab, Nares [[id number]]  (Normal) Collected: 09/28/24 2251    Lab Status: Final result Specimen: Swab from Nares Updated: 09/29/24 0757     MRSA PCR Negative    Narrative:      The negative predictive value of this diagnostic test is high and should only be used to consider de-escalating anti-MRSA therapy. A positive result may indicate colonization with MRSA and must be correlated clinically.  MRSA Negative    Blood Culture - Blood, Arm, Right [[id number]]  (Normal) Collected: 09/28/24 1750    Lab Status: Preliminary result Specimen: Blood from Arm, Right Updated: 10/01/24 1831     Blood Culture No growth at 3 days    Respiratory Panel PCR w/COVID-19(SARS-CoV-2) [facility]LLA In-House, NP Swab in UTM/VTM, 2 HR TAT - Swab, Nasopharynx [[id number]]  (Normal) Collected: 09/28/24 7084    Lab Status: Final result Specimen: Swab from  Nasopharynx Updated: 09/28/24 1847     ADENOVIRUS, PCR Not Detected     Coronavirus 229E Not Detected     Coronavirus HKU1 Not Detected     Coronavirus NL63 Not Detected     Coronavirus OC43 Not Detected     COVID19 Not Detected     Human Metapneumovirus Not Detected     Human Rhinovirus/Enterovirus Not Detected     Influenza A PCR Not Detected     Influenza B PCR Not Detected     Parainfluenza Virus 1 Not Detected     Parainfluenza Virus 2 Not Detected     Parainfluenza Virus 3 Not Detected     Parainfluenza Virus 4 Not Detected     RSV, PCR Not Detected     Bordetella pertussis pcr Not Detected     Bordetella parapertussis PCR Not Detected     Chlamydophila pneumoniae PCR Not Detected     Mycoplasma pneumo by PCR Not Detected    Narrative:      In the setting of a positive respiratory panel with a viral infection PLUS a negative procalcitonin without other underlying concern for bacterial infection, consider observing off antibiotics or discontinuation of antibiotics and continue supportive care. If the respiratory panel is positive for atypical bacterial infection (Bordetella pertussis, Chlamydophila pneumoniae, or Mycoplasma pneumoniae), consider antibiotic de-escalation to target atypical bacterial infection.    Blood Culture - Blood, Arm, Left [[id number]]  (Normal) Collected: 09/28/24 1745    Lab Status: Preliminary result Specimen: Blood from Arm, Left Updated: 10/01/24 1831     Blood Culture No growth at 3 days            FL Video Swallow With Speech Single Contrast    Result Date: 9/30/2024  FL VIDEO SWALLOW W SPEECH SINGLE-CONTRAST Date of Exam: 9/30/2024 11:17 AM EDT Indication: dysphagia.   Comparison: None available. Technique:   The speech pathologist administered food and/or liquid mixed with barium to the patient with cine/video imaging.  Imaging assistance was provided to the speech pathologist and an image was saved. Fluoroscopic Time: 30 seconds Number of Images: 6 associated fluoroscopic loops  were saved Findings: No aspiration was seen during fluoroscopic guided modified barium swallowing series. Please see speech therapy report for full details and recommendations.     Impression: Fluoroscopy provided for a modified barium swallow. No aspiration was seen during swallowing evaluation. Please see speech therapy report for full details and recommendations. Report dictated by: Mindy Arora PA-c  I have personally reviewed this case and agree with the findings above: Electronically Signed: Lobo Tsai MD  9/30/2024 9:38 PM EDT  Workstation ID: [id number]    CT Head Without Contrast    Result Date: 9/28/2024  CT HEAD WO CONTRAST Date of Exam: 9/28/2024 9:40 PM EDT Indication: hallucinations. Comparison: Brain MRI 5/23/2022. Technique: Axial CT images were obtained of the head without contrast administration.  Automated exposure control and iterative construction methods were used. Findings: Superficial soft tissues appear within normal limits. The calvarium is intact.  Paranasal sinuses and mastoid air cells appear well aerated.  Orbits are unremarkable.  There is no acute intracranial hemorrhage.  No mass effect or midline shift.  No abnormal extra-axial collections.  Gray-white differentiation is within normal limits. There is moderate patchy periventricular and juxtacortical white matter hypoattenuation. Allowing for differences in technique, there are no definite new hypoattenuating lesions in the brain. Ventricular size and configuration is normal for age.     Impression: 1.No acute intracranial abnormality. 2.Moderate chronic small vessel ischemic change. 3.Stable moderate chronic white matter disease. Allowing for differences in technique with prior brain MRI, there are no definite new hypoattenuating lesions in the brain. Electronically Signed: Wilman Vivar MD  9/28/2024 11:14 PM EDT  Workstation ID: [id number]    CT Chest Without Contrast Diagnostic    Result Date: 9/28/2024  CT CHEST WO CONTRAST  DIAGNOSTIC Date of Exam: 9/28/2024 9:40 PM EDT Indication: hypoxia. Comparison: Chest radiograph 9/28/2024 and 9/21/2022. Technique: Axial CT images were obtained of the chest without contrast administration.  Reconstructed coronal and sagittal images were also obtained. Automated exposure control and iterative construction methods were used. Findings: Thyroid, trachea and esophagus appear within normal limits. There is severe coronary artery calcification. Diminished attenuation of the blood pool would suggest anemia. There is moderate aortic and aortic branch vessel atherosclerosis. No aneurysm. No pericardial effusion or mediastinal lymphadenopathy. There is no pneumothorax or pleural effusion. There is segmentally obstructive endobronchial debris within both lower lobes fairly extensively with associated atelectasis. Superimposed pneumonia would be difficult to exclude based on this appearance. There is minor atelectasis elsewhere in the lung bases. Trachea is patent. There are no discrete measurable concerning lung nodules. There is mild subpleural interstitial thickening in the lung apices. There are no acute findings in the superficial soft tissues. No acute findings in the limited images of the upper abdomen. There is a low-density right adrenal nodule measuring 18 mm compatible with adenoma. There is no acute osseous abnormality or destructive bone lesion. There are mild thoracic degenerative changes.     Impression: 1.There is extensive segmentally obstructive endobronchial debris in both lower lobes with associated atelectasis. Superimposed pneumonia would be difficult to exclude. This could be from chronic aspiration or decreased mucus clearance. 2.Severe coronary artery disease. 3.Diminished attenuation of the blood pool suggesting anemia. Electronically Signed: Wilman Vivar MD  9/28/2024 11:07 PM EDT  Workstation ID: [id number]    XR Chest 1 View    Result Date: 9/28/2024  XR CHEST 1 VW Date of Exam:  9/28/2024 5:45 PM EDT Indication: sob Comparison: Chest AP dated 9/21/2022 Findings: There is consolidation in the right lung base. The left lung appears grossly clear. The cardiac mediastinal silhouettes appear normal. No effusion is seen.      Impression: Right basilar consolidation consistent with pneumonia versus atelectasis. A follow-up chest PA and lateral in 6 weeks is suggested to reevaluate. Electronically Signed: Escobar David MD  9/28/2024 6:02 PM EDT  Workstation ID: [id number]     Results for orders placed during the hospital encounter of 09/07/16    DUPLEX CAROTID BILATERAL CAR    Interpretation Summary  · Normal proximal left internal carotid artery.  · Proximal right internal carotid artery stenosis of 0-49%.      Results for orders placed during the hospital encounter of 09/07/16    DUPLEX CAROTID BILATERAL CAR    Interpretation Summary  · Normal proximal left internal carotid artery.  · Proximal right internal carotid artery stenosis of 0-49%.      Results for orders placed during the hospital encounter of 09/07/16    Adult transthoracic echo complete    Interpretation Summary  · Left ventricular function is normal. Estimated EF = 60%.  · Mild mitral valve regurgitation is present  · Mild tricuspid valve regurgitation is present.      Plan for Follow-up of Pending Labs/Results:   Pending Labs       Order Current Status    Blood Culture - Blood, Arm, Left Preliminary result    Blood Culture - Blood, Arm, Right Preliminary result          Discharge Details        Discharge Medications        New Medications        Instructions Start Date   cefdinir 300 MG capsule  Commonly known as: OMNICEF   300 mg, Oral, 2 Times Daily      doxycycline 100 MG capsule  Commonly known as: VIBRAMYCIN   100 mg, Oral, 2 Times Daily      lactobacillus acidophilus capsule capsule   1 capsule, Oral, Daily      vancomycin 125 MG capsule  Commonly known as: VANCOCIN   125 mg, Oral, Daily             Changes to Medications         Instructions Start Date   amLODIPine 2.5 MG tablet  Commonly known as: NORVASC  What changed: how much to take   5 mg, Oral, Daily      HYDROcodone-acetaminophen  MG per tablet  Commonly known as: NORCO  What changed:   when to take this  reasons to take this   1 tablet, Oral, Every 6 Hours PRN      predniSONE 20 MG tablet  Commonly known as: DELTASONE  What changed:   medication strength  See the new instructions.   Take 2 tablets by mouth Daily With Breakfast for 2 days, THEN 1.5 tablets Daily With Breakfast for 2 days, THEN 1 tablet Daily With Breakfast for 2 days, THEN 0.5 tablets Daily With Breakfast for 2 days.   Start Date: October 2, 2024            Continue These Medications        Instructions Start Date   albuterol sulfate  (90 Base) MCG/ACT inhaler  Commonly known as: PROVENTIL HFA;VENTOLIN HFA;PROAIR HFA   1 puff, Inhalation, Daily      albuterol 1.25 MG/3ML nebulizer solution  Commonly known as: ACCUNEB   1 ampule, Nebulization, Every 6 Hours PRN      atorvastatin 80 MG tablet  Commonly known as: LIPITOR   Take 1 tablet by mouth Every Night.      azithromycin 250 MG tablet  Commonly known as: ZITHROMAX   250 mg, Oral, 3 Times Weekly,  MON, WED, AND FRI      gabapentin 300 MG capsule  Commonly known as: NEURONTIN   300 mg, Oral, 2 Times Daily      nicotine 21 MG/24HR patch  Commonly known as: NICODERM CQ   1 patch, Transdermal, Every 24 Hours      oxybutynin XL 10 MG 24 hr tablet  Commonly known as: DITROPAN-XL   1 tablet, Oral, Daily      Trelegy Ellipta 200-62.5-25 MCG/ACT inhaler  Generic drug: Fluticasone-Umeclidin-Vilant   Inhale 1 puff Daily.             Stop These Medications      apixaban 5 MG tablet tablet  Commonly known as: ELIQUIS     benazepril 20 MG tablet  Commonly known as: LOTENSIN     buPROPion  MG 12 hr tablet  Commonly known as: WELLBUTRIN SR     busPIRone 5 MG tablet  Commonly known as: BUSPAR     cephalexin 500 MG capsule  Commonly known as: KEFLEX     dilTIAZem   MG 24 hr capsule  Commonly known as: CARDIZEM CD     furosemide 20 MG tablet  Commonly known as: LASIX     loperamide 2 MG capsule  Commonly known as: IMODIUM              Allergies   Allergen Reactions    Penicillins Other (See Comments)     WAS A YOUNG CHILD AND WAS NEVER TOLD WHAT HER REACTION WAS    Sulfa Antibiotics Other (See Comments)     WAS A YOUNG CHILD AND WAS NEVER TOLD WHAT HER REACTION WAS         Discharge Disposition:  Skilled Nursing Facility (DC - External)    Diet:  Hospital:  Diet Order   Procedures    Diet: Regular/House; Texture: Regular (IDDSI 7); Fluid Consistency: Thin (IDDSI 0)       Diet Instructions       Diet: Cardiac Diets; Healthy Heart (2-3 Na+); Thin (IDDSI 0)      Discharge Diet: Cardiac Diets    Cardiac Diet: Healthy Heart (2-3 Na+)    Fluid Consistency: Thin (IDDSI 0)             Activity:  Activity Instructions       Activity as Tolerated              Restrictions or Other Recommendations:         CODE STATUS:    Code Status and Medical Interventions: CPR (Attempt to Resuscitate); Full Support   Ordered at: 09/28/24 1948     Level Of Support Discussed With:    Patient     Code Status (Patient has no pulse and is not breathing):    CPR (Attempt to Resuscitate)     Medical Interventions (Patient has pulse or is breathing):    Full Support       Future Appointments   Date Time Provider Department Center   10/29/2024  1:00 PM Umang Perez MD [facility]       Additional Instructions for the Follow-ups that You Need to Schedule       Discharge Follow-up with PCP   As directed       Currently Documented PCP:    Niharika Fermin MD    PCP Phone Number:    [phone]     Follow Up Details: PCP Dr. Fermin 1 week                      Maria Esther Nguyen DO  10/02/24      Time Spent on Discharge:  I spent  35  minutes on this discharge activity which included: face-to-face encounter with the patient, reviewing the data in the system, coordination of the care with the nursing staff  as well as consultants, documentation, and entering orders.

## 2024-10-02 NOTE — PAYOR COMM NOTE
"Auth# [id number]   10/2/24 Discharge Date    SHERWIN Terry, RN  Utilization Review  Phone [phone]  Fax [phone]    [place] [id number]             Leatha Engel (74 y.o. Female)       Date of Birth   1950    Social Security Number       Address   [address] [place] [address]    Home Phone   [phone]    MRN   [id number]       Sabianist   None    Marital Status                               Admission Date   24    Admission Type   Emergency    Admitting Provider   Maria Esther Nguyen DO    Attending Provider       Department, Room/Bed   [facility] 5G, S561/1       Discharge Date   10/2/2024    Discharge Disposition   Skilled Nursing Facility (DC - External)    Discharge Destination                                 Attending Provider: (none)   Allergies: Penicillins, Sulfa Antibiotics    Isolation: None   Infection: None   Code Status: CPR    Ht: 170.2 cm (67\")   Wt: 71.2 kg (157 lb)    Admission Cmt: None   Principal Problem: Sepsis due to pneumonia [J18.9,A41.9]                   Active Insurance as of 2024       Primary Coverage       Payor Plan Insurance Group Employer/Plan Group    WELLCARE [facility] MEDICARE REPLACEMENT WELLCARE MED ADV PPO        Payor Plan Address Payor Plan Phone Number Payor Plan Fax Number Effective Dates    [address] [address]   3/1/2024 - None Entered    [facility] [id number]         Subscriber Name Subscriber Birth Date Member ID       LEATHA ENGEL 1950 [id number]                     Emergency Contacts        (Rel.) Home Phone Work Phone Mobile Phone    Niharika Engel (Grandchild) [phone] -- [phone]    ZEINABLUDWIN BALLESTEROS (Neighbor) -- -- [phone]                 Discharge Summary        Maria Esther Nguyen DO at 10/02/24 0901              [facility]  DISCHARGE SUMMARY    Patient Name: Leatha Engel  : " 1950  MRN: [id number]    Date of Admission: 9/28/2024  5:33 PM  Date of Discharge:  10/02/24    Primary Care Physician: Niharika Fermin MD    Consults       No orders found from 8/30/2024 to 9/29/2024.            Hospital Course     Presenting Problem: resp failure     Active Hospital Problems    Diagnosis  POA    COPD with acute exacerbation [J44.1]  Yes    Hypertensive disorder [I10]  Yes    HLD (hyperlipidemia) [E78.5]  Yes    Chronic kidney disease, stage 4 (severe) [N18.4]  Yes    Acute and chronic respiratory failure with hypoxia [J96.21]  Yes    Iron deficiency anemia [D50.9]  Yes    History of stroke [Z86.73]  Not Applicable    Restless legs syndrome (RLS) [G25.81]  Yes    Anxiety [F41.9]  Yes    Tobacco abuse [Z72.0]  Yes    Multiple sclerosis [G35]  Yes      Resolved Hospital Problems    Diagnosis Date Resolved POA    **Sepsis due to pneumonia [J18.9, A41.9] 10/02/2024 Yes    Pneumonia [J18.9] 10/02/2024 Yes    CHRISTIANO (acute kidney injury) [N17.9] 10/02/2024 Yes    Diarrhea [R19.7] 10/02/2024 Yes    Encephalopathy [G93.40] 10/02/2024 Yes    Lactic acidosis [E87.20] 10/02/2024 Yes          Hospital Course:  Leatha Wall is a 74 y.o. female w MS on chronic prednisone, COPD on chronic 3.5 liters n/c, CKDIIIb, h/o C diff per charting who presents with weakness and confusion from [facility]     Patient admitted to [facility] for COPD exacerbation 8/28-9/1, then back home, then to [facility] reportedly w pneumonia , then to [facility] and return here in less than 72 hours from that admission with confusion. History of PNA requiring intubation 12/2023     Sepsis 2/2 recurrent aspiration PNA - improved  -CT w chronic aspiration signs, concern for RLL pneumonia  -reviewed records from [facility] similar findings, treated as COPD exacerbation w doxy  -3rd respiratory admission in 1 month, do worry about aspiration as cause of recurrent issues v severe COPD w back -to-back  "exacerbations  - resp PCR negative; Bcx NGTD; MRSA neg; strep antigen negative   - Patient was on rocephin/doxy. Will change to omnicef and doxy to complete 2 more days   - Continue prednisone taper on DC back to home 9 mg daily dose   - resume azithromycin three times weekly once antibiotics are complete      Acute on chronic hypoxic resp failure 2/2 above + COPD  -resolved to baseline 3.5 liters n/c o2 need     CHRISTIANO on CKDIIIb - resolved to baseline. Continue to hold diuretics and lisinopril on DC. Recommend monitoring BMP if resumed      Concern for oropharyngeal dysphagia  -speech OK for regular diet  -remain concern for intermittent aspiration and overall weakness     Acute on chronic debility   - on review of neurology notes 4/2024: left leg weakness chronic, 3/5 at that time, uses cane at baseline  - PT/OT, close monitoring, likely high dose recent steroids not helping. Plan to taper on DC as outlined above.      Acute on chronic anemia  -baseline ~10 per notes from [facility]; trended down to around 8 and now stable.   -on eliquis prior without indication (see below), now stopped. Significant superficial bruising/bleeding     MS - prednisone taper as above for now, then back to home prednisone 9 mg daily  H/o C-diff - vancomycin ppx while on systemic abx. Continue PO vanc x 2 more doses as antibiotics complete   Active tobacco use - stopped x 3 weeks; encouraged continued  cessation      *Of note, patient was on eliquis on admission. My partner reviewed [facility] records - initial concern for Afib and was placed on eliquis for this reason. However, cardiology evaluated her there and wrote \"no A-fib, will sign off\" as they believed this to be nsr w PAC's. Continue off eliquis     Discharge Follow Up Recommendations for outpatient labs/diagnostics:   PCP Dr. Fermin 1 week     Day of Discharge     HPI:   No acute events. Feels ok. Slept well. Breathing is stable. Reviewed plans for rehab today and she is agreeable. " Answered all questions to the best of my ability.       Vital Signs:   Temp:  [97.5 °F (36.4 °C)-98.2 °F (36.8 °C)] 98.2 °F (36.8 °C)  Heart Rate:  [67-79] 79  Resp:  [18] 18  BP: (150-166)/(67-85) 164/84  Flow (L/min):  [3] 3      Physical Exam:  Constitutional: No acute distress, awake, alert; chronically ill appearing   Respiratory: Clear to auscultation bilaterally, respiratory effort normal ; no rhonchi   Cardiovascular: RRR, no murmurs  Gastrointestinal: Positive bowel sounds, soft, nontender, nondistended  Musculoskeletal: No bilateral ankle edema  Psychiatric: Appropriate affect, cooperative  Neurologic: Oriented x 3, strength symmetric in all extremities, Cranial Nerves grossly intact to confrontation, speech clear      Pertinent  and/or Most Recent Results     LAB RESULTS:      Lab 10/02/24  0436 10/01/24  0345 09/30/24  0347 09/29/24  0744 09/29/24  0451 09/28/24  2251 09/28/24  1745   WBC 10.02 14.70* 17.88*  --  25.69*  --  26.02*   HEMOGLOBIN 8.5* 8.0* 8.2*  --  10.2*  --  11.5*   HEMATOCRIT 26.1* 25.4* 25.1*  --  30.7*  --  36.1   PLATELETS 148 151 143  --  157  --  205   NEUTROS ABS  --   --  17.05*  --  24.77*  --  23.90*   IMMATURE GRANS (ABS)  --   --  0.14*  --  0.33*  --  0.26*   LYMPHS ABS  --   --  0.38*  --  0.45*  --  1.01   MONOS ABS  --   --  0.29  --  0.11  --  0.81   EOS ABS  --   --  0.00  --  0.00  --  0.00   MCV 91.3 94.1 92.3  --  92.2  --  94.3   PROCALCITONIN  --   --   --   --   --   --  61.99*   LACTATE  --   --   --  2.0 4.9* 3.3* 4.3*         Lab 10/02/24  0436 10/01/24  0345 09/30/24  0347 09/29/24  0452 09/28/24  1745   SODIUM 142 140 138 138 136   POTASSIUM 4.2 4.6 4.7 5.0 4.7   CHLORIDE 109* 108* 105 103 95*   CO2 24.0 24.0 22.0 18.0* 26.0   ANION GAP 9.0 8.0 11.0 17.0* 15.0   BUN 34* 43* 38* 38* 45*   CREATININE 1.06* 1.35* 1.31* 1.66* 2.22*   EGFR 55.9* 41.8* 43.4* 32.6* 23.0*   GLUCOSE 177* 149* 182* 155* 148*   CALCIUM 8.3* 8.2* 8.1* 7.8* 8.4*   MAGNESIUM  --   --   --    --  3.0*         Lab 09/29/24  0452 09/28/24  1745   TOTAL PROTEIN 5.5* 6.1   ALBUMIN 3.0* 3.5   GLOBULIN 2.5 2.6   ALT (SGPT) 33 41*   AST (SGOT) 18 19   BILIRUBIN 0.5 0.6   ALK PHOS 140* 168*         Lab 09/28/24  1745   PROBNP 464.7   HSTROP T 50*                 Lab 09/28/24  1800   PH, ARTERIAL 7.446   PCO2, ARTERIAL 37.4   PO2 ART 62.4*   FIO2 32   HCO3 ART 25.7   BASE EXCESS ART 1.7   CARBOXYHEMOGLOBIN 1.5     Brief Urine Lab Results  (Last result in the past 365 days)        Color   Clarity   Blood   Leuk Est   Nitrite   Protein   CREAT   Urine HCG        09/28/24 1848 Dark Yellow   Clear   Negative   Trace   Negative   Trace                 Microbiology Results (last 10 days)       Procedure Component Value - Date/Time    S. Pneumo Ag Urine or CSF - Urine, Urine, Clean Catch [[id number]]  (Normal) Collected: 09/29/24 1003    Lab Status: Final result Specimen: Urine, Clean Catch Updated: 09/30/24 0706     Strep Pneumo Ag Negative    MRSA Screen, PCR (Inpatient) - Swab, Nares [[id number]]  (Normal) Collected: 09/28/24 2251    Lab Status: Final result Specimen: Swab from Nares Updated: 09/29/24 0757     MRSA PCR Negative    Narrative:      The negative predictive value of this diagnostic test is high and should only be used to consider de-escalating anti-MRSA therapy. A positive result may indicate colonization with MRSA and must be correlated clinically.  MRSA Negative    Blood Culture - Blood, Arm, Right [[id number]]  (Normal) Collected: 09/28/24 1750    Lab Status: Preliminary result Specimen: Blood from Arm, Right Updated: 10/01/24 1831     Blood Culture No growth at 3 days    Respiratory Panel PCR w/COVID-19(SARS-CoV-2) DANAE/OFELIA/PREET/PAD/COR/BELLA In-House, NP Swab in UTM/VTM, 2 HR TAT - Swab, Nasopharynx [[id number]]  (Normal) Collected: 09/28/24 1746    Lab Status: Final result Specimen: Swab from Nasopharynx Updated: 09/28/24 1847     ADENOVIRUS, PCR Not Detected     Coronavirus 229E Not Detected      Coronavirus HKU1 Not Detected     Coronavirus NL63 Not Detected     Coronavirus OC43 Not Detected     COVID19 Not Detected     Human Metapneumovirus Not Detected     Human Rhinovirus/Enterovirus Not Detected     Influenza A PCR Not Detected     Influenza B PCR Not Detected     Parainfluenza Virus 1 Not Detected     Parainfluenza Virus 2 Not Detected     Parainfluenza Virus 3 Not Detected     Parainfluenza Virus 4 Not Detected     RSV, PCR Not Detected     Bordetella pertussis pcr Not Detected     Bordetella parapertussis PCR Not Detected     Chlamydophila pneumoniae PCR Not Detected     Mycoplasma pneumo by PCR Not Detected    Narrative:      In the setting of a positive respiratory panel with a viral infection PLUS a negative procalcitonin without other underlying concern for bacterial infection, consider observing off antibiotics or discontinuation of antibiotics and continue supportive care. If the respiratory panel is positive for atypical bacterial infection (Bordetella pertussis, Chlamydophila pneumoniae, or Mycoplasma pneumoniae), consider antibiotic de-escalation to target atypical bacterial infection.    Blood Culture - Blood, Arm, Left [[id number]]  (Normal) Collected: 09/28/24 1745    Lab Status: Preliminary result Specimen: Blood from Arm, Left Updated: 10/01/24 1831     Blood Culture No growth at 3 days            FL Video Swallow With Speech Single Contrast    Result Date: 9/30/2024  FL VIDEO SWALLOW W SPEECH SINGLE-CONTRAST Date of Exam: 9/30/2024 11:17 AM EDT Indication: dysphagia.   Comparison: None available. Technique:   The speech pathologist administered food and/or liquid mixed with barium to the patient with cine/video imaging.  Imaging assistance was provided to the speech pathologist and an image was saved. Fluoroscopic Time: 30 seconds Number of Images: 6 associated fluoroscopic loops were saved Findings: No aspiration was seen during fluoroscopic guided modified barium swallowing series.  Please see speech therapy report for full details and recommendations.     Impression: Fluoroscopy provided for a modified barium swallow. No aspiration was seen during swallowing evaluation. Please see speech therapy report for full details and recommendations. Report dictated by: Mindy Arora PA-c  I have personally reviewed this case and agree with the findings above: Electronically Signed: Lobo Tsai MD  9/30/2024 9:38 PM EDT  Workstation ID: [id number]    CT Head Without Contrast    Result Date: 9/28/2024  CT HEAD WO CONTRAST Date of Exam: 9/28/2024 9:40 PM EDT Indication: hallucinations. Comparison: Brain MRI 5/23/2022. Technique: Axial CT images were obtained of the head without contrast administration.  Automated exposure control and iterative construction methods were used. Findings: Superficial soft tissues appear within normal limits. The calvarium is intact.  Paranasal sinuses and mastoid air cells appear well aerated.  Orbits are unremarkable.  There is no acute intracranial hemorrhage.  No mass effect or midline shift.  No abnormal extra-axial collections.  Gray-white differentiation is within normal limits. There is moderate patchy periventricular and juxtacortical white matter hypoattenuation. Allowing for differences in technique, there are no definite new hypoattenuating lesions in the brain. Ventricular size and configuration is normal for age.     Impression: 1.No acute intracranial abnormality. 2.Moderate chronic small vessel ischemic change. 3.Stable moderate chronic white matter disease. Allowing for differences in technique with prior brain MRI, there are no definite new hypoattenuating lesions in the brain. Electronically Signed: Wilman Vivar MD  9/28/2024 11:14 PM EDT  Workstation ID: [id number]    CT Chest Without Contrast Diagnostic    Result Date: 9/28/2024  CT CHEST WO CONTRAST DIAGNOSTIC Date of Exam: 9/28/2024 9:40 PM EDT Indication: hypoxia. Comparison: Chest radiograph 9/28/2024 and  9/21/2022. Technique: Axial CT images were obtained of the chest without contrast administration.  Reconstructed coronal and sagittal images were also obtained. Automated exposure control and iterative construction methods were used. Findings: Thyroid, trachea and esophagus appear within normal limits. There is severe coronary artery calcification. Diminished attenuation of the blood pool would suggest anemia. There is moderate aortic and aortic branch vessel atherosclerosis. No aneurysm. No pericardial effusion or mediastinal lymphadenopathy. There is no pneumothorax or pleural effusion. There is segmentally obstructive endobronchial debris within both lower lobes fairly extensively with associated atelectasis. Superimposed pneumonia would be difficult to exclude based on this appearance. There is minor atelectasis elsewhere in the lung bases. Trachea is patent. There are no discrete measurable concerning lung nodules. There is mild subpleural interstitial thickening in the lung apices. There are no acute findings in the superficial soft tissues. No acute findings in the limited images of the upper abdomen. There is a low-density right adrenal nodule measuring 18 mm compatible with adenoma. There is no acute osseous abnormality or destructive bone lesion. There are mild thoracic degenerative changes.     Impression: 1.There is extensive segmentally obstructive endobronchial debris in both lower lobes with associated atelectasis. Superimposed pneumonia would be difficult to exclude. This could be from chronic aspiration or decreased mucus clearance. 2.Severe coronary artery disease. 3.Diminished attenuation of the blood pool suggesting anemia. Electronically Signed: Wilman Vivar MD  9/28/2024 11:07 PM EDT  Workstation ID: [id number]    XR Chest 1 View    Result Date: 9/28/2024  XR CHEST 1 VW Date of Exam: 9/28/2024 5:45 PM EDT Indication: sob Comparison: Chest AP dated 9/21/2022 Findings: There is consolidation in  the right lung base. The left lung appears grossly clear. The cardiac mediastinal silhouettes appear normal. No effusion is seen.      Impression: Right basilar consolidation consistent with pneumonia versus atelectasis. A follow-up chest PA and lateral in 6 weeks is suggested to reevaluate. Electronically Signed: Escobar David MD  9/28/2024 6:02 PM EDT  Workstation ID: [id number]     Results for orders placed during the hospital encounter of 09/07/16    DUPLEX CAROTID BILATERAL CAR    Interpretation Summary  · Normal proximal left internal carotid artery.  · Proximal right internal carotid artery stenosis of 0-49%.      Results for orders placed during the hospital encounter of 09/07/16    DUPLEX CAROTID BILATERAL CAR    Interpretation Summary  · Normal proximal left internal carotid artery.  · Proximal right internal carotid artery stenosis of 0-49%.      Results for orders placed during the hospital encounter of 09/07/16    Adult transthoracic echo complete    Interpretation Summary  · Left ventricular function is normal. Estimated EF = 60%.  · Mild mitral valve regurgitation is present  · Mild tricuspid valve regurgitation is present.      Plan for Follow-up of Pending Labs/Results:   Pending Labs       Order Current Status    Blood Culture - Blood, Arm, Left Preliminary result    Blood Culture - Blood, Arm, Right Preliminary result          Discharge Details        Discharge Medications        New Medications        Instructions Start Date   cefdinir 300 MG capsule  Commonly known as: OMNICEF   300 mg, Oral, 2 Times Daily      doxycycline 100 MG capsule  Commonly known as: VIBRAMYCIN   100 mg, Oral, 2 Times Daily      lactobacillus acidophilus capsule capsule   1 capsule, Oral, Daily      vancomycin 125 MG capsule  Commonly known as: VANCOCIN   125 mg, Oral, Daily             Changes to Medications        Instructions Start Date   amLODIPine 2.5 MG tablet  Commonly known as: NORVASC  What changed: how much to  take   5 mg, Oral, Daily      HYDROcodone-acetaminophen  MG per tablet  Commonly known as: NORCO  What changed:   when to take this  reasons to take this   1 tablet, Oral, Every 6 Hours PRN      predniSONE 20 MG tablet  Commonly known as: DELTASONE  What changed:   medication strength  See the new instructions.   Take 2 tablets by mouth Daily With Breakfast for 2 days, THEN 1.5 tablets Daily With Breakfast for 2 days, THEN 1 tablet Daily With Breakfast for 2 days, THEN 0.5 tablets Daily With Breakfast for 2 days.   Start Date: October 2, 2024            Continue These Medications        Instructions Start Date   albuterol sulfate  (90 Base) MCG/ACT inhaler  Commonly known as: PROVENTIL HFA;VENTOLIN HFA;PROAIR HFA   1 puff, Inhalation, Daily      albuterol 1.25 MG/3ML nebulizer solution  Commonly known as: ACCUNEB   1 ampule, Nebulization, Every 6 Hours PRN      atorvastatin 80 MG tablet  Commonly known as: LIPITOR   Take 1 tablet by mouth Every Night.      azithromycin 250 MG tablet  Commonly known as: ZITHROMAX   250 mg, Oral, 3 Times Weekly,  MON, WED, AND FRI      gabapentin 300 MG capsule  Commonly known as: NEURONTIN   300 mg, Oral, 2 Times Daily      nicotine 21 MG/24HR patch  Commonly known as: NICODERM CQ   1 patch, Transdermal, Every 24 Hours      oxybutynin XL 10 MG 24 hr tablet  Commonly known as: DITROPAN-XL   1 tablet, Oral, Daily      Trelegy Ellipta 200-62.5-25 MCG/ACT inhaler  Generic drug: Fluticasone-Umeclidin-Vilant   Inhale 1 puff Daily.             Stop These Medications      apixaban 5 MG tablet tablet  Commonly known as: ELIQUIS     benazepril 20 MG tablet  Commonly known as: LOTENSIN     buPROPion  MG 12 hr tablet  Commonly known as: WELLBUTRIN SR     busPIRone 5 MG tablet  Commonly known as: BUSPAR     cephalexin 500 MG capsule  Commonly known as: KEFLEX     dilTIAZem  MG 24 hr capsule  Commonly known as: CARDIZEM CD     furosemide 20 MG tablet  Commonly known as:  LASIX     loperamide 2 MG capsule  Commonly known as: IMODIUM              Allergies   Allergen Reactions    Penicillins Other (See Comments)     WAS A YOUNG CHILD AND WAS NEVER TOLD WHAT HER REACTION WAS    Sulfa Antibiotics Other (See Comments)     WAS A YOUNG CHILD AND WAS NEVER TOLD WHAT HER REACTION WAS         Discharge Disposition:  Skilled Nursing Facility (DC - External)    Diet:  Hospital:  Diet Order   Procedures    Diet: Regular/House; Texture: Regular (IDDSI 7); Fluid Consistency: Thin (IDDSI 0)       Diet Instructions       Diet: Cardiac Diets; Healthy Heart (2-3 Na+); Thin (IDDSI 0)      Discharge Diet: Cardiac Diets    Cardiac Diet: Healthy Heart (2-3 Na+)    Fluid Consistency: Thin (IDDSI 0)             Activity:  Activity Instructions       Activity as Tolerated              Restrictions or Other Recommendations:         CODE STATUS:    Code Status and Medical Interventions: CPR (Attempt to Resuscitate); Full Support   Ordered at: 09/28/24 1948     Level Of Support Discussed With:    Patient     Code Status (Patient has no pulse and is not breathing):    CPR (Attempt to Resuscitate)     Medical Interventions (Patient has pulse or is breathing):    Full Support       Future Appointments   Date Time Provider Department [place]   10/29/2024  1:00 PM Umang Perez MD [facility]       Additional Instructions for the Follow-ups that You Need to Schedule       Discharge Follow-up with PCP   As directed       Currently Documented PCP:    Niharika Fermin MD    PCP Phone Number:    [phone]     Follow Up Details: PCP Dr. Fermin 1 week                      Maria Esther Nguyen DO  10/02/24      Time Spent on Discharge:  I spent  35  minutes on this discharge activity which included: face-to-face encounter with the patient, reviewing the data in the system, coordination of the care with the nursing staff as well as consultants, documentation, and entering orders.            Electronically signed by Patrick  DO Maria Esther at 10/02/24 0908       Discharge Order (From admission, onward)       Start     Ordered    10/02/24 0852  Discharge patient  Once        Expected Discharge Date: 10/02/24   Discharge Disposition: Skilled Nursing Facility (DC - External)   Physician of Record for Attribution - Please select from Treatment Team: MARIA ESTHER MURILLO [[id number]]   Review needed by CMO to determine Physician of Record: No      Question Answer Comment   Physician of Record for Attribution - Please select from Treatment Team MARIA ESTHER MURILLO    Review needed by CMO to determine Physician of Record No        10/02/24 0851

## 2024-10-02 NOTE — CASE MANAGEMENT/SOCIAL WORK
Case Management Discharge Note      Final Note: Ms. Wall will be discharging to [facility] today. She has [facility] wheelchair transportation scheduled for 1530. She will need to be at the Maternity entrance by 1515.  has updated [facility] liaison. Nursing, please call report to [phone].  arya fax discharge summary to facility. Ms. Wall is in agreement with this plan. No other discharge needs were identified.         Selected Continued Care - Admitted Since 9/28/2024       Destination Coordination complete.      Service Provider Selected Services Address Phone Fax Patient Preferred    [facility] [place] [facility] [address] [address] [phone] --              Durable Medical Equipment    No services have been selected for the patient.                Dialysis/Infusion    No services have been selected for the patient.                Home Medical Care    No services have been selected for the patient.                Therapy    No services have been selected for the patient.                Community Resources    No services have been selected for the patient.                Community & DME    No services have been selected for the patient.                    Transportation Services  W/C Van:  ([facility] van)    Final Discharge Disposition Code: 03 - skilled nursing facility (SNF)

## 2024-10-02 NOTE — DISCHARGE PLACEMENT REQUEST
"To: [facility]  From: Arlen Hernandez,   [phone]      Leatha Engel (74 y.o. Female)       Date of Birth   1950    Social Security Number       Address   [address] [place] [address]    Home Phone   [phone]    MRN   [id number]       Shinto   None    Marital Status                               Admission Date   24    Admission Type   Emergency    Admitting Provider   Maria Esther Nguyen DO    Attending Provider   Maria Esther Nguyen DO    Department, Room/Bed   [facility] 5G, S561/1       Discharge Date       Discharge Disposition   Skilled Nursing Facility (DC - External)    Discharge Destination                                 Attending Provider: Maria Esther Nguyen DO    Allergies: Penicillins, Sulfa Antibiotics    Isolation: None   Infection: None   Code Status: CPR    Ht: 170.2 cm (67\")   Wt: 71.2 kg (157 lb)    Admission Cmt: None   Principal Problem: Sepsis due to pneumonia [J18.9,A41.9]                   Active Insurance as of 2024       Primary Coverage       Payor Plan Insurance Group Employer/Plan Group    [employer] [facility] MEDICARE REPLACEMENT WELLCARE MED ADV PPO        Payor Plan Address Payor Plan Phone Number Payor Plan Fax Number Effective Dates    [address] [address]   3/1/2024 - None Entered    [facility] [id number]         Subscriber Name Subscriber Birth Date Member ID       LEATHA ENGEL 1950 [id number]                     Emergency Contacts        (Rel.) Home Phone Work Phone Mobile Phone    Niharika Engel (Grandchild) [phone] -- [phone]    LUDWIN ARRIOLA (Neighbor) -- -- [phone]                 Discharge Summary        Maria Esther Nguyen DO at 10/02/24 0901              [facility]  DISCHARGE SUMMARY    Patient Name: Leatha Engel  : 1950  MRN: [id number]    Date of Admission: 2024  5:33 PM  Date of Discharge:  " 10/02/24    Primary Care Physician: Niharika Fermin MD    Consults       No orders found from 8/30/2024 to 9/29/2024.            Hospital Course     Presenting Problem: resp failure     Active Hospital Problems    Diagnosis  POA    COPD with acute exacerbation [J44.1]  Yes    Hypertensive disorder [I10]  Yes    HLD (hyperlipidemia) [E78.5]  Yes    Chronic kidney disease, stage 4 (severe) [N18.4]  Yes    Acute and chronic respiratory failure with hypoxia [J96.21]  Yes    Iron deficiency anemia [D50.9]  Yes    History of stroke [Z86.73]  Not Applicable    Restless legs syndrome (RLS) [G25.81]  Yes    Anxiety [F41.9]  Yes    Tobacco abuse [Z72.0]  Yes    Multiple sclerosis [G35]  Yes      Resolved Hospital Problems    Diagnosis Date Resolved POA    **Sepsis due to pneumonia [J18.9, A41.9] 10/02/2024 Yes    Pneumonia [J18.9] 10/02/2024 Yes    CHRISTIANO (acute kidney injury) [N17.9] 10/02/2024 Yes    Diarrhea [R19.7] 10/02/2024 Yes    Encephalopathy [G93.40] 10/02/2024 Yes    Lactic acidosis [E87.20] 10/02/2024 Yes          Hospital Course:  Leatha Wall is a 74 y.o. female w MS on chronic prednisone, COPD on chronic 3.5 liters n/c, CKDIIIb, h/o C diff per charting who presents with weakness and confusion from [facility]     Patient admitted to [facility] for COPD exacerbation 8/28-9/1, then back home, then to [facility] reportedly w pneumonia , then to [facility] and return here in less than 72 hours from that admission with confusion. History of PNA requiring intubation 12/2023     Sepsis 2/2 recurrent aspiration PNA - improved  -CT w chronic aspiration signs, concern for RLL pneumonia  -reviewed records from [facility] similar findings, treated as COPD exacerbation w doxy  -3rd respiratory admission in 1 month, do worry about aspiration as cause of recurrent issues v severe COPD w back -to-back exacerbations  - resp PCR negative; Bcx NGTD; MRSA neg; strep antigen negative   - Patient was on  "rocephin/doxy. Will change to omnicef and doxy to complete 2 more days   - Continue prednisone taper on DC back to home 9 mg daily dose   - resume azithromycin three times weekly once antibiotics are complete      Acute on chronic hypoxic resp failure 2/2 above + COPD  -resolved to baseline 3.5 liters n/c o2 need     CHRISTIANO on CKDIIIb - resolved to baseline. Continue to hold diuretics and lisinopril on DC. Recommend monitoring BMP if resumed      Concern for oropharyngeal dysphagia  -speech OK for regular diet  -remain concern for intermittent aspiration and overall weakness     Acute on chronic debility   - on review of neurology notes 4/2024: left leg weakness chronic, 3/5 at that time, uses cane at baseline  - PT/OT, close monitoring, likely high dose recent steroids not helping. Plan to taper on DC as outlined above.      Acute on chronic anemia  -baseline ~10 per notes from [facility]; trended down to around 8 and now stable.   -on eliquis prior without indication (see below), now stopped. Significant superficial bruising/bleeding     MS - prednisone taper as above for now, then back to home prednisone 9 mg daily  H/o C-diff - vancomycin ppx while on systemic abx. Continue PO vanc x 2 more doses as antibiotics complete   Active tobacco use - stopped x 3 weeks; encouraged continued  cessation      *Of note, patient was on eliquis on admission. My partner reviewed [facility] records - initial concern for Afib and was placed on eliquis for this reason. However, cardiology evaluated her there and wrote \"no A-fib, will sign off\" as they believed this to be nsr w PAC's. Continue off eliquis     Discharge Follow Up Recommendations for outpatient labs/diagnostics:   PCP Dr. Fermin 1 week     Day of Discharge     HPI:   No acute events. Feels ok. Slept well. Breathing is stable. Reviewed plans for rehab today and she is agreeable. Answered all questions to the best of my ability.       Vital Signs:   Temp:  [97.5 °F (36.4 " °C)-98.2 °F (36.8 °C)] 98.2 °F (36.8 °C)  Heart Rate:  [67-79] 79  Resp:  [18] 18  BP: (150-166)/(67-85) 164/84  Flow (L/min):  [3] 3      Physical Exam:  Constitutional: No acute distress, awake, alert; chronically ill appearing   Respiratory: Clear to auscultation bilaterally, respiratory effort normal ; no rhonchi   Cardiovascular: RRR, no murmurs  Gastrointestinal: Positive bowel sounds, soft, nontender, nondistended  Musculoskeletal: No bilateral ankle edema  Psychiatric: Appropriate affect, cooperative  Neurologic: Oriented x 3, strength symmetric in all extremities, Cranial Nerves grossly intact to confrontation, speech clear      Pertinent  and/or Most Recent Results     LAB RESULTS:      Lab 10/02/24  0436 10/01/24  0345 09/30/24  0347 09/29/24  0744 09/29/24 0451 09/28/24 2251 09/28/24  1745   WBC 10.02 14.70* 17.88*  --  25.69*  --  26.02*   HEMOGLOBIN 8.5* 8.0* 8.2*  --  10.2*  --  11.5*   HEMATOCRIT 26.1* 25.4* 25.1*  --  30.7*  --  36.1   PLATELETS 148 151 143  --  157  --  205   NEUTROS ABS  --   --  17.05*  --  24.77*  --  23.90*   IMMATURE GRANS (ABS)  --   --  0.14*  --  0.33*  --  0.26*   LYMPHS ABS  --   --  0.38*  --  0.45*  --  1.01   MONOS ABS  --   --  0.29  --  0.11  --  0.81   EOS ABS  --   --  0.00  --  0.00  --  0.00   MCV 91.3 94.1 92.3  --  92.2  --  94.3   PROCALCITONIN  --   --   --   --   --   --  61.99*   LACTATE  --   --   --  2.0 4.9* 3.3* 4.3*         Lab 10/02/24  0436 10/01/24  0345 09/30/24  0347 09/29/24  0452 09/28/24  1745   SODIUM 142 140 138 138 136   POTASSIUM 4.2 4.6 4.7 5.0 4.7   CHLORIDE 109* 108* 105 103 95*   CO2 24.0 24.0 22.0 18.0* 26.0   ANION GAP 9.0 8.0 11.0 17.0* 15.0   BUN 34* 43* 38* 38* 45*   CREATININE 1.06* 1.35* 1.31* 1.66* 2.22*   EGFR 55.9* 41.8* 43.4* 32.6* 23.0*   GLUCOSE 177* 149* 182* 155* 148*   CALCIUM 8.3* 8.2* 8.1* 7.8* 8.4*   MAGNESIUM  --   --   --   --  3.0*         Lab 09/29/24 0452 09/28/24  1745   TOTAL PROTEIN 5.5* 6.1   ALBUMIN 3.0*  3.5   GLOBULIN 2.5 2.6   ALT (SGPT) 33 41*   AST (SGOT) 18 19   BILIRUBIN 0.5 0.6   ALK PHOS 140* 168*         Lab 09/28/24  1745   PROBNP 464.7   HSTROP T 50*                 Lab 09/28/24  1800   PH, ARTERIAL 7.446   PCO2, ARTERIAL 37.4   PO2 ART 62.4*   FIO2 32   HCO3 ART 25.7   BASE EXCESS ART 1.7   CARBOXYHEMOGLOBIN 1.5     Brief Urine Lab Results  (Last result in the past 365 days)        Color   Clarity   Blood   Leuk Est   Nitrite   Protein   CREAT   Urine HCG        09/28/24 1848 Dark Yellow   Clear   Negative   Trace   Negative   Trace                 Microbiology Results (last 10 days)       Procedure Component Value - Date/Time    S. Pneumo Ag Urine or CSF - Urine, Urine, Clean Catch [[id number]]  (Normal) Collected: 09/29/24 1003    Lab Status: Final result Specimen: Urine, Clean Catch Updated: 09/30/24 0706     Strep Pneumo Ag Negative    MRSA Screen, PCR (Inpatient) - Swab, Nares [[id number]]  (Normal) Collected: 09/28/24 2251    Lab Status: Final result Specimen: Swab from Nares Updated: 09/29/24 0757     MRSA PCR Negative    Narrative:      The negative predictive value of this diagnostic test is high and should only be used to consider de-escalating anti-MRSA therapy. A positive result may indicate colonization with MRSA and must be correlated clinically.  MRSA Negative    Blood Culture - Blood, Arm, Right [[id number]]  (Normal) Collected: 09/28/24 1750    Lab Status: Preliminary result Specimen: Blood from Arm, Right Updated: 10/01/24 1831     Blood Culture No growth at 3 days    Respiratory Panel PCR w/COVID-19(SARS-CoV-2) DANAE/OFELIA/PREET/PAD/COR/BELLA In-House, NP Swab in UTM/VTM, 2 HR TAT - Swab, Nasopharynx [[id number]]  (Normal) Collected: 09/28/24 1746    Lab Status: Final result Specimen: Swab from Nasopharynx Updated: 09/28/24 1847     ADENOVIRUS, PCR Not Detected     Coronavirus 229E Not Detected     Coronavirus HKU1 Not Detected     Coronavirus NL63 Not Detected     Coronavirus OC43 Not Detected      COVID19 Not Detected     Human Metapneumovirus Not Detected     Human Rhinovirus/Enterovirus Not Detected     Influenza A PCR Not Detected     Influenza B PCR Not Detected     Parainfluenza Virus 1 Not Detected     Parainfluenza Virus 2 Not Detected     Parainfluenza Virus 3 Not Detected     Parainfluenza Virus 4 Not Detected     RSV, PCR Not Detected     Bordetella pertussis pcr Not Detected     Bordetella parapertussis PCR Not Detected     Chlamydophila pneumoniae PCR Not Detected     Mycoplasma pneumo by PCR Not Detected    Narrative:      In the setting of a positive respiratory panel with a viral infection PLUS a negative procalcitonin without other underlying concern for bacterial infection, consider observing off antibiotics or discontinuation of antibiotics and continue supportive care. If the respiratory panel is positive for atypical bacterial infection (Bordetella pertussis, Chlamydophila pneumoniae, or Mycoplasma pneumoniae), consider antibiotic de-escalation to target atypical bacterial infection.    Blood Culture - Blood, Arm, Left [[id number]]  (Normal) Collected: 09/28/24 1745    Lab Status: Preliminary result Specimen: Blood from Arm, Left Updated: 10/01/24 1831     Blood Culture No growth at 3 days            FL Video Swallow With Speech Single Contrast    Result Date: 9/30/2024  FL VIDEO SWALLOW W SPEECH SINGLE-CONTRAST Date of Exam: 9/30/2024 11:17 AM EDT Indication: dysphagia.   Comparison: None available. Technique:   The speech pathologist administered food and/or liquid mixed with barium to the patient with cine/video imaging.  Imaging assistance was provided to the speech pathologist and an image was saved. Fluoroscopic Time: 30 seconds Number of Images: 6 associated fluoroscopic loops were saved Findings: No aspiration was seen during fluoroscopic guided modified barium swallowing series. Please see speech therapy report for full details and recommendations.     Impression: Fluoroscopy  provided for a modified barium swallow. No aspiration was seen during swallowing evaluation. Please see speech therapy report for full details and recommendations. Report dictated by: Mindy Arora PA-c  I have personally reviewed this case and agree with the findings above: Electronically Signed: Lobo Tsai MD  9/30/2024 9:38 PM EDT  Workstation ID: [id number]    CT Head Without Contrast    Result Date: 9/28/2024  CT HEAD WO CONTRAST Date of Exam: 9/28/2024 9:40 PM EDT Indication: hallucinations. Comparison: Brain MRI 5/23/2022. Technique: Axial CT images were obtained of the head without contrast administration.  Automated exposure control and iterative construction methods were used. Findings: Superficial soft tissues appear within normal limits. The calvarium is intact.  Paranasal sinuses and mastoid air cells appear well aerated.  Orbits are unremarkable.  There is no acute intracranial hemorrhage.  No mass effect or midline shift.  No abnormal extra-axial collections.  Gray-white differentiation is within normal limits. There is moderate patchy periventricular and juxtacortical white matter hypoattenuation. Allowing for differences in technique, there are no definite new hypoattenuating lesions in the brain. Ventricular size and configuration is normal for age.     Impression: 1.No acute intracranial abnormality. 2.Moderate chronic small vessel ischemic change. 3.Stable moderate chronic white matter disease. Allowing for differences in technique with prior brain MRI, there are no definite new hypoattenuating lesions in the brain. Electronically Signed: Wilman Vivar MD  9/28/2024 11:14 PM EDT  Workstation ID: [id number]    CT Chest Without Contrast Diagnostic    Result Date: 9/28/2024  CT CHEST WO CONTRAST DIAGNOSTIC Date of Exam: 9/28/2024 9:40 PM EDT Indication: hypoxia. Comparison: Chest radiograph 9/28/2024 and 9/21/2022. Technique: Axial CT images were obtained of the chest without contrast administration.   Reconstructed coronal and sagittal images were also obtained. Automated exposure control and iterative construction methods were used. Findings: Thyroid, trachea and esophagus appear within normal limits. There is severe coronary artery calcification. Diminished attenuation of the blood pool would suggest anemia. There is moderate aortic and aortic branch vessel atherosclerosis. No aneurysm. No pericardial effusion or mediastinal lymphadenopathy. There is no pneumothorax or pleural effusion. There is segmentally obstructive endobronchial debris within both lower lobes fairly extensively with associated atelectasis. Superimposed pneumonia would be difficult to exclude based on this appearance. There is minor atelectasis elsewhere in the lung bases. Trachea is patent. There are no discrete measurable concerning lung nodules. There is mild subpleural interstitial thickening in the lung apices. There are no acute findings in the superficial soft tissues. No acute findings in the limited images of the upper abdomen. There is a low-density right adrenal nodule measuring 18 mm compatible with adenoma. There is no acute osseous abnormality or destructive bone lesion. There are mild thoracic degenerative changes.     Impression: 1.There is extensive segmentally obstructive endobronchial debris in both lower lobes with associated atelectasis. Superimposed pneumonia would be difficult to exclude. This could be from chronic aspiration or decreased mucus clearance. 2.Severe coronary artery disease. 3.Diminished attenuation of the blood pool suggesting anemia. Electronically Signed: Wilman Vivar MD  9/28/2024 11:07 PM EDT  Workstation ID: [id number]    XR Chest 1 View    Result Date: 9/28/2024  XR CHEST 1 VW Date of Exam: 9/28/2024 5:45 PM EDT Indication: sob Comparison: Chest AP dated 9/21/2022 Findings: There is consolidation in the right lung base. The left lung appears grossly clear. The cardiac mediastinal silhouettes appear  normal. No effusion is seen.      Impression: Right basilar consolidation consistent with pneumonia versus atelectasis. A follow-up chest PA and lateral in 6 weeks is suggested to reevaluate. Electronically Signed: Escobar David MD  9/28/2024 6:02 PM EDT  Workstation ID: [id number]     Results for orders placed during the hospital encounter of 09/07/16    DUPLEX CAROTID BILATERAL CAR    Interpretation Summary  · Normal proximal left internal carotid artery.  · Proximal right internal carotid artery stenosis of 0-49%.      Results for orders placed during the hospital encounter of 09/07/16    DUPLEX CAROTID BILATERAL CAR    Interpretation Summary  · Normal proximal left internal carotid artery.  · Proximal right internal carotid artery stenosis of 0-49%.      Results for orders placed during the hospital encounter of 09/07/16    Adult transthoracic echo complete    Interpretation Summary  · Left ventricular function is normal. Estimated EF = 60%.  · Mild mitral valve regurgitation is present  · Mild tricuspid valve regurgitation is present.      Plan for Follow-up of Pending Labs/Results:   Pending Labs       Order Current Status    Blood Culture - Blood, Arm, Left Preliminary result    Blood Culture - Blood, Arm, Right Preliminary result          Discharge Details        Discharge Medications        New Medications        Instructions Start Date   cefdinir 300 MG capsule  Commonly known as: OMNICEF   300 mg, Oral, 2 Times Daily      doxycycline 100 MG capsule  Commonly known as: VIBRAMYCIN   100 mg, Oral, 2 Times Daily      lactobacillus acidophilus capsule capsule   1 capsule, Oral, Daily      vancomycin 125 MG capsule  Commonly known as: VANCOCIN   125 mg, Oral, Daily             Changes to Medications        Instructions Start Date   amLODIPine 2.5 MG tablet  Commonly known as: NORVASC  What changed: how much to take   5 mg, Oral, Daily      HYDROcodone-acetaminophen  MG per tablet  Commonly known as:  ASIYA  What changed:   when to take this  reasons to take this   1 tablet, Oral, Every 6 Hours PRN      predniSONE 20 MG tablet  Commonly known as: DELTASONE  What changed:   medication strength  See the new instructions.   Take 2 tablets by mouth Daily With Breakfast for 2 days, THEN 1.5 tablets Daily With Breakfast for 2 days, THEN 1 tablet Daily With Breakfast for 2 days, THEN 0.5 tablets Daily With Breakfast for 2 days.   Start Date: October 2, 2024            Continue These Medications        Instructions Start Date   albuterol sulfate  (90 Base) MCG/ACT inhaler  Commonly known as: PROVENTIL HFA;VENTOLIN HFA;PROAIR HFA   1 puff, Inhalation, Daily      albuterol 1.25 MG/3ML nebulizer solution  Commonly known as: ACCUNEB   1 ampule, Nebulization, Every 6 Hours PRN      atorvastatin 80 MG tablet  Commonly known as: LIPITOR   Take 1 tablet by mouth Every Night.      azithromycin 250 MG tablet  Commonly known as: ZITHROMAX   250 mg, Oral, 3 Times Weekly,  MON, WED, AND FRI      gabapentin 300 MG capsule  Commonly known as: NEURONTIN   300 mg, Oral, 2 Times Daily      nicotine 21 MG/24HR patch  Commonly known as: NICODERM CQ   1 patch, Transdermal, Every 24 Hours      oxybutynin XL 10 MG 24 hr tablet  Commonly known as: DITROPAN-XL   1 tablet, Oral, Daily      Trelegy Ellipta 200-62.5-25 MCG/ACT inhaler  Generic drug: Fluticasone-Umeclidin-Vilant   Inhale 1 puff Daily.             Stop These Medications      apixaban 5 MG tablet tablet  Commonly known as: ELIQUIS     benazepril 20 MG tablet  Commonly known as: LOTENSIN     buPROPion  MG 12 hr tablet  Commonly known as: WELLBUTRIN SR     busPIRone 5 MG tablet  Commonly known as: BUSPAR     cephalexin 500 MG capsule  Commonly known as: KEFLEX     dilTIAZem  MG 24 hr capsule  Commonly known as: CARDIZEM CD     furosemide 20 MG tablet  Commonly known as: LASIX     loperamide 2 MG capsule  Commonly known as: IMODIUM              Allergies   Allergen  Reactions    Penicillins Other (See Comments)     WAS A YOUNG CHILD AND WAS NEVER TOLD WHAT HER REACTION WAS    Sulfa Antibiotics Other (See Comments)     WAS A YOUNG CHILD AND WAS NEVER TOLD WHAT HER REACTION WAS         Discharge Disposition:  Skilled Nursing Facility (DC - External)    Diet:  Hospital:  Diet Order   Procedures    Diet: Regular/House; Texture: Regular (IDDSI 7); Fluid Consistency: Thin (IDDSI 0)       Diet Instructions       Diet: Cardiac Diets; Healthy Heart (2-3 Na+); Thin (IDDSI 0)      Discharge Diet: Cardiac Diets    Cardiac Diet: Healthy Heart (2-3 Na+)    Fluid Consistency: Thin (IDDSI 0)             Activity:  Activity Instructions       Activity as Tolerated              Restrictions or Other Recommendations:         CODE STATUS:    Code Status and Medical Interventions: CPR (Attempt to Resuscitate); Full Support   Ordered at: 09/28/24 1948     Level Of Support Discussed With:    Patient     Code Status (Patient has no pulse and is not breathing):    CPR (Attempt to Resuscitate)     Medical Interventions (Patient has pulse or is breathing):    Full Support       Future Appointments   Date Time Provider Department [facility]   10/29/2024  1:00 PM Umang Perez MD [facility]       Additional Instructions for the Follow-ups that You Need to Schedule       Discharge Follow-up with PCP   As directed       Currently Documented PCP:    Niharika Fermin MD    PCP Phone Number:    [phone]     Follow Up Details: PCP Dr. Fermin 1 week                      Maria Esther Nguyen DO  10/02/24      Time Spent on Discharge:  I spent  35  minutes on this discharge activity which included: face-to-face encounter with the patient, reviewing the data in the system, coordination of the care with the nursing staff as well as consultants, documentation, and entering orders.            Electronically signed by Maria Esther Nguyen DO at 10/02/24 4352

## 2024-10-02 NOTE — PROGRESS NOTES
"Nutrition Services    Patient Name:  Leatha Wall  YOB: 1950  MRN: [id number]  Admit Date:  9/28/2024    Patient identified to be at nutrition risk per nurse admission screen based on indicator of \"unsure if weight loss\". Per EMR weight stable > past year. Pt does not currently meet screen criteria for nutrition risk. Pt evaluated by SLP for possible dysphagia, cleared for Regular/Thin with instrumental evaluation. Noted pt with D/C orders in place. RD will continue to follow per protocol and complete full assessment as indicated. Please consult with any nutrition related needs.      Electronically signed by:  Beverley Crowder MS,RD,LD  10/02/24 09:38 EDT   "

## 2024-10-03 ENCOUNTER — NURSING HOME (OUTPATIENT)
Dept: INTERNAL MEDICINE | Facility: CLINIC | Age: 74
End: 2024-10-03
Payer: MEDICARE

## 2024-10-03 VITALS
OXYGEN SATURATION: 88 % | TEMPERATURE: 97.3 F | WEIGHT: 157 LBS | DIASTOLIC BLOOD PRESSURE: 66 MMHG | SYSTOLIC BLOOD PRESSURE: 151 MMHG | RESPIRATION RATE: 20 BRPM | BODY MASS INDEX: 24.59 KG/M2 | HEART RATE: 74 BPM

## 2024-10-03 DIAGNOSIS — N18.4 CHRONIC KIDNEY DISEASE, STAGE 4 (SEVERE): ICD-10-CM

## 2024-10-03 DIAGNOSIS — F41.9 ANXIETY: ICD-10-CM

## 2024-10-03 DIAGNOSIS — G35 MULTIPLE SCLEROSIS: Chronic | ICD-10-CM

## 2024-10-03 DIAGNOSIS — I15.9 SECONDARY HYPERTENSION: ICD-10-CM

## 2024-10-03 DIAGNOSIS — E78.2 MIXED HYPERLIPIDEMIA: ICD-10-CM

## 2024-10-03 DIAGNOSIS — G25.81 RESTLESS LEGS SYNDROME (RLS): Chronic | ICD-10-CM

## 2024-10-03 DIAGNOSIS — J44.1 COPD WITH ACUTE EXACERBATION: ICD-10-CM

## 2024-10-03 DIAGNOSIS — Z72.0 TOBACCO ABUSE: Chronic | ICD-10-CM

## 2024-10-03 DIAGNOSIS — J96.21 ACUTE AND CHRONIC RESPIRATORY FAILURE WITH HYPOXIA: Primary | ICD-10-CM

## 2024-10-03 LAB
BACTERIA SPEC AEROBE CULT: NORMAL
BACTERIA SPEC AEROBE CULT: NORMAL

## 2024-10-03 PROCEDURE — 99305 1ST NF CARE MODERATE MDM 35: CPT | Performed by: INTERNAL MEDICINE

## 2024-10-03 NOTE — LETTER
"  Nursing Home History and Physical       Freeman Arredondo DO  [address] [place] [address] Phone: [phone]  Fax: [phone]     PATIENT NAME: Leatha Wall                                                                          YOB: 1950           DATE OF SERVICE: 10/03/2024  FACILITY:  [facility]    CHIEF COMPLAINT:  Nursing facility admission    History of Present Illness  The patient is a 74-year-old female with a history of multiple sclerosis, restless legs, hyperlipidemia, COPD, and tobacco abuse. She was recently hospitalized at [facility] due to concerns of weakness and confusion following her transfer from this facility on 09/28/2024.    Her initial admission was for a COPD exacerbation at [facility] from 08/28/2024 to 09/01/2024, followed by a brief stay at [facility]. She was then transferred to [facility], but within 72 hours, she was sent to [facility]. Her recent hospitalization was due to altered mental status with sepsis and pneumonia. She was treated with Rocephin and doxycycline, which were changed to Omnicef and doxycycline upon discharge, along with a prednisone taper. Given her history of recurrent aspiration, she was also advised to resume taking azithromycin three times a week after completing her antibiotics.           PAST MEDICAL & SURGICAL HISTORY:   Past Medical History:   Diagnosis Date    Aneurysm of aorta     Anxiety and depression     Arthritis     Back disorder     \"PROTRUDING DISC\"    Bilateral cataracts     Chronic respiratory failure with hypoxia     Cognitive communication deficit     COPD (chronic obstructive pulmonary disease)     Dysphagia, oropharyngeal phase     WOLF (generalized anxiety disorder)     Heavy tobacco smoker     Hepatitis C     Hyperlipidemia     Hypertension     Idiopathic gout     Idiopathic gout, unspecified site     MS (multiple sclerosis)     Multiple " sclerosis     Pneumonia     Sepsis, unspecified organism     Stroke     Unspecified sequelae of cerebral infarction     Urinary tract infection       Past Surgical History:   Procedure Laterality Date    CHOLECYSTECTOMY      HYSTERECTOMY      SMALL INTESTINE SURGERY      As a child    TOE SURGERY Left     Great    TONSILLECTOMY      and Adnoids    TUBAL ABDOMINAL LIGATION           MEDICATIONS:  I have reviewed and reconciled the patients medication list in the patients chart at the [facility] nursing [facility] on 10/03/2024.      ALLERGIES:  Allergies   Allergen Reactions    Penicillins Other (See Comments)     WAS A YOUNG CHILD AND WAS NEVER TOLD WHAT HER REACTION WAS    Sulfa Antibiotics Other (See Comments)     WAS A YOUNG CHILD AND WAS NEVER TOLD WHAT HER REACTION WAS         SOCIAL HISTORY:  Social History     Socioeconomic History    Marital status:     Number of children: 3   Tobacco Use    Smoking status: Every Day     Current packs/day: 1.00     Average packs/day: 1 pack/day for 40.0 years (40.0 ttl pk-yrs)     Types: Cigarettes     Passive exposure: Current    Smokeless tobacco: Never   Vaping Use    Vaping status: Never Used   Substance and Sexual Activity    Alcohol use: No    Drug use: No    Sexual activity: Defer       FAMILY HISTORY:  Family History   Problem Relation Age of Onset    Heart disease Mother     Coronary artery disease Mother     Heart attack Mother     Lung cancer Father     Diabetes Father     Diabetes Brother     Hypertension Brother         REVIEW OF SYSTEMS:  Review of Systems    PHYSICAL EXAMINATION:   VITAL SIGNS: /66   Pulse 74   Temp 97.3 °F (36.3 °C)   Resp 20   Wt 71.2 kg (157 lb)   SpO2 (!) 88% Comment: VITALS EMAILED PER SUSU WILLS.  BMI 24.59 kg/m²     Physical Exam  Vitals and nursing note reviewed.   Constitutional:       Appearance: Normal appearance. She is well-developed.   HENT:      Head: Normocephalic and atraumatic.      Nose: Nose normal.       Mouth/Throat:      Mouth: Mucous membranes are moist.      Pharynx: No oropharyngeal exudate.   Eyes:      General: No scleral icterus.     Conjunctiva/sclera: Conjunctivae normal.      Pupils: Pupils are equal, round, and reactive to light.   Neck:      Thyroid: No thyromegaly.   Cardiovascular:      Rate and Rhythm: Normal rate and regular rhythm.      Heart sounds: Normal heart sounds. No murmur heard.     No friction rub. No gallop.   Pulmonary:      Effort: Pulmonary effort is normal. No respiratory distress.      Breath sounds: Normal breath sounds. No wheezing.   Abdominal:      General: Bowel sounds are normal. There is no distension.      Palpations: Abdomen is soft.      Tenderness: There is no abdominal tenderness.   Musculoskeletal:         General: No deformity or signs of injury.      Cervical back: Normal range of motion and neck supple.   Lymphadenopathy:      Cervical: No cervical adenopathy.   Skin:     General: Skin is warm and dry.      Findings: No rash.   Neurological:      Mental Status: She is alert and oriented to person, place, and time.   Psychiatric:         Mood and Affect: Mood normal.         Behavior: Behavior normal.         RECORDS REVIEW:   Discharge Summary [facility] 10/2/24    ASSESSMENT   Diagnoses and all orders for this visit:    1. Acute and chronic respiratory failure with hypoxia (Primary)    2. COPD with acute exacerbation    3. Chronic kidney disease, stage 4 (severe)    4. Multiple sclerosis    5. Anxiety    6. Restless legs syndrome (RLS)    7. Tobacco abuse    8. Secondary hypertension    9. Mixed hyperlipidemia        Assessment & Plan  Acute on chronic hypoxic respiratory failure secondary to COPD exacerbation and pneumonia  - She has completed her course of Omnicef and doxycycline. Her baseline oxygen requirement is 3.5 L nasal cannula at rest.  End date for abx were clarified today (end tomorrow)    3b chronic kidney disease  - Upon discharge, her diuretics and  lisinopril were held. Her BMP will need to be monitored within 1 week of hospital discharge to ensure her creatinine levels have stabilized. Her volume status will also need to be closely monitored.    Debility and weakness  -She continues to receive physical and occupational therapy at the rehab facility. At baseline, she uses a cane for mobility due to known left lower extremity weakness.    Anemia  - Her baseline hemoglobin is 10, but it has trended downwards to about 8 with her recent hospitalizations. Eliquis was recently discontinued due to an unclear indication.    Multiple sclerosis  -She is continuing her prednisone taper as prescribed from the hospital and remains on 9 mg prednisone daily long term. She has a scheduled follow-up with neurology.    history of C. difficile infection  - She was prophylactically treated with vancomycin in conjunction with her current antibiotics. Currently, she does not require any antibiotics and may continue probiotics in the facility.    History of tobacco abuse  Stopped smoking in the last 3 weeks. We will encourage cessation while she is in the nursing facility.         [x]  Discussed Patient in detail with nursing/staff, addressed all needs today.     [x]  Plan of Care Reviewed   [x]  PT/OT Reviewed   [x]  Order Changes  []  Discharge Plans Reviewed  [x]  Advance Directive on file with Nursing Home.   [x]  POA on file with Nursing Home.    [x]  Code Status listed and reviewed.     Freeman Arredondo DO.  10/3/2024      **Part of this note may be an electronic transcription/translation of spoken language to printed text using the Dragon Dictation System.**    Patient or patient representative verbalized consent for the use of Ambient Listening during the visit with  Freeman Arredondo DO for chart documentation. 10/3/2024  08:36 EDT

## 2024-10-03 NOTE — PROGRESS NOTES
"  Nursing Home History and Physical       Freeman Arredondo DO  [address] [place] [address] Phone: [phone]  Fax: [phone]     PATIENT NAME: Leatha Wall                                                                          YOB: 1950           DATE OF SERVICE: 10/03/2024  FACILITY:  [facility]    CHIEF COMPLAINT:  Nursing facility admission    History of Present Illness  The patient is a 74-year-old female with a history of multiple sclerosis, restless legs, hyperlipidemia, COPD, and tobacco abuse. She was recently hospitalized at [facility] due to concerns of weakness and confusion following her transfer from this facility on 09/28/2024.    Her initial admission was for a COPD exacerbation at [facility] from 08/28/2024 to 09/01/2024, followed by a brief stay at [facility]. She was then transferred to [facility], but within 72 hours, she was sent to [facility]. Her recent hospitalization was due to altered mental status with sepsis and pneumonia. She was treated with Rocephin and doxycycline, which were changed to Omnicef and doxycycline upon discharge, along with a prednisone taper. Given her history of recurrent aspiration, she was also advised to resume taking azithromycin three times a week after completing her antibiotics.           PAST MEDICAL & SURGICAL HISTORY:   Past Medical History:   Diagnosis Date    Aneurysm of aorta     Anxiety and depression     Arthritis     Back disorder     \"PROTRUDING DISC\"    Bilateral cataracts     Chronic respiratory failure with hypoxia     Cognitive communication deficit     COPD (chronic obstructive pulmonary disease)     Dysphagia, oropharyngeal phase     WOLF (generalized anxiety disorder)     Heavy tobacco smoker     Hepatitis C     Hyperlipidemia     Hypertension     Idiopathic gout     Idiopathic gout, unspecified site     MS (multiple sclerosis)     Multiple " sclerosis     Pneumonia     Sepsis, unspecified organism     Stroke     Unspecified sequelae of cerebral infarction     Urinary tract infection       Past Surgical History:   Procedure Laterality Date    CHOLECYSTECTOMY      HYSTERECTOMY      SMALL INTESTINE SURGERY      As a child    TOE SURGERY Left     Great    TONSILLECTOMY      and Adnoids    TUBAL ABDOMINAL LIGATION           MEDICATIONS:  I have reviewed and reconciled the patients medication list in the patients chart at the [facility] nursing [facility] on 10/03/2024.      ALLERGIES:  Allergies   Allergen Reactions    Penicillins Other (See Comments)     WAS A YOUNG CHILD AND WAS NEVER TOLD WHAT HER REACTION WAS    Sulfa Antibiotics Other (See Comments)     WAS A YOUNG CHILD AND WAS NEVER TOLD WHAT HER REACTION WAS         SOCIAL HISTORY:  Social History     Socioeconomic History    Marital status:     Number of children: 3   Tobacco Use    Smoking status: Every Day     Current packs/day: 1.00     Average packs/day: 1 pack/day for 40.0 years (40.0 ttl pk-yrs)     Types: Cigarettes     Passive exposure: Current    Smokeless tobacco: Never   Vaping Use    Vaping status: Never Used   Substance and Sexual Activity    Alcohol use: No    Drug use: No    Sexual activity: Defer       FAMILY HISTORY:  Family History   Problem Relation Age of Onset    Heart disease Mother     Coronary artery disease Mother     Heart attack Mother     Lung cancer Father     Diabetes Father     Diabetes Brother     Hypertension Brother         REVIEW OF SYSTEMS:  Review of Systems    PHYSICAL EXAMINATION:   VITAL SIGNS: /66   Pulse 74   Temp 97.3 °F (36.3 °C)   Resp 20   Wt 71.2 kg (157 lb)   SpO2 (!) 88% Comment: VITALS EMAILED PER SUSU WILLS.  BMI 24.59 kg/m²     Physical Exam  Vitals and nursing note reviewed.   Constitutional:       Appearance: Normal appearance. She is well-developed.   HENT:      Head: Normocephalic and atraumatic.      Nose: Nose normal.       Mouth/Throat:      Mouth: Mucous membranes are moist.      Pharynx: No oropharyngeal exudate.   Eyes:      General: No scleral icterus.     Conjunctiva/sclera: Conjunctivae normal.      Pupils: Pupils are equal, round, and reactive to light.   Neck:      Thyroid: No thyromegaly.   Cardiovascular:      Rate and Rhythm: Normal rate and regular rhythm.      Heart sounds: Normal heart sounds. No murmur heard.     No friction rub. No gallop.   Pulmonary:      Effort: Pulmonary effort is normal. No respiratory distress.      Breath sounds: Normal breath sounds. No wheezing.   Abdominal:      General: Bowel sounds are normal. There is no distension.      Palpations: Abdomen is soft.      Tenderness: There is no abdominal tenderness.   Musculoskeletal:         General: No deformity or signs of injury.      Cervical back: Normal range of motion and neck supple.   Lymphadenopathy:      Cervical: No cervical adenopathy.   Skin:     General: Skin is warm and dry.      Findings: No rash.   Neurological:      Mental Status: She is alert and oriented to person, place, and time.   Psychiatric:         Mood and Affect: Mood normal.         Behavior: Behavior normal.         RECORDS REVIEW:   Discharge Summary [facility] 10/2/24    ASSESSMENT   Diagnoses and all orders for this visit:    1. Acute and chronic respiratory failure with hypoxia (Primary)    2. COPD with acute exacerbation    3. Chronic kidney disease, stage 4 (severe)    4. Multiple sclerosis    5. Anxiety    6. Restless legs syndrome (RLS)    7. Tobacco abuse    8. Secondary hypertension    9. Mixed hyperlipidemia        Assessment & Plan  Acute on chronic hypoxic respiratory failure secondary to COPD exacerbation and pneumonia  - She has completed her course of Omnicef and doxycycline. Her baseline oxygen requirement is 3.5 L nasal cannula at rest.  End date for abx were clarified today (end tomorrow)    3b chronic kidney disease  - Upon discharge, her diuretics and  lisinopril were held. Her BMP will need to be monitored within 1 week of hospital discharge to ensure her creatinine levels have stabilized. Her volume status will also need to be closely monitored.    Debility and weakness  -She continues to receive physical and occupational therapy at the rehab facility. At baseline, she uses a cane for mobility due to known left lower extremity weakness.    Anemia  - Her baseline hemoglobin is 10, but it has trended downwards to about 8 with her recent hospitalizations. Eliquis was recently discontinued due to an unclear indication.    Multiple sclerosis  -She is continuing her prednisone taper as prescribed from the hospital and remains on 9 mg prednisone daily long term. She has a scheduled follow-up with neurology.    history of C. difficile infection  - She was prophylactically treated with vancomycin in conjunction with her current antibiotics. Currently, she does not require any antibiotics and may continue probiotics in the facility.    History of tobacco abuse  Stopped smoking in the last 3 weeks. We will encourage cessation while she is in the nursing facility.         [x]  Discussed Patient in detail with nursing/staff, addressed all needs today.     [x]  Plan of Care Reviewed   [x]  PT/OT Reviewed   [x]  Order Changes  []  Discharge Plans Reviewed  [x]  Advance Directive on file with Nursing Home.   [x]  POA on file with Nursing Home.    [x]  Code Status listed and reviewed.     Freeman Arredondo DO.  10/3/2024      **Part of this note may be an electronic transcription/translation of spoken language to printed text using the Dragon Dictation System.**    Patient or patient representative verbalized consent for the use of Ambient Listening during the visit with  Freeman Arredondo DO for chart documentation. 10/3/2024  08:36 EDT